# Patient Record
Sex: MALE | Race: WHITE | Employment: OTHER | URBAN - NONMETROPOLITAN AREA
[De-identification: names, ages, dates, MRNs, and addresses within clinical notes are randomized per-mention and may not be internally consistent; named-entity substitution may affect disease eponyms.]

---

## 2018-02-08 ENCOUNTER — HOSPITAL ENCOUNTER (OUTPATIENT)
Age: 73
Discharge: HOME | End: 2018-02-08
Payer: MEDICARE

## 2018-02-08 LAB
ABSOLUTE EOS #: 0.5 K/UL (ref 0–0.4)
ABSOLUTE IMMATURE GRANULOCYTE: ABNORMAL K/UL (ref 0–0.3)
ABSOLUTE LYMPH #: 2 K/UL (ref 1–4.8)
ABSOLUTE MONO #: 0.8 K/UL (ref 0–1)
ALBUMIN SERPL-MCNC: 4 G/DL (ref 3.5–5.2)
ALBUMIN/GLOBULIN RATIO: 1.3 (ref 1–2.5)
ALP BLD-CCNC: 45 U/L (ref 40–129)
ALT SERPL-CCNC: 26 U/L (ref 5–41)
ANION GAP SERPL CALCULATED.3IONS-SCNC: 11 MMOL/L (ref 9–17)
AST SERPL-CCNC: 23 U/L
BASOPHILS # BLD: 0 % (ref 0–2)
BASOPHILS ABSOLUTE: 0 K/UL (ref 0–0.2)
BILIRUB SERPL-MCNC: 0.55 MG/DL (ref 0.3–1.2)
BUN BLDV-MCNC: 9 MG/DL (ref 8–23)
BUN/CREAT BLD: 13 (ref 9–20)
CALCIUM SERPL-MCNC: 9.2 MG/DL (ref 8.6–10.4)
CHLORIDE BLD-SCNC: 99 MMOL/L (ref 98–107)
CO2: 27 MMOL/L (ref 20–31)
CREAT SERPL-MCNC: 0.71 MG/DL (ref 0.7–1.2)
DIFFERENTIAL TYPE: ABNORMAL
EOSINOPHILS RELATIVE PERCENT: 6 % (ref 0–8)
GFR AFRICAN AMERICAN: >60 ML/MIN
GFR NON-AFRICAN AMERICAN: >60 ML/MIN
GFR SERPL CREATININE-BSD FRML MDRD: ABNORMAL ML/MIN/{1.73_M2}
GFR SERPL CREATININE-BSD FRML MDRD: ABNORMAL ML/MIN/{1.73_M2}
GLUCOSE BLD-MCNC: 255 MG/DL (ref 70–99)
HCT VFR BLD CALC: 39.3 % (ref 41–53)
HEMOGLOBIN: 13.2 G/DL (ref 13.5–17)
IMMATURE GRANULOCYTES: ABNORMAL %
LYMPHOCYTES # BLD: 23 % (ref 24–44)
MCH RBC QN AUTO: 28.6 PG (ref 26–34)
MCHC RBC AUTO-ENTMCNC: 33.5 G/DL (ref 31–37)
MCV RBC AUTO: 85.4 FL (ref 80–100)
MONOCYTES # BLD: 9 % (ref 0–12)
NRBC AUTOMATED: ABNORMAL PER 100 WBC
PDW BLD-RTO: 13.9 % (ref 12.1–15.2)
PLATELET # BLD: 250 K/UL (ref 140–450)
PLATELET ESTIMATE: ABNORMAL
PMV BLD AUTO: 7.2 FL (ref 6–12)
POTASSIUM SERPL-SCNC: 4.3 MMOL/L (ref 3.7–5.3)
RBC # BLD: 4.6 M/UL (ref 4.5–5.9)
RBC # BLD: ABNORMAL 10*6/UL
SEG NEUTROPHILS: 62 % (ref 36–66)
SEGMENTED NEUTROPHILS ABSOLUTE COUNT: 5.2 K/UL (ref 1.8–7.7)
SODIUM BLD-SCNC: 137 MMOL/L (ref 135–144)
TOTAL PROTEIN: 7.2 G/DL (ref 6.4–8.3)
WBC # BLD: 8.6 K/UL (ref 3.5–11)
WBC # BLD: ABNORMAL 10*3/UL

## 2018-02-08 PROCEDURE — 87505 NFCT AGENT DETECTION GI: CPT

## 2018-02-08 PROCEDURE — 83630 LACTOFERRIN FECAL (QUAL): CPT

## 2018-02-08 PROCEDURE — 82272 OCCULT BLD FECES 1-3 TESTS: CPT

## 2018-02-08 PROCEDURE — 36415 COLL VENOUS BLD VENIPUNCTURE: CPT

## 2018-02-08 PROCEDURE — 80053 COMPREHEN METABOLIC PANEL: CPT

## 2018-02-08 PROCEDURE — 85025 COMPLETE CBC W/AUTO DIFF WBC: CPT

## 2018-02-09 LAB
CAMPYLOBACTER PCR: NORMAL
DATE, STOOL #1: NORMAL
DATE, STOOL #2: NORMAL
DATE, STOOL #3: NORMAL
HEMOCCULT SP1 STL QL: NEGATIVE
HEMOCCULT SP2 STL QL: NORMAL
HEMOCCULT SP3 STL QL: NORMAL
LACTOFERRIN, QUAL: ABNORMAL
SALMONELLA PCR: NORMAL
SHIGATOXIN GENE PCR: NORMAL
SHIGELLA SP PCR: NORMAL
SPECIMEN: NORMAL
TIME, STOOL #1: 1700
TIME, STOOL #2: NORMAL
TIME, STOOL #3: NORMAL

## 2018-09-05 ENCOUNTER — OFFICE VISIT (OUTPATIENT)
Dept: SURGERY | Age: 73
End: 2018-09-05

## 2018-09-05 VITALS
DIASTOLIC BLOOD PRESSURE: 75 MMHG | SYSTOLIC BLOOD PRESSURE: 120 MMHG | BODY MASS INDEX: 34.13 KG/M2 | RESPIRATION RATE: 24 BRPM | HEIGHT: 70 IN | HEART RATE: 82 BPM | WEIGHT: 238.4 LBS | TEMPERATURE: 98.2 F

## 2018-09-05 DIAGNOSIS — Z12.11 SCREENING FOR MALIGNANT NEOPLASM OF COLON: Primary | ICD-10-CM

## 2018-09-05 PROCEDURE — 1036F TOBACCO NON-USER: CPT | Performed by: SURGERY

## 2018-09-05 PROCEDURE — 99999 PR OFFICE/OUTPT VISIT,PROCEDURE ONLY: CPT | Performed by: SURGERY

## 2018-09-05 PROCEDURE — 1101F PT FALLS ASSESS-DOCD LE1/YR: CPT | Performed by: SURGERY

## 2018-09-05 PROCEDURE — 3017F COLORECTAL CA SCREEN DOC REV: CPT | Performed by: SURGERY

## 2018-09-05 PROCEDURE — G8427 DOCREV CUR MEDS BY ELIG CLIN: HCPCS | Performed by: SURGERY

## 2018-09-05 PROCEDURE — 4040F PNEUMOC VAC/ADMIN/RCVD: CPT | Performed by: SURGERY

## 2018-09-05 PROCEDURE — G8419 CALC BMI OUT NRM PARAM NOF/U: HCPCS | Performed by: SURGERY

## 2018-09-05 PROCEDURE — 1123F ACP DISCUSS/DSCN MKR DOCD: CPT | Performed by: SURGERY

## 2018-09-05 RX ORDER — LISINOPRIL 2.5 MG/1
2.5 TABLET ORAL DAILY
COMMUNITY

## 2018-09-05 RX ORDER — ZINC GLUCONATE 50 MG
50 TABLET ORAL DAILY
COMMUNITY

## 2018-09-05 RX ORDER — ATORVASTATIN CALCIUM 10 MG/1
20 TABLET, FILM COATED ORAL NIGHTLY
Refills: 3 | COMMUNITY
Start: 2018-08-27

## 2018-09-05 RX ORDER — FLUTICASONE PROPIONATE 50 MCG
1 SPRAY, SUSPENSION (ML) NASAL DAILY
COMMUNITY

## 2018-09-05 RX ORDER — TAMSULOSIN HYDROCHLORIDE 0.4 MG/1
0.4 CAPSULE ORAL NIGHTLY
COMMUNITY

## 2018-09-10 ENCOUNTER — HOSPITAL ENCOUNTER (OUTPATIENT)
Dept: VASCULAR LAB | Age: 73
Discharge: HOME | End: 2018-09-12
Payer: MEDICARE

## 2018-09-10 DIAGNOSIS — I65.29 STENOSIS OF CAROTID ARTERY, UNSPECIFIED LATERALITY: ICD-10-CM

## 2018-09-10 PROCEDURE — 93880 EXTRACRANIAL BILAT STUDY: CPT

## 2018-10-04 ENCOUNTER — ANESTHESIA EVENT (OUTPATIENT)
Dept: OPERATING ROOM | Age: 73
End: 2018-10-04
Payer: MEDICARE

## 2018-10-05 ENCOUNTER — ANESTHESIA (OUTPATIENT)
Dept: OPERATING ROOM | Age: 73
End: 2018-10-05
Payer: MEDICARE

## 2018-10-05 ENCOUNTER — HOSPITAL ENCOUNTER (OUTPATIENT)
Age: 73
Setting detail: OUTPATIENT SURGERY
Discharge: HOME | End: 2018-10-05
Attending: SURGERY | Admitting: SURGERY
Payer: MEDICARE

## 2018-10-05 VITALS
WEIGHT: 218 LBS | BODY MASS INDEX: 31.21 KG/M2 | HEIGHT: 70 IN | OXYGEN SATURATION: 98 % | TEMPERATURE: 97.2 F | DIASTOLIC BLOOD PRESSURE: 80 MMHG | HEART RATE: 74 BPM | RESPIRATION RATE: 20 BRPM | SYSTOLIC BLOOD PRESSURE: 118 MMHG

## 2018-10-05 VITALS
RESPIRATION RATE: 17 BRPM | SYSTOLIC BLOOD PRESSURE: 113 MMHG | DIASTOLIC BLOOD PRESSURE: 88 MMHG | OXYGEN SATURATION: 98 %

## 2018-10-05 PROBLEM — K64.1 GRADE II INTERNAL HEMORRHOIDS: Chronic | Status: ACTIVE | Noted: 2018-10-05

## 2018-10-05 LAB — GLUCOSE BLD-MCNC: 118 MG/DL (ref 74–100)

## 2018-10-05 PROCEDURE — 3700000000 HC ANESTHESIA ATTENDED CARE: Performed by: SURGERY

## 2018-10-05 PROCEDURE — 2580000003 HC RX 258: Performed by: NURSE ANESTHETIST, CERTIFIED REGISTERED

## 2018-10-05 PROCEDURE — 3609027000 HC COLONOSCOPY: Performed by: SURGERY

## 2018-10-05 PROCEDURE — G0121 COLON CA SCRN NOT HI RSK IND: HCPCS | Performed by: SURGERY

## 2018-10-05 PROCEDURE — 3700000001 HC ADD 15 MINUTES (ANESTHESIA): Performed by: SURGERY

## 2018-10-05 PROCEDURE — 82947 ASSAY GLUCOSE BLOOD QUANT: CPT

## 2018-10-05 PROCEDURE — 7100000010 HC PHASE II RECOVERY - FIRST 15 MIN: Performed by: SURGERY

## 2018-10-05 PROCEDURE — 7100000011 HC PHASE II RECOVERY - ADDTL 15 MIN: Performed by: SURGERY

## 2018-10-05 PROCEDURE — 6360000002 HC RX W HCPCS: Performed by: NURSE ANESTHETIST, CERTIFIED REGISTERED

## 2018-10-05 PROCEDURE — 2500000003 HC RX 250 WO HCPCS: Performed by: NURSE ANESTHETIST, CERTIFIED REGISTERED

## 2018-10-05 PROCEDURE — 2709999900 HC NON-CHARGEABLE SUPPLY: Performed by: SURGERY

## 2018-10-05 RX ORDER — LIDOCAINE HYDROCHLORIDE 20 MG/ML
INJECTION, SOLUTION INFILTRATION; PERINEURAL PRN
Status: DISCONTINUED | OUTPATIENT
Start: 2018-10-05 | End: 2018-10-05 | Stop reason: SDUPTHER

## 2018-10-05 RX ORDER — SODIUM CHLORIDE, SODIUM LACTATE, POTASSIUM CHLORIDE, CALCIUM CHLORIDE 600; 310; 30; 20 MG/100ML; MG/100ML; MG/100ML; MG/100ML
INJECTION, SOLUTION INTRAVENOUS CONTINUOUS
Status: DISCONTINUED | OUTPATIENT
Start: 2018-10-05 | End: 2018-10-05 | Stop reason: HOSPADM

## 2018-10-05 RX ORDER — PROPOFOL 10 MG/ML
INJECTION, EMULSION INTRAVENOUS CONTINUOUS PRN
Status: DISCONTINUED | OUTPATIENT
Start: 2018-10-05 | End: 2018-10-05 | Stop reason: SDUPTHER

## 2018-10-05 RX ORDER — PROPOFOL 10 MG/ML
INJECTION, EMULSION INTRAVENOUS PRN
Status: DISCONTINUED | OUTPATIENT
Start: 2018-10-05 | End: 2018-10-05 | Stop reason: SDUPTHER

## 2018-10-05 RX ADMIN — PROPOFOL 70 MG: 10 INJECTION, EMULSION INTRAVENOUS at 10:40

## 2018-10-05 RX ADMIN — SODIUM CHLORIDE, POTASSIUM CHLORIDE, SODIUM LACTATE AND CALCIUM CHLORIDE: 600; 310; 30; 20 INJECTION, SOLUTION INTRAVENOUS at 10:07

## 2018-10-05 RX ADMIN — LIDOCAINE HYDROCHLORIDE 100 MG: 20 INJECTION, SOLUTION INFILTRATION; PERINEURAL at 10:40

## 2018-10-05 RX ADMIN — PROPOFOL 150 MCG/KG/MIN: 10 INJECTION, EMULSION INTRAVENOUS at 10:40

## 2018-10-05 ASSESSMENT — PAIN - FUNCTIONAL ASSESSMENT: PAIN_FUNCTIONAL_ASSESSMENT: 0-10

## 2018-10-05 NOTE — OP NOTE
Op Note    Sulema Kamara  YOB: 1945  [id number]      PCP: Dr. Mo    Pre-operative Diagnosis: screening colonoscopy    Post-operative Diagnosis: grade 2 internal hemorrhoids. Normal colon. Procedure: colonoscopy to cecum    Anesthesia: MAC    Surgeon: Dr. Brice Collins    Estimated Blood Loss: none    Complications: None    Specimens: none     Procedure details:    I do meet with the patient in preoperative holding area. Please see H&P for indications for the above named procedure. Patient was brought to the endoscopy suite and placed under monitored anesthesia. Patient was positioned in left lateral position. Time out called and procedure confirmed. The lubricated variable stiffness pediatric colonoscope was carefully passed under direct vision into the rectum, advanced through the sigmoid colon, transverse colon, ascending colon and the cecum. The ileocecal valve was well visualized. Additional findings:    Findings:   Cecum: normal cecal pouch. IC valve and appendiceal orifice well confirmed  Ascending: normal  Transverse: normal  Descending: normal   Sigmoid: normal  Rectum: normal  Rectal exam: mild grade 1-2 internal hemorrhoids, no masses, no fissure  Bowel prep quality: excellent    At the distal 1/3 of the rectum, the scope was retroflexed showing grade 1-2 internal hemorrhoids. The patient tolerated the procedure well. The abdomen was soft after the procedure. I spoke with patient and wife afterwards. Next colonoscopy recommended 10 years. Active 72year old who appears younger and overall good health.     Electronically signed by David Thompson DO on 10/5/2018 at 10:58 AM

## 2018-10-05 NOTE — BRIEF OP NOTE
Brief Postoperative Note    Sulema Kamara  YOB: 1945  [id number]      PCP: Dr. Mo    Pre-operative Diagnosis: screening colonoscopy    Post-operative Diagnosis: grade 2 internal hemorrhoids. Normal colon.     Procedure: colonoscopy to cecum    Anesthesia: MAC    Surgeon: Dr. Brice Collins    Estimated Blood Loss: none    Complications: None    Specimens: none         Electronically signed by David Thompson DO on 10/5/2018 at 10:58 AM

## 2021-01-01 ENCOUNTER — ANESTHESIA (OUTPATIENT)
Dept: ICU | Age: 76
DRG: 871 | End: 2021-01-01
Payer: MEDICARE

## 2021-01-01 ENCOUNTER — APPOINTMENT (OUTPATIENT)
Dept: GENERAL RADIOLOGY | Age: 76
DRG: 870 | End: 2021-01-01
Attending: INTERNAL MEDICINE
Payer: MEDICARE

## 2021-01-01 ENCOUNTER — APPOINTMENT (OUTPATIENT)
Dept: GENERAL RADIOLOGY | Age: 76
DRG: 871 | End: 2021-01-01
Payer: MEDICARE

## 2021-01-01 ENCOUNTER — HOSPITAL ENCOUNTER (INPATIENT)
Age: 76
LOS: 16 days | DRG: 870 | End: 2021-02-20
Attending: INTERNAL MEDICINE | Admitting: INTERNAL MEDICINE
Payer: MEDICARE

## 2021-01-01 ENCOUNTER — HOSPITAL ENCOUNTER (OUTPATIENT)
Dept: LAB | Age: 76
Setting detail: SPECIMEN
Discharge: HOME | End: 2021-01-14
Payer: MEDICARE

## 2021-01-01 ENCOUNTER — APPOINTMENT (OUTPATIENT)
Dept: VASCULAR LAB | Age: 76
DRG: 871 | End: 2021-01-01
Payer: MEDICARE

## 2021-01-01 ENCOUNTER — TELEPHONE (OUTPATIENT)
Dept: PRIMARY CARE CLINIC | Age: 76
End: 2021-01-01

## 2021-01-01 ENCOUNTER — APPOINTMENT (OUTPATIENT)
Dept: ULTRASOUND IMAGING | Age: 76
DRG: 870 | End: 2021-01-01
Attending: INTERNAL MEDICINE
Payer: MEDICARE

## 2021-01-01 ENCOUNTER — HOSPITAL ENCOUNTER (INPATIENT)
Age: 76
LOS: 15 days | Discharge: ANOTHER HOSPITAL | DRG: 871 | End: 2021-02-03
Attending: EMERGENCY MEDICINE | Admitting: INTERNAL MEDICINE
Payer: MEDICARE

## 2021-01-01 ENCOUNTER — ANESTHESIA EVENT (OUTPATIENT)
Dept: ICU | Age: 76
DRG: 871 | End: 2021-01-01
Payer: MEDICARE

## 2021-01-01 ENCOUNTER — HOSPITAL ENCOUNTER (OUTPATIENT)
Age: 76
Setting detail: SPECIMEN
Discharge: HOME | End: 2021-01-14
Payer: MEDICARE

## 2021-01-01 VITALS
SYSTOLIC BLOOD PRESSURE: 119 MMHG | BODY MASS INDEX: 36.77 KG/M2 | OXYGEN SATURATION: 78 % | WEIGHT: 256.84 LBS | HEART RATE: 110 BPM | TEMPERATURE: 101.3 F | DIASTOLIC BLOOD PRESSURE: 69 MMHG | RESPIRATION RATE: 21 BRPM | HEIGHT: 70 IN

## 2021-01-01 VITALS
BODY MASS INDEX: 34.06 KG/M2 | OXYGEN SATURATION: 99 % | HEIGHT: 70 IN | WEIGHT: 237.88 LBS | TEMPERATURE: 97 F | RESPIRATION RATE: 20 BRPM | DIASTOLIC BLOOD PRESSURE: 55 MMHG | HEART RATE: 96 BPM | SYSTOLIC BLOOD PRESSURE: 84 MMHG

## 2021-01-01 DIAGNOSIS — J96.01 ACUTE HYPOXEMIC RESPIRATORY FAILURE (HCC): Primary | ICD-10-CM

## 2021-01-01 DIAGNOSIS — U07.1 PNEUMONIA DUE TO SEVERE ACUTE RESPIRATORY SYNDROME CORONAVIRUS 2 (SARS-COV-2): ICD-10-CM

## 2021-01-01 DIAGNOSIS — J12.82 PNEUMONIA DUE TO SEVERE ACUTE RESPIRATORY SYNDROME CORONAVIRUS 2 (SARS-COV-2): ICD-10-CM

## 2021-01-01 LAB
-: ABNORMAL
ABSOLUTE EOS #: 0 K/UL (ref 0–0.4)
ABSOLUTE EOS #: 0 K/UL (ref 0–0.44)
ABSOLUTE EOS #: 0.08 K/UL (ref 0–0.44)
ABSOLUTE EOS #: 0.09 K/UL (ref 0–0.4)
ABSOLUTE EOS #: 0.1 K/UL (ref 0–0.44)
ABSOLUTE EOS #: 0.23 K/UL (ref 0–0.44)
ABSOLUTE EOS #: <0.03 K/UL (ref 0–0.44)
ABSOLUTE IMMATURE GRANULOCYTE: 0 K/UL (ref 0–0.3)
ABSOLUTE IMMATURE GRANULOCYTE: 0.08 K/UL (ref 0–0.3)
ABSOLUTE IMMATURE GRANULOCYTE: 0.12 K/UL (ref 0–0.3)
ABSOLUTE IMMATURE GRANULOCYTE: 0.12 K/UL (ref 0–0.3)
ABSOLUTE IMMATURE GRANULOCYTE: 0.17 K/UL (ref 0–0.3)
ABSOLUTE IMMATURE GRANULOCYTE: 0.18 K/UL (ref 0–0.3)
ABSOLUTE IMMATURE GRANULOCYTE: 0.19 K/UL (ref 0–0.3)
ABSOLUTE IMMATURE GRANULOCYTE: 0.19 K/UL (ref 0–0.3)
ABSOLUTE IMMATURE GRANULOCYTE: 0.23 K/UL (ref 0–0.3)
ABSOLUTE IMMATURE GRANULOCYTE: 0.25 K/UL (ref 0–0.3)
ABSOLUTE IMMATURE GRANULOCYTE: 0.26 K/UL (ref 0–0.3)
ABSOLUTE IMMATURE GRANULOCYTE: 0.28 K/UL (ref 0–0.3)
ABSOLUTE IMMATURE GRANULOCYTE: 0.29 K/UL (ref 0–0.3)
ABSOLUTE IMMATURE GRANULOCYTE: 0.31 K/UL (ref 0–0.3)
ABSOLUTE IMMATURE GRANULOCYTE: 0.33 K/UL (ref 0–0.3)
ABSOLUTE IMMATURE GRANULOCYTE: 0.4 K/UL (ref 0–0.3)
ABSOLUTE IMMATURE GRANULOCYTE: 0.41 K/UL (ref 0–0.3)
ABSOLUTE IMMATURE GRANULOCYTE: 0.47 K/UL (ref 0–0.3)
ABSOLUTE LYMPH #: 0.12 K/UL (ref 1.1–3.7)
ABSOLUTE LYMPH #: 0.13 K/UL (ref 1.1–3.7)
ABSOLUTE LYMPH #: 0.14 K/UL (ref 1–4.8)
ABSOLUTE LYMPH #: 0.16 K/UL (ref 1.1–3.7)
ABSOLUTE LYMPH #: 0.17 K/UL (ref 1.1–3.7)
ABSOLUTE LYMPH #: 0.18 K/UL (ref 1–4.8)
ABSOLUTE LYMPH #: 0.19 K/UL (ref 1.1–3.7)
ABSOLUTE LYMPH #: 0.2 K/UL (ref 1.1–3.7)
ABSOLUTE LYMPH #: 0.24 K/UL (ref 1.1–3.7)
ABSOLUTE LYMPH #: 0.29 K/UL (ref 1.1–3.7)
ABSOLUTE LYMPH #: 0.29 K/UL (ref 1.1–3.7)
ABSOLUTE LYMPH #: 0.3 K/UL (ref 1.1–3.7)
ABSOLUTE LYMPH #: 0.33 K/UL (ref 1.1–3.7)
ABSOLUTE LYMPH #: 0.34 K/UL (ref 1.1–3.7)
ABSOLUTE LYMPH #: 0.37 K/UL (ref 1–4.8)
ABSOLUTE LYMPH #: 0.43 K/UL (ref 1–4.8)
ABSOLUTE LYMPH #: 0.45 K/UL (ref 1.1–3.7)
ABSOLUTE LYMPH #: 0.46 K/UL (ref 1.1–3.7)
ABSOLUTE LYMPH #: 0.46 K/UL (ref 1.1–3.7)
ABSOLUTE LYMPH #: 0.49 K/UL (ref 1.1–3.7)
ABSOLUTE LYMPH #: 0.49 K/UL (ref 1–4.8)
ABSOLUTE LYMPH #: 0.5 K/UL (ref 1.1–3.7)
ABSOLUTE LYMPH #: 0.53 K/UL (ref 1.1–3.7)
ABSOLUTE LYMPH #: 0.56 K/UL (ref 1–4.8)
ABSOLUTE LYMPH #: 0.59 K/UL (ref 1.1–3.7)
ABSOLUTE LYMPH #: 0.6 K/UL (ref 1.1–3.7)
ABSOLUTE LYMPH #: 0.62 K/UL (ref 1.1–3.7)
ABSOLUTE LYMPH #: 0.65 K/UL (ref 1.1–3.7)
ABSOLUTE LYMPH #: 0.7 K/UL (ref 1.1–3.7)
ABSOLUTE LYMPH #: 0.71 K/UL (ref 1.1–3.7)
ABSOLUTE LYMPH #: 0.77 K/UL (ref 1–4.8)
ABSOLUTE LYMPH #: 0.83 K/UL (ref 1.1–3.7)
ABSOLUTE MONO #: 0 K/UL (ref 0.1–0.8)
ABSOLUTE MONO #: 0 K/UL (ref 0.1–1.2)
ABSOLUTE MONO #: 0.13 K/UL (ref 0.1–1.2)
ABSOLUTE MONO #: 0.13 K/UL (ref 0.1–1.2)
ABSOLUTE MONO #: 0.15 K/UL (ref 0.1–1.2)
ABSOLUTE MONO #: 0.18 K/UL (ref 0.1–0.8)
ABSOLUTE MONO #: 0.26 K/UL (ref 0.1–1.2)
ABSOLUTE MONO #: 0.34 K/UL (ref 0.1–1.2)
ABSOLUTE MONO #: 0.4 K/UL (ref 0.1–1.2)
ABSOLUTE MONO #: 0.46 K/UL (ref 0.1–0.8)
ABSOLUTE MONO #: 0.46 K/UL (ref 0.1–1.2)
ABSOLUTE MONO #: 0.46 K/UL (ref 0.1–1.2)
ABSOLUTE MONO #: 0.47 K/UL (ref 0.1–1.2)
ABSOLUTE MONO #: 0.48 K/UL (ref 0.1–1.2)
ABSOLUTE MONO #: 0.48 K/UL (ref 0.1–1.2)
ABSOLUTE MONO #: 0.49 K/UL (ref 0.1–1.2)
ABSOLUTE MONO #: 0.5 K/UL (ref 0.1–1.2)
ABSOLUTE MONO #: 0.51 K/UL (ref 0.1–0.8)
ABSOLUTE MONO #: 0.55 K/UL (ref 0.1–1.2)
ABSOLUTE MONO #: 0.56 K/UL (ref 0.1–0.8)
ABSOLUTE MONO #: 0.57 K/UL (ref 0.1–1.2)
ABSOLUTE MONO #: 0.57 K/UL (ref 0.1–1.2)
ABSOLUTE MONO #: 0.58 K/UL (ref 0.1–1.2)
ABSOLUTE MONO #: 0.59 K/UL (ref 0.1–1.2)
ABSOLUTE MONO #: 0.61 K/UL (ref 0.1–1.2)
ABSOLUTE MONO #: 0.62 K/UL (ref 0.1–1.2)
ABSOLUTE MONO #: 0.65 K/UL (ref 0.1–0.8)
ABSOLUTE MONO #: 0.73 K/UL (ref 0.1–0.8)
ABSOLUTE MONO #: 1.01 K/UL (ref 0.1–1.2)
ACTION: NORMAL
ALBUMIN SERPL-MCNC: 2.4 G/DL (ref 3.5–5.2)
ALBUMIN SERPL-MCNC: 2.5 G/DL (ref 3.5–5.2)
ALBUMIN SERPL-MCNC: 2.6 G/DL (ref 3.5–5.2)
ALBUMIN SERPL-MCNC: 2.7 G/DL (ref 3.5–5.2)
ALBUMIN SERPL-MCNC: 2.8 G/DL (ref 3.5–5.2)
ALBUMIN SERPL-MCNC: 2.8 G/DL (ref 3.5–5.2)
ALBUMIN SERPL-MCNC: 3 G/DL (ref 3.5–5.2)
ALBUMIN SERPL-MCNC: 3.1 G/DL (ref 3.5–5.2)
ALBUMIN SERPL-MCNC: 3.1 G/DL (ref 3.5–5.2)
ALBUMIN SERPL-MCNC: 3.2 G/DL (ref 3.5–5.2)
ALBUMIN SERPL-MCNC: 3.4 G/DL (ref 3.5–5.2)
ALBUMIN SERPL-MCNC: 3.9 G/DL (ref 3.5–5.2)
ALBUMIN/GLOBULIN RATIO: 0.6 (ref 1–2.5)
ALBUMIN/GLOBULIN RATIO: 0.7 (ref 1–2.5)
ALBUMIN/GLOBULIN RATIO: 0.8 (ref 1–2.5)
ALBUMIN/GLOBULIN RATIO: 0.9 (ref 1–2.5)
ALBUMIN/GLOBULIN RATIO: 1.1 (ref 1–2.5)
ALLEN TEST: ABNORMAL
ALP BLD-CCNC: 123 U/L (ref 40–129)
ALP BLD-CCNC: 130 U/L (ref 40–129)
ALP BLD-CCNC: 141 U/L (ref 40–129)
ALP BLD-CCNC: 145 U/L (ref 40–129)
ALP BLD-CCNC: 163 U/L (ref 40–129)
ALP BLD-CCNC: 169 U/L (ref 40–129)
ALP BLD-CCNC: 170 U/L (ref 40–129)
ALP BLD-CCNC: 173 U/L (ref 40–129)
ALP BLD-CCNC: 180 U/L (ref 40–129)
ALP BLD-CCNC: 195 U/L (ref 40–129)
ALP BLD-CCNC: 213 U/L (ref 40–129)
ALP BLD-CCNC: 219 U/L (ref 40–129)
ALP BLD-CCNC: 219 U/L (ref 40–129)
ALP BLD-CCNC: 62 U/L (ref 40–129)
ALP BLD-CCNC: 69 U/L (ref 40–129)
ALP BLD-CCNC: 69 U/L (ref 40–129)
ALP BLD-CCNC: 93 U/L (ref 40–129)
ALP BLD-CCNC: 96 U/L (ref 40–129)
ALT SERPL-CCNC: 12 U/L (ref 5–41)
ALT SERPL-CCNC: 13 U/L (ref 5–41)
ALT SERPL-CCNC: 14 U/L (ref 5–41)
ALT SERPL-CCNC: 15 U/L (ref 5–41)
ALT SERPL-CCNC: 18 U/L (ref 5–41)
ALT SERPL-CCNC: 21 U/L (ref 5–41)
ALT SERPL-CCNC: 22 U/L (ref 5–41)
ALT SERPL-CCNC: 24 U/L (ref 5–41)
ALT SERPL-CCNC: 26 U/L (ref 5–41)
AMORPHOUS: ABNORMAL
ANION GAP SERPL CALCULATED.3IONS-SCNC: 10 MMOL/L (ref 9–17)
ANION GAP SERPL CALCULATED.3IONS-SCNC: 10 MMOL/L (ref 9–17)
ANION GAP SERPL CALCULATED.3IONS-SCNC: 11 MMOL/L (ref 9–17)
ANION GAP SERPL CALCULATED.3IONS-SCNC: 12 MMOL/L (ref 9–17)
ANION GAP SERPL CALCULATED.3IONS-SCNC: 12 MMOL/L (ref 9–17)
ANION GAP SERPL CALCULATED.3IONS-SCNC: 16 MMOL/L (ref 9–17)
ANION GAP SERPL CALCULATED.3IONS-SCNC: 2 MMOL/L (ref 9–17)
ANION GAP SERPL CALCULATED.3IONS-SCNC: 3 MMOL/L (ref 9–17)
ANION GAP SERPL CALCULATED.3IONS-SCNC: 4 MMOL/L (ref 9–17)
ANION GAP SERPL CALCULATED.3IONS-SCNC: 4 MMOL/L (ref 9–17)
ANION GAP SERPL CALCULATED.3IONS-SCNC: 5 MMOL/L (ref 9–17)
ANION GAP SERPL CALCULATED.3IONS-SCNC: 6 MMOL/L (ref 9–17)
ANION GAP SERPL CALCULATED.3IONS-SCNC: 7 MMOL/L (ref 9–17)
ANION GAP SERPL CALCULATED.3IONS-SCNC: 8 MMOL/L (ref 9–17)
ANION GAP SERPL CALCULATED.3IONS-SCNC: 9 MMOL/L (ref 9–17)
ANION GAP: 6 MMOL/L (ref 7–16)
AST SERPL-CCNC: 19 U/L
AST SERPL-CCNC: 24 U/L
AST SERPL-CCNC: 29 U/L
AST SERPL-CCNC: 30 U/L
AST SERPL-CCNC: 32 U/L
AST SERPL-CCNC: 32 U/L
AST SERPL-CCNC: 33 U/L
AST SERPL-CCNC: 34 U/L
AST SERPL-CCNC: 35 U/L
AST SERPL-CCNC: 36 U/L
AST SERPL-CCNC: 37 U/L
AST SERPL-CCNC: 37 U/L
AST SERPL-CCNC: 38 U/L
AST SERPL-CCNC: 40 U/L
AST SERPL-CCNC: 41 U/L
BACTERIA: ABNORMAL
BASOPHILS # BLD: 0 % (ref 0–2)
BASOPHILS # BLD: 1 % (ref 0–2)
BASOPHILS ABSOLUTE: 0 K/UL (ref 0–0.2)
BASOPHILS ABSOLUTE: 0.11 K/UL (ref 0–0.2)
BASOPHILS ABSOLUTE: <0.03 K/UL (ref 0–0.2)
BILIRUB SERPL-MCNC: 0.42 MG/DL (ref 0.3–1.2)
BILIRUB SERPL-MCNC: 0.54 MG/DL (ref 0.3–1.2)
BILIRUB SERPL-MCNC: 0.59 MG/DL (ref 0.3–1.2)
BILIRUB SERPL-MCNC: 0.6 MG/DL (ref 0.3–1.2)
BILIRUB SERPL-MCNC: 0.61 MG/DL (ref 0.3–1.2)
BILIRUB SERPL-MCNC: 0.62 MG/DL (ref 0.3–1.2)
BILIRUB SERPL-MCNC: 0.66 MG/DL (ref 0.3–1.2)
BILIRUB SERPL-MCNC: 0.68 MG/DL (ref 0.3–1.2)
BILIRUB SERPL-MCNC: 0.71 MG/DL (ref 0.3–1.2)
BILIRUB SERPL-MCNC: 0.75 MG/DL (ref 0.3–1.2)
BILIRUB SERPL-MCNC: 0.78 MG/DL (ref 0.3–1.2)
BILIRUB SERPL-MCNC: 0.79 MG/DL (ref 0.3–1.2)
BILIRUB SERPL-MCNC: 0.83 MG/DL (ref 0.3–1.2)
BILIRUB SERPL-MCNC: 0.86 MG/DL (ref 0.3–1.2)
BILIRUB SERPL-MCNC: 0.94 MG/DL (ref 0.3–1.2)
BILIRUB SERPL-MCNC: 0.97 MG/DL (ref 0.3–1.2)
BILIRUB SERPL-MCNC: 1.03 MG/DL (ref 0.3–1.2)
BILIRUB SERPL-MCNC: 1.04 MG/DL (ref 0.3–1.2)
BILIRUBIN DIRECT: 0.21 MG/DL
BILIRUBIN URINE: NEGATIVE
BILIRUBIN, INDIRECT: 0.41 MG/DL (ref 0–1)
BNP INTERPRETATION: ABNORMAL
BUN BLDV-MCNC: 18 MG/DL (ref 8–23)
BUN BLDV-MCNC: 20 MG/DL (ref 8–23)
BUN BLDV-MCNC: 20 MG/DL (ref 8–23)
BUN BLDV-MCNC: 21 MG/DL (ref 8–23)
BUN BLDV-MCNC: 22 MG/DL (ref 8–23)
BUN BLDV-MCNC: 23 MG/DL (ref 8–23)
BUN BLDV-MCNC: 23 MG/DL (ref 8–23)
BUN BLDV-MCNC: 24 MG/DL (ref 8–23)
BUN BLDV-MCNC: 24 MG/DL (ref 8–23)
BUN BLDV-MCNC: 25 MG/DL (ref 8–23)
BUN BLDV-MCNC: 26 MG/DL (ref 8–23)
BUN BLDV-MCNC: 28 MG/DL (ref 8–23)
BUN BLDV-MCNC: 29 MG/DL (ref 8–23)
BUN BLDV-MCNC: 31 MG/DL (ref 8–23)
BUN BLDV-MCNC: 31 MG/DL (ref 8–23)
BUN BLDV-MCNC: 32 MG/DL (ref 8–23)
BUN BLDV-MCNC: 32 MG/DL (ref 8–23)
BUN BLDV-MCNC: 35 MG/DL (ref 8–23)
BUN BLDV-MCNC: 36 MG/DL (ref 8–23)
BUN BLDV-MCNC: 36 MG/DL (ref 8–23)
BUN BLDV-MCNC: 37 MG/DL (ref 8–23)
BUN BLDV-MCNC: 38 MG/DL (ref 8–23)
BUN BLDV-MCNC: 39 MG/DL (ref 8–23)
BUN BLDV-MCNC: 40 MG/DL (ref 8–23)
BUN BLDV-MCNC: 42 MG/DL (ref 8–23)
BUN BLDV-MCNC: 43 MG/DL (ref 8–23)
BUN BLDV-MCNC: 45 MG/DL (ref 8–23)
BUN BLDV-MCNC: 47 MG/DL (ref 8–23)
BUN/CREAT BLD: 21 (ref 9–20)
BUN/CREAT BLD: 26 (ref 9–20)
BUN/CREAT BLD: 29 (ref 9–20)
BUN/CREAT BLD: 30 (ref 9–20)
BUN/CREAT BLD: 31 (ref 9–20)
BUN/CREAT BLD: 32 (ref 9–20)
BUN/CREAT BLD: 32 (ref 9–20)
BUN/CREAT BLD: 33 (ref 9–20)
BUN/CREAT BLD: 33 (ref 9–20)
BUN/CREAT BLD: 36 (ref 9–20)
BUN/CREAT BLD: 36 (ref 9–20)
BUN/CREAT BLD: 37 (ref 9–20)
BUN/CREAT BLD: 37 (ref 9–20)
BUN/CREAT BLD: 39 (ref 9–20)
BUN/CREAT BLD: 42 (ref 9–20)
BUN/CREAT BLD: 49 (ref 9–20)
BUN/CREAT BLD: ABNORMAL (ref 9–20)
C-REACTIVE PROTEIN: 98.4 MG/L (ref 0–5)
CALCIUM SERPL-MCNC: 8.2 MG/DL (ref 8.6–10.4)
CALCIUM SERPL-MCNC: 8.2 MG/DL (ref 8.6–10.4)
CALCIUM SERPL-MCNC: 8.3 MG/DL (ref 8.6–10.4)
CALCIUM SERPL-MCNC: 8.4 MG/DL (ref 8.6–10.4)
CALCIUM SERPL-MCNC: 8.5 MG/DL (ref 8.6–10.4)
CALCIUM SERPL-MCNC: 8.5 MG/DL (ref 8.6–10.4)
CALCIUM SERPL-MCNC: 8.6 MG/DL (ref 8.6–10.4)
CALCIUM SERPL-MCNC: 8.7 MG/DL (ref 8.6–10.4)
CALCIUM SERPL-MCNC: 8.8 MG/DL (ref 8.6–10.4)
CALCIUM SERPL-MCNC: 8.9 MG/DL (ref 8.6–10.4)
CALCIUM SERPL-MCNC: 9 MG/DL (ref 8.6–10.4)
CALCIUM SERPL-MCNC: 9.1 MG/DL (ref 8.6–10.4)
CALCIUM SERPL-MCNC: 9.2 MG/DL (ref 8.6–10.4)
CALCIUM SERPL-MCNC: 9.3 MG/DL (ref 8.6–10.4)
CALCIUM SERPL-MCNC: 9.4 MG/DL (ref 8.6–10.4)
CALCIUM SERPL-MCNC: 9.4 MG/DL (ref 8.6–10.4)
CALCIUM SERPL-MCNC: 9.5 MG/DL (ref 8.6–10.4)
CALCIUM SERPL-MCNC: 9.5 MG/DL (ref 8.6–10.4)
CARBOXYHEMOGLOBIN: ABNORMAL % (ref 0–5)
CASTS UA: ABNORMAL /LPF (ref 0–8)
CHLORIDE BLD-SCNC: 100 MMOL/L (ref 98–107)
CHLORIDE BLD-SCNC: 100 MMOL/L (ref 98–107)
CHLORIDE BLD-SCNC: 101 MMOL/L (ref 98–107)
CHLORIDE BLD-SCNC: 101 MMOL/L (ref 98–107)
CHLORIDE BLD-SCNC: 103 MMOL/L (ref 98–107)
CHLORIDE BLD-SCNC: 103 MMOL/L (ref 98–107)
CHLORIDE BLD-SCNC: 106 MMOL/L (ref 98–107)
CHLORIDE BLD-SCNC: 106 MMOL/L (ref 98–107)
CHLORIDE BLD-SCNC: 107 MMOL/L (ref 98–107)
CHLORIDE BLD-SCNC: 90 MMOL/L (ref 98–107)
CHLORIDE BLD-SCNC: 91 MMOL/L (ref 98–107)
CHLORIDE BLD-SCNC: 92 MMOL/L (ref 98–107)
CHLORIDE BLD-SCNC: 93 MMOL/L (ref 98–107)
CHLORIDE BLD-SCNC: 94 MMOL/L (ref 98–107)
CHLORIDE BLD-SCNC: 95 MMOL/L (ref 98–107)
CHLORIDE BLD-SCNC: 96 MMOL/L (ref 98–107)
CHLORIDE BLD-SCNC: 97 MMOL/L (ref 98–107)
CHLORIDE BLD-SCNC: 99 MMOL/L (ref 98–107)
CO2: 21 MMOL/L (ref 20–31)
CO2: 21 MMOL/L (ref 20–31)
CO2: 22 MMOL/L (ref 20–31)
CO2: 22 MMOL/L (ref 20–31)
CO2: 24 MMOL/L (ref 20–31)
CO2: 24 MMOL/L (ref 20–31)
CO2: 25 MMOL/L (ref 20–31)
CO2: 25 MMOL/L (ref 20–31)
CO2: 26 MMOL/L (ref 20–31)
CO2: 27 MMOL/L (ref 20–31)
CO2: 28 MMOL/L (ref 20–31)
CO2: 29 MMOL/L (ref 20–31)
CO2: 31 MMOL/L (ref 20–31)
CO2: 33 MMOL/L (ref 20–31)
CO2: 34 MMOL/L (ref 20–31)
CO2: 35 MMOL/L (ref 20–31)
CO2: 36 MMOL/L (ref 20–31)
CO2: 36 MMOL/L (ref 20–31)
CO2: 37 MMOL/L (ref 20–31)
CO2: 38 MMOL/L (ref 20–31)
CO2: 39 MMOL/L (ref 20–31)
CO2: 39 MMOL/L (ref 20–31)
COLOR: YELLOW
CORTISOL COLLECTION INFO: ABNORMAL
CORTISOL: 18.6 UG/DL (ref 2.7–18.4)
CREAT SERPL-MCNC: 0.52 MG/DL (ref 0.7–1.2)
CREAT SERPL-MCNC: 0.52 MG/DL (ref 0.7–1.2)
CREAT SERPL-MCNC: 0.55 MG/DL (ref 0.7–1.2)
CREAT SERPL-MCNC: 0.56 MG/DL (ref 0.7–1.2)
CREAT SERPL-MCNC: 0.57 MG/DL (ref 0.7–1.2)
CREAT SERPL-MCNC: 0.58 MG/DL (ref 0.7–1.2)
CREAT SERPL-MCNC: 0.59 MG/DL (ref 0.7–1.2)
CREAT SERPL-MCNC: 0.59 MG/DL (ref 0.7–1.2)
CREAT SERPL-MCNC: 0.62 MG/DL (ref 0.7–1.2)
CREAT SERPL-MCNC: 0.63 MG/DL (ref 0.7–1.2)
CREAT SERPL-MCNC: 0.64 MG/DL (ref 0.7–1.2)
CREAT SERPL-MCNC: 0.64 MG/DL (ref 0.7–1.2)
CREAT SERPL-MCNC: 0.65 MG/DL (ref 0.7–1.2)
CREAT SERPL-MCNC: 0.66 MG/DL (ref 0.7–1.2)
CREAT SERPL-MCNC: 0.67 MG/DL (ref 0.7–1.2)
CREAT SERPL-MCNC: 0.68 MG/DL (ref 0.7–1.2)
CREAT SERPL-MCNC: 0.69 MG/DL (ref 0.7–1.2)
CREAT SERPL-MCNC: 0.73 MG/DL (ref 0.7–1.2)
CREAT SERPL-MCNC: 0.74 MG/DL (ref 0.7–1.2)
CREAT SERPL-MCNC: 0.78 MG/DL (ref 0.7–1.2)
CREAT SERPL-MCNC: 0.78 MG/DL (ref 0.7–1.2)
CREAT SERPL-MCNC: 0.79 MG/DL (ref 0.7–1.2)
CREAT SERPL-MCNC: 0.81 MG/DL (ref 0.7–1.2)
CREAT SERPL-MCNC: 0.84 MG/DL (ref 0.7–1.2)
CREAT SERPL-MCNC: 0.96 MG/DL (ref 0.7–1.2)
CREAT SERPL-MCNC: 1.16 MG/DL (ref 0.7–1.2)
CREAT SERPL-MCNC: 1.21 MG/DL (ref 0.7–1.2)
CREAT SERPL-MCNC: 1.31 MG/DL (ref 0.7–1.2)
CREATININE URINE: 98.2 MG/DL (ref 39–259)
CRYSTALS, UA: ABNORMAL /HPF
CULTURE: ABNORMAL
CULTURE: NORMAL
D-DIMER QUANTITATIVE: 1.36 MG/L FEU (ref 0–0.59)
D-DIMER QUANTITATIVE: 1.51 MG/L FEU (ref 0–0.59)
D-DIMER QUANTITATIVE: 1.63 MG/L FEU (ref 0–0.59)
D-DIMER QUANTITATIVE: 10.67 MG/L FEU (ref 0–0.59)
D-DIMER QUANTITATIVE: 12.97 MG/L FEU (ref 0–0.59)
D-DIMER QUANTITATIVE: 13.5 MG/L FEU (ref 0–0.59)
D-DIMER QUANTITATIVE: 13.68 MG/L FEU (ref 0–0.59)
D-DIMER QUANTITATIVE: 15.1 MG/L FEU (ref 0–0.59)
D-DIMER QUANTITATIVE: 3.12 MG/L FEU (ref 0–0.59)
D-DIMER QUANTITATIVE: 3.53 MG/L FEU (ref 0–0.59)
D-DIMER QUANTITATIVE: 3.98 MG/L FEU (ref 0–0.59)
D-DIMER QUANTITATIVE: 4.69 MG/L FEU (ref 0–0.59)
D-DIMER QUANTITATIVE: 6 MG/L FEU (ref 0–0.59)
D-DIMER QUANTITATIVE: 7.39 MG/L FEU (ref 0–0.59)
D-DIMER QUANTITATIVE: 9.31 MG/L FEU (ref 0–0.59)
DATE AND TIME: NORMAL
DIFFERENTIAL TYPE: ABNORMAL
DIRECT EXAM: ABNORMAL
DIRECT EXAM: NORMAL
EKG ATRIAL RATE: 71 BPM
EKG ATRIAL RATE: 75 BPM
EKG ATRIAL RATE: 85 BPM
EKG ATRIAL RATE: 88 BPM
EKG P AXIS: -25 DEGREES
EKG P AXIS: 1 DEGREES
EKG P AXIS: 19 DEGREES
EKG P AXIS: 29 DEGREES
EKG P-R INTERVAL: 150 MS
EKG P-R INTERVAL: 166 MS
EKG P-R INTERVAL: 172 MS
EKG P-R INTERVAL: 178 MS
EKG Q-T INTERVAL: 358 MS
EKG Q-T INTERVAL: 372 MS
EKG Q-T INTERVAL: 382 MS
EKG Q-T INTERVAL: 400 MS
EKG QRS DURATION: 78 MS
EKG QRS DURATION: 80 MS
EKG QRS DURATION: 86 MS
EKG QRS DURATION: 88 MS
EKG QTC CALCULATION (BAZETT): 415 MS
EKG QTC CALCULATION (BAZETT): 433 MS
EKG QTC CALCULATION (BAZETT): 442 MS
EKG QTC CALCULATION (BAZETT): 446 MS
EKG R AXIS: 18 DEGREES
EKG R AXIS: 22 DEGREES
EKG R AXIS: 36 DEGREES
EKG R AXIS: 9 DEGREES
EKG T AXIS: -28 DEGREES
EKG T AXIS: 24 DEGREES
EKG T AXIS: 24 DEGREES
EKG T AXIS: 32 DEGREES
EKG VENTRICULAR RATE: 71 BPM
EKG VENTRICULAR RATE: 75 BPM
EKG VENTRICULAR RATE: 85 BPM
EKG VENTRICULAR RATE: 88 BPM
EOSINOPHIL,URINE: NORMAL
EOSINOPHILS RELATIVE PERCENT: 0 % (ref 1–4)
EOSINOPHILS RELATIVE PERCENT: 1 % (ref 1–4)
EOSINOPHILS RELATIVE PERCENT: 2 % (ref 1–4)
EPITHELIAL CELLS UA: ABNORMAL /HPF (ref 0–5)
ESTIMATED AVERAGE GLUCOSE: 163 MG/DL
FERRITIN: 307 UG/L (ref 30–400)
FIBRINOGEN: 313 MG/DL (ref 179–518)
FIBRINOGEN: 328 MG/DL (ref 179–518)
FIBRINOGEN: 347 MG/DL (ref 179–518)
FIBRINOGEN: 350 MG/DL (ref 179–518)
FIBRINOGEN: 354 MG/DL (ref 179–518)
FIBRINOGEN: 362 MG/DL (ref 179–518)
FIBRINOGEN: 365 MG/DL (ref 179–518)
FIBRINOGEN: 372 MG/DL (ref 179–518)
FIBRINOGEN: 385 MG/DL (ref 179–518)
FIBRINOGEN: 390 MG/DL (ref 179–518)
FIBRINOGEN: 406 MG/DL (ref 179–518)
FIBRINOGEN: 433 MG/DL (ref 179–518)
FIBRINOGEN: 465 MG/DL (ref 179–518)
FIBRINOGEN: 489 MG/DL (ref 179–518)
FIBRINOGEN: 540 MG/DL (ref 179–518)
FIO2: 100
FIO2: 65
FIO2: 70
FIO2: 75
FIO2: 80
FIO2: 90
FIO2: 95
FIO2: 95
FIO2: ABNORMAL
GFR AFRICAN AMERICAN: >60 ML/MIN
GFR NON-AFRICAN AMERICAN: 54 ML/MIN
GFR NON-AFRICAN AMERICAN: 59 ML/MIN
GFR NON-AFRICAN AMERICAN: >60 ML/MIN
GFR SERPL CREATININE-BSD FRML MDRD: ABNORMAL ML/MIN/{1.73_M2}
GLOBULIN: ABNORMAL G/DL (ref 1.5–3.8)
GLUCOSE BLD-MCNC: 107 MG/DL (ref 75–110)
GLUCOSE BLD-MCNC: 115 MG/DL (ref 75–110)
GLUCOSE BLD-MCNC: 115 MG/DL (ref 75–110)
GLUCOSE BLD-MCNC: 121 MG/DL (ref 70–99)
GLUCOSE BLD-MCNC: 122 MG/DL (ref 74–100)
GLUCOSE BLD-MCNC: 125 MG/DL (ref 75–110)
GLUCOSE BLD-MCNC: 127 MG/DL (ref 75–110)
GLUCOSE BLD-MCNC: 129 MG/DL (ref 70–99)
GLUCOSE BLD-MCNC: 129 MG/DL (ref 74–100)
GLUCOSE BLD-MCNC: 130 MG/DL (ref 75–110)
GLUCOSE BLD-MCNC: 131 MG/DL (ref 75–110)
GLUCOSE BLD-MCNC: 133 MG/DL (ref 75–110)
GLUCOSE BLD-MCNC: 134 MG/DL (ref 70–99)
GLUCOSE BLD-MCNC: 134 MG/DL (ref 70–99)
GLUCOSE BLD-MCNC: 136 MG/DL (ref 70–99)
GLUCOSE BLD-MCNC: 136 MG/DL (ref 75–110)
GLUCOSE BLD-MCNC: 138 MG/DL (ref 74–100)
GLUCOSE BLD-MCNC: 141 MG/DL (ref 74–100)
GLUCOSE BLD-MCNC: 141 MG/DL (ref 74–100)
GLUCOSE BLD-MCNC: 141 MG/DL (ref 75–110)
GLUCOSE BLD-MCNC: 141 MG/DL (ref 75–110)
GLUCOSE BLD-MCNC: 142 MG/DL (ref 70–99)
GLUCOSE BLD-MCNC: 144 MG/DL (ref 74–100)
GLUCOSE BLD-MCNC: 144 MG/DL (ref 74–100)
GLUCOSE BLD-MCNC: 145 MG/DL (ref 75–110)
GLUCOSE BLD-MCNC: 146 MG/DL (ref 74–100)
GLUCOSE BLD-MCNC: 146 MG/DL (ref 75–110)
GLUCOSE BLD-MCNC: 146 MG/DL (ref 75–110)
GLUCOSE BLD-MCNC: 148 MG/DL (ref 74–100)
GLUCOSE BLD-MCNC: 148 MG/DL (ref 74–100)
GLUCOSE BLD-MCNC: 150 MG/DL (ref 70–99)
GLUCOSE BLD-MCNC: 150 MG/DL (ref 75–110)
GLUCOSE BLD-MCNC: 154 MG/DL (ref 75–110)
GLUCOSE BLD-MCNC: 155 MG/DL (ref 70–99)
GLUCOSE BLD-MCNC: 155 MG/DL (ref 74–100)
GLUCOSE BLD-MCNC: 155 MG/DL (ref 75–110)
GLUCOSE BLD-MCNC: 155 MG/DL (ref 75–110)
GLUCOSE BLD-MCNC: 157 MG/DL (ref 75–110)
GLUCOSE BLD-MCNC: 158 MG/DL (ref 70–99)
GLUCOSE BLD-MCNC: 158 MG/DL (ref 74–100)
GLUCOSE BLD-MCNC: 158 MG/DL (ref 75–110)
GLUCOSE BLD-MCNC: 159 MG/DL (ref 70–99)
GLUCOSE BLD-MCNC: 159 MG/DL (ref 75–110)
GLUCOSE BLD-MCNC: 160 MG/DL (ref 70–99)
GLUCOSE BLD-MCNC: 160 MG/DL (ref 70–99)
GLUCOSE BLD-MCNC: 160 MG/DL (ref 75–110)
GLUCOSE BLD-MCNC: 161 MG/DL (ref 74–100)
GLUCOSE BLD-MCNC: 162 MG/DL (ref 74–100)
GLUCOSE BLD-MCNC: 164 MG/DL (ref 74–100)
GLUCOSE BLD-MCNC: 164 MG/DL (ref 74–100)
GLUCOSE BLD-MCNC: 166 MG/DL (ref 74–100)
GLUCOSE BLD-MCNC: 166 MG/DL (ref 75–110)
GLUCOSE BLD-MCNC: 166 MG/DL (ref 75–110)
GLUCOSE BLD-MCNC: 167 MG/DL (ref 70–99)
GLUCOSE BLD-MCNC: 167 MG/DL (ref 75–110)
GLUCOSE BLD-MCNC: 168 MG/DL (ref 70–99)
GLUCOSE BLD-MCNC: 169 MG/DL (ref 75–110)
GLUCOSE BLD-MCNC: 170 MG/DL (ref 74–100)
GLUCOSE BLD-MCNC: 170 MG/DL (ref 75–110)
GLUCOSE BLD-MCNC: 170 MG/DL (ref 75–110)
GLUCOSE BLD-MCNC: 171 MG/DL (ref 74–100)
GLUCOSE BLD-MCNC: 171 MG/DL (ref 75–110)
GLUCOSE BLD-MCNC: 172 MG/DL (ref 75–110)
GLUCOSE BLD-MCNC: 172 MG/DL (ref 75–110)
GLUCOSE BLD-MCNC: 173 MG/DL (ref 75–110)
GLUCOSE BLD-MCNC: 173 MG/DL (ref 75–110)
GLUCOSE BLD-MCNC: 174 MG/DL (ref 75–110)
GLUCOSE BLD-MCNC: 174 MG/DL (ref 75–110)
GLUCOSE BLD-MCNC: 175 MG/DL (ref 75–110)
GLUCOSE BLD-MCNC: 176 MG/DL (ref 75–110)
GLUCOSE BLD-MCNC: 178 MG/DL (ref 70–99)
GLUCOSE BLD-MCNC: 179 MG/DL (ref 75–110)
GLUCOSE BLD-MCNC: 179 MG/DL (ref 75–110)
GLUCOSE BLD-MCNC: 180 MG/DL (ref 74–100)
GLUCOSE BLD-MCNC: 181 MG/DL (ref 70–99)
GLUCOSE BLD-MCNC: 181 MG/DL (ref 70–99)
GLUCOSE BLD-MCNC: 181 MG/DL (ref 75–110)
GLUCOSE BLD-MCNC: 182 MG/DL (ref 74–100)
GLUCOSE BLD-MCNC: 182 MG/DL (ref 74–100)
GLUCOSE BLD-MCNC: 182 MG/DL (ref 75–110)
GLUCOSE BLD-MCNC: 183 MG/DL (ref 74–100)
GLUCOSE BLD-MCNC: 183 MG/DL (ref 75–110)
GLUCOSE BLD-MCNC: 184 MG/DL (ref 74–100)
GLUCOSE BLD-MCNC: 184 MG/DL (ref 75–110)
GLUCOSE BLD-MCNC: 184 MG/DL (ref 75–110)
GLUCOSE BLD-MCNC: 185 MG/DL (ref 74–100)
GLUCOSE BLD-MCNC: 186 MG/DL (ref 70–99)
GLUCOSE BLD-MCNC: 186 MG/DL (ref 75–110)
GLUCOSE BLD-MCNC: 187 MG/DL (ref 70–99)
GLUCOSE BLD-MCNC: 187 MG/DL (ref 74–100)
GLUCOSE BLD-MCNC: 188 MG/DL (ref 74–100)
GLUCOSE BLD-MCNC: 189 MG/DL (ref 70–99)
GLUCOSE BLD-MCNC: 189 MG/DL (ref 74–100)
GLUCOSE BLD-MCNC: 189 MG/DL (ref 75–110)
GLUCOSE BLD-MCNC: 190 MG/DL (ref 74–100)
GLUCOSE BLD-MCNC: 190 MG/DL (ref 75–110)
GLUCOSE BLD-MCNC: 191 MG/DL (ref 75–110)
GLUCOSE BLD-MCNC: 192 MG/DL (ref 75–110)
GLUCOSE BLD-MCNC: 193 MG/DL (ref 74–100)
GLUCOSE BLD-MCNC: 194 MG/DL (ref 75–110)
GLUCOSE BLD-MCNC: 195 MG/DL (ref 74–100)
GLUCOSE BLD-MCNC: 195 MG/DL (ref 74–100)
GLUCOSE BLD-MCNC: 196 MG/DL (ref 74–100)
GLUCOSE BLD-MCNC: 197 MG/DL (ref 75–110)
GLUCOSE BLD-MCNC: 198 MG/DL (ref 70–99)
GLUCOSE BLD-MCNC: 198 MG/DL (ref 74–100)
GLUCOSE BLD-MCNC: 198 MG/DL (ref 75–110)
GLUCOSE BLD-MCNC: 199 MG/DL (ref 70–99)
GLUCOSE BLD-MCNC: 199 MG/DL (ref 75–110)
GLUCOSE BLD-MCNC: 201 MG/DL (ref 70–99)
GLUCOSE BLD-MCNC: 201 MG/DL (ref 74–100)
GLUCOSE BLD-MCNC: 204 MG/DL (ref 70–99)
GLUCOSE BLD-MCNC: 205 MG/DL (ref 70–99)
GLUCOSE BLD-MCNC: 205 MG/DL (ref 70–99)
GLUCOSE BLD-MCNC: 206 MG/DL (ref 75–110)
GLUCOSE BLD-MCNC: 206 MG/DL (ref 75–110)
GLUCOSE BLD-MCNC: 207 MG/DL (ref 74–100)
GLUCOSE BLD-MCNC: 208 MG/DL (ref 75–110)
GLUCOSE BLD-MCNC: 208 MG/DL (ref 75–110)
GLUCOSE BLD-MCNC: 209 MG/DL (ref 70–99)
GLUCOSE BLD-MCNC: 209 MG/DL (ref 75–110)
GLUCOSE BLD-MCNC: 209 MG/DL (ref 75–110)
GLUCOSE BLD-MCNC: 210 MG/DL (ref 74–100)
GLUCOSE BLD-MCNC: 211 MG/DL (ref 74–100)
GLUCOSE BLD-MCNC: 211 MG/DL (ref 75–110)
GLUCOSE BLD-MCNC: 211 MG/DL (ref 75–110)
GLUCOSE BLD-MCNC: 212 MG/DL (ref 74–100)
GLUCOSE BLD-MCNC: 213 MG/DL (ref 75–110)
GLUCOSE BLD-MCNC: 213 MG/DL (ref 75–110)
GLUCOSE BLD-MCNC: 214 MG/DL (ref 74–100)
GLUCOSE BLD-MCNC: 214 MG/DL (ref 75–110)
GLUCOSE BLD-MCNC: 215 MG/DL (ref 74–100)
GLUCOSE BLD-MCNC: 217 MG/DL (ref 70–99)
GLUCOSE BLD-MCNC: 218 MG/DL (ref 74–100)
GLUCOSE BLD-MCNC: 218 MG/DL (ref 75–110)
GLUCOSE BLD-MCNC: 218 MG/DL (ref 75–110)
GLUCOSE BLD-MCNC: 219 MG/DL (ref 70–99)
GLUCOSE BLD-MCNC: 220 MG/DL (ref 74–100)
GLUCOSE BLD-MCNC: 220 MG/DL (ref 74–100)
GLUCOSE BLD-MCNC: 221 MG/DL (ref 74–100)
GLUCOSE BLD-MCNC: 222 MG/DL (ref 70–99)
GLUCOSE BLD-MCNC: 223 MG/DL (ref 75–110)
GLUCOSE BLD-MCNC: 225 MG/DL (ref 74–100)
GLUCOSE BLD-MCNC: 226 MG/DL (ref 74–100)
GLUCOSE BLD-MCNC: 228 MG/DL (ref 74–100)
GLUCOSE BLD-MCNC: 229 MG/DL (ref 70–99)
GLUCOSE BLD-MCNC: 231 MG/DL (ref 70–99)
GLUCOSE BLD-MCNC: 231 MG/DL (ref 75–110)
GLUCOSE BLD-MCNC: 231 MG/DL (ref 75–110)
GLUCOSE BLD-MCNC: 233 MG/DL (ref 70–99)
GLUCOSE BLD-MCNC: 233 MG/DL (ref 74–100)
GLUCOSE BLD-MCNC: 234 MG/DL (ref 74–100)
GLUCOSE BLD-MCNC: 234 MG/DL (ref 74–100)
GLUCOSE BLD-MCNC: 234 MG/DL (ref 75–110)
GLUCOSE BLD-MCNC: 235 MG/DL (ref 70–99)
GLUCOSE BLD-MCNC: 235 MG/DL (ref 70–99)
GLUCOSE BLD-MCNC: 237 MG/DL (ref 70–99)
GLUCOSE BLD-MCNC: 238 MG/DL (ref 74–100)
GLUCOSE BLD-MCNC: 238 MG/DL (ref 75–110)
GLUCOSE BLD-MCNC: 240 MG/DL (ref 74–100)
GLUCOSE BLD-MCNC: 241 MG/DL (ref 70–99)
GLUCOSE BLD-MCNC: 242 MG/DL (ref 74–100)
GLUCOSE BLD-MCNC: 243 MG/DL (ref 74–100)
GLUCOSE BLD-MCNC: 244 MG/DL (ref 74–100)
GLUCOSE BLD-MCNC: 244 MG/DL (ref 74–100)
GLUCOSE BLD-MCNC: 245 MG/DL (ref 75–110)
GLUCOSE BLD-MCNC: 246 MG/DL (ref 70–99)
GLUCOSE BLD-MCNC: 246 MG/DL (ref 70–99)
GLUCOSE BLD-MCNC: 247 MG/DL (ref 70–99)
GLUCOSE BLD-MCNC: 248 MG/DL (ref 75–110)
GLUCOSE BLD-MCNC: 249 MG/DL (ref 74–100)
GLUCOSE BLD-MCNC: 250 MG/DL (ref 70–99)
GLUCOSE BLD-MCNC: 251 MG/DL (ref 74–100)
GLUCOSE BLD-MCNC: 254 MG/DL (ref 74–100)
GLUCOSE BLD-MCNC: 255 MG/DL (ref 70–99)
GLUCOSE BLD-MCNC: 256 MG/DL (ref 74–100)
GLUCOSE BLD-MCNC: 257 MG/DL (ref 70–99)
GLUCOSE BLD-MCNC: 260 MG/DL (ref 74–100)
GLUCOSE BLD-MCNC: 261 MG/DL (ref 75–110)
GLUCOSE BLD-MCNC: 264 MG/DL (ref 74–100)
GLUCOSE BLD-MCNC: 287 MG/DL (ref 74–100)
GLUCOSE BLD-MCNC: 288 MG/DL (ref 74–100)
GLUCOSE URINE: NEGATIVE
HBA1C MFR BLD: 7.3 % (ref 4–6)
HCO3 VENOUS: 20.3 MMOL/L (ref 24–30)
HCT VFR BLD CALC: 28.5 % (ref 40.7–50.3)
HCT VFR BLD CALC: 28.9 % (ref 40.7–50.3)
HCT VFR BLD CALC: 28.9 % (ref 40.7–50.3)
HCT VFR BLD CALC: 29 % (ref 40.7–50.3)
HCT VFR BLD CALC: 29 % (ref 40.7–50.3)
HCT VFR BLD CALC: 29.1 % (ref 40.7–50.3)
HCT VFR BLD CALC: 29.6 % (ref 40.7–50.3)
HCT VFR BLD CALC: 30.5 % (ref 40.7–50.3)
HCT VFR BLD CALC: 31.7 % (ref 40.7–50.3)
HCT VFR BLD CALC: 32.3 % (ref 40.7–50.3)
HCT VFR BLD CALC: 32.4 % (ref 40.7–50.3)
HCT VFR BLD CALC: 33 % (ref 40.7–50.3)
HCT VFR BLD CALC: 33.4 % (ref 40.7–50.3)
HCT VFR BLD CALC: 33.7 % (ref 40.7–50.3)
HCT VFR BLD CALC: 34.8 % (ref 40.7–50.3)
HCT VFR BLD CALC: 34.9 % (ref 40.7–50.3)
HCT VFR BLD CALC: 35.3 % (ref 40.7–50.3)
HCT VFR BLD CALC: 35.4 % (ref 40.7–50.3)
HCT VFR BLD CALC: 35.5 % (ref 40.7–50.3)
HCT VFR BLD CALC: 36 % (ref 40.7–50.3)
HCT VFR BLD CALC: 36.2 % (ref 40.7–50.3)
HCT VFR BLD CALC: 36.2 % (ref 40.7–50.3)
HCT VFR BLD CALC: 36.3 % (ref 40.7–50.3)
HCT VFR BLD CALC: 36.4 % (ref 40.7–50.3)
HCT VFR BLD CALC: 36.4 % (ref 40.7–50.3)
HCT VFR BLD CALC: 36.6 % (ref 40.7–50.3)
HCT VFR BLD CALC: 37.1 % (ref 40.7–50.3)
HCT VFR BLD CALC: 37.7 % (ref 40.7–50.3)
HCT VFR BLD CALC: 38.7 % (ref 40.7–50.3)
HCT VFR BLD CALC: 38.7 % (ref 40.7–50.3)
HCT VFR BLD CALC: 40 % (ref 40.7–50.3)
HCT VFR BLD CALC: 40.3 % (ref 40.7–50.3)
HEMOGLOBIN: 10.1 G/DL (ref 13–17)
HEMOGLOBIN: 10.3 G/DL (ref 13–17)
HEMOGLOBIN: 10.4 G/DL (ref 13–17)
HEMOGLOBIN: 11 G/DL (ref 13–17)
HEMOGLOBIN: 11.2 G/DL (ref 13–17)
HEMOGLOBIN: 11.3 G/DL (ref 13–17)
HEMOGLOBIN: 11.4 G/DL (ref 13–17)
HEMOGLOBIN: 11.5 G/DL (ref 13–17)
HEMOGLOBIN: 11.6 G/DL (ref 13–17)
HEMOGLOBIN: 11.6 G/DL (ref 13–17)
HEMOGLOBIN: 11.7 G/DL (ref 13–17)
HEMOGLOBIN: 11.7 G/DL (ref 13–17)
HEMOGLOBIN: 11.8 G/DL (ref 13–17)
HEMOGLOBIN: 11.9 G/DL (ref 13–17)
HEMOGLOBIN: 12 G/DL (ref 13–17)
HEMOGLOBIN: 12.1 G/DL (ref 13–17)
HEMOGLOBIN: 12.2 G/DL (ref 13–17)
HEMOGLOBIN: 12.5 G/DL (ref 13–17)
HEMOGLOBIN: 12.5 G/DL (ref 13–17)
HEMOGLOBIN: 13 G/DL (ref 13–17)
HEMOGLOBIN: 13.3 G/DL (ref 13–17)
HEMOGLOBIN: 8.5 G/DL (ref 13–17)
HEMOGLOBIN: 8.7 G/DL (ref 13–17)
HEMOGLOBIN: 8.8 G/DL (ref 13–17)
HEMOGLOBIN: 8.8 G/DL (ref 13–17)
HEMOGLOBIN: 9 G/DL (ref 13–17)
HEMOGLOBIN: 9.1 G/DL (ref 13–17)
HEMOGLOBIN: 9.1 G/DL (ref 13–17)
HEMOGLOBIN: 9.5 G/DL (ref 13–17)
HEMOGLOBIN: 9.8 G/DL (ref 13–17)
HEMOGLOBIN: 9.8 G/DL (ref 13–17)
HEMOGLOBIN: 9.9 G/DL (ref 13–17)
IMMATURE GRANULOCYTES: 0 %
IMMATURE GRANULOCYTES: 1 %
IMMATURE GRANULOCYTES: 2 %
IMMATURE GRANULOCYTES: 3 %
IMMATURE GRANULOCYTES: 4 %
IMMATURE GRANULOCYTES: 5 %
INR BLD: 1.1
INR BLD: 1.2
INR BLD: 1.3
INR BLD: 1.4
INR BLD: 1.4
INTERVENTION: NORMAL
KETONES, URINE: NEGATIVE
LACTATE DEHYDROGENASE: 666 U/L (ref 135–225)
LACTIC ACID, SEPSIS WHOLE BLOOD: ABNORMAL MMOL/L (ref 0.5–1.9)
LACTIC ACID, SEPSIS WHOLE BLOOD: NORMAL MMOL/L (ref 0.5–1.9)
LACTIC ACID, SEPSIS: 1.7 MMOL/L (ref 0.5–1.9)
LACTIC ACID, SEPSIS: 3 MMOL/L (ref 0.5–1.9)
LACTIC ACID, WHOLE BLOOD: 1.5 MMOL/L (ref 0.7–2.1)
LACTIC ACID, WHOLE BLOOD: 1.5 MMOL/L (ref 0.7–2.1)
LEUKOCYTE ESTERASE, URINE: ABNORMAL
LYMPHOCYTES # BLD: 1 % (ref 24–43)
LYMPHOCYTES # BLD: 1 % (ref 24–44)
LYMPHOCYTES # BLD: 1 % (ref 24–44)
LYMPHOCYTES # BLD: 2 % (ref 24–43)
LYMPHOCYTES # BLD: 3 % (ref 24–43)
LYMPHOCYTES # BLD: 4 % (ref 24–43)
LYMPHOCYTES # BLD: 4 % (ref 24–44)
LYMPHOCYTES # BLD: 5 % (ref 24–43)
LYMPHOCYTES # BLD: 5 % (ref 24–44)
LYMPHOCYTES # BLD: 6 % (ref 24–43)
LYMPHOCYTES # BLD: 6 % (ref 24–43)
LYMPHOCYTES # BLD: 6 % (ref 24–44)
LYMPHOCYTES # BLD: 7 % (ref 24–43)
LYMPHOCYTES # BLD: 8 % (ref 24–43)
Lab: ABNORMAL
Lab: NORMAL
MCH RBC QN AUTO: 26.8 PG (ref 25.2–33.5)
MCH RBC QN AUTO: 27 PG (ref 25.2–33.5)
MCH RBC QN AUTO: 27 PG (ref 25.2–33.5)
MCH RBC QN AUTO: 27.1 PG (ref 25.2–33.5)
MCH RBC QN AUTO: 27.2 PG (ref 25.2–33.5)
MCH RBC QN AUTO: 27.3 PG (ref 25.2–33.5)
MCH RBC QN AUTO: 27.4 PG (ref 25.2–33.5)
MCH RBC QN AUTO: 27.5 PG (ref 25.2–33.5)
MCH RBC QN AUTO: 27.7 PG (ref 25.2–33.5)
MCH RBC QN AUTO: 27.7 PG (ref 25.2–33.5)
MCH RBC QN AUTO: 27.8 PG (ref 25.2–33.5)
MCH RBC QN AUTO: 27.8 PG (ref 25.2–33.5)
MCH RBC QN AUTO: 28.1 PG (ref 25.2–33.5)
MCHC RBC AUTO-ENTMCNC: 29.2 G/DL (ref 28.4–34.8)
MCHC RBC AUTO-ENTMCNC: 30.2 G/DL (ref 28.4–34.8)
MCHC RBC AUTO-ENTMCNC: 30.2 G/DL (ref 28.4–34.8)
MCHC RBC AUTO-ENTMCNC: 30.3 G/DL (ref 28.4–34.8)
MCHC RBC AUTO-ENTMCNC: 30.4 G/DL (ref 28.4–34.8)
MCHC RBC AUTO-ENTMCNC: 30.4 G/DL (ref 28.4–34.8)
MCHC RBC AUTO-ENTMCNC: 30.5 G/DL (ref 28.4–34.8)
MCHC RBC AUTO-ENTMCNC: 30.7 G/DL (ref 28.4–34.8)
MCHC RBC AUTO-ENTMCNC: 30.7 G/DL (ref 28.4–34.8)
MCHC RBC AUTO-ENTMCNC: 30.9 G/DL (ref 28.4–34.8)
MCHC RBC AUTO-ENTMCNC: 30.9 G/DL (ref 28.4–34.8)
MCHC RBC AUTO-ENTMCNC: 31 G/DL (ref 28.4–34.8)
MCHC RBC AUTO-ENTMCNC: 31 G/DL (ref 28.4–34.8)
MCHC RBC AUTO-ENTMCNC: 31.1 G/DL (ref 28.4–34.8)
MCHC RBC AUTO-ENTMCNC: 31.2 G/DL (ref 28.4–34.8)
MCHC RBC AUTO-ENTMCNC: 31.4 G/DL (ref 28.4–34.8)
MCHC RBC AUTO-ENTMCNC: 31.8 G/DL (ref 28.4–34.8)
MCHC RBC AUTO-ENTMCNC: 32.2 G/DL (ref 28.4–34.8)
MCHC RBC AUTO-ENTMCNC: 32.2 G/DL (ref 28.4–34.8)
MCHC RBC AUTO-ENTMCNC: 32.3 G/DL (ref 28.4–34.8)
MCHC RBC AUTO-ENTMCNC: 32.4 G/DL (ref 28.4–34.8)
MCHC RBC AUTO-ENTMCNC: 32.4 G/DL (ref 28.4–34.8)
MCHC RBC AUTO-ENTMCNC: 32.5 G/DL (ref 28.4–34.8)
MCHC RBC AUTO-ENTMCNC: 32.5 G/DL (ref 28.4–34.8)
MCHC RBC AUTO-ENTMCNC: 32.7 G/DL (ref 28.4–34.8)
MCHC RBC AUTO-ENTMCNC: 32.7 G/DL (ref 28.4–34.8)
MCHC RBC AUTO-ENTMCNC: 33 G/DL (ref 28.4–34.8)
MCHC RBC AUTO-ENTMCNC: 33.1 G/DL (ref 28.4–34.8)
MCHC RBC AUTO-ENTMCNC: 34.3 G/DL (ref 28.4–34.8)
MCV RBC AUTO: 81.9 FL (ref 82.6–102.9)
MCV RBC AUTO: 83.4 FL (ref 82.6–102.9)
MCV RBC AUTO: 83.7 FL (ref 82.6–102.9)
MCV RBC AUTO: 83.7 FL (ref 82.6–102.9)
MCV RBC AUTO: 83.8 FL (ref 82.6–102.9)
MCV RBC AUTO: 84.1 FL (ref 82.6–102.9)
MCV RBC AUTO: 84.1 FL (ref 82.6–102.9)
MCV RBC AUTO: 84.4 FL (ref 82.6–102.9)
MCV RBC AUTO: 84.5 FL (ref 82.6–102.9)
MCV RBC AUTO: 84.6 FL (ref 82.6–102.9)
MCV RBC AUTO: 84.7 FL (ref 82.6–102.9)
MCV RBC AUTO: 84.9 FL (ref 82.6–102.9)
MCV RBC AUTO: 85.1 FL (ref 82.6–102.9)
MCV RBC AUTO: 86 FL (ref 82.6–102.9)
MCV RBC AUTO: 86.4 FL (ref 82.6–102.9)
MCV RBC AUTO: 87.3 FL (ref 82.6–102.9)
MCV RBC AUTO: 87.6 FL (ref 82.6–102.9)
MCV RBC AUTO: 87.7 FL (ref 82.6–102.9)
MCV RBC AUTO: 87.8 FL (ref 82.6–102.9)
MCV RBC AUTO: 88 FL (ref 82.6–102.9)
MCV RBC AUTO: 88.1 FL (ref 82.6–102.9)
MCV RBC AUTO: 88.5 FL (ref 82.6–102.9)
MCV RBC AUTO: 88.8 FL (ref 82.6–102.9)
MCV RBC AUTO: 88.8 FL (ref 82.6–102.9)
MCV RBC AUTO: 89.4 FL (ref 82.6–102.9)
MCV RBC AUTO: 89.5 FL (ref 82.6–102.9)
MCV RBC AUTO: 90 FL (ref 82.6–102.9)
MCV RBC AUTO: 91.2 FL (ref 82.6–102.9)
MCV RBC AUTO: 91.5 FL (ref 82.6–102.9)
MCV RBC AUTO: 93.3 FL (ref 82.6–102.9)
METHEMOGLOBIN: ABNORMAL % (ref 0–1.9)
MODE: ABNORMAL
MONOCYTES # BLD: 0 % (ref 1–7)
MONOCYTES # BLD: 0 % (ref 3–12)
MONOCYTES # BLD: 1 % (ref 1–7)
MONOCYTES # BLD: 1 % (ref 3–12)
MONOCYTES # BLD: 2 % (ref 3–12)
MONOCYTES # BLD: 3 % (ref 3–12)
MONOCYTES # BLD: 4 % (ref 1–7)
MONOCYTES # BLD: 4 % (ref 3–12)
MONOCYTES # BLD: 5 % (ref 1–7)
MONOCYTES # BLD: 5 % (ref 1–7)
MONOCYTES # BLD: 5 % (ref 3–12)
MONOCYTES # BLD: 6 % (ref 1–7)
MONOCYTES # BLD: 6 % (ref 1–7)
MONOCYTES # BLD: 6 % (ref 3–12)
MONOCYTES # BLD: 7 % (ref 3–12)
MONOCYTES # BLD: 7 % (ref 3–12)
MORPHOLOGY: ABNORMAL
MORPHOLOGY: NORMAL
MUCUS: ABNORMAL
NEGATIVE BASE EXCESS, ART: 2 (ref 0–2)
NEGATIVE BASE EXCESS, ART: 3 (ref 0–2)
NEGATIVE BASE EXCESS, ART: 3 (ref 0–2)
NEGATIVE BASE EXCESS, ART: 5 (ref 0–2)
NEGATIVE BASE EXCESS, ART: 5 (ref 0–2)
NEGATIVE BASE EXCESS, ART: ABNORMAL (ref 0–2)
NEGATIVE BASE EXCESS, VEN: 3.5 MMOL/L (ref 0–2)
NITRITE, URINE: NEGATIVE
NOTIFICATION TIME: ABNORMAL
NOTIFICATION: ABNORMAL
NOTIFY: NORMAL
NRBC AUTOMATED: 0 PER 100 WBC
NRBC AUTOMATED: 0.1 PER 100 WBC
NRBC AUTOMATED: 0.1 PER 100 WBC
NRBC AUTOMATED: 0.2 PER 100 WBC
NRBC AUTOMATED: 0.2 PER 100 WBC
NRBC AUTOMATED: 0.3 PER 100 WBC
NRBC AUTOMATED: 0.4 PER 100 WBC
NRBC AUTOMATED: 0.6 PER 100 WBC
NRBC AUTOMATED: 1.3 PER 100 WBC
NUCLEATED RED BLOOD CELLS: 1 PER 100 WBC
O2 DEVICE/FLOW/%: ABNORMAL
O2 SAT, VEN: 59.9 % (ref 60–85)
OSMOLALITY URINE: 439 MOSM/KG (ref 80–1300)
OTHER OBSERVATIONS UA: ABNORMAL
OXYHEMOGLOBIN: ABNORMAL % (ref 95–98)
PARTIAL THROMBOPLASTIN TIME: 29.2 SEC (ref 23.9–33.8)
PARTIAL THROMBOPLASTIN TIME: 29.9 SEC (ref 23.9–33.8)
PARTIAL THROMBOPLASTIN TIME: 32.1 SEC (ref 23.9–33.8)
PARTIAL THROMBOPLASTIN TIME: 33.8 SEC (ref 23.9–33.8)
PARTIAL THROMBOPLASTIN TIME: 34.5 SEC (ref 23.9–33.8)
PARTIAL THROMBOPLASTIN TIME: 35.8 SEC (ref 23.9–33.8)
PARTIAL THROMBOPLASTIN TIME: 37.1 SEC (ref 23.9–33.8)
PARTIAL THROMBOPLASTIN TIME: 37.3 SEC (ref 23.9–33.8)
PARTIAL THROMBOPLASTIN TIME: 37.7 SEC (ref 23.9–33.8)
PARTIAL THROMBOPLASTIN TIME: 38.4 SEC (ref 23.9–33.8)
PARTIAL THROMBOPLASTIN TIME: 40.5 SEC (ref 23.9–33.8)
PARTIAL THROMBOPLASTIN TIME: 41.1 SEC (ref 23.9–33.8)
PARTIAL THROMBOPLASTIN TIME: 41.1 SEC (ref 23.9–33.8)
PATIENT TEMP: 36.5
PATIENT TEMP: 37
PATIENT TEMP: 37.1
PATIENT TEMP: 38.1
PATIENT TEMP: ABNORMAL
PCO2, VEN, TEMP ADJ: ABNORMAL MMHG (ref 39–55)
PCO2, VEN: 33.2 (ref 39–55)
PDW BLD-RTO: 12.5 % (ref 11.8–14.4)
PDW BLD-RTO: 12.6 % (ref 11.8–14.4)
PDW BLD-RTO: 12.7 % (ref 11.8–14.4)
PDW BLD-RTO: 12.8 % (ref 11.8–14.4)
PDW BLD-RTO: 12.9 % (ref 11.8–14.4)
PDW BLD-RTO: 13.2 % (ref 11.8–14.4)
PDW BLD-RTO: 13.4 % (ref 11.8–14.4)
PDW BLD-RTO: 13.7 % (ref 11.8–14.4)
PDW BLD-RTO: 13.8 % (ref 11.8–14.4)
PDW BLD-RTO: 14 % (ref 11.8–14.4)
PDW BLD-RTO: 14 % (ref 11.8–14.4)
PDW BLD-RTO: 14.1 % (ref 11.8–14.4)
PDW BLD-RTO: 14.1 % (ref 11.8–14.4)
PDW BLD-RTO: 14.6 % (ref 11.8–14.4)
PDW BLD-RTO: 14.6 % (ref 11.8–14.4)
PDW BLD-RTO: 14.7 % (ref 11.8–14.4)
PDW BLD-RTO: 14.8 % (ref 11.8–14.4)
PDW BLD-RTO: 15 % (ref 11.8–14.4)
PDW BLD-RTO: 15.1 % (ref 11.8–14.4)
PDW BLD-RTO: 15.4 % (ref 11.8–14.4)
PDW BLD-RTO: 16.2 % (ref 11.8–14.4)
PEEP/CPAP: ABNORMAL
PH UA: 5 (ref 5–8)
PH VENOUS: 7.4 (ref 7.32–7.42)
PH, VEN, TEMP ADJ: ABNORMAL (ref 7.32–7.42)
PLATELET # BLD: 106 K/UL (ref 138–453)
PLATELET # BLD: 107 K/UL (ref 138–453)
PLATELET # BLD: 117 K/UL (ref 138–453)
PLATELET # BLD: 123 K/UL (ref 138–453)
PLATELET # BLD: 128 K/UL (ref 138–453)
PLATELET # BLD: 132 K/UL (ref 138–453)
PLATELET # BLD: 134 K/UL (ref 138–453)
PLATELET # BLD: 134 K/UL (ref 138–453)
PLATELET # BLD: 145 K/UL (ref 138–453)
PLATELET # BLD: 146 K/UL (ref 138–453)
PLATELET # BLD: 149 K/UL (ref 138–453)
PLATELET # BLD: 149 K/UL (ref 138–453)
PLATELET # BLD: 152 K/UL (ref 138–453)
PLATELET # BLD: 156 K/UL (ref 138–453)
PLATELET # BLD: 168 K/UL (ref 138–453)
PLATELET # BLD: 198 K/UL (ref 138–453)
PLATELET # BLD: 207 K/UL (ref 138–453)
PLATELET # BLD: 209 K/UL (ref 138–453)
PLATELET # BLD: 216 K/UL (ref 138–453)
PLATELET # BLD: 221 K/UL (ref 138–453)
PLATELET # BLD: 226 K/UL (ref 138–453)
PLATELET # BLD: 227 K/UL (ref 138–453)
PLATELET # BLD: 235 K/UL (ref 138–453)
PLATELET # BLD: 247 K/UL (ref 138–453)
PLATELET # BLD: 269 K/UL (ref 138–453)
PLATELET # BLD: 271 K/UL (ref 138–453)
PLATELET # BLD: 280 K/UL (ref 138–453)
PLATELET # BLD: 284 K/UL (ref 138–453)
PLATELET # BLD: 82 K/UL (ref 138–453)
PLATELET # BLD: 90 K/UL (ref 138–453)
PLATELET # BLD: 95 K/UL (ref 138–453)
PLATELET # BLD: ABNORMAL K/UL (ref 138–453)
PLATELET ESTIMATE: ABNORMAL
PLATELET, FLUORESCENCE: 127 K/UL (ref 138–453)
PLATELET, IMMATURE FRACTION: 2.2 % (ref 1.1–10.3)
PMV BLD AUTO: 10 FL (ref 8.1–13.5)
PMV BLD AUTO: 10.1 FL (ref 8.1–13.5)
PMV BLD AUTO: 10.1 FL (ref 8.1–13.5)
PMV BLD AUTO: 8.4 FL (ref 8.1–13.5)
PMV BLD AUTO: 8.5 FL (ref 8.1–13.5)
PMV BLD AUTO: 8.6 FL (ref 8.1–13.5)
PMV BLD AUTO: 8.6 FL (ref 8.1–13.5)
PMV BLD AUTO: 8.7 FL (ref 8.1–13.5)
PMV BLD AUTO: 8.7 FL (ref 8.1–13.5)
PMV BLD AUTO: 8.8 FL (ref 8.1–13.5)
PMV BLD AUTO: 8.9 FL (ref 8.1–13.5)
PMV BLD AUTO: 8.9 FL (ref 8.1–13.5)
PMV BLD AUTO: 9 FL (ref 8.1–13.5)
PMV BLD AUTO: 9.1 FL (ref 8.1–13.5)
PMV BLD AUTO: 9.3 FL (ref 8.1–13.5)
PMV BLD AUTO: 9.4 FL (ref 8.1–13.5)
PMV BLD AUTO: 9.5 FL (ref 8.1–13.5)
PMV BLD AUTO: 9.6 FL (ref 8.1–13.5)
PMV BLD AUTO: 9.7 FL (ref 8.1–13.5)
PMV BLD AUTO: 9.7 FL (ref 8.1–13.5)
PMV BLD AUTO: 9.8 FL (ref 8.1–13.5)
PMV BLD AUTO: 9.9 FL (ref 8.1–13.5)
PMV BLD AUTO: ABNORMAL FL (ref 8.1–13.5)
PO2, VEN, TEMP ADJ: ABNORMAL MMHG (ref 30–50)
PO2, VEN: 30.8 (ref 30–50)
POC CHLORIDE: 102 MMOL/L (ref 98–107)
POC HCO3: 24.6 MMOL/L (ref 21–28)
POC HCO3: 26.1 MMOL/L (ref 21–28)
POC HCO3: 26.2 MMOL/L (ref 21–28)
POC HCO3: 26.8 MMOL/L (ref 21–28)
POC HCO3: 26.9 MMOL/L (ref 21–28)
POC HCO3: 26.9 MMOL/L (ref 21–28)
POC HCO3: 27.7 MMOL/L (ref 21–28)
POC HCO3: 29.2 MMOL/L (ref 21–28)
POC HCO3: 29.3 MMOL/L (ref 21–28)
POC HCO3: 30.3 MMOL/L (ref 21–28)
POC HCO3: 32.6 MMOL/L (ref 21–28)
POC HCO3: 33.3 MMOL/L (ref 21–28)
POC HCO3: 35.4 MMOL/L (ref 21–28)
POC HCO3: 35.6 MMOL/L (ref 21–28)
POC HCO3: 35.8 MMOL/L (ref 21–28)
POC HCO3: 37.1 MMOL/L (ref 21–28)
POC HCO3: 37.2 MMOL/L (ref 21–28)
POC HCO3: 37.8 MMOL/L (ref 21–28)
POC HCO3: 38.3 MMOL/L (ref 21–28)
POC HCO3: 39 MMOL/L (ref 21–28)
POC HCO3: 39.4 MMOL/L (ref 21–28)
POC HCO3: 40.1 MMOL/L (ref 21–28)
POC HCO3: 40.5 MMOL/L (ref 21–28)
POC HCO3: 40.7 MMOL/L (ref 21–28)
POC HCO3: 44.1 MMOL/L (ref 21–28)
POC HCO3: 44.2 MMOL/L (ref 21–28)
POC HCO3: 45.1 MMOL/L (ref 21–28)
POC IONIZED CALCIUM: 1.27 MMOL/L (ref 1.15–1.33)
POC LACTIC ACID: 0.63 MMOL/L (ref 0.56–1.39)
POC LACTIC ACID: 1.01 MMOL/L (ref 0.56–1.39)
POC LACTIC ACID: 1.12 MMOL/L (ref 0.56–1.39)
POC LACTIC ACID: 1.29 MMOL/L (ref 0.56–1.39)
POC LACTIC ACID: 1.43 MMOL/L (ref 0.56–1.39)
POC O2 SATURATION: 100 % (ref 94–98)
POC O2 SATURATION: 70 % (ref 94–98)
POC O2 SATURATION: 71 % (ref 94–98)
POC O2 SATURATION: 76 % (ref 94–98)
POC O2 SATURATION: 82 % (ref 94–98)
POC O2 SATURATION: 83 % (ref 94–98)
POC O2 SATURATION: 84 % (ref 94–98)
POC O2 SATURATION: 85 % (ref 94–98)
POC O2 SATURATION: 87 % (ref 94–98)
POC O2 SATURATION: 89 % (ref 94–98)
POC O2 SATURATION: 90 % (ref 94–98)
POC O2 SATURATION: 90 % (ref 94–98)
POC O2 SATURATION: 91 % (ref 94–98)
POC O2 SATURATION: 92 % (ref 94–98)
POC O2 SATURATION: 92 % (ref 94–98)
POC O2 SATURATION: 93 % (ref 94–98)
POC O2 SATURATION: 94 % (ref 94–98)
POC O2 SATURATION: 95 % (ref 94–98)
POC O2 SATURATION: 96 % (ref 94–98)
POC O2 SATURATION: 96 % (ref 94–98)
POC O2 SATURATION: 97 % (ref 94–98)
POC O2 SATURATION: 97 % (ref 94–98)
POC O2 SATURATION: 99 % (ref 94–98)
POC O2 SATURATION: 99 % (ref 94–98)
POC PCO2 TEMP: 89 MM HG
POC PCO2 TEMP: ABNORMAL MM HG
POC PCO2: 53.3 MM HG (ref 35–48)
POC PCO2: 56.9 MM HG (ref 35–48)
POC PCO2: 57.3 MM HG (ref 35–48)
POC PCO2: 60.9 MM HG (ref 35–48)
POC PCO2: 61.3 MM HG (ref 35–48)
POC PCO2: 61.3 MM HG (ref 35–48)
POC PCO2: 62.1 MM HG (ref 35–48)
POC PCO2: 62.1 MM HG (ref 35–48)
POC PCO2: 63.3 MM HG (ref 35–48)
POC PCO2: 63.5 MM HG (ref 35–48)
POC PCO2: 64 MM HG (ref 35–48)
POC PCO2: 64.2 MM HG (ref 35–48)
POC PCO2: 65.5 MM HG (ref 35–48)
POC PCO2: 66 MM HG (ref 35–48)
POC PCO2: 67.2 MM HG (ref 35–48)
POC PCO2: 67.6 MM HG (ref 35–48)
POC PCO2: 68.2 MM HG (ref 35–48)
POC PCO2: 70.1 MM HG (ref 35–48)
POC PCO2: 83.8 MM HG (ref 35–48)
POC PCO2: 84.4 MM HG (ref 35–48)
POC PCO2: 84.7 MM HG (ref 35–48)
POC PCO2: 87.1 MM HG (ref 35–48)
POC PCO2: 90.1 MM HG (ref 35–48)
POC PCO2: 93 MM HG (ref 35–48)
POC PCO2: 95.6 MM HG (ref 35–48)
POC PCO2: 96.7 MM HG (ref 35–48)
POC PCO2: 98.8 MM HG (ref 35–48)
POC PH TEMP: 7.31
POC PH TEMP: ABNORMAL
POC PH: 7.07 (ref 7.35–7.45)
POC PH: 7.09 (ref 7.35–7.45)
POC PH: 7.18 (ref 7.35–7.45)
POC PH: 7.22 (ref 7.35–7.45)
POC PH: 7.22 (ref 7.35–7.45)
POC PH: 7.23 (ref 7.35–7.45)
POC PH: 7.24 (ref 7.35–7.45)
POC PH: 7.25 (ref 7.35–7.45)
POC PH: 7.27 (ref 7.35–7.45)
POC PH: 7.28 (ref 7.35–7.45)
POC PH: 7.28 (ref 7.35–7.45)
POC PH: 7.29 (ref 7.35–7.45)
POC PH: 7.29 (ref 7.35–7.45)
POC PH: 7.3 (ref 7.35–7.45)
POC PH: 7.33 (ref 7.35–7.45)
POC PH: 7.35 (ref 7.35–7.45)
POC PH: 7.37 (ref 7.35–7.45)
POC PH: 7.39 (ref 7.35–7.45)
POC PH: 7.39 (ref 7.35–7.45)
POC PH: 7.4 (ref 7.35–7.45)
POC PH: 7.43 (ref 7.35–7.45)
POC PO2 TEMP: 58 MM HG
POC PO2 TEMP: ABNORMAL MM HG
POC PO2: 102.9 MM HG (ref 83–108)
POC PO2: 106.1 MM HG (ref 83–108)
POC PO2: 168.2 MM HG (ref 83–108)
POC PO2: 173.6 MM HG (ref 83–108)
POC PO2: 174.2 MM HG (ref 83–108)
POC PO2: 45 MM HG (ref 83–108)
POC PO2: 46.7 MM HG (ref 83–108)
POC PO2: 48 MM HG (ref 83–108)
POC PO2: 53.9 MM HG (ref 83–108)
POC PO2: 53.9 MM HG (ref 83–108)
POC PO2: 57.9 MM HG (ref 83–108)
POC PO2: 59.4 MM HG (ref 83–108)
POC PO2: 61.8 MM HG (ref 83–108)
POC PO2: 62.6 MM HG (ref 83–108)
POC PO2: 65.5 MM HG (ref 83–108)
POC PO2: 68 MM HG (ref 83–108)
POC PO2: 73.1 MM HG (ref 83–108)
POC PO2: 73.1 MM HG (ref 83–108)
POC PO2: 74.6 MM HG (ref 83–108)
POC PO2: 75.5 MM HG (ref 83–108)
POC PO2: 80.3 MM HG (ref 83–108)
POC PO2: 85.6 MM HG (ref 83–108)
POC PO2: 86.8 MM HG (ref 83–108)
POC PO2: 90.2 MM HG (ref 83–108)
POC PO2: 91.6 MM HG (ref 83–108)
POC PO2: 97 MM HG (ref 83–108)
POC PO2: 99.9 MM HG (ref 83–108)
POC POTASSIUM: 5.5 MMOL/L (ref 3.5–4.5)
POC SODIUM: 134 MMOL/L (ref 138–146)
POSITIVE BASE EXCESS, ART: 0 (ref 0–3)
POSITIVE BASE EXCESS, ART: 1 (ref 0–3)
POSITIVE BASE EXCESS, ART: 10 (ref 0–3)
POSITIVE BASE EXCESS, ART: 10 (ref 0–3)
POSITIVE BASE EXCESS, ART: 11 (ref 0–3)
POSITIVE BASE EXCESS, ART: 12 (ref 0–3)
POSITIVE BASE EXCESS, ART: 12 (ref 0–3)
POSITIVE BASE EXCESS, ART: 15 (ref 0–3)
POSITIVE BASE EXCESS, ART: 3 (ref 0–3)
POSITIVE BASE EXCESS, ART: 5 (ref 0–3)
POSITIVE BASE EXCESS, ART: 5 (ref 0–3)
POSITIVE BASE EXCESS, ART: 8 (ref 0–3)
POSITIVE BASE EXCESS, ART: 9 (ref 0–3)
POSITIVE BASE EXCESS, ART: ABNORMAL (ref 0–3)
POSITIVE BASE EXCESS, VEN: ABNORMAL MMOL/L (ref 0–2)
POTASSIUM SERPL-SCNC: 3.9 MMOL/L (ref 3.7–5.3)
POTASSIUM SERPL-SCNC: 4 MMOL/L (ref 3.7–5.3)
POTASSIUM SERPL-SCNC: 4.1 MMOL/L (ref 3.7–5.3)
POTASSIUM SERPL-SCNC: 4.2 MMOL/L (ref 3.7–5.3)
POTASSIUM SERPL-SCNC: 4.3 MMOL/L (ref 3.7–5.3)
POTASSIUM SERPL-SCNC: 4.4 MMOL/L (ref 3.7–5.3)
POTASSIUM SERPL-SCNC: 4.6 MMOL/L (ref 3.7–5.3)
POTASSIUM SERPL-SCNC: 4.7 MMOL/L (ref 3.7–5.3)
POTASSIUM SERPL-SCNC: 4.8 MMOL/L (ref 3.7–5.3)
POTASSIUM SERPL-SCNC: 4.9 MMOL/L (ref 3.7–5.3)
POTASSIUM SERPL-SCNC: 5 MMOL/L (ref 3.7–5.3)
POTASSIUM SERPL-SCNC: 5.1 MMOL/L (ref 3.7–5.3)
POTASSIUM SERPL-SCNC: 5.2 MMOL/L (ref 3.7–5.3)
POTASSIUM SERPL-SCNC: 5.3 MMOL/L (ref 3.7–5.3)
POTASSIUM SERPL-SCNC: 5.3 MMOL/L (ref 3.7–5.3)
POTASSIUM SERPL-SCNC: 5.4 MMOL/L (ref 3.7–5.3)
POTASSIUM SERPL-SCNC: 5.4 MMOL/L (ref 3.7–5.3)
POTASSIUM SERPL-SCNC: 5.5 MMOL/L (ref 3.7–5.3)
POTASSIUM SERPL-SCNC: 5.6 MMOL/L (ref 3.7–5.3)
POTASSIUM SERPL-SCNC: 5.8 MMOL/L (ref 3.7–5.3)
POTASSIUM SERPL-SCNC: 6 MMOL/L (ref 3.7–5.3)
POTASSIUM, UR: 34.8 MMOL/L
PRO-BNP: 480 PG/ML
PROTEIN UA: ABNORMAL
PROTHROMBIN TIME: 13.8 SEC (ref 11.5–14.2)
PROTHROMBIN TIME: 14 SEC (ref 11.5–14.2)
PROTHROMBIN TIME: 14.1 SEC (ref 11.5–14.2)
PROTHROMBIN TIME: 14.1 SEC (ref 11.5–14.2)
PROTHROMBIN TIME: 14.4 SEC (ref 11.5–14.2)
PROTHROMBIN TIME: 14.5 SEC (ref 11.5–14.2)
PROTHROMBIN TIME: 14.7 SEC (ref 11.5–14.2)
PROTHROMBIN TIME: 14.8 SEC (ref 11.5–14.2)
PROTHROMBIN TIME: 15.3 SEC (ref 11.5–14.2)
PROTHROMBIN TIME: 15.5 SEC (ref 11.5–14.2)
PROTHROMBIN TIME: 15.7 SEC (ref 11.5–14.2)
PROTHROMBIN TIME: 16.1 SEC (ref 11.5–14.2)
PROTHROMBIN TIME: 16.2 SEC (ref 11.5–14.2)
PROTHROMBIN TIME: 16.7 SEC (ref 11.5–14.2)
PROTHROMBIN TIME: 17.1 SEC (ref 11.5–14.2)
PSV: ABNORMAL
PT. POSITION: ABNORMAL
RBC # BLD: 3.12 M/UL (ref 4.21–5.77)
RBC # BLD: 3.16 M/UL (ref 4.21–5.77)
RBC # BLD: 3.17 M/UL (ref 4.21–5.77)
RBC # BLD: 3.21 M/UL (ref 4.21–5.77)
RBC # BLD: 3.29 M/UL (ref 4.21–5.77)
RBC # BLD: 3.31 M/UL (ref 4.21–5.77)
RBC # BLD: 3.32 M/UL (ref 4.21–5.77)
RBC # BLD: 3.48 M/UL (ref 4.21–5.77)
RBC # BLD: 3.57 M/UL (ref 4.21–5.77)
RBC # BLD: 3.62 M/UL (ref 4.21–5.77)
RBC # BLD: 3.67 M/UL (ref 4.21–5.77)
RBC # BLD: 3.71 M/UL (ref 4.21–5.77)
RBC # BLD: 3.82 M/UL (ref 4.21–5.77)
RBC # BLD: 3.84 M/UL (ref 4.21–5.77)
RBC # BLD: 4.09 M/UL (ref 4.21–5.77)
RBC # BLD: 4.1 M/UL (ref 4.21–5.77)
RBC # BLD: 4.15 M/UL (ref 4.21–5.77)
RBC # BLD: 4.15 M/UL (ref 4.21–5.77)
RBC # BLD: 4.21 M/UL (ref 4.21–5.77)
RBC # BLD: 4.23 M/UL (ref 4.21–5.77)
RBC # BLD: 4.24 M/UL (ref 4.21–5.77)
RBC # BLD: 4.25 M/UL (ref 4.21–5.77)
RBC # BLD: 4.29 M/UL (ref 4.21–5.77)
RBC # BLD: 4.31 M/UL (ref 4.21–5.77)
RBC # BLD: 4.33 M/UL (ref 4.21–5.77)
RBC # BLD: 4.37 M/UL (ref 4.21–5.77)
RBC # BLD: 4.39 M/UL (ref 4.21–5.77)
RBC # BLD: 4.46 M/UL (ref 4.21–5.77)
RBC # BLD: 4.56 M/UL (ref 4.21–5.77)
RBC # BLD: 4.58 M/UL (ref 4.21–5.77)
RBC # BLD: 4.73 M/UL (ref 4.21–5.77)
RBC # BLD: 4.83 M/UL (ref 4.21–5.77)
RBC # BLD: ABNORMAL 10*6/UL
RBC UA: ABNORMAL /HPF (ref 0–4)
READ BACK: YES
RENAL EPITHELIAL, UA: ABNORMAL /HPF
RESPIRATORY RATE: 20
SAMPLE SITE: ABNORMAL
SARS-COV-2, RAPID: ABNORMAL
SARS-COV-2: ABNORMAL
SARS-COV-2: DETECTED
SEG NEUTROPHILS: 81 % (ref 36–65)
SEG NEUTROPHILS: 82 % (ref 36–65)
SEG NEUTROPHILS: 83 % (ref 36–65)
SEG NEUTROPHILS: 87 % (ref 36–65)
SEG NEUTROPHILS: 87 % (ref 36–66)
SEG NEUTROPHILS: 88 % (ref 36–65)
SEG NEUTROPHILS: 88 % (ref 36–65)
SEG NEUTROPHILS: 88 % (ref 36–66)
SEG NEUTROPHILS: 88 % (ref 36–66)
SEG NEUTROPHILS: 89 % (ref 36–65)
SEG NEUTROPHILS: 89 % (ref 36–65)
SEG NEUTROPHILS: 89 % (ref 36–66)
SEG NEUTROPHILS: 89 % (ref 36–66)
SEG NEUTROPHILS: 90 % (ref 36–65)
SEG NEUTROPHILS: 90 % (ref 36–65)
SEG NEUTROPHILS: 91 % (ref 36–65)
SEG NEUTROPHILS: 91 % (ref 36–65)
SEG NEUTROPHILS: 92 % (ref 36–65)
SEG NEUTROPHILS: 92 % (ref 36–65)
SEG NEUTROPHILS: 93 % (ref 36–65)
SEG NEUTROPHILS: 94 % (ref 36–65)
SEG NEUTROPHILS: 95 % (ref 36–65)
SEG NEUTROPHILS: 97 % (ref 36–65)
SEG NEUTROPHILS: 97 % (ref 36–66)
SEG NEUTROPHILS: 97 % (ref 36–66)
SEG NEUTROPHILS: 98 % (ref 36–65)
SEG NEUTROPHILS: 98 % (ref 36–65)
SEG NEUTROPHILS: 99 % (ref 36–65)
SEGMENTED NEUTROPHILS ABSOLUTE COUNT: 10.86 K/UL (ref 1.8–7.7)
SEGMENTED NEUTROPHILS ABSOLUTE COUNT: 10.94 K/UL (ref 1.5–8.1)
SEGMENTED NEUTROPHILS ABSOLUTE COUNT: 11.19 K/UL (ref 1.5–8.1)
SEGMENTED NEUTROPHILS ABSOLUTE COUNT: 11.26 K/UL (ref 1.8–7.7)
SEGMENTED NEUTROPHILS ABSOLUTE COUNT: 11.38 K/UL (ref 1.5–8.1)
SEGMENTED NEUTROPHILS ABSOLUTE COUNT: 12.09 K/UL (ref 1.5–8.1)
SEGMENTED NEUTROPHILS ABSOLUTE COUNT: 12.64 K/UL (ref 1.5–8.1)
SEGMENTED NEUTROPHILS ABSOLUTE COUNT: 13.14 K/UL (ref 1.5–8.1)
SEGMENTED NEUTROPHILS ABSOLUTE COUNT: 13.24 K/UL (ref 1.5–8.1)
SEGMENTED NEUTROPHILS ABSOLUTE COUNT: 13.58 K/UL (ref 1.8–7.7)
SEGMENTED NEUTROPHILS ABSOLUTE COUNT: 14.35 K/UL (ref 1.5–8.1)
SEGMENTED NEUTROPHILS ABSOLUTE COUNT: 14.55 K/UL (ref 1.5–8.1)
SEGMENTED NEUTROPHILS ABSOLUTE COUNT: 15.74 K/UL (ref 1.5–8.1)
SEGMENTED NEUTROPHILS ABSOLUTE COUNT: 16.72 K/UL (ref 1.5–8.1)
SEGMENTED NEUTROPHILS ABSOLUTE COUNT: 17.66 K/UL (ref 1.8–7.7)
SEGMENTED NEUTROPHILS ABSOLUTE COUNT: 18.62 K/UL (ref 1.5–8.1)
SEGMENTED NEUTROPHILS ABSOLUTE COUNT: 18.99 K/UL (ref 1.5–8.1)
SEGMENTED NEUTROPHILS ABSOLUTE COUNT: 22.34 K/UL (ref 1.5–8.1)
SEGMENTED NEUTROPHILS ABSOLUTE COUNT: 5.71 K/UL (ref 1.5–8.1)
SEGMENTED NEUTROPHILS ABSOLUTE COUNT: 6.39 K/UL (ref 1.5–8.1)
SEGMENTED NEUTROPHILS ABSOLUTE COUNT: 7.29 K/UL (ref 1.5–8.1)
SEGMENTED NEUTROPHILS ABSOLUTE COUNT: 7.32 K/UL (ref 1.5–8.1)
SEGMENTED NEUTROPHILS ABSOLUTE COUNT: 7.72 K/UL (ref 1.5–8.1)
SEGMENTED NEUTROPHILS ABSOLUTE COUNT: 8.07 K/UL (ref 1.5–8.1)
SEGMENTED NEUTROPHILS ABSOLUTE COUNT: 8.1 K/UL (ref 1.8–7.7)
SEGMENTED NEUTROPHILS ABSOLUTE COUNT: 8.37 K/UL (ref 1.5–8.1)
SEGMENTED NEUTROPHILS ABSOLUTE COUNT: 8.45 K/UL (ref 1.5–8.1)
SEGMENTED NEUTROPHILS ABSOLUTE COUNT: 8.64 K/UL (ref 1.5–8.1)
SEGMENTED NEUTROPHILS ABSOLUTE COUNT: 8.9 K/UL (ref 1.5–8.1)
SEGMENTED NEUTROPHILS ABSOLUTE COUNT: 9.61 K/UL (ref 1.8–7.7)
SEGMENTED NEUTROPHILS ABSOLUTE COUNT: 9.65 K/UL (ref 1.8–7.7)
SEGMENTED NEUTROPHILS ABSOLUTE COUNT: 9.93 K/UL (ref 1.5–8.1)
SERUM OSMOLALITY: 299 MOSM/KG (ref 275–295)
SET RATE: ABNORMAL
SODIUM BLD-SCNC: 125 MMOL/L (ref 135–144)
SODIUM BLD-SCNC: 125 MMOL/L (ref 135–144)
SODIUM BLD-SCNC: 128 MMOL/L (ref 135–144)
SODIUM BLD-SCNC: 129 MMOL/L (ref 135–144)
SODIUM BLD-SCNC: 129 MMOL/L (ref 135–144)
SODIUM BLD-SCNC: 130 MMOL/L (ref 135–144)
SODIUM BLD-SCNC: 131 MMOL/L (ref 135–144)
SODIUM BLD-SCNC: 132 MMOL/L (ref 135–144)
SODIUM BLD-SCNC: 133 MMOL/L (ref 135–144)
SODIUM BLD-SCNC: 134 MMOL/L (ref 135–144)
SODIUM BLD-SCNC: 135 MMOL/L (ref 135–144)
SODIUM BLD-SCNC: 136 MMOL/L (ref 135–144)
SODIUM BLD-SCNC: 137 MMOL/L (ref 135–144)
SODIUM BLD-SCNC: 138 MMOL/L (ref 135–144)
SODIUM BLD-SCNC: 138 MMOL/L (ref 135–144)
SODIUM BLD-SCNC: 139 MMOL/L (ref 135–144)
SODIUM BLD-SCNC: 139 MMOL/L (ref 135–144)
SODIUM BLD-SCNC: 140 MMOL/L (ref 135–144)
SODIUM BLD-SCNC: 141 MMOL/L (ref 135–144)
SODIUM,UR: 39 MMOL/L
SODIUM,UR: 56 MMOL/L
SOURCE: ABNORMAL
SPECIFIC GRAVITY UA: 1.02 (ref 1–1.03)
SPECIMEN DESCRIPTION: ABNORMAL
SPECIMEN DESCRIPTION: NORMAL
TCO2 (CALC), ART: 27 MMOL/L (ref 22–29)
TCO2 (CALC), ART: 28 MMOL/L (ref 22–29)
TCO2 (CALC), ART: 28 MMOL/L (ref 22–29)
TCO2 (CALC), ART: 29 MMOL/L (ref 22–29)
TCO2 (CALC), ART: 31 MMOL/L (ref 22–29)
TCO2 (CALC), ART: 31 MMOL/L (ref 22–29)
TCO2 (CALC), ART: 32 MMOL/L (ref 22–29)
TCO2 (CALC), ART: 32 MMOL/L (ref 22–29)
TCO2 (CALC), ART: 35 MMOL/L (ref 22–29)
TCO2 (CALC), ART: 35 MMOL/L (ref 22–29)
TCO2 (CALC), ART: 37 MMOL/L (ref 22–29)
TCO2 (CALC), ART: 39 MMOL/L (ref 22–29)
TCO2 (CALC), ART: 40 MMOL/L (ref 22–29)
TCO2 (CALC), ART: 42 MMOL/L (ref 22–29)
TCO2 (CALC), ART: 42 MMOL/L (ref 22–29)
TCO2 (CALC), ART: 43 MMOL/L (ref 22–29)
TCO2 (CALC), ART: 47 MMOL/L (ref 22–29)
TCO2 (CALC), ART: 47 MMOL/L (ref 22–29)
TCO2 (CALC), ART: 48 MMOL/L (ref 22–29)
TEXT FOR RESPIRATORY: ABNORMAL
THYROXINE, FREE: 1.35 NG/DL (ref 0.93–1.7)
TOTAL HB: ABNORMAL G/DL (ref 12–16)
TOTAL PROTEIN, URINE: 33 MG/DL
TOTAL PROTEIN: 5.9 G/DL (ref 6.4–8.3)
TOTAL PROTEIN: 6.3 G/DL (ref 6.4–8.3)
TOTAL PROTEIN: 6.6 G/DL (ref 6.4–8.3)
TOTAL PROTEIN: 6.6 G/DL (ref 6.4–8.3)
TOTAL PROTEIN: 6.7 G/DL (ref 6.4–8.3)
TOTAL PROTEIN: 6.8 G/DL (ref 6.4–8.3)
TOTAL PROTEIN: 6.9 G/DL (ref 6.4–8.3)
TOTAL PROTEIN: 6.9 G/DL (ref 6.4–8.3)
TOTAL PROTEIN: 7 G/DL (ref 6.4–8.3)
TOTAL PROTEIN: 7 G/DL (ref 6.4–8.3)
TOTAL PROTEIN: 7.1 G/DL (ref 6.4–8.3)
TOTAL PROTEIN: 7.2 G/DL (ref 6.4–8.3)
TOTAL PROTEIN: 7.3 G/DL (ref 6.4–8.3)
TOTAL PROTEIN: 7.5 G/DL (ref 6.4–8.3)
TOTAL RATE: ABNORMAL
TRICHOMONAS: ABNORMAL
TROPONIN INTERP: ABNORMAL
TROPONIN INTERP: NORMAL
TROPONIN T: ABNORMAL NG/ML
TROPONIN T: NORMAL NG/ML
TROPONIN, HIGH SENSITIVITY: 8 NG/L (ref 0–22)
TROPONIN, HIGH SENSITIVITY: 81 NG/L (ref 0–22)
TSH SERPL DL<=0.05 MIU/L-ACNC: 0.03 MIU/L (ref 0.3–5)
TURBIDITY: ABNORMAL
URINE HGB: ABNORMAL
UROBILINOGEN, URINE: NORMAL
VANCOMYCIN TROUGH DATE LAST DOSE: NORMAL
VANCOMYCIN TROUGH DOSE AMOUNT: NORMAL
VANCOMYCIN TROUGH TIME LAST DOSE: NORMAL
VANCOMYCIN TROUGH: 16.5 UG/ML (ref 10–20)
VT: ABNORMAL
WBC # BLD: 10 K/UL (ref 3.5–11.3)
WBC # BLD: 10 K/UL (ref 3.5–11.3)
WBC # BLD: 10.2 K/UL (ref 3.5–11.3)
WBC # BLD: 10.8 K/UL (ref 3.5–11.3)
WBC # BLD: 11.1 K/UL (ref 3.5–11.3)
WBC # BLD: 11.3 K/UL (ref 3.5–11.3)
WBC # BLD: 12.1 K/UL (ref 3.5–11.3)
WBC # BLD: 12.2 K/UL (ref 3.5–11.3)
WBC # BLD: 12.3 K/UL (ref 3.5–11.3)
WBC # BLD: 12.3 K/UL (ref 3.5–11.3)
WBC # BLD: 12.8 K/UL (ref 3.5–11.3)
WBC # BLD: 13 K/UL (ref 3.5–11.3)
WBC # BLD: 13.3 K/UL (ref 3.5–11.3)
WBC # BLD: 13.4 K/UL (ref 3.5–11.3)
WBC # BLD: 14 K/UL (ref 3.5–11.3)
WBC # BLD: 14.4 K/UL (ref 3.5–11.3)
WBC # BLD: 15 K/UL (ref 3.5–11.3)
WBC # BLD: 15.1 K/UL (ref 3.5–11.3)
WBC # BLD: 15.9 K/UL (ref 3.5–11.3)
WBC # BLD: 17.6 K/UL (ref 3.5–11.3)
WBC # BLD: 18.2 K/UL (ref 3.5–11.3)
WBC # BLD: 19.8 K/UL (ref 3.5–11.3)
WBC # BLD: 20.2 K/UL (ref 3.5–11.3)
WBC # BLD: 22.8 K/UL (ref 3.5–11.3)
WBC # BLD: 6.5 K/UL (ref 3.5–11.3)
WBC # BLD: 7.7 K/UL (ref 3.5–11.3)
WBC # BLD: 8.1 K/UL (ref 3.5–11.3)
WBC # BLD: 8.4 K/UL (ref 3.5–11.3)
WBC # BLD: 8.4 K/UL (ref 3.5–11.3)
WBC # BLD: 9.2 K/UL (ref 3.5–11.3)
WBC # BLD: 9.3 K/UL (ref 3.5–11.3)
WBC # BLD: 9.6 K/UL (ref 3.5–11.3)
WBC # BLD: ABNORMAL 10*3/UL
WBC UA: ABNORMAL /HPF (ref 0–5)
YEAST: ABNORMAL

## 2021-01-01 PROCEDURE — 6370000000 HC RX 637 (ALT 250 FOR IP): Performed by: FAMILY MEDICINE

## 2021-01-01 PROCEDURE — 2000000000 HC ICU R&B

## 2021-01-01 PROCEDURE — 94645 CONT INHLJ TX EACH ADDL HOUR: CPT

## 2021-01-01 PROCEDURE — 6370000000 HC RX 637 (ALT 250 FOR IP): Performed by: STUDENT IN AN ORGANIZED HEALTH CARE EDUCATION/TRAINING PROGRAM

## 2021-01-01 PROCEDURE — 94761 N-INVAS EAR/PLS OXIMETRY MLT: CPT

## 2021-01-01 PROCEDURE — 2500000003 HC RX 250 WO HCPCS: Performed by: STUDENT IN AN ORGANIZED HEALTH CARE EDUCATION/TRAINING PROGRAM

## 2021-01-01 PROCEDURE — 99232 SBSQ HOSP IP/OBS MODERATE 35: CPT | Performed by: INTERNAL MEDICINE

## 2021-01-01 PROCEDURE — 6370000000 HC RX 637 (ALT 250 FOR IP): Performed by: INTERNAL MEDICINE

## 2021-01-01 PROCEDURE — 6360000002 HC RX W HCPCS: Performed by: STUDENT IN AN ORGANIZED HEALTH CARE EDUCATION/TRAINING PROGRAM

## 2021-01-01 PROCEDURE — 2580000003 HC RX 258: Performed by: STUDENT IN AN ORGANIZED HEALTH CARE EDUCATION/TRAINING PROGRAM

## 2021-01-01 PROCEDURE — 2700000000 HC OXYGEN THERAPY PER DAY

## 2021-01-01 PROCEDURE — 94660 CPAP INITIATION&MGMT: CPT

## 2021-01-01 PROCEDURE — 94640 AIRWAY INHALATION TREATMENT: CPT

## 2021-01-01 PROCEDURE — 85025 COMPLETE CBC W/AUTO DIFF WBC: CPT

## 2021-01-01 PROCEDURE — 6370000000 HC RX 637 (ALT 250 FOR IP): Performed by: NURSE PRACTITIONER

## 2021-01-01 PROCEDURE — 37799 UNLISTED PX VASCULAR SURGERY: CPT

## 2021-01-01 PROCEDURE — 85730 THROMBOPLASTIN TIME PARTIAL: CPT

## 2021-01-01 PROCEDURE — 85379 FIBRIN DEGRADATION QUANT: CPT

## 2021-01-01 PROCEDURE — 6360000002 HC RX W HCPCS: Performed by: PHYSICIAN ASSISTANT

## 2021-01-01 PROCEDURE — 82803 BLOOD GASES ANY COMBINATION: CPT

## 2021-01-01 PROCEDURE — 99233 SBSQ HOSP IP/OBS HIGH 50: CPT | Performed by: NURSE PRACTITIONER

## 2021-01-01 PROCEDURE — 85384 FIBRINOGEN ACTIVITY: CPT

## 2021-01-01 PROCEDURE — 87186 SC STD MICRODIL/AGAR DIL: CPT

## 2021-01-01 PROCEDURE — 6360000002 HC RX W HCPCS: Performed by: FAMILY MEDICINE

## 2021-01-01 PROCEDURE — 99231 SBSQ HOSP IP/OBS SF/LOW 25: CPT | Performed by: INTERNAL MEDICINE

## 2021-01-01 PROCEDURE — 83605 ASSAY OF LACTIC ACID: CPT

## 2021-01-01 PROCEDURE — 85610 PROTHROMBIN TIME: CPT

## 2021-01-01 PROCEDURE — 94003 VENT MGMT INPAT SUBQ DAY: CPT

## 2021-01-01 PROCEDURE — 80053 COMPREHEN METABOLIC PANEL: CPT

## 2021-01-01 PROCEDURE — 99284 EMERGENCY DEPT VISIT MOD MDM: CPT

## 2021-01-01 PROCEDURE — 99233 SBSQ HOSP IP/OBS HIGH 50: CPT | Performed by: INTERNAL MEDICINE

## 2021-01-01 PROCEDURE — 82947 ASSAY GLUCOSE BLOOD QUANT: CPT

## 2021-01-01 PROCEDURE — 2580000003 HC RX 258: Performed by: EMERGENCY MEDICINE

## 2021-01-01 PROCEDURE — 36415 COLL VENOUS BLD VENIPUNCTURE: CPT

## 2021-01-01 PROCEDURE — 94669 MECHANICAL CHEST WALL OSCILL: CPT

## 2021-01-01 PROCEDURE — 83935 ASSAY OF URINE OSMOLALITY: CPT

## 2021-01-01 PROCEDURE — 87641 MR-STAPH DNA AMP PROBE: CPT

## 2021-01-01 PROCEDURE — 97530 THERAPEUTIC ACTIVITIES: CPT

## 2021-01-01 PROCEDURE — 99497 ADVNCD CARE PLAN 30 MIN: CPT | Performed by: NURSE PRACTITIONER

## 2021-01-01 PROCEDURE — 2500000003 HC RX 250 WO HCPCS: Performed by: NURSE PRACTITIONER

## 2021-01-01 PROCEDURE — 80048 BASIC METABOLIC PNL TOTAL CA: CPT

## 2021-01-01 PROCEDURE — 6360000002 HC RX W HCPCS: Performed by: EMERGENCY MEDICINE

## 2021-01-01 PROCEDURE — 2580000003 HC RX 258: Performed by: FAMILY MEDICINE

## 2021-01-01 PROCEDURE — 99291 CRITICAL CARE FIRST HOUR: CPT | Performed by: INTERNAL MEDICINE

## 2021-01-01 PROCEDURE — 2500000003 HC RX 250 WO HCPCS: Performed by: FAMILY MEDICINE

## 2021-01-01 PROCEDURE — 6360000002 HC RX W HCPCS: Performed by: INTERNAL MEDICINE

## 2021-01-01 PROCEDURE — 83880 ASSAY OF NATRIURETIC PEPTIDE: CPT

## 2021-01-01 PROCEDURE — 84132 ASSAY OF SERUM POTASSIUM: CPT

## 2021-01-01 PROCEDURE — 6360000002 HC RX W HCPCS: Performed by: NURSE PRACTITIONER

## 2021-01-01 PROCEDURE — 83930 ASSAY OF BLOOD OSMOLALITY: CPT

## 2021-01-01 PROCEDURE — 2580000003 HC RX 258: Performed by: INTERNAL MEDICINE

## 2021-01-01 PROCEDURE — 83036 HEMOGLOBIN GLYCOSYLATED A1C: CPT

## 2021-01-01 PROCEDURE — 5A1935Z RESPIRATORY VENTILATION, LESS THAN 24 CONSECUTIVE HOURS: ICD-10-PCS | Performed by: INTERNAL MEDICINE

## 2021-01-01 PROCEDURE — 93005 ELECTROCARDIOGRAM TRACING: CPT | Performed by: EMERGENCY MEDICINE

## 2021-01-01 PROCEDURE — 71045 X-RAY EXAM CHEST 1 VIEW: CPT

## 2021-01-01 PROCEDURE — 87205 SMEAR GRAM STAIN: CPT

## 2021-01-01 PROCEDURE — 97116 GAIT TRAINING THERAPY: CPT

## 2021-01-01 PROCEDURE — 2580000003 HC RX 258: Performed by: NURSE PRACTITIONER

## 2021-01-01 PROCEDURE — C9803 HOPD COVID-19 SPEC COLLECT: HCPCS

## 2021-01-01 PROCEDURE — 0BH18EZ INSERTION OF ENDOTRACHEAL AIRWAY INTO TRACHEA, VIA NATURAL OR ARTIFICIAL OPENING ENDOSCOPIC: ICD-10-PCS | Performed by: INTERNAL MEDICINE

## 2021-01-01 PROCEDURE — 81001 URINALYSIS AUTO W/SCOPE: CPT

## 2021-01-01 PROCEDURE — 82533 TOTAL CORTISOL: CPT

## 2021-01-01 PROCEDURE — 84484 ASSAY OF TROPONIN QUANT: CPT

## 2021-01-01 PROCEDURE — 51702 INSERT TEMP BLADDER CATH: CPT

## 2021-01-01 PROCEDURE — 99232 SBSQ HOSP IP/OBS MODERATE 35: CPT | Performed by: NURSE PRACTITIONER

## 2021-01-01 PROCEDURE — 94002 VENT MGMT INPAT INIT DAY: CPT

## 2021-01-01 PROCEDURE — 84133 ASSAY OF URINE POTASSIUM: CPT

## 2021-01-01 PROCEDURE — 99223 1ST HOSP IP/OBS HIGH 75: CPT | Performed by: INTERNAL MEDICINE

## 2021-01-01 PROCEDURE — 93010 ELECTROCARDIOGRAM REPORT: CPT | Performed by: INTERNAL MEDICINE

## 2021-01-01 PROCEDURE — 94772 CIRCADIAN RESPIR PATTERN REC: CPT

## 2021-01-01 PROCEDURE — 97110 THERAPEUTIC EXERCISES: CPT

## 2021-01-01 PROCEDURE — 2500000003 HC RX 250 WO HCPCS

## 2021-01-01 PROCEDURE — 82728 ASSAY OF FERRITIN: CPT

## 2021-01-01 PROCEDURE — 36556 INSERT NON-TUNNEL CV CATH: CPT

## 2021-01-01 PROCEDURE — 80202 ASSAY OF VANCOMYCIN: CPT

## 2021-01-01 PROCEDURE — 85055 RETICULATED PLATELET ASSAY: CPT

## 2021-01-01 PROCEDURE — 83615 LACTATE (LD) (LDH) ENZYME: CPT

## 2021-01-01 PROCEDURE — 31500 INSERT EMERGENCY AIRWAY: CPT

## 2021-01-01 PROCEDURE — U0005 INFEC AGEN DETEC AMPLI PROBE: HCPCS

## 2021-01-01 PROCEDURE — 97166 OT EVAL MOD COMPLEX 45 MIN: CPT

## 2021-01-01 PROCEDURE — 96375 TX/PRO/DX INJ NEW DRUG ADDON: CPT

## 2021-01-01 PROCEDURE — 6360000002 HC RX W HCPCS

## 2021-01-01 PROCEDURE — 82805 BLOOD GASES W/O2 SATURATION: CPT

## 2021-01-01 PROCEDURE — 93010 ELECTROCARDIOGRAM REPORT: CPT | Performed by: FAMILY MEDICINE

## 2021-01-01 PROCEDURE — 31500 INSERT EMERGENCY AIRWAY: CPT | Performed by: NURSE ANESTHETIST, CERTIFIED REGISTERED

## 2021-01-01 PROCEDURE — 93970 EXTREMITY STUDY: CPT

## 2021-01-01 PROCEDURE — 93005 ELECTROCARDIOGRAM TRACING: CPT | Performed by: STUDENT IN AN ORGANIZED HEALTH CARE EDUCATION/TRAINING PROGRAM

## 2021-01-01 PROCEDURE — 36620 INSERTION CATHETER ARTERY: CPT

## 2021-01-01 PROCEDURE — 96365 THER/PROPH/DIAG IV INF INIT: CPT

## 2021-01-01 PROCEDURE — 84156 ASSAY OF PROTEIN URINE: CPT

## 2021-01-01 PROCEDURE — 84295 ASSAY OF SERUM SODIUM: CPT

## 2021-01-01 PROCEDURE — 86140 C-REACTIVE PROTEIN: CPT

## 2021-01-01 PROCEDURE — 2580000003 HC RX 258

## 2021-01-01 PROCEDURE — 87040 BLOOD CULTURE FOR BACTERIA: CPT

## 2021-01-01 PROCEDURE — 87086 URINE CULTURE/COLONY COUNT: CPT

## 2021-01-01 PROCEDURE — 74018 RADEX ABDOMEN 1 VIEW: CPT

## 2021-01-01 PROCEDURE — 99221 1ST HOSP IP/OBS SF/LOW 40: CPT | Performed by: INTERNAL MEDICINE

## 2021-01-01 PROCEDURE — 5A1955Z RESPIRATORY VENTILATION, GREATER THAN 96 CONSECUTIVE HOURS: ICD-10-PCS | Performed by: INTERNAL MEDICINE

## 2021-01-01 PROCEDURE — 84300 ASSAY OF URINE SODIUM: CPT

## 2021-01-01 PROCEDURE — 87070 CULTURE OTHR SPECIMN AEROBIC: CPT

## 2021-01-01 PROCEDURE — 02HV33Z INSERTION OF INFUSION DEVICE INTO SUPERIOR VENA CAVA, PERCUTANEOUS APPROACH: ICD-10-PCS | Performed by: INTERNAL MEDICINE

## 2021-01-01 PROCEDURE — 97161 PT EVAL LOW COMPLEX 20 MIN: CPT

## 2021-01-01 PROCEDURE — 97535 SELF CARE MNGMENT TRAINING: CPT

## 2021-01-01 PROCEDURE — 99222 1ST HOSP IP/OBS MODERATE 55: CPT | Performed by: NURSE PRACTITIONER

## 2021-01-01 PROCEDURE — 84443 ASSAY THYROID STIM HORMONE: CPT

## 2021-01-01 PROCEDURE — 76770 US EXAM ABDO BACK WALL COMP: CPT

## 2021-01-01 PROCEDURE — 87077 CULTURE AEROBIC IDENTIFY: CPT

## 2021-01-01 PROCEDURE — 82570 ASSAY OF URINE CREATININE: CPT

## 2021-01-01 PROCEDURE — XW033E5 INTRODUCTION OF REMDESIVIR ANTI-INFECTIVE INTO PERIPHERAL VEIN, PERCUTANEOUS APPROACH, NEW TECHNOLOGY GROUP 5: ICD-10-PCS | Performed by: INTERNAL MEDICINE

## 2021-01-01 PROCEDURE — 51701 INSERT BLADDER CATHETER: CPT

## 2021-01-01 PROCEDURE — 84439 ASSAY OF FREE THYROXINE: CPT

## 2021-01-01 PROCEDURE — 1200000000 HC SEMI PRIVATE

## 2021-01-01 PROCEDURE — 6370000000 HC RX 637 (ALT 250 FOR IP)

## 2021-01-01 PROCEDURE — 82435 ASSAY OF BLOOD CHLORIDE: CPT

## 2021-01-01 PROCEDURE — U0003 INFECTIOUS AGENT DETECTION BY NUCLEIC ACID (DNA OR RNA); SEVERE ACUTE RESPIRATORY SYNDROME CORONAVIRUS 2 (SARS-COV-2) (CORONAVIRUS DISEASE [COVID-19]), AMPLIFIED PROBE TECHNIQUE, MAKING USE OF HIGH THROUGHPUT TECHNOLOGIES AS DESCRIBED BY CMS-2020-01-R: HCPCS

## 2021-01-01 PROCEDURE — 94644 CONT INHLJ TX 1ST HOUR: CPT

## 2021-01-01 PROCEDURE — 80076 HEPATIC FUNCTION PANEL: CPT

## 2021-01-01 PROCEDURE — 82330 ASSAY OF CALCIUM: CPT

## 2021-01-01 PROCEDURE — 89220 SPUTUM SPECIMEN COLLECTION: CPT

## 2021-01-01 PROCEDURE — 97162 PT EVAL MOD COMPLEX 30 MIN: CPT

## 2021-01-01 RX ORDER — SODIUM CHLORIDE 0.9 % (FLUSH) 0.9 %
10 SYRINGE (ML) INJECTION PRN
Status: DISCONTINUED | OUTPATIENT
Start: 2021-01-01 | End: 2021-01-01 | Stop reason: HOSPADM

## 2021-01-01 RX ORDER — MORPHINE SULFATE 2 MG/ML
1 INJECTION, SOLUTION INTRAMUSCULAR; INTRAVENOUS EVERY 30 MIN PRN
Status: DISCONTINUED | OUTPATIENT
Start: 2021-01-01 | End: 2021-01-01 | Stop reason: HOSPADM

## 2021-01-01 RX ORDER — ASPIRIN 81 MG/1
81 TABLET ORAL DAILY
Status: DISCONTINUED | OUTPATIENT
Start: 2021-01-01 | End: 2021-01-01 | Stop reason: HOSPADM

## 2021-01-01 RX ORDER — PANTOPRAZOLE SODIUM 40 MG/1
40 TABLET, DELAYED RELEASE ORAL DAILY
COMMUNITY

## 2021-01-01 RX ORDER — LIDOCAINE HYDROCHLORIDE 10 MG/ML
5 INJECTION, SOLUTION INFILTRATION; PERINEURAL ONCE
Status: DISCONTINUED | OUTPATIENT
Start: 2021-01-01 | End: 2021-01-01

## 2021-01-01 RX ORDER — FLUTICASONE PROPIONATE 50 MCG
1 SPRAY, SUSPENSION (ML) NASAL DAILY
Status: DISCONTINUED | OUTPATIENT
Start: 2021-01-01 | End: 2021-01-01 | Stop reason: HOSPADM

## 2021-01-01 RX ORDER — IVERMECTIN 3 MG/1
12 TABLET ORAL ONCE
Status: COMPLETED | OUTPATIENT
Start: 2021-01-01 | End: 2021-01-01

## 2021-01-01 RX ORDER — SODIUM CHLORIDE 0.9 % (FLUSH) 0.9 %
10 SYRINGE (ML) INJECTION EVERY 12 HOURS SCHEDULED
Status: DISCONTINUED | OUTPATIENT
Start: 2021-01-01 | End: 2021-01-01 | Stop reason: HOSPADM

## 2021-01-01 RX ORDER — NICOTINE POLACRILEX 4 MG
15 LOZENGE BUCCAL PRN
Status: DISCONTINUED | OUTPATIENT
Start: 2021-01-01 | End: 2021-01-01 | Stop reason: HOSPADM

## 2021-01-01 RX ORDER — PREDNISONE 20 MG/1
20 TABLET ORAL DAILY
Status: COMPLETED | OUTPATIENT
Start: 2021-01-01 | End: 2021-01-01

## 2021-01-01 RX ORDER — PANTOPRAZOLE SODIUM 40 MG/1
40 TABLET, DELAYED RELEASE ORAL DAILY
Status: DISCONTINUED | OUTPATIENT
Start: 2021-01-01 | End: 2021-01-01

## 2021-01-01 RX ORDER — DEXTROSE MONOHYDRATE 50 MG/ML
INJECTION, SOLUTION INTRAVENOUS
Status: COMPLETED
Start: 2021-01-01 | End: 2021-01-01

## 2021-01-01 RX ORDER — LANOLIN ALCOHOL/MO/W.PET/CERES
400 CREAM (GRAM) TOPICAL DAILY
Status: DISCONTINUED | OUTPATIENT
Start: 2021-01-01 | End: 2021-01-01 | Stop reason: HOSPADM

## 2021-01-01 RX ORDER — DEXTROSE MONOHYDRATE 50 MG/ML
100 INJECTION, SOLUTION INTRAVENOUS PRN
Status: DISCONTINUED | OUTPATIENT
Start: 2021-01-01 | End: 2021-01-01

## 2021-01-01 RX ORDER — ALBUTEROL SULFATE 2.5 MG/3ML
2.5 SOLUTION RESPIRATORY (INHALATION) EVERY 4 HOURS PRN
Status: DISCONTINUED | OUTPATIENT
Start: 2021-01-01 | End: 2021-01-01 | Stop reason: HOSPADM

## 2021-01-01 RX ORDER — MORPHINE SULFATE 2 MG/ML
2 INJECTION, SOLUTION INTRAMUSCULAR; INTRAVENOUS EVERY 30 MIN PRN
Status: DISCONTINUED | OUTPATIENT
Start: 2021-01-01 | End: 2021-01-01 | Stop reason: HOSPADM

## 2021-01-01 RX ORDER — 0.9 % SODIUM CHLORIDE 0.9 %
500 INTRAVENOUS SOLUTION INTRAVENOUS ONCE
Status: COMPLETED | OUTPATIENT
Start: 2021-01-01 | End: 2021-01-01

## 2021-01-01 RX ORDER — LORAZEPAM 2 MG/ML
1 INJECTION INTRAMUSCULAR
Status: DISCONTINUED | OUTPATIENT
Start: 2021-01-01 | End: 2021-01-01 | Stop reason: HOSPADM

## 2021-01-01 RX ORDER — MAGNESIUM OXIDE 400 MG/1
400 TABLET ORAL DAILY
COMMUNITY

## 2021-01-01 RX ORDER — DEXTROSE MONOHYDRATE 50 MG/ML
100 INJECTION, SOLUTION INTRAVENOUS PRN
Status: DISCONTINUED | OUTPATIENT
Start: 2021-01-01 | End: 2021-01-01 | Stop reason: HOSPADM

## 2021-01-01 RX ORDER — DEXTROSE MONOHYDRATE 25 G/50ML
12.5 INJECTION, SOLUTION INTRAVENOUS PRN
Status: DISCONTINUED | OUTPATIENT
Start: 2021-01-01 | End: 2021-01-01

## 2021-01-01 RX ORDER — PREDNISONE 1 MG/1
5 TABLET ORAL DAILY
Status: DISCONTINUED | OUTPATIENT
Start: 2021-02-23 | End: 2021-01-01 | Stop reason: HOSPADM

## 2021-01-01 RX ORDER — DEXTROSE MONOHYDRATE 25 G/50ML
25 INJECTION, SOLUTION INTRAVENOUS ONCE
Status: COMPLETED | OUTPATIENT
Start: 2021-01-01 | End: 2021-01-01

## 2021-01-01 RX ORDER — GLYCOPYRROLATE 0.2 MG/ML
0.2 INJECTION INTRAMUSCULAR; INTRAVENOUS EVERY 4 HOURS PRN
Status: DISCONTINUED | OUTPATIENT
Start: 2021-01-01 | End: 2021-01-01 | Stop reason: HOSPADM

## 2021-01-01 RX ORDER — FUROSEMIDE 10 MG/ML
20 INJECTION INTRAMUSCULAR; INTRAVENOUS ONCE
Status: COMPLETED | OUTPATIENT
Start: 2021-01-01 | End: 2021-01-01

## 2021-01-01 RX ORDER — NOREPINEPHRINE BIT/0.9 % NACL 16MG/250ML
INFUSION BOTTLE (ML) INTRAVENOUS
Status: COMPLETED
Start: 2021-01-01 | End: 2021-01-01

## 2021-01-01 RX ORDER — ALBUTEROL SULFATE 90 UG/1
4 AEROSOL, METERED RESPIRATORY (INHALATION) EVERY 4 HOURS PRN
Status: DISCONTINUED | OUTPATIENT
Start: 2021-01-01 | End: 2021-01-01 | Stop reason: HOSPADM

## 2021-01-01 RX ORDER — SODIUM CHLORIDE 9 MG/ML
INJECTION, SOLUTION INTRAVENOUS CONTINUOUS
Status: DISCONTINUED | OUTPATIENT
Start: 2021-01-01 | End: 2021-01-01

## 2021-01-01 RX ORDER — ONDANSETRON 2 MG/ML
4 INJECTION INTRAMUSCULAR; INTRAVENOUS EVERY 6 HOURS PRN
Status: DISCONTINUED | OUTPATIENT
Start: 2021-01-01 | End: 2021-01-01 | Stop reason: HOSPADM

## 2021-01-01 RX ORDER — PREDNISONE 20 MG/1
40 TABLET ORAL DAILY
Status: COMPLETED | OUTPATIENT
Start: 2021-01-01 | End: 2021-01-01

## 2021-01-01 RX ORDER — AMLODIPINE BESYLATE 10 MG/1
10 TABLET ORAL DAILY
Status: DISCONTINUED | OUTPATIENT
Start: 2021-01-01 | End: 2021-01-01 | Stop reason: HOSPADM

## 2021-01-01 RX ORDER — CALCIUM GLUCONATE 20 MG/ML
1000 INJECTION, SOLUTION INTRAVENOUS ONCE
Status: COMPLETED | OUTPATIENT
Start: 2021-01-01 | End: 2021-01-01

## 2021-01-01 RX ORDER — CALCIUM GLUCONATE 94 MG/ML
2000 INJECTION, SOLUTION INTRAVENOUS ONCE
Status: COMPLETED | OUTPATIENT
Start: 2021-01-01 | End: 2021-01-01

## 2021-01-01 RX ORDER — INSULIN GLARGINE 100 [IU]/ML
30 INJECTION, SOLUTION SUBCUTANEOUS NIGHTLY
Status: DISCONTINUED | OUTPATIENT
Start: 2021-01-01 | End: 2021-01-01

## 2021-01-01 RX ORDER — INSULIN GLARGINE 100 [IU]/ML
40 INJECTION, SOLUTION SUBCUTANEOUS NIGHTLY
Status: DISCONTINUED | OUTPATIENT
Start: 2021-01-01 | End: 2021-01-01 | Stop reason: HOSPADM

## 2021-01-01 RX ORDER — MORPHINE SULFATE 4 MG/ML
4 INJECTION, SOLUTION INTRAMUSCULAR; INTRAVENOUS EVERY 30 MIN PRN
Status: DISCONTINUED | OUTPATIENT
Start: 2021-01-01 | End: 2021-01-01 | Stop reason: HOSPADM

## 2021-01-01 RX ORDER — PROPOFOL 10 MG/ML
5-50 INJECTION, EMULSION INTRAVENOUS CONTINUOUS
Status: DISCONTINUED | OUTPATIENT
Start: 2021-01-01 | End: 2021-01-01 | Stop reason: HOSPADM

## 2021-01-01 RX ORDER — ACETAMINOPHEN 325 MG/1
650 TABLET ORAL EVERY 6 HOURS PRN
Status: DISCONTINUED | OUTPATIENT
Start: 2021-01-01 | End: 2021-01-01 | Stop reason: HOSPADM

## 2021-01-01 RX ORDER — FUROSEMIDE 10 MG/ML
40 INJECTION INTRAMUSCULAR; INTRAVENOUS ONCE
Status: COMPLETED | OUTPATIENT
Start: 2021-01-01 | End: 2021-01-01

## 2021-01-01 RX ORDER — PROMETHAZINE HYDROCHLORIDE 12.5 MG/1
12.5 TABLET ORAL EVERY 6 HOURS PRN
Status: DISCONTINUED | OUTPATIENT
Start: 2021-01-01 | End: 2021-01-01 | Stop reason: HOSPADM

## 2021-01-01 RX ORDER — IPRATROPIUM BROMIDE AND ALBUTEROL SULFATE 2.5; .5 MG/3ML; MG/3ML
1 SOLUTION RESPIRATORY (INHALATION)
Status: DISCONTINUED | OUTPATIENT
Start: 2021-01-01 | End: 2021-01-01

## 2021-01-01 RX ORDER — KETAMINE HCL 50MG/ML(1)
SYRINGE (ML) INTRAVENOUS
Status: COMPLETED | OUTPATIENT
Start: 2021-01-01 | End: 2021-01-01

## 2021-01-01 RX ORDER — CISATRACURIUM BESYLATE 10 MG/ML
0.15 INJECTION, SOLUTION INTRAVENOUS ONCE
Status: DISCONTINUED | OUTPATIENT
Start: 2021-01-01 | End: 2021-01-01 | Stop reason: HOSPADM

## 2021-01-01 RX ORDER — LABETALOL HYDROCHLORIDE 5 MG/ML
10 INJECTION, SOLUTION INTRAVENOUS EVERY 6 HOURS PRN
Status: DISCONTINUED | OUTPATIENT
Start: 2021-01-01 | End: 2021-01-01 | Stop reason: HOSPADM

## 2021-01-01 RX ORDER — INSULIN GLARGINE 100 [IU]/ML
10 INJECTION, SOLUTION SUBCUTANEOUS NIGHTLY
Status: DISCONTINUED | OUTPATIENT
Start: 2021-01-01 | End: 2021-01-01

## 2021-01-01 RX ORDER — ACETAMINOPHEN 650 MG
TABLET, EXTENDED RELEASE ORAL
Status: DISPENSED
Start: 2021-01-01 | End: 2021-01-01

## 2021-01-01 RX ORDER — MORPHINE SULFATE 2 MG/ML
2 INJECTION, SOLUTION INTRAMUSCULAR; INTRAVENOUS EVERY 4 HOURS PRN
Status: DISCONTINUED | OUTPATIENT
Start: 2021-01-01 | End: 2021-01-01 | Stop reason: HOSPADM

## 2021-01-01 RX ORDER — NOREPINEPHRINE BIT/0.9 % NACL 16MG/250ML
2-100 INFUSION BOTTLE (ML) INTRAVENOUS CONTINUOUS
Status: DISCONTINUED | OUTPATIENT
Start: 2021-01-01 | End: 2021-01-01

## 2021-01-01 RX ORDER — ROCURONIUM BROMIDE 10 MG/ML
INJECTION, SOLUTION INTRAVENOUS
Status: COMPLETED | OUTPATIENT
Start: 2021-01-01 | End: 2021-01-01

## 2021-01-01 RX ORDER — NOREPINEPHRINE BIT/0.9 % NACL 16MG/250ML
2-30 INFUSION BOTTLE (ML) INTRAVENOUS CONTINUOUS
Status: DISCONTINUED | OUTPATIENT
Start: 2021-01-01 | End: 2021-01-01

## 2021-01-01 RX ORDER — METFORMIN HYDROCHLORIDE 500 MG/1
1000 TABLET, EXTENDED RELEASE ORAL
Status: DISCONTINUED | OUTPATIENT
Start: 2021-01-01 | End: 2021-01-01 | Stop reason: HOSPADM

## 2021-01-01 RX ORDER — INSULIN GLARGINE 100 [IU]/ML
12 INJECTION, SOLUTION SUBCUTANEOUS NIGHTLY
Status: DISCONTINUED | OUTPATIENT
Start: 2021-01-01 | End: 2021-01-01 | Stop reason: HOSPADM

## 2021-01-01 RX ORDER — POLYVINYL ALCOHOL 14 MG/ML
1 SOLUTION/ DROPS OPHTHALMIC PRN
Status: DISCONTINUED | OUTPATIENT
Start: 2021-01-01 | End: 2021-01-01 | Stop reason: HOSPADM

## 2021-01-01 RX ORDER — CHLORHEXIDINE GLUCONATE 0.12 MG/ML
15 RINSE ORAL 2 TIMES DAILY
Status: DISCONTINUED | OUTPATIENT
Start: 2021-01-01 | End: 2021-01-01 | Stop reason: HOSPADM

## 2021-01-01 RX ORDER — LORAZEPAM 2 MG/ML
0.5 INJECTION INTRAMUSCULAR
Status: DISCONTINUED | OUTPATIENT
Start: 2021-01-01 | End: 2021-01-01 | Stop reason: HOSPADM

## 2021-01-01 RX ORDER — ALBUTEROL SULFATE 90 UG/1
2 AEROSOL, METERED RESPIRATORY (INHALATION) EVERY 6 HOURS PRN
COMMUNITY

## 2021-01-01 RX ORDER — IPRATROPIUM BROMIDE AND ALBUTEROL SULFATE 2.5; .5 MG/3ML; MG/3ML
1 SOLUTION RESPIRATORY (INHALATION) 4 TIMES DAILY
Status: DISCONTINUED | OUTPATIENT
Start: 2021-01-01 | End: 2021-01-01 | Stop reason: HOSPADM

## 2021-01-01 RX ORDER — FUROSEMIDE 10 MG/ML
INJECTION INTRAMUSCULAR; INTRAVENOUS
Status: COMPLETED
Start: 2021-01-01 | End: 2021-01-01

## 2021-01-01 RX ORDER — VECURONIUM BROMIDE 1 MG/ML
INJECTION, POWDER, LYOPHILIZED, FOR SOLUTION INTRAVENOUS
Status: COMPLETED
Start: 2021-01-01 | End: 2021-01-01

## 2021-01-01 RX ORDER — LACTULOSE 10 G/15ML
20 SOLUTION ORAL 3 TIMES DAILY
Status: DISCONTINUED | OUTPATIENT
Start: 2021-01-01 | End: 2021-01-01

## 2021-01-01 RX ORDER — DEXAMETHASONE SODIUM PHOSPHATE 10 MG/ML
6 INJECTION INTRAMUSCULAR; INTRAVENOUS EVERY 24 HOURS
Status: COMPLETED | OUTPATIENT
Start: 2021-01-01 | End: 2021-01-01

## 2021-01-01 RX ORDER — SODIUM CHLORIDE 0.9 % (FLUSH) 0.9 %
10 SYRINGE (ML) INJECTION PRN
Status: DISCONTINUED | OUTPATIENT
Start: 2021-01-01 | End: 2021-01-01

## 2021-01-01 RX ORDER — 0.9 % SODIUM CHLORIDE 0.9 %
30 INTRAVENOUS SOLUTION INTRAVENOUS PRN
Status: DISCONTINUED | OUTPATIENT
Start: 2021-01-01 | End: 2021-01-01 | Stop reason: HOSPADM

## 2021-01-01 RX ORDER — DEXMEDETOMIDINE HYDROCHLORIDE 4 UG/ML
.2-1.4 INJECTION, SOLUTION INTRAVENOUS CONTINUOUS
Status: DISCONTINUED | OUTPATIENT
Start: 2021-01-01 | End: 2021-01-01

## 2021-01-01 RX ORDER — CALCIUM GLUCONATE 94 MG/ML
1000 INJECTION, SOLUTION INTRAVENOUS ONCE
Status: DISCONTINUED | OUTPATIENT
Start: 2021-01-01 | End: 2021-01-01

## 2021-01-01 RX ORDER — NICOTINE POLACRILEX 4 MG
15 LOZENGE BUCCAL PRN
Status: DISCONTINUED | OUTPATIENT
Start: 2021-01-01 | End: 2021-01-01

## 2021-01-01 RX ORDER — POLYETHYLENE GLYCOL 3350 17 G/17G
17 POWDER, FOR SOLUTION ORAL DAILY PRN
Status: DISCONTINUED | OUTPATIENT
Start: 2021-01-01 | End: 2021-01-01 | Stop reason: HOSPADM

## 2021-01-01 RX ORDER — DEXTROSE MONOHYDRATE 25 G/50ML
12.5 INJECTION, SOLUTION INTRAVENOUS PRN
Status: DISCONTINUED | OUTPATIENT
Start: 2021-01-01 | End: 2021-01-01 | Stop reason: HOSPADM

## 2021-01-01 RX ORDER — SODIUM CHLORIDE 9 MG/ML
INJECTION, SOLUTION INTRAVENOUS CONTINUOUS
Status: DISCONTINUED | OUTPATIENT
Start: 2021-01-01 | End: 2021-01-01 | Stop reason: HOSPADM

## 2021-01-01 RX ORDER — PSEUDOEPHEDRINE HYDROCHLORIDE 30 MG/1
60 TABLET ORAL ONCE
Status: COMPLETED | OUTPATIENT
Start: 2021-01-01 | End: 2021-01-01

## 2021-01-01 RX ORDER — METHYLPREDNISOLONE SODIUM SUCCINATE 40 MG/ML
40 INJECTION, POWDER, LYOPHILIZED, FOR SOLUTION INTRAMUSCULAR; INTRAVENOUS EVERY 12 HOURS
Status: DISCONTINUED | OUTPATIENT
Start: 2021-01-01 | End: 2021-01-01 | Stop reason: HOSPADM

## 2021-01-01 RX ORDER — MIDAZOLAM HYDROCHLORIDE 5 MG/ML
INJECTION INTRAMUSCULAR; INTRAVENOUS
Status: COMPLETED | OUTPATIENT
Start: 2021-01-01 | End: 2021-01-01

## 2021-01-01 RX ORDER — SUCCINYLCHOLINE CHLORIDE 20 MG/ML
INJECTION INTRAMUSCULAR; INTRAVENOUS
Status: COMPLETED | OUTPATIENT
Start: 2021-01-01 | End: 2021-01-01

## 2021-01-01 RX ORDER — VITAMIN B COMPLEX
5000 TABLET ORAL DAILY
Status: DISCONTINUED | OUTPATIENT
Start: 2021-01-01 | End: 2021-01-01 | Stop reason: HOSPADM

## 2021-01-01 RX ORDER — LISINOPRIL 2.5 MG/1
2.5 TABLET ORAL DAILY
Status: DISCONTINUED | OUTPATIENT
Start: 2021-01-01 | End: 2021-01-01 | Stop reason: HOSPADM

## 2021-01-01 RX ORDER — ACETAMINOPHEN 650 MG/1
650 SUPPOSITORY RECTAL EVERY 6 HOURS PRN
Status: DISCONTINUED | OUTPATIENT
Start: 2021-01-01 | End: 2021-01-01 | Stop reason: HOSPADM

## 2021-01-01 RX ORDER — PROPOFOL 10 MG/ML
INJECTION, EMULSION INTRAVENOUS
Status: COMPLETED
Start: 2021-01-01 | End: 2021-01-01

## 2021-01-01 RX ORDER — ACETAMINOPHEN 500 MG
1000 TABLET ORAL EVERY 6 HOURS PRN
COMMUNITY

## 2021-01-01 RX ORDER — VECURONIUM BROMIDE 1 MG/ML
10 INJECTION, POWDER, LYOPHILIZED, FOR SOLUTION INTRAVENOUS ONCE
Status: COMPLETED | OUTPATIENT
Start: 2021-01-01 | End: 2021-01-01

## 2021-01-01 RX ORDER — REMDESIVIR 100 MG/1
INJECTION, POWDER, LYOPHILIZED, FOR SOLUTION INTRAVENOUS
Status: COMPLETED
Start: 2021-01-01 | End: 2021-01-01

## 2021-01-01 RX ORDER — PROPOFOL 10 MG/ML
10 INJECTION, EMULSION INTRAVENOUS ONCE
Status: COMPLETED | OUTPATIENT
Start: 2021-01-01 | End: 2021-01-01

## 2021-01-01 RX ORDER — PROPOFOL 10 MG/ML
5-50 INJECTION, EMULSION INTRAVENOUS
Status: DISCONTINUED | OUTPATIENT
Start: 2021-01-01 | End: 2021-01-01

## 2021-01-01 RX ORDER — PREDNISONE 10 MG/1
10 TABLET ORAL DAILY
Status: DISCONTINUED | OUTPATIENT
Start: 2021-01-01 | End: 2021-01-01 | Stop reason: HOSPADM

## 2021-01-01 RX ORDER — VANCOMYCIN HYDROCHLORIDE 1 G/200ML
1000 INJECTION, SOLUTION INTRAVENOUS ONCE
Status: DISCONTINUED | OUTPATIENT
Start: 2021-01-01 | End: 2021-01-01

## 2021-01-01 RX ORDER — IPRATROPIUM BROMIDE AND ALBUTEROL SULFATE 2.5; .5 MG/3ML; MG/3ML
SOLUTION RESPIRATORY (INHALATION)
Status: COMPLETED
Start: 2021-01-01 | End: 2021-01-01

## 2021-01-01 RX ORDER — CETIRIZINE HYDROCHLORIDE 10 MG/1
10 TABLET ORAL DAILY
COMMUNITY

## 2021-01-01 RX ORDER — VANCOMYCIN HYDROCHLORIDE 1 G/200ML
1000 INJECTION, SOLUTION INTRAVENOUS EVERY 12 HOURS
Status: DISCONTINUED | OUTPATIENT
Start: 2021-01-01 | End: 2021-01-01

## 2021-01-01 RX ORDER — PROPOFOL 10 MG/ML
INJECTION, EMULSION INTRAVENOUS CONTINUOUS PRN
Status: DISCONTINUED | OUTPATIENT
Start: 2021-01-01 | End: 2021-01-01 | Stop reason: HOSPADM

## 2021-01-01 RX ORDER — LEVOFLOXACIN 5 MG/ML
500 INJECTION, SOLUTION INTRAVENOUS EVERY 24 HOURS
Status: DISCONTINUED | OUTPATIENT
Start: 2021-01-01 | End: 2021-01-01

## 2021-01-01 RX ORDER — TAMSULOSIN HYDROCHLORIDE 0.4 MG/1
0.4 CAPSULE ORAL NIGHTLY
Status: DISCONTINUED | OUTPATIENT
Start: 2021-01-01 | End: 2021-01-01 | Stop reason: HOSPADM

## 2021-01-01 RX ORDER — HEPARIN SODIUM 5000 [USP'U]/ML
5000 INJECTION, SOLUTION INTRAVENOUS; SUBCUTANEOUS EVERY 8 HOURS SCHEDULED
Status: DISCONTINUED | OUTPATIENT
Start: 2021-01-01 | End: 2021-01-01 | Stop reason: HOSPADM

## 2021-01-01 RX ORDER — ATORVASTATIN CALCIUM 20 MG/1
20 TABLET, FILM COATED ORAL NIGHTLY
Status: DISCONTINUED | OUTPATIENT
Start: 2021-01-01 | End: 2021-01-01 | Stop reason: HOSPADM

## 2021-01-01 RX ORDER — CETIRIZINE HYDROCHLORIDE 10 MG/1
10 TABLET ORAL DAILY
Status: DISCONTINUED | OUTPATIENT
Start: 2021-01-01 | End: 2021-01-01 | Stop reason: HOSPADM

## 2021-01-01 RX ORDER — FENTANYL CITRATE 50 UG/ML
25 INJECTION, SOLUTION INTRAMUSCULAR; INTRAVENOUS
Status: DISCONTINUED | OUTPATIENT
Start: 2021-01-01 | End: 2021-01-01 | Stop reason: HOSPADM

## 2021-01-01 RX ORDER — METHYLPREDNISOLONE SODIUM SUCCINATE 40 MG/ML
40 INJECTION, POWDER, LYOPHILIZED, FOR SOLUTION INTRAMUSCULAR; INTRAVENOUS EVERY 12 HOURS
Status: COMPLETED | OUTPATIENT
Start: 2021-01-01 | End: 2021-01-01

## 2021-01-01 RX ORDER — ZINC SULFATE 50(220)MG
50 CAPSULE ORAL DAILY
Status: DISCONTINUED | OUTPATIENT
Start: 2021-01-01 | End: 2021-01-01 | Stop reason: HOSPADM

## 2021-01-01 RX ORDER — PHENYLEPHRINE HYDROCHLORIDE 10 MG/ML
INJECTION INTRAVENOUS
Status: DISCONTINUED
Start: 2021-01-01 | End: 2021-01-01 | Stop reason: HOSPADM

## 2021-01-01 RX ADMIN — INSULIN LISPRO 4 UNITS: 100 INJECTION, SOLUTION INTRAVENOUS; SUBCUTANEOUS at 03:54

## 2021-01-01 RX ADMIN — ENOXAPARIN SODIUM 60 MG: 60 INJECTION SUBCUTANEOUS at 09:12

## 2021-01-01 RX ADMIN — Medication 5000 UNITS: at 08:28

## 2021-01-01 RX ADMIN — Medication 200 MCG/HR: at 07:22

## 2021-01-01 RX ADMIN — CISATRACURIUM BESYLATE 3 MCG/KG/MIN: 10 INJECTION INTRAVENOUS at 03:52

## 2021-01-01 RX ADMIN — SODIUM CHLORIDE, PRESERVATIVE FREE 10 ML: 5 INJECTION INTRAVENOUS at 09:17

## 2021-01-01 RX ADMIN — PREDNISONE 40 MG: 20 TABLET ORAL at 08:35

## 2021-01-01 RX ADMIN — SODIUM CHLORIDE, PRESERVATIVE FREE 10 ML: 5 INJECTION INTRAVENOUS at 20:58

## 2021-01-01 RX ADMIN — Medication 5000 UNITS: at 08:17

## 2021-01-01 RX ADMIN — ASPIRIN 81 MG: 81 TABLET, COATED ORAL at 08:51

## 2021-01-01 RX ADMIN — EPOPROSTENOL 50 NG/KG/MIN: 1.5 INJECTION, POWDER, LYOPHILIZED, FOR SOLUTION INTRAVENOUS at 08:48

## 2021-01-01 RX ADMIN — INSULIN LISPRO 1 UNITS: 100 INJECTION, SOLUTION INTRAVENOUS; SUBCUTANEOUS at 12:31

## 2021-01-01 RX ADMIN — TAMSULOSIN HYDROCHLORIDE 0.4 MG: 0.4 CAPSULE ORAL at 21:10

## 2021-01-01 RX ADMIN — IVERMECTIN 12 MG: 3 TABLET ORAL at 12:05

## 2021-01-01 RX ADMIN — VECURONIUM BROMIDE 10 MG: 1 INJECTION, POWDER, LYOPHILIZED, FOR SOLUTION INTRAVENOUS at 12:02

## 2021-01-01 RX ADMIN — IPRATROPIUM BROMIDE AND ALBUTEROL SULFATE 1 AMPULE: .5; 3 SOLUTION RESPIRATORY (INHALATION) at 16:20

## 2021-01-01 RX ADMIN — HEPARIN SODIUM 5000 UNITS: 5000 INJECTION INTRAVENOUS; SUBCUTANEOUS at 00:51

## 2021-01-01 RX ADMIN — HEPARIN SODIUM 5000 UNITS: 5000 INJECTION INTRAVENOUS; SUBCUTANEOUS at 14:15

## 2021-01-01 RX ADMIN — PROPOFOL 60 MG: 10 INJECTION, EMULSION INTRAVENOUS at 16:13

## 2021-01-01 RX ADMIN — IPRATROPIUM BROMIDE AND ALBUTEROL SULFATE 1 AMPULE: .5; 3 SOLUTION RESPIRATORY (INHALATION) at 20:05

## 2021-01-01 RX ADMIN — Medication 100 MCG/HR: at 23:09

## 2021-01-01 RX ADMIN — ASPIRIN 81 MG: 81 TABLET, COATED ORAL at 08:02

## 2021-01-01 RX ADMIN — POLYETHYLENE GLYCOL 3350 17 G: 17 POWDER, FOR SOLUTION ORAL at 16:31

## 2021-01-01 RX ADMIN — ALBUTEROL SULFATE 2.5 MG: 2.5 SOLUTION RESPIRATORY (INHALATION) at 00:59

## 2021-01-01 RX ADMIN — SODIUM CHLORIDE, PRESERVATIVE FREE 10 ML: 5 INJECTION INTRAVENOUS at 08:11

## 2021-01-01 RX ADMIN — METFORMIN HYDROCHLORIDE 1000 MG: 500 TABLET, EXTENDED RELEASE ORAL at 08:24

## 2021-01-01 RX ADMIN — LACTULOSE 20 G: 10 SOLUTION ORAL at 19:28

## 2021-01-01 RX ADMIN — Medication 200 MCG/HR: at 00:44

## 2021-01-01 RX ADMIN — CETIRIZINE HYDROCHLORIDE 10 MG: 10 TABLET, FILM COATED ORAL at 09:02

## 2021-01-01 RX ADMIN — SODIUM CHLORIDE, PRESERVATIVE FREE 10 ML: 5 INJECTION INTRAVENOUS at 08:18

## 2021-01-01 RX ADMIN — Medication 1500 MG: at 03:30

## 2021-01-01 RX ADMIN — ZINC SULFATE 220 MG (50 MG) CAPSULE 50 MG: CAPSULE at 08:23

## 2021-01-01 RX ADMIN — METHYLPREDNISOLONE SODIUM SUCCINATE 40 MG: 40 INJECTION, POWDER, FOR SOLUTION INTRAMUSCULAR; INTRAVENOUS at 12:55

## 2021-01-01 RX ADMIN — IPRATROPIUM BROMIDE AND ALBUTEROL SULFATE 1 AMPULE: .5; 3 SOLUTION RESPIRATORY (INHALATION) at 05:44

## 2021-01-01 RX ADMIN — PIPERACILLIN AND TAZOBACTAM 3375 MG: 3; .375 INJECTION, POWDER, FOR SOLUTION INTRAVENOUS at 06:21

## 2021-01-01 RX ADMIN — Medication 200 MCG/HR: at 11:09

## 2021-01-01 RX ADMIN — INSULIN GLARGINE 40 UNITS: 100 INJECTION, SOLUTION SUBCUTANEOUS at 20:11

## 2021-01-01 RX ADMIN — Medication 75 MCG/HR: at 23:08

## 2021-01-01 RX ADMIN — Medication 75 MCG/HR: at 11:31

## 2021-01-01 RX ADMIN — METHYLPREDNISOLONE SODIUM SUCCINATE 40 MG: 40 INJECTION, POWDER, FOR SOLUTION INTRAMUSCULAR; INTRAVENOUS at 00:28

## 2021-01-01 RX ADMIN — SODIUM CHLORIDE, PRESERVATIVE FREE 10 ML: 5 INJECTION INTRAVENOUS at 20:12

## 2021-01-01 RX ADMIN — IPRATROPIUM BROMIDE AND ALBUTEROL SULFATE 1 AMPULE: .5; 3 SOLUTION RESPIRATORY (INHALATION) at 11:02

## 2021-01-01 RX ADMIN — FAMOTIDINE 20 MG: 10 INJECTION INTRAVENOUS at 08:00

## 2021-01-01 RX ADMIN — HEPARIN SODIUM 5000 UNITS: 5000 INJECTION INTRAVENOUS; SUBCUTANEOUS at 15:56

## 2021-01-01 RX ADMIN — INSULIN LISPRO 2 UNITS: 100 INJECTION, SOLUTION INTRAVENOUS; SUBCUTANEOUS at 08:11

## 2021-01-01 RX ADMIN — INSULIN LISPRO 2 UNITS: 100 INJECTION, SOLUTION INTRAVENOUS; SUBCUTANEOUS at 06:20

## 2021-01-01 RX ADMIN — IPRATROPIUM BROMIDE AND ALBUTEROL SULFATE 1 AMPULE: .5; 3 SOLUTION RESPIRATORY (INHALATION) at 20:51

## 2021-01-01 RX ADMIN — HEPARIN SODIUM 5000 UNITS: 5000 INJECTION INTRAVENOUS; SUBCUTANEOUS at 20:27

## 2021-01-01 RX ADMIN — LACTULOSE 20 G: 10 SOLUTION ORAL at 14:25

## 2021-01-01 RX ADMIN — INSULIN GLARGINE 40 UNITS: 100 INJECTION, SOLUTION SUBCUTANEOUS at 20:25

## 2021-01-01 RX ADMIN — INSULIN LISPRO 1 UNITS: 100 INJECTION, SOLUTION INTRAVENOUS; SUBCUTANEOUS at 20:35

## 2021-01-01 RX ADMIN — METHYLPREDNISOLONE SODIUM SUCCINATE 40 MG: 40 INJECTION, POWDER, FOR SOLUTION INTRAMUSCULAR; INTRAVENOUS at 22:16

## 2021-01-01 RX ADMIN — INSULIN LISPRO 3 UNITS: 100 INJECTION, SOLUTION INTRAVENOUS; SUBCUTANEOUS at 17:16

## 2021-01-01 RX ADMIN — VASOPRESSIN 0.04 UNITS/MIN: 20 INJECTION INTRAVENOUS at 12:43

## 2021-01-01 RX ADMIN — INSULIN LISPRO 3 UNITS: 100 INJECTION, SOLUTION INTRAVENOUS; SUBCUTANEOUS at 16:49

## 2021-01-01 RX ADMIN — Medication 75 MCG/HR: at 11:53

## 2021-01-01 RX ADMIN — VASOPRESSIN 0.04 UNITS/MIN: 20 INJECTION INTRAVENOUS at 17:05

## 2021-01-01 RX ADMIN — ATORVASTATIN CALCIUM 20 MG: 20 TABLET, FILM COATED ORAL at 20:18

## 2021-01-01 RX ADMIN — INSULIN GLARGINE 40 UNITS: 100 INJECTION, SOLUTION SUBCUTANEOUS at 20:54

## 2021-01-01 RX ADMIN — IPRATROPIUM BROMIDE AND ALBUTEROL SULFATE 1 AMPULE: .5; 3 SOLUTION RESPIRATORY (INHALATION) at 15:23

## 2021-01-01 RX ADMIN — CISATRACURIUM BESYLATE 2 MCG/KG/MIN: 10 INJECTION INTRAVENOUS at 05:21

## 2021-01-01 RX ADMIN — REMDESIVIR 100 MG: 100 INJECTION, POWDER, LYOPHILIZED, FOR SOLUTION INTRAVENOUS at 02:47

## 2021-01-01 RX ADMIN — LISINOPRIL 2.5 MG: 2.5 TABLET ORAL at 09:12

## 2021-01-01 RX ADMIN — TAMSULOSIN HYDROCHLORIDE 0.4 MG: 0.4 CAPSULE ORAL at 20:39

## 2021-01-01 RX ADMIN — FAMOTIDINE 20 MG: 10 INJECTION INTRAVENOUS at 20:33

## 2021-01-01 RX ADMIN — FLUTICASONE PROPIONATE 1 SPRAY: 50 SPRAY, METERED NASAL at 08:03

## 2021-01-01 RX ADMIN — ASPIRIN 81 MG: 81 TABLET, COATED ORAL at 08:23

## 2021-01-01 RX ADMIN — CISATRACURIUM BESYLATE 2 MCG/KG/MIN: 10 INJECTION INTRAVENOUS at 07:31

## 2021-01-01 RX ADMIN — INSULIN LISPRO 3 UNITS: 100 INJECTION, SOLUTION INTRAVENOUS; SUBCUTANEOUS at 11:58

## 2021-01-01 RX ADMIN — METFORMIN HYDROCHLORIDE 1000 MG: 500 TABLET, EXTENDED RELEASE ORAL at 08:32

## 2021-01-01 RX ADMIN — Medication 10 MG/HR: at 21:07

## 2021-01-01 RX ADMIN — INSULIN LISPRO 1 UNITS: 100 INJECTION, SOLUTION INTRAVENOUS; SUBCUTANEOUS at 13:00

## 2021-01-01 RX ADMIN — METHYLPREDNISOLONE SODIUM SUCCINATE 40 MG: 40 INJECTION, POWDER, FOR SOLUTION INTRAMUSCULAR; INTRAVENOUS at 12:11

## 2021-01-01 RX ADMIN — AMLODIPINE BESYLATE 10 MG: 10 TABLET ORAL at 08:31

## 2021-01-01 RX ADMIN — INSULIN LISPRO 6 UNITS: 100 INJECTION, SOLUTION INTRAVENOUS; SUBCUTANEOUS at 08:03

## 2021-01-01 RX ADMIN — INSULIN GLARGINE 12 UNITS: 100 INJECTION, SOLUTION SUBCUTANEOUS at 21:19

## 2021-01-01 RX ADMIN — SODIUM CHLORIDE: 9 INJECTION, SOLUTION INTRAVENOUS at 19:49

## 2021-01-01 RX ADMIN — CETIRIZINE HYDROCHLORIDE 10 MG: 10 TABLET, FILM COATED ORAL at 08:25

## 2021-01-01 RX ADMIN — HEPARIN SODIUM 5000 UNITS: 5000 INJECTION INTRAVENOUS; SUBCUTANEOUS at 05:21

## 2021-01-01 RX ADMIN — METFORMIN HYDROCHLORIDE 1000 MG: 500 TABLET, EXTENDED RELEASE ORAL at 08:15

## 2021-01-01 RX ADMIN — ENOXAPARIN SODIUM 60 MG: 60 INJECTION SUBCUTANEOUS at 08:12

## 2021-01-01 RX ADMIN — SODIUM CHLORIDE, PRESERVATIVE FREE 10 ML: 5 INJECTION INTRAVENOUS at 21:35

## 2021-01-01 RX ADMIN — TAMSULOSIN HYDROCHLORIDE 0.4 MG: 0.4 CAPSULE ORAL at 20:57

## 2021-01-01 RX ADMIN — HEPARIN SODIUM 5000 UNITS: 5000 INJECTION INTRAVENOUS; SUBCUTANEOUS at 05:32

## 2021-01-01 RX ADMIN — Medication 150 MCG/HR: at 16:16

## 2021-01-01 RX ADMIN — INSULIN LISPRO 4 UNITS: 100 INJECTION, SOLUTION INTRAVENOUS; SUBCUTANEOUS at 17:02

## 2021-01-01 RX ADMIN — SODIUM CHLORIDE, PRESERVATIVE FREE 10 ML: 5 INJECTION INTRAVENOUS at 20:16

## 2021-01-01 RX ADMIN — CETIRIZINE HYDROCHLORIDE 10 MG: 10 TABLET, FILM COATED ORAL at 08:23

## 2021-01-01 RX ADMIN — LEVOFLOXACIN 500 MG: 5 INJECTION, SOLUTION INTRAVENOUS at 13:30

## 2021-01-01 RX ADMIN — HEPARIN SODIUM 5000 UNITS: 5000 INJECTION INTRAVENOUS; SUBCUTANEOUS at 14:17

## 2021-01-01 RX ADMIN — IPRATROPIUM BROMIDE AND ALBUTEROL SULFATE 1 AMPULE: .5; 3 SOLUTION RESPIRATORY (INHALATION) at 04:45

## 2021-01-01 RX ADMIN — Medication 75 MCG/HR: at 01:01

## 2021-01-01 RX ADMIN — DEXTROSE MONOHYDRATE 25 G: 25 INJECTION, SOLUTION INTRAVENOUS at 12:03

## 2021-01-01 RX ADMIN — HEPARIN SODIUM 5000 UNITS: 5000 INJECTION INTRAVENOUS; SUBCUTANEOUS at 15:38

## 2021-01-01 RX ADMIN — DEXAMETHASONE SODIUM PHOSPHATE 6 MG: 10 INJECTION INTRAMUSCULAR; INTRAVENOUS at 02:17

## 2021-01-01 RX ADMIN — METHYLPREDNISOLONE SODIUM SUCCINATE 40 MG: 40 INJECTION, POWDER, FOR SOLUTION INTRAMUSCULAR; INTRAVENOUS at 22:44

## 2021-01-01 RX ADMIN — DEXTROSE MONOHYDRATE 25 G: 25 INJECTION, SOLUTION INTRAVENOUS at 06:10

## 2021-01-01 RX ADMIN — Medication 125 MCG/HR: at 19:17

## 2021-01-01 RX ADMIN — ASPIRIN 81 MG: 81 TABLET, COATED ORAL at 08:57

## 2021-01-01 RX ADMIN — INSULIN LISPRO 6 UNITS: 100 INJECTION, SOLUTION INTRAVENOUS; SUBCUTANEOUS at 16:48

## 2021-01-01 RX ADMIN — INSULIN LISPRO 1 UNITS: 100 INJECTION, SOLUTION INTRAVENOUS; SUBCUTANEOUS at 00:08

## 2021-01-01 RX ADMIN — METHYLPREDNISOLONE SODIUM SUCCINATE 40 MG: 40 INJECTION, POWDER, FOR SOLUTION INTRAMUSCULAR; INTRAVENOUS at 23:24

## 2021-01-01 RX ADMIN — HEPARIN SODIUM 5000 UNITS: 5000 INJECTION INTRAVENOUS; SUBCUTANEOUS at 06:01

## 2021-01-01 RX ADMIN — METHYLPREDNISOLONE SODIUM SUCCINATE 40 MG: 40 INJECTION, POWDER, FOR SOLUTION INTRAMUSCULAR; INTRAVENOUS at 00:03

## 2021-01-01 RX ADMIN — INSULIN LISPRO 3 UNITS: 100 INJECTION, SOLUTION INTRAVENOUS; SUBCUTANEOUS at 08:37

## 2021-01-01 RX ADMIN — SODIUM CHLORIDE, PRESERVATIVE FREE 10 ML: 5 INJECTION INTRAVENOUS at 20:17

## 2021-01-01 RX ADMIN — HEPARIN SODIUM 5000 UNITS: 5000 INJECTION INTRAVENOUS; SUBCUTANEOUS at 20:47

## 2021-01-01 RX ADMIN — IPRATROPIUM BROMIDE AND ALBUTEROL SULFATE 1 AMPULE: .5; 3 SOLUTION RESPIRATORY (INHALATION) at 11:00

## 2021-01-01 RX ADMIN — SODIUM CHLORIDE, PRESERVATIVE FREE 10 ML: 5 INJECTION INTRAVENOUS at 08:51

## 2021-01-01 RX ADMIN — INSULIN LISPRO 1 UNITS: 100 INJECTION, SOLUTION INTRAVENOUS; SUBCUTANEOUS at 04:35

## 2021-01-01 RX ADMIN — FAMOTIDINE 20 MG: 10 INJECTION INTRAVENOUS at 08:10

## 2021-01-01 RX ADMIN — SODIUM CHLORIDE, PRESERVATIVE FREE 10 ML: 5 INJECTION INTRAVENOUS at 08:00

## 2021-01-01 RX ADMIN — INSULIN GLARGINE 40 UNITS: 100 INJECTION, SOLUTION SUBCUTANEOUS at 21:30

## 2021-01-01 RX ADMIN — ZINC SULFATE 220 MG (50 MG) CAPSULE 50 MG: CAPSULE at 08:28

## 2021-01-01 RX ADMIN — FLUTICASONE PROPIONATE 1 SPRAY: 50 SPRAY, METERED NASAL at 09:04

## 2021-01-01 RX ADMIN — METHYLPREDNISOLONE SODIUM SUCCINATE 40 MG: 40 INJECTION, POWDER, FOR SOLUTION INTRAMUSCULAR; INTRAVENOUS at 23:52

## 2021-01-01 RX ADMIN — SODIUM CHLORIDE, PRESERVATIVE FREE 10 ML: 5 INJECTION INTRAVENOUS at 08:27

## 2021-01-01 RX ADMIN — FENTANYL CITRATE 25 MCG: 50 INJECTION, SOLUTION INTRAMUSCULAR; INTRAVENOUS at 16:53

## 2021-01-01 RX ADMIN — HEPARIN SODIUM 5000 UNITS: 5000 INJECTION INTRAVENOUS; SUBCUTANEOUS at 05:57

## 2021-01-01 RX ADMIN — ASPIRIN 81 MG: 81 TABLET, COATED ORAL at 08:09

## 2021-01-01 RX ADMIN — HEPARIN SODIUM 5000 UNITS: 5000 INJECTION INTRAVENOUS; SUBCUTANEOUS at 22:01

## 2021-01-01 RX ADMIN — AMLODIPINE BESYLATE 10 MG: 10 TABLET ORAL at 07:43

## 2021-01-01 RX ADMIN — FAMOTIDINE 20 MG: 10 INJECTION INTRAVENOUS at 22:10

## 2021-01-01 RX ADMIN — Medication 150 MCG/HR: at 23:57

## 2021-01-01 RX ADMIN — ZINC SULFATE 220 MG (50 MG) CAPSULE 50 MG: CAPSULE at 08:17

## 2021-01-01 RX ADMIN — ASPIRIN 81 MG: 81 TABLET, COATED ORAL at 08:16

## 2021-01-01 RX ADMIN — HEPARIN SODIUM 5000 UNITS: 5000 INJECTION INTRAVENOUS; SUBCUTANEOUS at 14:05

## 2021-01-01 RX ADMIN — ACETAMINOPHEN 650 MG: 325 TABLET ORAL at 08:24

## 2021-01-01 RX ADMIN — INSULIN LISPRO 3 UNITS: 100 INJECTION, SOLUTION INTRAVENOUS; SUBCUTANEOUS at 00:25

## 2021-01-01 RX ADMIN — INSULIN LISPRO 2 UNITS: 100 INJECTION, SOLUTION INTRAVENOUS; SUBCUTANEOUS at 03:43

## 2021-01-01 RX ADMIN — DEXTROSE MONOHYDRATE 200 MG: 50 INJECTION, SOLUTION INTRAVENOUS at 17:27

## 2021-01-01 RX ADMIN — DEXTROSE MONOHYDRATE 100 MG: 50 INJECTION, SOLUTION INTRAVENOUS at 15:56

## 2021-01-01 RX ADMIN — PANTOPRAZOLE SODIUM 40 MG: 40 TABLET, DELAYED RELEASE ORAL at 09:12

## 2021-01-01 RX ADMIN — IPRATROPIUM BROMIDE AND ALBUTEROL SULFATE 1 AMPULE: .5; 3 SOLUTION RESPIRATORY (INHALATION) at 21:52

## 2021-01-01 RX ADMIN — INSULIN LISPRO 3 UNITS: 100 INJECTION, SOLUTION INTRAVENOUS; SUBCUTANEOUS at 17:00

## 2021-01-01 RX ADMIN — Medication 200 MCG/HR: at 16:22

## 2021-01-01 RX ADMIN — EPOPROSTENOL 50 NG/KG/MIN: 1.5 INJECTION, POWDER, LYOPHILIZED, FOR SOLUTION INTRAVENOUS at 11:08

## 2021-01-01 RX ADMIN — IPRATROPIUM BROMIDE AND ALBUTEROL SULFATE 1 AMPULE: .5; 3 SOLUTION RESPIRATORY (INHALATION) at 04:53

## 2021-01-01 RX ADMIN — ZINC SULFATE 220 MG (50 MG) CAPSULE 50 MG: CAPSULE at 08:15

## 2021-01-01 RX ADMIN — INSULIN LISPRO 3 UNITS: 100 INJECTION, SOLUTION INTRAVENOUS; SUBCUTANEOUS at 18:16

## 2021-01-01 RX ADMIN — PREDNISONE 40 MG: 20 TABLET ORAL at 15:45

## 2021-01-01 RX ADMIN — ATORVASTATIN CALCIUM 20 MG: 20 TABLET, FILM COATED ORAL at 20:39

## 2021-01-01 RX ADMIN — INSULIN GLARGINE 10 UNITS: 100 INJECTION, SOLUTION SUBCUTANEOUS at 21:24

## 2021-01-01 RX ADMIN — ACETAMINOPHEN 650 MG: 325 TABLET ORAL at 17:35

## 2021-01-01 RX ADMIN — TAMSULOSIN HYDROCHLORIDE 0.4 MG: 0.4 CAPSULE ORAL at 20:46

## 2021-01-01 RX ADMIN — ENOXAPARIN SODIUM 60 MG: 60 INJECTION SUBCUTANEOUS at 08:25

## 2021-01-01 RX ADMIN — PANTOPRAZOLE SODIUM 40 MG: 40 TABLET, DELAYED RELEASE ORAL at 08:17

## 2021-01-01 RX ADMIN — IVERMECTIN 12 MG: 3 TABLET ORAL at 17:09

## 2021-01-01 RX ADMIN — INSULIN LISPRO 2 UNITS: 100 INJECTION, SOLUTION INTRAVENOUS; SUBCUTANEOUS at 19:46

## 2021-01-01 RX ADMIN — INSULIN LISPRO 1 UNITS: 100 INJECTION, SOLUTION INTRAVENOUS; SUBCUTANEOUS at 08:38

## 2021-01-01 RX ADMIN — ASPIRIN 81 MG: 81 TABLET, COATED ORAL at 09:02

## 2021-01-01 RX ADMIN — Medication 5000 UNITS: at 14:02

## 2021-01-01 RX ADMIN — DEXAMETHASONE SODIUM PHOSPHATE 6 MG: 10 INJECTION INTRAMUSCULAR; INTRAVENOUS at 01:11

## 2021-01-01 RX ADMIN — FLUTICASONE PROPIONATE 1 SPRAY: 50 SPRAY, METERED NASAL at 08:17

## 2021-01-01 RX ADMIN — ASPIRIN 81 MG: 81 TABLET, COATED ORAL at 08:48

## 2021-01-01 RX ADMIN — IPRATROPIUM BROMIDE AND ALBUTEROL SULFATE 1 AMPULE: .5; 3 SOLUTION RESPIRATORY (INHALATION) at 16:51

## 2021-01-01 RX ADMIN — SODIUM CHLORIDE, PRESERVATIVE FREE 10 ML: 5 INJECTION INTRAVENOUS at 20:13

## 2021-01-01 RX ADMIN — HEPARIN SODIUM 5000 UNITS: 5000 INJECTION INTRAVENOUS; SUBCUTANEOUS at 14:18

## 2021-01-01 RX ADMIN — FAMOTIDINE 20 MG: 10 INJECTION INTRAVENOUS at 21:20

## 2021-01-01 RX ADMIN — SODIUM CHLORIDE: 9 INJECTION, SOLUTION INTRAVENOUS at 02:05

## 2021-01-01 RX ADMIN — INSULIN LISPRO 6 UNITS: 100 INJECTION, SOLUTION INTRAVENOUS; SUBCUTANEOUS at 12:40

## 2021-01-01 RX ADMIN — INSULIN LISPRO 4 UNITS: 100 INJECTION, SOLUTION INTRAVENOUS; SUBCUTANEOUS at 08:24

## 2021-01-01 RX ADMIN — INSULIN LISPRO 3 UNITS: 100 INJECTION, SOLUTION INTRAVENOUS; SUBCUTANEOUS at 12:56

## 2021-01-01 RX ADMIN — FUROSEMIDE 40 MG: 10 INJECTION, SOLUTION INTRAMUSCULAR; INTRAVENOUS at 10:05

## 2021-01-01 RX ADMIN — LACTULOSE 20 G: 10 SOLUTION ORAL at 20:20

## 2021-01-01 RX ADMIN — Medication 400 MG: at 08:51

## 2021-01-01 RX ADMIN — ZINC SULFATE 220 MG (50 MG) CAPSULE 50 MG: CAPSULE at 08:58

## 2021-01-01 RX ADMIN — METFORMIN HYDROCHLORIDE 1000 MG: 500 TABLET, EXTENDED RELEASE ORAL at 08:51

## 2021-01-01 RX ADMIN — CETIRIZINE HYDROCHLORIDE 10 MG: 10 TABLET, FILM COATED ORAL at 14:03

## 2021-01-01 RX ADMIN — AZITHROMYCIN MONOHYDRATE 500 MG: 500 INJECTION, POWDER, LYOPHILIZED, FOR SOLUTION INTRAVENOUS at 22:26

## 2021-01-01 RX ADMIN — HEPARIN SODIUM 5000 UNITS: 5000 INJECTION INTRAVENOUS; SUBCUTANEOUS at 23:58

## 2021-01-01 RX ADMIN — WATER: 1 INJECTION INTRAMUSCULAR; INTRAVENOUS; SUBCUTANEOUS at 12:01

## 2021-01-01 RX ADMIN — EPOPROSTENOL 50 NG/KG/MIN: 1.5 INJECTION, POWDER, LYOPHILIZED, FOR SOLUTION INTRAVENOUS at 15:15

## 2021-01-01 RX ADMIN — HEPARIN SODIUM 5000 UNITS: 5000 INJECTION INTRAVENOUS; SUBCUTANEOUS at 19:33

## 2021-01-01 RX ADMIN — METHYLPREDNISOLONE SODIUM SUCCINATE 40 MG: 40 INJECTION, POWDER, FOR SOLUTION INTRAMUSCULAR; INTRAVENOUS at 23:48

## 2021-01-01 RX ADMIN — CETIRIZINE HYDROCHLORIDE 10 MG: 10 TABLET, FILM COATED ORAL at 08:16

## 2021-01-01 RX ADMIN — Medication 8 MG/HR: at 16:59

## 2021-01-01 RX ADMIN — ENOXAPARIN SODIUM 60 MG: 60 INJECTION SUBCUTANEOUS at 00:27

## 2021-01-01 RX ADMIN — DEXAMETHASONE SODIUM PHOSPHATE 6 MG: 10 INJECTION INTRAMUSCULAR; INTRAVENOUS at 02:07

## 2021-01-01 RX ADMIN — SODIUM ZIRCONIUM CYCLOSILICATE 10 G: 10 POWDER, FOR SUSPENSION ORAL at 14:15

## 2021-01-01 RX ADMIN — FAMOTIDINE 20 MG: 10 INJECTION INTRAVENOUS at 10:56

## 2021-01-01 RX ADMIN — Medication 9 MG/HR: at 06:10

## 2021-01-01 RX ADMIN — Medication 9 MG/HR: at 18:10

## 2021-01-01 RX ADMIN — INSULIN LISPRO 3 UNITS: 100 INJECTION, SOLUTION INTRAVENOUS; SUBCUTANEOUS at 23:57

## 2021-01-01 RX ADMIN — FAMOTIDINE 20 MG: 10 INJECTION, SOLUTION INTRAVENOUS at 11:21

## 2021-01-01 RX ADMIN — DEXAMETHASONE SODIUM PHOSPHATE 6 MG: 10 INJECTION INTRAMUSCULAR; INTRAVENOUS at 01:58

## 2021-01-01 RX ADMIN — DEXTROSE MONOHYDRATE 100 MG: 50 INJECTION, SOLUTION INTRAVENOUS at 20:20

## 2021-01-01 RX ADMIN — INSULIN LISPRO 1 UNITS: 100 INJECTION, SOLUTION INTRAVENOUS; SUBCUTANEOUS at 17:30

## 2021-01-01 RX ADMIN — FLUTICASONE PROPIONATE 1 SPRAY: 50 SPRAY, METERED NASAL at 08:11

## 2021-01-01 RX ADMIN — Medication 200 MCG/HR: at 05:00

## 2021-01-01 RX ADMIN — FLUTICASONE PROPIONATE 1 SPRAY: 50 SPRAY, METERED NASAL at 17:07

## 2021-01-01 RX ADMIN — SODIUM CHLORIDE, PRESERVATIVE FREE 10 ML: 5 INJECTION INTRAVENOUS at 08:36

## 2021-01-01 RX ADMIN — Medication 1500 MG: at 04:00

## 2021-01-01 RX ADMIN — IPRATROPIUM BROMIDE AND ALBUTEROL SULFATE 1 AMPULE: .5; 3 SOLUTION RESPIRATORY (INHALATION) at 20:18

## 2021-01-01 RX ADMIN — Medication 9 MG/HR: at 16:51

## 2021-01-01 RX ADMIN — HEPARIN SODIUM 5000 UNITS: 5000 INJECTION INTRAVENOUS; SUBCUTANEOUS at 14:44

## 2021-01-01 RX ADMIN — INSULIN LISPRO 3 UNITS: 100 INJECTION, SOLUTION INTRAVENOUS; SUBCUTANEOUS at 15:04

## 2021-01-01 RX ADMIN — Medication 10 MG/HR: at 01:31

## 2021-01-01 RX ADMIN — SODIUM CHLORIDE 500 ML: 9 INJECTION, SOLUTION INTRAVENOUS at 10:01

## 2021-01-01 RX ADMIN — ATORVASTATIN CALCIUM 20 MG: 20 TABLET, FILM COATED ORAL at 00:27

## 2021-01-01 RX ADMIN — INSULIN GLARGINE 12 UNITS: 100 INJECTION, SOLUTION SUBCUTANEOUS at 19:31

## 2021-01-01 RX ADMIN — CISATRACURIUM BESYLATE 3 MCG/KG/MIN: 10 INJECTION INTRAVENOUS at 04:12

## 2021-01-01 RX ADMIN — TAMSULOSIN HYDROCHLORIDE 0.4 MG: 0.4 CAPSULE ORAL at 20:59

## 2021-01-01 RX ADMIN — Medication 75 MCG/HR: at 06:18

## 2021-01-01 RX ADMIN — SODIUM CHLORIDE: 9 INJECTION, SOLUTION INTRAVENOUS at 21:52

## 2021-01-01 RX ADMIN — CISATRACURIUM BESYLATE 3 MCG/KG/MIN: 10 INJECTION INTRAVENOUS at 15:17

## 2021-01-01 RX ADMIN — INSULIN LISPRO 2 UNITS: 100 INJECTION, SOLUTION INTRAVENOUS; SUBCUTANEOUS at 14:32

## 2021-01-01 RX ADMIN — INSULIN GLARGINE 30 UNITS: 100 INJECTION, SOLUTION SUBCUTANEOUS at 20:16

## 2021-01-01 RX ADMIN — PROPOFOL 25 MCG/KG/MIN: 10 INJECTION, EMULSION INTRAVENOUS at 20:33

## 2021-01-01 RX ADMIN — ATORVASTATIN CALCIUM 20 MG: 20 TABLET, FILM COATED ORAL at 20:16

## 2021-01-01 RX ADMIN — SODIUM CHLORIDE, PRESERVATIVE FREE 10 ML: 5 INJECTION INTRAVENOUS at 08:03

## 2021-01-01 RX ADMIN — PIPERACILLIN AND TAZOBACTAM 3375 MG: 3; .375 INJECTION, POWDER, FOR SOLUTION INTRAVENOUS at 14:35

## 2021-01-01 RX ADMIN — SODIUM CHLORIDE 2 MCG/KG/MIN: 9 INJECTION, SOLUTION INTRAVENOUS at 17:31

## 2021-01-01 RX ADMIN — DEXAMETHASONE SODIUM PHOSPHATE 6 MG: 10 INJECTION INTRAMUSCULAR; INTRAVENOUS at 04:07

## 2021-01-01 RX ADMIN — PIPERACILLIN AND TAZOBACTAM 3375 MG: 3; .375 INJECTION, POWDER, FOR SOLUTION INTRAVENOUS at 09:07

## 2021-01-01 RX ADMIN — Medication 8 MG/HR: at 06:37

## 2021-01-01 RX ADMIN — DEXTROSE MONOHYDRATE 100 MG: 50 INJECTION, SOLUTION INTRAVENOUS at 17:52

## 2021-01-01 RX ADMIN — ACETAMINOPHEN 650 MG: 325 TABLET ORAL at 16:10

## 2021-01-01 RX ADMIN — PIPERACILLIN AND TAZOBACTAM 3375 MG: 3; .375 INJECTION, POWDER, FOR SOLUTION INTRAVENOUS at 08:11

## 2021-01-01 RX ADMIN — METHYLPREDNISOLONE SODIUM SUCCINATE 40 MG: 40 INJECTION, POWDER, FOR SOLUTION INTRAMUSCULAR; INTRAVENOUS at 11:41

## 2021-01-01 RX ADMIN — SODIUM CHLORIDE, PRESERVATIVE FREE 10 ML: 5 INJECTION INTRAVENOUS at 09:08

## 2021-01-01 RX ADMIN — METHYLPREDNISOLONE SODIUM SUCCINATE 40 MG: 40 INJECTION, POWDER, FOR SOLUTION INTRAMUSCULAR; INTRAVENOUS at 11:23

## 2021-01-01 RX ADMIN — INSULIN LISPRO 3 UNITS: 100 INJECTION, SOLUTION INTRAVENOUS; SUBCUTANEOUS at 21:45

## 2021-01-01 RX ADMIN — IPRATROPIUM BROMIDE AND ALBUTEROL SULFATE 1 AMPULE: .5; 3 SOLUTION RESPIRATORY (INHALATION) at 04:58

## 2021-01-01 RX ADMIN — MIDAZOLAM 5 MG: 5 INJECTION INTRAMUSCULAR; INTRAVENOUS at 16:12

## 2021-01-01 RX ADMIN — Medication 10 MG/HR: at 23:26

## 2021-01-01 RX ADMIN — Medication 75 MCG/HR: at 08:04

## 2021-01-01 RX ADMIN — ENOXAPARIN SODIUM 60 MG: 60 INJECTION SUBCUTANEOUS at 20:07

## 2021-01-01 RX ADMIN — INSULIN LISPRO 3 UNITS: 100 INJECTION, SOLUTION INTRAVENOUS; SUBCUTANEOUS at 12:08

## 2021-01-01 RX ADMIN — INSULIN LISPRO 1 UNITS: 100 INJECTION, SOLUTION INTRAVENOUS; SUBCUTANEOUS at 00:30

## 2021-01-01 RX ADMIN — SODIUM CHLORIDE: 9 INJECTION, SOLUTION INTRAVENOUS at 17:10

## 2021-01-01 RX ADMIN — SODIUM CHLORIDE, PRESERVATIVE FREE 10 ML: 5 INJECTION INTRAVENOUS at 08:25

## 2021-01-01 RX ADMIN — CISATRACURIUM BESYLATE 3.5 MCG/KG/MIN: 10 INJECTION INTRAVENOUS at 13:20

## 2021-01-01 RX ADMIN — ZINC SULFATE 220 MG (50 MG) CAPSULE 50 MG: CAPSULE at 14:02

## 2021-01-01 RX ADMIN — ATORVASTATIN CALCIUM 20 MG: 20 TABLET, FILM COATED ORAL at 20:17

## 2021-01-01 RX ADMIN — LISINOPRIL 2.5 MG: 2.5 TABLET ORAL at 14:02

## 2021-01-01 RX ADMIN — Medication 200 MCG/HR: at 21:23

## 2021-01-01 RX ADMIN — GUAIFENESIN AND DEXTROMETHORPHAN HYDROBROMIDE 1 TABLET: 600; 30 TABLET, EXTENDED RELEASE ORAL at 14:02

## 2021-01-01 RX ADMIN — ZINC SULFATE 220 MG (50 MG) CAPSULE 50 MG: CAPSULE at 08:25

## 2021-01-01 RX ADMIN — Medication 1500 MG: at 15:41

## 2021-01-01 RX ADMIN — INSULIN GLARGINE 12 UNITS: 100 INJECTION, SOLUTION SUBCUTANEOUS at 20:27

## 2021-01-01 RX ADMIN — Medication 10 MG/HR: at 14:40

## 2021-01-01 RX ADMIN — FUROSEMIDE 20 MG: 10 INJECTION, SOLUTION INTRAMUSCULAR; INTRAVENOUS at 18:00

## 2021-01-01 RX ADMIN — WATER 20 ML: 1 INJECTION INTRAMUSCULAR; INTRAVENOUS; SUBCUTANEOUS at 02:42

## 2021-01-01 RX ADMIN — HEPARIN SODIUM 5000 UNITS: 5000 INJECTION INTRAVENOUS; SUBCUTANEOUS at 06:36

## 2021-01-01 RX ADMIN — Medication 10 MCG/MIN: at 03:50

## 2021-01-01 RX ADMIN — INSULIN GLARGINE 40 UNITS: 100 INJECTION, SOLUTION SUBCUTANEOUS at 21:01

## 2021-01-01 RX ADMIN — SODIUM ZIRCONIUM CYCLOSILICATE 10 G: 10 POWDER, FOR SUSPENSION ORAL at 08:10

## 2021-01-01 RX ADMIN — INSULIN LISPRO 4 UNITS: 100 INJECTION, SOLUTION INTRAVENOUS; SUBCUTANEOUS at 08:10

## 2021-01-01 RX ADMIN — INSULIN LISPRO 1 UNITS: 100 INJECTION, SOLUTION INTRAVENOUS; SUBCUTANEOUS at 03:20

## 2021-01-01 RX ADMIN — ENOXAPARIN SODIUM 60 MG: 60 INJECTION SUBCUTANEOUS at 20:18

## 2021-01-01 RX ADMIN — INSULIN LISPRO 3 UNITS: 100 INJECTION, SOLUTION INTRAVENOUS; SUBCUTANEOUS at 05:41

## 2021-01-01 RX ADMIN — Medication 400 MG: at 08:15

## 2021-01-01 RX ADMIN — INSULIN LISPRO 3 UNITS: 100 INJECTION, SOLUTION INTRAVENOUS; SUBCUTANEOUS at 11:31

## 2021-01-01 RX ADMIN — PHENYLEPHRINE HYDROCHLORIDE 30 MCG/MIN: 10 INJECTION INTRAVENOUS at 05:57

## 2021-01-01 RX ADMIN — Medication 1500 MG: at 15:10

## 2021-01-01 RX ADMIN — Medication 200 MCG/HR: at 19:46

## 2021-01-01 RX ADMIN — Medication 1500 MG: at 15:44

## 2021-01-01 RX ADMIN — TAMSULOSIN HYDROCHLORIDE 0.4 MG: 0.4 CAPSULE ORAL at 20:18

## 2021-01-01 RX ADMIN — SODIUM ZIRCONIUM CYCLOSILICATE 10 G: 10 POWDER, FOR SUSPENSION ORAL at 20:14

## 2021-01-01 RX ADMIN — REMDESIVIR 100 MG: 100 INJECTION, POWDER, LYOPHILIZED, FOR SOLUTION INTRAVENOUS at 02:42

## 2021-01-01 RX ADMIN — IPRATROPIUM BROMIDE AND ALBUTEROL SULFATE 1 AMPULE: .5; 3 SOLUTION RESPIRATORY (INHALATION) at 21:40

## 2021-01-01 RX ADMIN — INSULIN GLARGINE 40 UNITS: 100 INJECTION, SOLUTION SUBCUTANEOUS at 21:14

## 2021-01-01 RX ADMIN — SODIUM CHLORIDE, PRESERVATIVE FREE 10 ML: 5 INJECTION INTRAVENOUS at 19:28

## 2021-01-01 RX ADMIN — IPRATROPIUM BROMIDE AND ALBUTEROL SULFATE 1 AMPULE: .5; 3 SOLUTION RESPIRATORY (INHALATION) at 20:46

## 2021-01-01 RX ADMIN — GUAIFENESIN AND DEXTROMETHORPHAN HYDROBROMIDE 1 TABLET: 600; 30 TABLET, EXTENDED RELEASE ORAL at 21:32

## 2021-01-01 RX ADMIN — IPRATROPIUM BROMIDE AND ALBUTEROL SULFATE 1 AMPULE: .5; 3 SOLUTION RESPIRATORY (INHALATION) at 05:29

## 2021-01-01 RX ADMIN — LISINOPRIL 2.5 MG: 2.5 TABLET ORAL at 08:27

## 2021-01-01 RX ADMIN — INSULIN LISPRO 2 UNITS: 100 INJECTION, SOLUTION INTRAVENOUS; SUBCUTANEOUS at 17:08

## 2021-01-01 RX ADMIN — Medication 75 MCG/HR: at 21:06

## 2021-01-01 RX ADMIN — METFORMIN HYDROCHLORIDE 1000 MG: 500 TABLET, EXTENDED RELEASE ORAL at 08:48

## 2021-01-01 RX ADMIN — LISINOPRIL 2.5 MG: 2.5 TABLET ORAL at 08:57

## 2021-01-01 RX ADMIN — ASPIRIN 81 MG: 81 TABLET, COATED ORAL at 09:12

## 2021-01-01 RX ADMIN — CALCIUM GLUCONATE 1000 MG: 20 INJECTION, SOLUTION INTRAVENOUS at 12:04

## 2021-01-01 RX ADMIN — INSULIN LISPRO 3 UNITS: 100 INJECTION, SOLUTION INTRAVENOUS; SUBCUTANEOUS at 11:15

## 2021-01-01 RX ADMIN — EPOPROSTENOL 50 NG/KG/MIN: 1.5 INJECTION, POWDER, LYOPHILIZED, FOR SOLUTION INTRAVENOUS at 01:48

## 2021-01-01 RX ADMIN — SODIUM CHLORIDE, PRESERVATIVE FREE 10 ML: 5 INJECTION INTRAVENOUS at 09:51

## 2021-01-01 RX ADMIN — HEPARIN SODIUM 5000 UNITS: 5000 INJECTION INTRAVENOUS; SUBCUTANEOUS at 15:45

## 2021-01-01 RX ADMIN — INSULIN LISPRO 1 UNITS: 100 INJECTION, SOLUTION INTRAVENOUS; SUBCUTANEOUS at 08:30

## 2021-01-01 RX ADMIN — ENOXAPARIN SODIUM 60 MG: 60 INJECTION SUBCUTANEOUS at 20:15

## 2021-01-01 RX ADMIN — SODIUM CHLORIDE, PRESERVATIVE FREE 10 ML: 5 INJECTION INTRAVENOUS at 08:31

## 2021-01-01 RX ADMIN — EPOPROSTENOL 50 NG/KG/MIN: 1.5 INJECTION, POWDER, LYOPHILIZED, FOR SOLUTION INTRAVENOUS at 23:00

## 2021-01-01 RX ADMIN — SODIUM CHLORIDE, PRESERVATIVE FREE 10 ML: 5 INJECTION INTRAVENOUS at 21:37

## 2021-01-01 RX ADMIN — Medication 400 MG: at 09:02

## 2021-01-01 RX ADMIN — LEVOFLOXACIN 500 MG: 5 INJECTION, SOLUTION INTRAVENOUS at 11:44

## 2021-01-01 RX ADMIN — SODIUM CHLORIDE, PRESERVATIVE FREE 10 ML: 5 INJECTION INTRAVENOUS at 08:49

## 2021-01-01 RX ADMIN — IPRATROPIUM BROMIDE AND ALBUTEROL SULFATE 1 AMPULE: .5; 3 SOLUTION RESPIRATORY (INHALATION) at 21:05

## 2021-01-01 RX ADMIN — Medication 400 MG: at 09:13

## 2021-01-01 RX ADMIN — INSULIN LISPRO 2 UNITS: 100 INJECTION, SOLUTION INTRAVENOUS; SUBCUTANEOUS at 18:44

## 2021-01-01 RX ADMIN — INSULIN LISPRO 2 UNITS: 100 INJECTION, SOLUTION INTRAVENOUS; SUBCUTANEOUS at 15:37

## 2021-01-01 RX ADMIN — CETIRIZINE HYDROCHLORIDE 10 MG: 10 TABLET, FILM COATED ORAL at 08:57

## 2021-01-01 RX ADMIN — PANTOPRAZOLE SODIUM 40 MG: 40 TABLET, DELAYED RELEASE ORAL at 08:02

## 2021-01-01 RX ADMIN — INSULIN LISPRO 3 UNITS: 100 INJECTION, SOLUTION INTRAVENOUS; SUBCUTANEOUS at 00:44

## 2021-01-01 RX ADMIN — LISINOPRIL 2.5 MG: 2.5 TABLET ORAL at 08:02

## 2021-01-01 RX ADMIN — LACTULOSE 20 G: 10 SOLUTION ORAL at 08:35

## 2021-01-01 RX ADMIN — LACTULOSE 20 G: 10 SOLUTION ORAL at 16:31

## 2021-01-01 RX ADMIN — DEXAMETHASONE SODIUM PHOSPHATE 6 MG: 10 INJECTION INTRAMUSCULAR; INTRAVENOUS at 05:44

## 2021-01-01 RX ADMIN — SODIUM CHLORIDE, PRESERVATIVE FREE 10 ML: 5 INJECTION INTRAVENOUS at 11:52

## 2021-01-01 RX ADMIN — SODIUM CHLORIDE, PRESERVATIVE FREE 10 ML: 5 INJECTION INTRAVENOUS at 08:47

## 2021-01-01 RX ADMIN — WATER 40 ML: 1 INJECTION INTRAMUSCULAR; INTRAVENOUS; SUBCUTANEOUS at 03:23

## 2021-01-01 RX ADMIN — INSULIN GLARGINE 40 UNITS: 100 INJECTION, SOLUTION SUBCUTANEOUS at 20:41

## 2021-01-01 RX ADMIN — Medication 5 MG/HR: at 21:59

## 2021-01-01 RX ADMIN — FLUTICASONE PROPIONATE 1 SPRAY: 50 SPRAY, METERED NASAL at 08:50

## 2021-01-01 RX ADMIN — HEPARIN SODIUM 5000 UNITS: 5000 INJECTION INTRAVENOUS; SUBCUTANEOUS at 08:33

## 2021-01-01 RX ADMIN — LACTULOSE 20 G: 10 SOLUTION ORAL at 15:45

## 2021-01-01 RX ADMIN — IPRATROPIUM BROMIDE AND ALBUTEROL SULFATE 1 AMPULE: .5; 3 SOLUTION RESPIRATORY (INHALATION) at 11:58

## 2021-01-01 RX ADMIN — Medication 75 MCG/HR: at 03:43

## 2021-01-01 RX ADMIN — Medication 6 MG/HR: at 20:46

## 2021-01-01 RX ADMIN — LACTULOSE 20 G: 10 SOLUTION ORAL at 13:45

## 2021-01-01 RX ADMIN — HEPARIN SODIUM 5000 UNITS: 5000 INJECTION INTRAVENOUS; SUBCUTANEOUS at 21:00

## 2021-01-01 RX ADMIN — VASOPRESSIN 0.02 UNITS/MIN: 20 INJECTION INTRAVENOUS at 05:24

## 2021-01-01 RX ADMIN — LACTULOSE 20 G: 10 SOLUTION ORAL at 09:00

## 2021-01-01 RX ADMIN — Medication 8 MCG/MIN: at 00:02

## 2021-01-01 RX ADMIN — SODIUM CHLORIDE, PRESERVATIVE FREE 10 ML: 5 INJECTION INTRAVENOUS at 21:00

## 2021-01-01 RX ADMIN — LACTULOSE 20 G: 10 SOLUTION ORAL at 19:31

## 2021-01-01 RX ADMIN — Medication 10 MG/HR: at 14:50

## 2021-01-01 RX ADMIN — TAMSULOSIN HYDROCHLORIDE 0.4 MG: 0.4 CAPSULE ORAL at 20:54

## 2021-01-01 RX ADMIN — Medication 5000 UNITS: at 08:23

## 2021-01-01 RX ADMIN — Medication 10 MG/HR: at 14:59

## 2021-01-01 RX ADMIN — IPRATROPIUM BROMIDE AND ALBUTEROL SULFATE 1 AMPULE: .5; 3 SOLUTION RESPIRATORY (INHALATION) at 21:42

## 2021-01-01 RX ADMIN — INSULIN LISPRO 3 UNITS: 100 INJECTION, SOLUTION INTRAVENOUS; SUBCUTANEOUS at 12:12

## 2021-01-01 RX ADMIN — Medication 400 MG: at 08:10

## 2021-01-01 RX ADMIN — Medication 5000 UNITS: at 08:57

## 2021-01-01 RX ADMIN — FAMOTIDINE 20 MG: 10 INJECTION INTRAVENOUS at 08:38

## 2021-01-01 RX ADMIN — DEXAMETHASONE SODIUM PHOSPHATE 6 MG: 10 INJECTION INTRAMUSCULAR; INTRAVENOUS at 04:39

## 2021-01-01 RX ADMIN — HEPARIN SODIUM 5000 UNITS: 5000 INJECTION INTRAVENOUS; SUBCUTANEOUS at 21:45

## 2021-01-01 RX ADMIN — INSULIN LISPRO 3 UNITS: 100 INJECTION, SOLUTION INTRAVENOUS; SUBCUTANEOUS at 04:33

## 2021-01-01 RX ADMIN — SODIUM CHLORIDE, PRESERVATIVE FREE 10 ML: 5 INJECTION INTRAVENOUS at 09:26

## 2021-01-01 RX ADMIN — FAMOTIDINE 20 MG: 10 INJECTION INTRAVENOUS at 09:19

## 2021-01-01 RX ADMIN — HEPARIN SODIUM 5000 UNITS: 5000 INJECTION INTRAVENOUS; SUBCUTANEOUS at 21:19

## 2021-01-01 RX ADMIN — PANTOPRAZOLE SODIUM 40 MG: 40 TABLET, DELAYED RELEASE ORAL at 08:48

## 2021-01-01 RX ADMIN — AMLODIPINE BESYLATE 10 MG: 10 TABLET ORAL at 08:00

## 2021-01-01 RX ADMIN — INSULIN LISPRO 2 UNITS: 100 INJECTION, SOLUTION INTRAVENOUS; SUBCUTANEOUS at 09:00

## 2021-01-01 RX ADMIN — Medication 5000 UNITS: at 09:02

## 2021-01-01 RX ADMIN — INSULIN LISPRO 1 UNITS: 100 INJECTION, SOLUTION INTRAVENOUS; SUBCUTANEOUS at 16:51

## 2021-01-01 RX ADMIN — CETIRIZINE HYDROCHLORIDE 10 MG: 10 TABLET, FILM COATED ORAL at 08:50

## 2021-01-01 RX ADMIN — ATORVASTATIN CALCIUM 20 MG: 20 TABLET, FILM COATED ORAL at 20:02

## 2021-01-01 RX ADMIN — ENOXAPARIN SODIUM 60 MG: 60 INJECTION SUBCUTANEOUS at 20:30

## 2021-01-01 RX ADMIN — INSULIN HUMAN 10 UNITS: 100 INJECTION, SOLUTION PARENTERAL at 06:56

## 2021-01-01 RX ADMIN — Medication 5000 UNITS: at 08:32

## 2021-01-01 RX ADMIN — DEXTROSE MONOHYDRATE 100 MG: 50 INJECTION, SOLUTION INTRAVENOUS at 16:58

## 2021-01-01 RX ADMIN — ATORVASTATIN CALCIUM 20 MG: 20 TABLET, FILM COATED ORAL at 21:00

## 2021-01-01 RX ADMIN — CISATRACURIUM BESYLATE 2 MCG/KG/MIN: 10 INJECTION INTRAVENOUS at 16:03

## 2021-01-01 RX ADMIN — Medication 8 MG/HR: at 18:17

## 2021-01-01 RX ADMIN — ATORVASTATIN CALCIUM 20 MG: 20 TABLET, FILM COATED ORAL at 21:10

## 2021-01-01 RX ADMIN — HEPARIN SODIUM 5000 UNITS: 5000 INJECTION INTRAVENOUS; SUBCUTANEOUS at 22:03

## 2021-01-01 RX ADMIN — POLYVINYL ALCOHOL 1 DROP: 14 SOLUTION/ DROPS OPHTHALMIC at 20:03

## 2021-01-01 RX ADMIN — SODIUM CHLORIDE, PRESERVATIVE FREE 10 ML: 5 INJECTION INTRAVENOUS at 20:38

## 2021-01-01 RX ADMIN — INSULIN HUMAN 13 UNITS: 100 INJECTION, SOLUTION PARENTERAL at 12:04

## 2021-01-01 RX ADMIN — LACTULOSE 20 G: 10 SOLUTION ORAL at 20:12

## 2021-01-01 RX ADMIN — DEXTROSE MONOHYDRATE 100 MG: 50 INJECTION, SOLUTION INTRAVENOUS at 16:48

## 2021-01-01 RX ADMIN — Medication 1500 MG: at 03:00

## 2021-01-01 RX ADMIN — Medication 5000 UNITS: at 08:25

## 2021-01-01 RX ADMIN — IPRATROPIUM BROMIDE AND ALBUTEROL SULFATE 1 AMPULE: .5; 3 SOLUTION RESPIRATORY (INHALATION) at 16:26

## 2021-01-01 RX ADMIN — ASPIRIN 81 MG: 81 TABLET, COATED ORAL at 08:28

## 2021-01-01 RX ADMIN — Medication 75 MCG/HR: at 14:26

## 2021-01-01 RX ADMIN — INSULIN GLARGINE 10 UNITS: 100 INJECTION, SOLUTION SUBCUTANEOUS at 20:03

## 2021-01-01 RX ADMIN — SODIUM BICARBONATE 10 MEQ: 84 INJECTION, SOLUTION INTRAVENOUS at 06:57

## 2021-01-01 RX ADMIN — INSULIN LISPRO 3 UNITS: 100 INJECTION, SOLUTION INTRAVENOUS; SUBCUTANEOUS at 11:50

## 2021-01-01 RX ADMIN — PANTOPRAZOLE SODIUM 40 MG: 40 TABLET, DELAYED RELEASE ORAL at 09:02

## 2021-01-01 RX ADMIN — METHYLPREDNISOLONE SODIUM SUCCINATE 40 MG: 40 INJECTION, POWDER, FOR SOLUTION INTRAMUSCULAR; INTRAVENOUS at 23:53

## 2021-01-01 RX ADMIN — AMLODIPINE BESYLATE 10 MG: 10 TABLET ORAL at 09:18

## 2021-01-01 RX ADMIN — IPRATROPIUM BROMIDE AND ALBUTEROL SULFATE 1 AMPULE: .5; 3 SOLUTION RESPIRATORY (INHALATION) at 11:10

## 2021-01-01 RX ADMIN — Medication 11 MCG/MIN: at 17:07

## 2021-01-01 RX ADMIN — ENOXAPARIN SODIUM 60 MG: 60 INJECTION SUBCUTANEOUS at 08:09

## 2021-01-01 RX ADMIN — INSULIN LISPRO 6 UNITS: 100 INJECTION, SOLUTION INTRAVENOUS; SUBCUTANEOUS at 08:18

## 2021-01-01 RX ADMIN — LISINOPRIL 2.5 MG: 2.5 TABLET ORAL at 08:23

## 2021-01-01 RX ADMIN — IPRATROPIUM BROMIDE AND ALBUTEROL SULFATE 1 AMPULE: .5; 3 SOLUTION RESPIRATORY (INHALATION) at 10:56

## 2021-01-01 RX ADMIN — FAMOTIDINE 20 MG: 10 INJECTION INTRAVENOUS at 08:31

## 2021-01-01 RX ADMIN — Medication 150 MCG/HR: at 22:59

## 2021-01-01 RX ADMIN — IPRATROPIUM BROMIDE AND ALBUTEROL SULFATE 1 AMPULE: .5; 3 SOLUTION RESPIRATORY (INHALATION) at 16:47

## 2021-01-01 RX ADMIN — Medication 8 MG/HR: at 18:11

## 2021-01-01 RX ADMIN — ENOXAPARIN SODIUM 60 MG: 60 INJECTION SUBCUTANEOUS at 21:10

## 2021-01-01 RX ADMIN — ZINC SULFATE 220 MG (50 MG) CAPSULE 50 MG: CAPSULE at 08:48

## 2021-01-01 RX ADMIN — HEPARIN SODIUM 5000 UNITS: 5000 INJECTION INTRAVENOUS; SUBCUTANEOUS at 16:31

## 2021-01-01 RX ADMIN — FAMOTIDINE 20 MG: 10 INJECTION INTRAVENOUS at 20:19

## 2021-01-01 RX ADMIN — PROPOFOL 10 MCG/KG/MIN: 10 INJECTION, EMULSION INTRAVENOUS at 02:00

## 2021-01-01 RX ADMIN — Medication 5 MG/HR: at 06:06

## 2021-01-01 RX ADMIN — CISATRACURIUM BESYLATE 3.5 MCG/KG/MIN: 10 INJECTION INTRAVENOUS at 08:23

## 2021-01-01 RX ADMIN — ENOXAPARIN SODIUM 60 MG: 60 INJECTION SUBCUTANEOUS at 08:14

## 2021-01-01 RX ADMIN — IPRATROPIUM BROMIDE AND ALBUTEROL SULFATE 1 AMPULE: .5; 3 SOLUTION RESPIRATORY (INHALATION) at 05:04

## 2021-01-01 RX ADMIN — SODIUM CHLORIDE, PRESERVATIVE FREE 10 ML: 5 INJECTION INTRAVENOUS at 02:43

## 2021-01-01 RX ADMIN — INSULIN LISPRO 3 UNITS: 100 INJECTION, SOLUTION INTRAVENOUS; SUBCUTANEOUS at 17:42

## 2021-01-01 RX ADMIN — IPRATROPIUM BROMIDE AND ALBUTEROL SULFATE 1 AMPULE: .5; 3 SOLUTION RESPIRATORY (INHALATION) at 10:55

## 2021-01-01 RX ADMIN — FUROSEMIDE 40 MG: 10 INJECTION, SOLUTION INTRAMUSCULAR; INTRAVENOUS at 11:50

## 2021-01-01 RX ADMIN — HEPARIN SODIUM 5000 UNITS: 5000 INJECTION INTRAVENOUS; SUBCUTANEOUS at 05:14

## 2021-01-01 RX ADMIN — HEPARIN SODIUM 5000 UNITS: 5000 INJECTION INTRAVENOUS; SUBCUTANEOUS at 21:58

## 2021-01-01 RX ADMIN — ENOXAPARIN SODIUM 60 MG: 60 INJECTION SUBCUTANEOUS at 20:46

## 2021-01-01 RX ADMIN — SODIUM CHLORIDE: 9 INJECTION, SOLUTION INTRAVENOUS at 10:00

## 2021-01-01 RX ADMIN — METHYLPREDNISOLONE SODIUM SUCCINATE 40 MG: 40 INJECTION, POWDER, FOR SOLUTION INTRAMUSCULAR; INTRAVENOUS at 11:32

## 2021-01-01 RX ADMIN — INSULIN GLARGINE 12 UNITS: 100 INJECTION, SOLUTION SUBCUTANEOUS at 19:51

## 2021-01-01 RX ADMIN — TAMSULOSIN HYDROCHLORIDE 0.4 MG: 0.4 CAPSULE ORAL at 20:02

## 2021-01-01 RX ADMIN — CISATRACURIUM BESYLATE 2 MCG/KG/MIN: 10 INJECTION INTRAVENOUS at 17:09

## 2021-01-01 RX ADMIN — PIPERACILLIN AND TAZOBACTAM 3375 MG: 3; .375 INJECTION, POWDER, FOR SOLUTION INTRAVENOUS at 23:56

## 2021-01-01 RX ADMIN — INSULIN LISPRO 2 UNITS: 100 INJECTION, SOLUTION INTRAVENOUS; SUBCUTANEOUS at 12:36

## 2021-01-01 RX ADMIN — FLUTICASONE PROPIONATE 1 SPRAY: 50 SPRAY, METERED NASAL at 08:54

## 2021-01-01 RX ADMIN — FAMOTIDINE 20 MG: 10 INJECTION INTRAVENOUS at 08:24

## 2021-01-01 RX ADMIN — FENTANYL CITRATE 25 MCG/HR: 50 INJECTION, SOLUTION INTRAMUSCULAR; INTRAVENOUS at 16:40

## 2021-01-01 RX ADMIN — FAMOTIDINE 20 MG: 10 INJECTION, SOLUTION INTRAVENOUS at 20:19

## 2021-01-01 RX ADMIN — INSULIN LISPRO 1 UNITS: 100 INJECTION, SOLUTION INTRAVENOUS; SUBCUTANEOUS at 08:36

## 2021-01-01 RX ADMIN — ENOXAPARIN SODIUM 60 MG: 60 INJECTION SUBCUTANEOUS at 20:27

## 2021-01-01 RX ADMIN — REMDESIVIR 100 MG: 100 INJECTION, POWDER, LYOPHILIZED, FOR SOLUTION INTRAVENOUS at 02:38

## 2021-01-01 RX ADMIN — INSULIN LISPRO 3 UNITS: 100 INJECTION, SOLUTION INTRAVENOUS; SUBCUTANEOUS at 05:35

## 2021-01-01 RX ADMIN — SODIUM CHLORIDE, PRESERVATIVE FREE 10 ML: 5 INJECTION INTRAVENOUS at 10:58

## 2021-01-01 RX ADMIN — TAMSULOSIN HYDROCHLORIDE 0.4 MG: 0.4 CAPSULE ORAL at 20:17

## 2021-01-01 RX ADMIN — ALBUTEROL SULFATE 2.5 MG: 2.5 SOLUTION RESPIRATORY (INHALATION) at 23:05

## 2021-01-01 RX ADMIN — METHYLPREDNISOLONE SODIUM SUCCINATE 40 MG: 40 INJECTION, POWDER, FOR SOLUTION INTRAMUSCULAR; INTRAVENOUS at 23:35

## 2021-01-01 RX ADMIN — PANTOPRAZOLE SODIUM 40 MG: 40 TABLET, DELAYED RELEASE ORAL at 08:10

## 2021-01-01 RX ADMIN — Medication 400 MG: at 08:18

## 2021-01-01 RX ADMIN — CETIRIZINE HYDROCHLORIDE 10 MG: 10 TABLET, FILM COATED ORAL at 08:48

## 2021-01-01 RX ADMIN — Medication 200 MCG/HR: at 21:09

## 2021-01-01 RX ADMIN — Medication 8 MG/HR: at 04:12

## 2021-01-01 RX ADMIN — ENOXAPARIN SODIUM 60 MG: 60 INJECTION SUBCUTANEOUS at 08:58

## 2021-01-01 RX ADMIN — Medication 400 MG: at 08:17

## 2021-01-01 RX ADMIN — TAMSULOSIN HYDROCHLORIDE 0.4 MG: 0.4 CAPSULE ORAL at 21:32

## 2021-01-01 RX ADMIN — EPOPROSTENOL 50 NG/KG/MIN: 1.5 INJECTION, POWDER, LYOPHILIZED, FOR SOLUTION INTRAVENOUS at 18:16

## 2021-01-01 RX ADMIN — DEXAMETHASONE SODIUM PHOSPHATE 6 MG: 10 INJECTION INTRAMUSCULAR; INTRAVENOUS at 01:59

## 2021-01-01 RX ADMIN — SODIUM CHLORIDE, PRESERVATIVE FREE 10 ML: 5 INJECTION INTRAVENOUS at 08:38

## 2021-01-01 RX ADMIN — WATER 20 ML: 1 INJECTION INTRAMUSCULAR; INTRAVENOUS; SUBCUTANEOUS at 02:47

## 2021-01-01 RX ADMIN — CALCIUM GLUCONATE 1000 MG: 20 INJECTION, SOLUTION INTRAVENOUS at 06:01

## 2021-01-01 RX ADMIN — DEXTROSE MONOHYDRATE: 50 INJECTION, SOLUTION INTRAVENOUS at 02:51

## 2021-01-01 RX ADMIN — INSULIN GLARGINE 12 UNITS: 100 INJECTION, SOLUTION SUBCUTANEOUS at 20:12

## 2021-01-01 RX ADMIN — SODIUM ZIRCONIUM CYCLOSILICATE 10 G: 10 POWDER, FOR SUSPENSION ORAL at 12:59

## 2021-01-01 RX ADMIN — INSULIN LISPRO 3 UNITS: 100 INJECTION, SOLUTION INTRAVENOUS; SUBCUTANEOUS at 17:01

## 2021-01-01 RX ADMIN — LISINOPRIL 2.5 MG: 2.5 TABLET ORAL at 08:15

## 2021-01-01 RX ADMIN — FAMOTIDINE 20 MG: 10 INJECTION INTRAVENOUS at 08:35

## 2021-01-01 RX ADMIN — INSULIN LISPRO 2 UNITS: 100 INJECTION, SOLUTION INTRAVENOUS; SUBCUTANEOUS at 14:01

## 2021-01-01 RX ADMIN — METHYLPREDNISOLONE SODIUM SUCCINATE 40 MG: 40 INJECTION, POWDER, FOR SOLUTION INTRAMUSCULAR; INTRAVENOUS at 11:27

## 2021-01-01 RX ADMIN — PIPERACILLIN AND TAZOBACTAM 3375 MG: 3; .375 INJECTION, POWDER, FOR SOLUTION INTRAVENOUS at 22:06

## 2021-01-01 RX ADMIN — SODIUM CHLORIDE: 9 INJECTION, SOLUTION INTRAVENOUS at 11:14

## 2021-01-01 RX ADMIN — Medication 75 MCG/HR: at 14:46

## 2021-01-01 RX ADMIN — PANTOPRAZOLE SODIUM 40 MG: 40 TABLET, DELAYED RELEASE ORAL at 08:28

## 2021-01-01 RX ADMIN — ASPIRIN 81 MG: 81 TABLET, COATED ORAL at 14:02

## 2021-01-01 RX ADMIN — SODIUM CHLORIDE, PRESERVATIVE FREE 10 ML: 5 INJECTION INTRAVENOUS at 20:56

## 2021-01-01 RX ADMIN — INSULIN GLARGINE 10 UNITS: 100 INJECTION, SOLUTION SUBCUTANEOUS at 20:33

## 2021-01-01 RX ADMIN — LISINOPRIL 2.5 MG: 2.5 TABLET ORAL at 08:48

## 2021-01-01 RX ADMIN — Medication 75 MCG/HR: at 23:53

## 2021-01-01 RX ADMIN — VASOPRESSIN 0.04 UNITS/MIN: 20 INJECTION INTRAVENOUS at 22:01

## 2021-01-01 RX ADMIN — LACTULOSE 20 G: 10 SOLUTION ORAL at 14:19

## 2021-01-01 RX ADMIN — ASPIRIN 81 MG: 81 TABLET, COATED ORAL at 08:15

## 2021-01-01 RX ADMIN — TAMSULOSIN HYDROCHLORIDE 0.4 MG: 0.4 CAPSULE ORAL at 00:27

## 2021-01-01 RX ADMIN — IPRATROPIUM BROMIDE AND ALBUTEROL SULFATE 1 AMPULE: .5; 3 SOLUTION RESPIRATORY (INHALATION) at 16:35

## 2021-01-01 RX ADMIN — METHYLPREDNISOLONE SODIUM SUCCINATE 40 MG: 40 INJECTION, POWDER, FOR SOLUTION INTRAMUSCULAR; INTRAVENOUS at 11:33

## 2021-01-01 RX ADMIN — CETIRIZINE HYDROCHLORIDE 10 MG: 10 TABLET, FILM COATED ORAL at 09:12

## 2021-01-01 RX ADMIN — PIPERACILLIN AND TAZOBACTAM 3375 MG: 3; .375 INJECTION, POWDER, FOR SOLUTION INTRAVENOUS at 14:59

## 2021-01-01 RX ADMIN — EPOPROSTENOL 50 NG/KG/MIN: 1.5 INJECTION, POWDER, LYOPHILIZED, FOR SOLUTION INTRAVENOUS at 12:39

## 2021-01-01 RX ADMIN — HEPARIN SODIUM 5000 UNITS: 5000 INJECTION INTRAVENOUS; SUBCUTANEOUS at 20:33

## 2021-01-01 RX ADMIN — CETIRIZINE HYDROCHLORIDE 10 MG: 10 TABLET, FILM COATED ORAL at 08:32

## 2021-01-01 RX ADMIN — WATER 1 G: 1 INJECTION INTRAMUSCULAR; INTRAVENOUS; SUBCUTANEOUS at 22:26

## 2021-01-01 RX ADMIN — INSULIN LISPRO 3 UNITS: 100 INJECTION, SOLUTION INTRAVENOUS; SUBCUTANEOUS at 11:46

## 2021-01-01 RX ADMIN — FAMOTIDINE 20 MG: 10 INJECTION INTRAVENOUS at 20:11

## 2021-01-01 RX ADMIN — DEXTROSE MONOHYDRATE 25 G: 25 INJECTION, SOLUTION INTRAVENOUS at 06:56

## 2021-01-01 RX ADMIN — CISATRACURIUM BESYLATE 3 MCG/KG/MIN: 10 INJECTION INTRAVENOUS at 03:00

## 2021-01-01 RX ADMIN — SODIUM ZIRCONIUM CYCLOSILICATE 5 G: 5 POWDER, FOR SUSPENSION ORAL at 20:19

## 2021-01-01 RX ADMIN — INSULIN LISPRO 3 UNITS: 100 INJECTION, SOLUTION INTRAVENOUS; SUBCUTANEOUS at 17:07

## 2021-01-01 RX ADMIN — CETIRIZINE HYDROCHLORIDE 10 MG: 10 TABLET, FILM COATED ORAL at 08:15

## 2021-01-01 RX ADMIN — METFORMIN HYDROCHLORIDE 1000 MG: 500 TABLET, EXTENDED RELEASE ORAL at 08:28

## 2021-01-01 RX ADMIN — ENOXAPARIN SODIUM 60 MG: 60 INJECTION SUBCUTANEOUS at 08:28

## 2021-01-01 RX ADMIN — Medication 125 MCG/HR: at 02:53

## 2021-01-01 RX ADMIN — SODIUM CHLORIDE, PRESERVATIVE FREE 10 ML: 5 INJECTION INTRAVENOUS at 09:00

## 2021-01-01 RX ADMIN — METHYLPREDNISOLONE SODIUM SUCCINATE 40 MG: 40 INJECTION, POWDER, FOR SOLUTION INTRAMUSCULAR; INTRAVENOUS at 23:31

## 2021-01-01 RX ADMIN — INSULIN GLARGINE 12 UNITS: 100 INJECTION, SOLUTION SUBCUTANEOUS at 21:44

## 2021-01-01 RX ADMIN — Medication 200 MCG/HR: at 01:51

## 2021-01-01 RX ADMIN — PANTOPRAZOLE SODIUM 40 MG: 40 TABLET, DELAYED RELEASE ORAL at 14:03

## 2021-01-01 RX ADMIN — IPRATROPIUM BROMIDE AND ALBUTEROL SULFATE 1 AMPULE: .5; 3 SOLUTION RESPIRATORY (INHALATION) at 01:31

## 2021-01-01 RX ADMIN — LEVOFLOXACIN 500 MG: 5 INJECTION, SOLUTION INTRAVENOUS at 12:55

## 2021-01-01 RX ADMIN — Medication 1500 MG: at 15:55

## 2021-01-01 RX ADMIN — SODIUM CHLORIDE, PRESERVATIVE FREE 10 ML: 5 INJECTION INTRAVENOUS at 21:20

## 2021-01-01 RX ADMIN — SODIUM CHLORIDE, PRESERVATIVE FREE 10 ML: 5 INJECTION INTRAVENOUS at 16:30

## 2021-01-01 RX ADMIN — IPRATROPIUM BROMIDE AND ALBUTEROL SULFATE 1 AMPULE: .5; 3 SOLUTION RESPIRATORY (INHALATION) at 19:12

## 2021-01-01 RX ADMIN — ENOXAPARIN SODIUM 60 MG: 60 INJECTION SUBCUTANEOUS at 21:45

## 2021-01-01 RX ADMIN — PANTOPRAZOLE SODIUM 40 MG: 40 TABLET, DELAYED RELEASE ORAL at 08:23

## 2021-01-01 RX ADMIN — INSULIN GLARGINE 40 UNITS: 100 INJECTION, SOLUTION SUBCUTANEOUS at 20:19

## 2021-01-01 RX ADMIN — Medication 8 MG/HR: at 23:53

## 2021-01-01 RX ADMIN — INSULIN LISPRO 1 UNITS: 100 INJECTION, SOLUTION INTRAVENOUS; SUBCUTANEOUS at 08:33

## 2021-01-01 RX ADMIN — ROCURONIUM BROMIDE 50 MG: 10 INJECTION INTRAVENOUS at 16:16

## 2021-01-01 RX ADMIN — Medication 5000 UNITS: at 08:15

## 2021-01-01 RX ADMIN — FAMOTIDINE 20 MG: 10 INJECTION INTRAVENOUS at 20:14

## 2021-01-01 RX ADMIN — Medication 9 MG/HR: at 04:40

## 2021-01-01 RX ADMIN — ALBUTEROL SULFATE 2.5 MG: 2.5 SOLUTION RESPIRATORY (INHALATION) at 01:47

## 2021-01-01 RX ADMIN — Medication 9 MG/HR: at 05:00

## 2021-01-01 RX ADMIN — SODIUM CHLORIDE, PRESERVATIVE FREE 10 ML: 5 INJECTION INTRAVENOUS at 23:35

## 2021-01-01 RX ADMIN — Medication 400 MG: at 08:33

## 2021-01-01 RX ADMIN — SODIUM CHLORIDE, PRESERVATIVE FREE 10 ML: 5 INJECTION INTRAVENOUS at 21:10

## 2021-01-01 RX ADMIN — CETIRIZINE HYDROCHLORIDE 10 MG: 10 TABLET, FILM COATED ORAL at 08:10

## 2021-01-01 RX ADMIN — Medication 9 MG/HR: at 10:57

## 2021-01-01 RX ADMIN — PANTOPRAZOLE SODIUM 40 MG: 40 TABLET, DELAYED RELEASE ORAL at 08:33

## 2021-01-01 RX ADMIN — Medication 100 MCG/HR: at 02:58

## 2021-01-01 RX ADMIN — INSULIN LISPRO 1 UNITS: 100 INJECTION, SOLUTION INTRAVENOUS; SUBCUTANEOUS at 20:47

## 2021-01-01 RX ADMIN — METFORMIN HYDROCHLORIDE 1000 MG: 500 TABLET, EXTENDED RELEASE ORAL at 14:02

## 2021-01-01 RX ADMIN — LACTULOSE 20 G: 10 SOLUTION ORAL at 20:36

## 2021-01-01 RX ADMIN — Medication 200 MCG/HR: at 11:11

## 2021-01-01 RX ADMIN — INSULIN LISPRO 3 UNITS: 100 INJECTION, SOLUTION INTRAVENOUS; SUBCUTANEOUS at 09:01

## 2021-01-01 RX ADMIN — HEPARIN SODIUM 5000 UNITS: 5000 INJECTION INTRAVENOUS; SUBCUTANEOUS at 05:13

## 2021-01-01 RX ADMIN — GUAIFENESIN AND DEXTROMETHORPHAN HYDROBROMIDE 1 TABLET: 600; 30 TABLET, EXTENDED RELEASE ORAL at 03:02

## 2021-01-01 RX ADMIN — INSULIN LISPRO 2 UNITS: 100 INJECTION, SOLUTION INTRAVENOUS; SUBCUTANEOUS at 11:15

## 2021-01-01 RX ADMIN — IPRATROPIUM BROMIDE AND ALBUTEROL SULFATE 1 AMPULE: .5; 3 SOLUTION RESPIRATORY (INHALATION) at 15:26

## 2021-01-01 RX ADMIN — HEPARIN SODIUM 5000 UNITS: 5000 INJECTION INTRAVENOUS; SUBCUTANEOUS at 14:45

## 2021-01-01 RX ADMIN — INSULIN LISPRO 3 UNITS: 100 INJECTION, SOLUTION INTRAVENOUS; SUBCUTANEOUS at 08:13

## 2021-01-01 RX ADMIN — Medication 75 MCG/HR: at 19:35

## 2021-01-01 RX ADMIN — PREDNISONE 20 MG: 20 TABLET ORAL at 08:31

## 2021-01-01 RX ADMIN — SODIUM CHLORIDE, PRESERVATIVE FREE 10 ML: 5 INJECTION INTRAVENOUS at 09:18

## 2021-01-01 RX ADMIN — ATORVASTATIN CALCIUM 20 MG: 20 TABLET, FILM COATED ORAL at 20:59

## 2021-01-01 RX ADMIN — FLUTICASONE PROPIONATE 1 SPRAY: 50 SPRAY, METERED NASAL at 08:24

## 2021-01-01 RX ADMIN — INSULIN GLARGINE 40 UNITS: 100 INJECTION, SOLUTION SUBCUTANEOUS at 20:38

## 2021-01-01 RX ADMIN — METHYLPREDNISOLONE SODIUM SUCCINATE 40 MG: 40 INJECTION, POWDER, FOR SOLUTION INTRAMUSCULAR; INTRAVENOUS at 22:17

## 2021-01-01 RX ADMIN — FAMOTIDINE 20 MG: 10 INJECTION INTRAVENOUS at 19:45

## 2021-01-01 RX ADMIN — SODIUM CHLORIDE, PRESERVATIVE FREE 10 ML: 5 INJECTION INTRAVENOUS at 19:31

## 2021-01-01 RX ADMIN — Medication 200 MCG/HR: at 14:34

## 2021-01-01 RX ADMIN — IPRATROPIUM BROMIDE AND ALBUTEROL SULFATE 1 AMPULE: .5; 3 SOLUTION RESPIRATORY (INHALATION) at 05:00

## 2021-01-01 RX ADMIN — SODIUM CHLORIDE, PRESERVATIVE FREE 10 ML: 5 INJECTION INTRAVENOUS at 23:31

## 2021-01-01 RX ADMIN — HEPARIN SODIUM 5000 UNITS: 5000 INJECTION INTRAVENOUS; SUBCUTANEOUS at 14:58

## 2021-01-01 RX ADMIN — Medication 8 MG/HR: at 11:17

## 2021-01-01 RX ADMIN — CISATRACURIUM BESYLATE 3 MCG/KG/MIN: 10 INJECTION INTRAVENOUS at 14:10

## 2021-01-01 RX ADMIN — IPRATROPIUM BROMIDE AND ALBUTEROL SULFATE 1 AMPULE: .5; 3 SOLUTION RESPIRATORY (INHALATION) at 09:38

## 2021-01-01 RX ADMIN — SODIUM ZIRCONIUM CYCLOSILICATE 5 G: 5 POWDER, FOR SUSPENSION ORAL at 20:03

## 2021-01-01 RX ADMIN — INSULIN LISPRO 3 UNITS: 100 INJECTION, SOLUTION INTRAVENOUS; SUBCUTANEOUS at 08:03

## 2021-01-01 RX ADMIN — INSULIN GLARGINE 30 UNITS: 100 INJECTION, SOLUTION SUBCUTANEOUS at 20:15

## 2021-01-01 RX ADMIN — SODIUM CHLORIDE: 9 INJECTION, SOLUTION INTRAVENOUS at 04:13

## 2021-01-01 RX ADMIN — CEFTAROLINE FOSAMIL 600 MG: 600 POWDER, FOR SOLUTION INTRAVENOUS at 03:45

## 2021-01-01 RX ADMIN — ASPIRIN 81 MG: 81 TABLET, COATED ORAL at 08:32

## 2021-01-01 RX ADMIN — INSULIN LISPRO 6 UNITS: 100 INJECTION, SOLUTION INTRAVENOUS; SUBCUTANEOUS at 12:13

## 2021-01-01 RX ADMIN — INSULIN LISPRO 1 UNITS: 100 INJECTION, SOLUTION INTRAVENOUS; SUBCUTANEOUS at 20:19

## 2021-01-01 RX ADMIN — SODIUM CHLORIDE, PRESERVATIVE FREE 10 ML: 5 INJECTION INTRAVENOUS at 08:35

## 2021-01-01 RX ADMIN — Medication 1500 MG: at 14:49

## 2021-01-01 RX ADMIN — DEXAMETHASONE SODIUM PHOSPHATE 6 MG: 10 INJECTION INTRAMUSCULAR; INTRAVENOUS at 03:20

## 2021-01-01 RX ADMIN — HEPARIN SODIUM 5000 UNITS: 5000 INJECTION INTRAVENOUS; SUBCUTANEOUS at 05:30

## 2021-01-01 RX ADMIN — FAMOTIDINE 20 MG: 10 INJECTION INTRAVENOUS at 20:16

## 2021-01-01 RX ADMIN — SODIUM CHLORIDE: 9 INJECTION, SOLUTION INTRAVENOUS at 14:16

## 2021-01-01 RX ADMIN — HEPARIN SODIUM 5000 UNITS: 5000 INJECTION INTRAVENOUS; SUBCUTANEOUS at 04:53

## 2021-01-01 RX ADMIN — EPOPROSTENOL 50 NG/KG/MIN: 1.5 INJECTION, POWDER, LYOPHILIZED, FOR SOLUTION INTRAVENOUS at 19:00

## 2021-01-01 RX ADMIN — REMDESIVIR 100 MG: 100 INJECTION, POWDER, LYOPHILIZED, FOR SOLUTION INTRAVENOUS at 02:52

## 2021-01-01 RX ADMIN — SODIUM CHLORIDE, PRESERVATIVE FREE 10 ML: 5 INJECTION INTRAVENOUS at 08:23

## 2021-01-01 RX ADMIN — ENOXAPARIN SODIUM 60 MG: 60 INJECTION SUBCUTANEOUS at 20:39

## 2021-01-01 RX ADMIN — MORPHINE SULFATE 2 MG: 2 INJECTION, SOLUTION INTRAMUSCULAR; INTRAVENOUS at 10:33

## 2021-01-01 RX ADMIN — FAMOTIDINE 20 MG: 10 INJECTION INTRAVENOUS at 08:37

## 2021-01-01 RX ADMIN — Medication 150 MCG/HR: at 05:43

## 2021-01-01 RX ADMIN — INSULIN LISPRO 2 UNITS: 100 INJECTION, SOLUTION INTRAVENOUS; SUBCUTANEOUS at 16:31

## 2021-01-01 RX ADMIN — Medication 400 MG: at 14:02

## 2021-01-01 RX ADMIN — IPRATROPIUM BROMIDE AND ALBUTEROL SULFATE 1 AMPULE: .5; 3 SOLUTION RESPIRATORY (INHALATION) at 22:11

## 2021-01-01 RX ADMIN — Medication 9 MG/HR: at 17:52

## 2021-01-01 RX ADMIN — SODIUM ZIRCONIUM CYCLOSILICATE 5 G: 5 POWDER, FOR SUSPENSION ORAL at 17:09

## 2021-01-01 RX ADMIN — INSULIN LISPRO 1 UNITS: 100 INJECTION, SOLUTION INTRAVENOUS; SUBCUTANEOUS at 20:02

## 2021-01-01 RX ADMIN — Medication 5000 UNITS: at 08:02

## 2021-01-01 RX ADMIN — ATORVASTATIN CALCIUM 20 MG: 20 TABLET, FILM COATED ORAL at 20:30

## 2021-01-01 RX ADMIN — IPRATROPIUM BROMIDE AND ALBUTEROL SULFATE 1 AMPULE: .5; 3 SOLUTION RESPIRATORY (INHALATION) at 05:20

## 2021-01-01 RX ADMIN — INSULIN LISPRO 1 UNITS: 100 INJECTION, SOLUTION INTRAVENOUS; SUBCUTANEOUS at 12:04

## 2021-01-01 RX ADMIN — INSULIN LISPRO 1 UNITS: 100 INJECTION, SOLUTION INTRAVENOUS; SUBCUTANEOUS at 23:53

## 2021-01-01 RX ADMIN — ZINC SULFATE 220 MG (50 MG) CAPSULE 50 MG: CAPSULE at 08:32

## 2021-01-01 RX ADMIN — Medication 5000 UNITS: at 08:48

## 2021-01-01 RX ADMIN — INSULIN LISPRO 2 UNITS: 100 INJECTION, SOLUTION INTRAVENOUS; SUBCUTANEOUS at 11:53

## 2021-01-01 RX ADMIN — ENOXAPARIN SODIUM 60 MG: 60 INJECTION SUBCUTANEOUS at 20:19

## 2021-01-01 RX ADMIN — LACTULOSE 20 G: 10 SOLUTION ORAL at 15:55

## 2021-01-01 RX ADMIN — ASPIRIN 81 MG: 81 TABLET, COATED ORAL at 08:17

## 2021-01-01 RX ADMIN — FUROSEMIDE 40 MG: 10 INJECTION, SOLUTION INTRAMUSCULAR; INTRAVENOUS at 14:17

## 2021-01-01 RX ADMIN — CISATRACURIUM BESYLATE 4 MCG/KG/MIN: 10 INJECTION INTRAVENOUS at 22:04

## 2021-01-01 RX ADMIN — ZINC SULFATE 220 MG (50 MG) CAPSULE 50 MG: CAPSULE at 08:02

## 2021-01-01 RX ADMIN — IPRATROPIUM BROMIDE AND ALBUTEROL SULFATE 1 AMPULE: .5; 3 SOLUTION RESPIRATORY (INHALATION) at 10:02

## 2021-01-01 RX ADMIN — SODIUM CHLORIDE, PRESERVATIVE FREE 10 ML: 5 INJECTION INTRAVENOUS at 21:02

## 2021-01-01 RX ADMIN — Medication 5000 UNITS: at 09:12

## 2021-01-01 RX ADMIN — IPRATROPIUM BROMIDE AND ALBUTEROL SULFATE 1 AMPULE: .5; 3 SOLUTION RESPIRATORY (INHALATION) at 15:34

## 2021-01-01 RX ADMIN — LACTULOSE 20 G: 10 SOLUTION ORAL at 20:14

## 2021-01-01 RX ADMIN — TAMSULOSIN HYDROCHLORIDE 0.4 MG: 0.4 CAPSULE ORAL at 20:30

## 2021-01-01 RX ADMIN — CISATRACURIUM BESYLATE 4 MCG/KG/MIN: 10 INJECTION INTRAVENOUS at 08:34

## 2021-01-01 RX ADMIN — Medication 75 MCG/HR: at 13:19

## 2021-01-01 RX ADMIN — SODIUM ZIRCONIUM CYCLOSILICATE 5 G: 5 POWDER, FOR SUSPENSION ORAL at 08:23

## 2021-01-01 RX ADMIN — IPRATROPIUM BROMIDE AND ALBUTEROL SULFATE 1 AMPULE: .5; 3 SOLUTION RESPIRATORY (INHALATION) at 16:05

## 2021-01-01 RX ADMIN — INSULIN LISPRO 1 UNITS: 100 INJECTION, SOLUTION INTRAVENOUS; SUBCUTANEOUS at 20:12

## 2021-01-01 RX ADMIN — ATORVASTATIN CALCIUM 20 MG: 20 TABLET, FILM COATED ORAL at 20:46

## 2021-01-01 RX ADMIN — SODIUM CHLORIDE, PRESERVATIVE FREE 10 ML: 5 INJECTION INTRAVENOUS at 00:01

## 2021-01-01 RX ADMIN — CETIRIZINE HYDROCHLORIDE 10 MG: 10 TABLET, FILM COATED ORAL at 08:17

## 2021-01-01 RX ADMIN — LISINOPRIL 2.5 MG: 2.5 TABLET ORAL at 08:18

## 2021-01-01 RX ADMIN — ATORVASTATIN CALCIUM 20 MG: 20 TABLET, FILM COATED ORAL at 20:57

## 2021-01-01 RX ADMIN — LISINOPRIL 2.5 MG: 2.5 TABLET ORAL at 08:25

## 2021-01-01 RX ADMIN — METHYLPREDNISOLONE SODIUM SUCCINATE 40 MG: 40 INJECTION, POWDER, FOR SOLUTION INTRAMUSCULAR; INTRAVENOUS at 00:01

## 2021-01-01 RX ADMIN — METFORMIN HYDROCHLORIDE 1000 MG: 500 TABLET, EXTENDED RELEASE ORAL at 09:02

## 2021-01-01 RX ADMIN — SODIUM CHLORIDE, PRESERVATIVE FREE 10 ML: 5 INJECTION INTRAVENOUS at 20:20

## 2021-01-01 RX ADMIN — ENOXAPARIN SODIUM 60 MG: 60 INJECTION SUBCUTANEOUS at 08:33

## 2021-01-01 RX ADMIN — SODIUM ZIRCONIUM CYCLOSILICATE 10 G: 10 POWDER, FOR SUSPENSION ORAL at 20:13

## 2021-01-01 RX ADMIN — IPRATROPIUM BROMIDE AND ALBUTEROL SULFATE 1 AMPULE: .5; 3 SOLUTION RESPIRATORY (INHALATION) at 11:31

## 2021-01-01 RX ADMIN — ENOXAPARIN SODIUM 60 MG: 60 INJECTION SUBCUTANEOUS at 09:02

## 2021-01-01 RX ADMIN — Medication 400 MG: at 08:48

## 2021-01-01 RX ADMIN — ZINC SULFATE 220 MG (50 MG) CAPSULE 50 MG: CAPSULE at 09:12

## 2021-01-01 RX ADMIN — IPRATROPIUM BROMIDE AND ALBUTEROL SULFATE 1 AMPULE: .5; 3 SOLUTION RESPIRATORY (INHALATION) at 05:06

## 2021-01-01 RX ADMIN — SODIUM CHLORIDE, PRESERVATIVE FREE 10 ML: 5 INJECTION INTRAVENOUS at 20:39

## 2021-01-01 RX ADMIN — IPRATROPIUM BROMIDE AND ALBUTEROL SULFATE 1 AMPULE: .5; 3 SOLUTION RESPIRATORY (INHALATION) at 10:30

## 2021-01-01 RX ADMIN — INSULIN GLARGINE 10 UNITS: 100 INJECTION, SOLUTION SUBCUTANEOUS at 19:46

## 2021-01-01 RX ADMIN — ENOXAPARIN SODIUM 60 MG: 60 INJECTION SUBCUTANEOUS at 08:17

## 2021-01-01 RX ADMIN — HEPARIN SODIUM 5000 UNITS: 5000 INJECTION INTRAVENOUS; SUBCUTANEOUS at 06:22

## 2021-01-01 RX ADMIN — ALBUTEROL SULFATE 2.5 MG: 2.5 SOLUTION RESPIRATORY (INHALATION) at 01:03

## 2021-01-01 RX ADMIN — IPRATROPIUM BROMIDE AND ALBUTEROL SULFATE 1 AMPULE: .5; 3 SOLUTION RESPIRATORY (INHALATION) at 16:43

## 2021-01-01 RX ADMIN — IPRATROPIUM BROMIDE AND ALBUTEROL SULFATE 1 AMPULE: .5; 3 SOLUTION RESPIRATORY (INHALATION) at 21:37

## 2021-01-01 RX ADMIN — POLYVINYL ALCOHOL 1 DROP: 14 SOLUTION/ DROPS OPHTHALMIC at 17:24

## 2021-01-01 RX ADMIN — ATORVASTATIN CALCIUM 20 MG: 20 TABLET, FILM COATED ORAL at 20:54

## 2021-01-01 RX ADMIN — SODIUM CHLORIDE, PRESERVATIVE FREE 10 ML: 5 INJECTION INTRAVENOUS at 12:09

## 2021-01-01 RX ADMIN — INSULIN LISPRO 6 UNITS: 100 INJECTION, SOLUTION INTRAVENOUS; SUBCUTANEOUS at 00:00

## 2021-01-01 RX ADMIN — ENOXAPARIN SODIUM 60 MG: 60 INJECTION SUBCUTANEOUS at 20:17

## 2021-01-01 RX ADMIN — Medication 9 MG/HR: at 02:25

## 2021-01-01 RX ADMIN — ACETAMINOPHEN 650 MG: 325 TABLET ORAL at 23:48

## 2021-01-01 RX ADMIN — INSULIN LISPRO 3 UNITS: 100 INJECTION, SOLUTION INTRAVENOUS; SUBCUTANEOUS at 00:51

## 2021-01-01 RX ADMIN — METHYLPREDNISOLONE SODIUM SUCCINATE 40 MG: 40 INJECTION, POWDER, FOR SOLUTION INTRAMUSCULAR; INTRAVENOUS at 11:58

## 2021-01-01 RX ADMIN — PROPOFOL 10 MCG/KG/MIN: 10 INJECTION, EMULSION INTRAVENOUS at 16:38

## 2021-01-01 RX ADMIN — Medication 100 MCG/HR: at 02:51

## 2021-01-01 RX ADMIN — LISINOPRIL 2.5 MG: 2.5 TABLET ORAL at 08:32

## 2021-01-01 RX ADMIN — ENOXAPARIN SODIUM 60 MG: 60 INJECTION SUBCUTANEOUS at 21:00

## 2021-01-01 RX ADMIN — SODIUM CHLORIDE, PRESERVATIVE FREE 10 ML: 5 INJECTION INTRAVENOUS at 00:05

## 2021-01-01 RX ADMIN — SODIUM CHLORIDE, PRESERVATIVE FREE 10 ML: 5 INJECTION INTRAVENOUS at 08:13

## 2021-01-01 RX ADMIN — DEXTROSE MONOHYDRATE 100 MG: 50 INJECTION, SOLUTION INTRAVENOUS at 20:39

## 2021-01-01 RX ADMIN — TAMSULOSIN HYDROCHLORIDE 0.4 MG: 0.4 CAPSULE ORAL at 20:23

## 2021-01-01 RX ADMIN — Medication 400 MG: at 08:25

## 2021-01-01 RX ADMIN — IPRATROPIUM BROMIDE AND ALBUTEROL SULFATE 1 AMPULE: .5; 3 SOLUTION RESPIRATORY (INHALATION) at 19:46

## 2021-01-01 RX ADMIN — HEPARIN SODIUM 5000 UNITS: 5000 INJECTION INTRAVENOUS; SUBCUTANEOUS at 22:10

## 2021-01-01 RX ADMIN — INSULIN GLARGINE 40 UNITS: 100 INJECTION, SOLUTION SUBCUTANEOUS at 20:37

## 2021-01-01 RX ADMIN — SODIUM CHLORIDE, PRESERVATIVE FREE 10 ML: 5 INJECTION INTRAVENOUS at 09:06

## 2021-01-01 RX ADMIN — INSULIN LISPRO 3 UNITS: 100 INJECTION, SOLUTION INTRAVENOUS; SUBCUTANEOUS at 05:14

## 2021-01-01 RX ADMIN — SODIUM CHLORIDE: 9 INJECTION, SOLUTION INTRAVENOUS at 03:13

## 2021-01-01 RX ADMIN — Medication 10 MG/HR: at 01:36

## 2021-01-01 RX ADMIN — METFORMIN HYDROCHLORIDE 500 MG: 500 TABLET ORAL at 08:23

## 2021-01-01 RX ADMIN — METFORMIN HYDROCHLORIDE 500 MG: 500 TABLET ORAL at 08:02

## 2021-01-01 RX ADMIN — CISATRACURIUM BESYLATE 2 MCG/KG/MIN: 10 INJECTION INTRAVENOUS at 23:50

## 2021-01-01 RX ADMIN — LACTULOSE 20 G: 10 SOLUTION ORAL at 08:00

## 2021-01-01 RX ADMIN — Medication 400 MG: at 08:23

## 2021-01-01 RX ADMIN — METHYLPREDNISOLONE SODIUM SUCCINATE 40 MG: 40 INJECTION, POWDER, FOR SOLUTION INTRAMUSCULAR; INTRAVENOUS at 12:02

## 2021-01-01 RX ADMIN — INSULIN LISPRO 6 UNITS: 100 INJECTION, SOLUTION INTRAVENOUS; SUBCUTANEOUS at 12:09

## 2021-01-01 RX ADMIN — ENOXAPARIN SODIUM 60 MG: 60 INJECTION SUBCUTANEOUS at 08:48

## 2021-01-01 RX ADMIN — LACTULOSE 20 G: 10 SOLUTION ORAL at 19:44

## 2021-01-01 RX ADMIN — FAMOTIDINE 20 MG: 10 INJECTION INTRAVENOUS at 09:26

## 2021-01-01 RX ADMIN — SUCCINYLCHOLINE CHLORIDE 200 MG: 20 INJECTION, SOLUTION INTRAMUSCULAR; INTRAVENOUS at 16:12

## 2021-01-01 RX ADMIN — INSULIN LISPRO 6 UNITS: 100 INJECTION, SOLUTION INTRAVENOUS; SUBCUTANEOUS at 18:24

## 2021-01-01 RX ADMIN — INSULIN LISPRO 3 UNITS: 100 INJECTION, SOLUTION INTRAVENOUS; SUBCUTANEOUS at 12:25

## 2021-01-01 RX ADMIN — SODIUM CHLORIDE, PRESERVATIVE FREE 10 ML: 5 INJECTION INTRAVENOUS at 20:34

## 2021-01-01 RX ADMIN — Medication 8 MG/HR: at 06:05

## 2021-01-01 RX ADMIN — METFORMIN HYDROCHLORIDE 500 MG: 500 TABLET ORAL at 08:16

## 2021-01-01 RX ADMIN — LISINOPRIL 2.5 MG: 2.5 TABLET ORAL at 08:09

## 2021-01-01 RX ADMIN — INSULIN GLARGINE 10 UNITS: 100 INJECTION, SOLUTION SUBCUTANEOUS at 20:35

## 2021-01-01 RX ADMIN — INSULIN HUMAN 10 UNITS: 100 INJECTION, SOLUTION PARENTERAL at 06:10

## 2021-01-01 RX ADMIN — HEPARIN SODIUM 5000 UNITS: 5000 INJECTION INTRAVENOUS; SUBCUTANEOUS at 05:41

## 2021-01-01 RX ADMIN — INSULIN GLARGINE 40 UNITS: 100 INJECTION, SOLUTION SUBCUTANEOUS at 20:58

## 2021-01-01 RX ADMIN — PANTOPRAZOLE SODIUM 40 MG: 40 TABLET, DELAYED RELEASE ORAL at 08:58

## 2021-01-01 RX ADMIN — Medication 150 MCG/HR: at 12:24

## 2021-01-01 RX ADMIN — Medication 10 MG/HR: at 19:47

## 2021-01-01 RX ADMIN — PREDNISONE 20 MG: 20 TABLET ORAL at 08:24

## 2021-01-01 RX ADMIN — HEPARIN SODIUM 5000 UNITS: 5000 INJECTION INTRAVENOUS; SUBCUTANEOUS at 14:30

## 2021-01-01 RX ADMIN — INSULIN LISPRO 2 UNITS: 100 INJECTION, SOLUTION INTRAVENOUS; SUBCUTANEOUS at 18:55

## 2021-01-01 RX ADMIN — PANTOPRAZOLE SODIUM 40 MG: 40 TABLET, DELAYED RELEASE ORAL at 08:16

## 2021-01-01 RX ADMIN — SODIUM CHLORIDE, PRESERVATIVE FREE 10 ML: 5 INJECTION INTRAVENOUS at 09:07

## 2021-01-01 RX ADMIN — Medication 5000 UNITS: at 08:10

## 2021-01-01 RX ADMIN — SODIUM CHLORIDE, PRESERVATIVE FREE 10 ML: 5 INJECTION INTRAVENOUS at 20:15

## 2021-01-01 RX ADMIN — INSULIN LISPRO 3 UNITS: 100 INJECTION, SOLUTION INTRAVENOUS; SUBCUTANEOUS at 12:17

## 2021-01-01 RX ADMIN — IPRATROPIUM BROMIDE AND ALBUTEROL SULFATE 1 AMPULE: .5; 3 SOLUTION RESPIRATORY (INHALATION) at 05:25

## 2021-01-01 RX ADMIN — FUROSEMIDE 40 MG: 10 INJECTION INTRAMUSCULAR; INTRAVENOUS at 11:50

## 2021-01-01 RX ADMIN — ATORVASTATIN CALCIUM 20 MG: 20 TABLET, FILM COATED ORAL at 21:32

## 2021-01-01 RX ADMIN — IPRATROPIUM BROMIDE AND ALBUTEROL SULFATE 1 AMPULE: .5; 3 SOLUTION RESPIRATORY (INHALATION) at 05:08

## 2021-01-01 RX ADMIN — INSULIN LISPRO 3 UNITS: 100 INJECTION, SOLUTION INTRAVENOUS; SUBCUTANEOUS at 17:59

## 2021-01-01 RX ADMIN — INSULIN LISPRO 4 UNITS: 100 INJECTION, SOLUTION INTRAVENOUS; SUBCUTANEOUS at 08:00

## 2021-01-01 RX ADMIN — IPRATROPIUM BROMIDE AND ALBUTEROL SULFATE 1 AMPULE: .5; 3 SOLUTION RESPIRATORY (INHALATION) at 21:45

## 2021-01-01 RX ADMIN — Medication 200 MCG/HR: at 02:15

## 2021-01-01 RX ADMIN — TAMSULOSIN HYDROCHLORIDE 0.4 MG: 0.4 CAPSULE ORAL at 21:17

## 2021-01-01 RX ADMIN — LACTULOSE 20 G: 10 SOLUTION ORAL at 20:03

## 2021-01-01 RX ADMIN — SODIUM CHLORIDE: 9 INJECTION, SOLUTION INTRAVENOUS at 04:00

## 2021-01-01 RX ADMIN — INSULIN LISPRO 3 UNITS: 100 INJECTION, SOLUTION INTRAVENOUS; SUBCUTANEOUS at 18:40

## 2021-01-01 RX ADMIN — ZINC SULFATE 220 MG (50 MG) CAPSULE 50 MG: CAPSULE at 08:09

## 2021-01-01 RX ADMIN — ALBUTEROL SULFATE 2.5 MG: 2.5 SOLUTION RESPIRATORY (INHALATION) at 00:51

## 2021-01-01 RX ADMIN — CALCIUM GLUCONATE 2000 MG: 98 INJECTION, SOLUTION INTRAVENOUS at 06:57

## 2021-01-01 RX ADMIN — IPRATROPIUM BROMIDE AND ALBUTEROL SULFATE 1 AMPULE: .5; 3 SOLUTION RESPIRATORY (INHALATION) at 09:28

## 2021-01-01 RX ADMIN — ACETAMINOPHEN 650 MG: 325 TABLET ORAL at 06:21

## 2021-01-01 RX ADMIN — SODIUM CHLORIDE 500 ML: 9 INJECTION, SOLUTION INTRAVENOUS at 10:27

## 2021-01-01 RX ADMIN — SODIUM CHLORIDE, PRESERVATIVE FREE 10 ML: 5 INJECTION INTRAVENOUS at 20:14

## 2021-01-01 RX ADMIN — Medication 8 MG/HR: at 02:54

## 2021-01-01 RX ADMIN — LACTULOSE 20 G: 10 SOLUTION ORAL at 08:24

## 2021-01-01 RX ADMIN — ENOXAPARIN SODIUM 60 MG: 60 INJECTION SUBCUTANEOUS at 20:57

## 2021-01-01 RX ADMIN — INSULIN LISPRO 6 UNITS: 100 INJECTION, SOLUTION INTRAVENOUS; SUBCUTANEOUS at 17:53

## 2021-01-01 RX ADMIN — HEPARIN SODIUM 5000 UNITS: 5000 INJECTION INTRAVENOUS; SUBCUTANEOUS at 06:23

## 2021-01-01 RX ADMIN — LISINOPRIL 2.5 MG: 2.5 TABLET ORAL at 09:02

## 2021-01-01 RX ADMIN — HEPARIN SODIUM 5000 UNITS: 5000 INJECTION INTRAVENOUS; SUBCUTANEOUS at 06:07

## 2021-01-01 RX ADMIN — CISATRACURIUM BESYLATE 0.5 MCG/KG/MIN: 10 INJECTION INTRAVENOUS at 08:26

## 2021-01-01 RX ADMIN — SODIUM CHLORIDE, PRESERVATIVE FREE 10 ML: 5 INJECTION INTRAVENOUS at 08:40

## 2021-01-01 RX ADMIN — HEPARIN SODIUM 5000 UNITS: 5000 INJECTION INTRAVENOUS; SUBCUTANEOUS at 20:34

## 2021-01-01 RX ADMIN — METHYLPREDNISOLONE SODIUM SUCCINATE 40 MG: 40 INJECTION, POWDER, FOR SOLUTION INTRAMUSCULAR; INTRAVENOUS at 11:52

## 2021-01-01 RX ADMIN — EPOPROSTENOL 50 NG/KG/MIN: 1.5 INJECTION, POWDER, LYOPHILIZED, FOR SOLUTION INTRAVENOUS at 05:50

## 2021-01-01 RX ADMIN — ATORVASTATIN CALCIUM 20 MG: 20 TABLET, FILM COATED ORAL at 20:23

## 2021-01-01 RX ADMIN — LISINOPRIL 2.5 MG: 2.5 TABLET ORAL at 08:51

## 2021-01-01 RX ADMIN — LACTULOSE 20 G: 10 SOLUTION ORAL at 14:59

## 2021-01-01 RX ADMIN — CETIRIZINE HYDROCHLORIDE 10 MG: 10 TABLET, FILM COATED ORAL at 08:28

## 2021-01-01 RX ADMIN — INSULIN LISPRO 6 UNITS: 100 INJECTION, SOLUTION INTRAVENOUS; SUBCUTANEOUS at 17:34

## 2021-01-01 RX ADMIN — METFORMIN HYDROCHLORIDE 500 MG: 500 TABLET ORAL at 08:17

## 2021-01-01 RX ADMIN — Medication 200 MCG/HR: at 05:27

## 2021-01-01 RX ADMIN — METHYLPREDNISOLONE SODIUM SUCCINATE 40 MG: 40 INJECTION, POWDER, FOR SOLUTION INTRAMUSCULAR; INTRAVENOUS at 12:04

## 2021-01-01 RX ADMIN — Medication 100 MCG/HR: at 09:00

## 2021-01-01 RX ADMIN — HEPARIN SODIUM 5000 UNITS: 5000 INJECTION INTRAVENOUS; SUBCUTANEOUS at 21:29

## 2021-01-01 RX ADMIN — ENOXAPARIN SODIUM 60 MG: 60 INJECTION SUBCUTANEOUS at 20:55

## 2021-01-01 RX ADMIN — ENOXAPARIN SODIUM 60 MG: 60 INJECTION SUBCUTANEOUS at 08:02

## 2021-01-01 RX ADMIN — IPRATROPIUM BROMIDE AND ALBUTEROL SULFATE 1 AMPULE: .5; 3 SOLUTION RESPIRATORY (INHALATION) at 05:11

## 2021-01-01 RX ADMIN — SODIUM ZIRCONIUM CYCLOSILICATE 5 G: 5 POWDER, FOR SUSPENSION ORAL at 13:45

## 2021-01-01 RX ADMIN — SODIUM CHLORIDE, PRESERVATIVE FREE 10 ML: 5 INJECTION INTRAVENOUS at 03:23

## 2021-01-01 RX ADMIN — PANTOPRAZOLE SODIUM 40 MG: 40 TABLET, DELAYED RELEASE ORAL at 08:25

## 2021-01-01 RX ADMIN — ALBUTEROL SULFATE 2.5 MG: 2.5 SOLUTION RESPIRATORY (INHALATION) at 00:43

## 2021-01-01 RX ADMIN — EPOPROSTENOL 50 NG/KG/MIN: 1.5 INJECTION, POWDER, LYOPHILIZED, FOR SOLUTION INTRAVENOUS at 16:56

## 2021-01-01 RX ADMIN — ASPIRIN 81 MG: 81 TABLET, COATED ORAL at 08:25

## 2021-01-01 RX ADMIN — INSULIN LISPRO 3 UNITS: 100 INJECTION, SOLUTION INTRAVENOUS; SUBCUTANEOUS at 16:32

## 2021-01-01 RX ADMIN — LISINOPRIL 2.5 MG: 2.5 TABLET ORAL at 08:17

## 2021-01-01 RX ADMIN — HEPARIN SODIUM 5000 UNITS: 5000 INJECTION INTRAVENOUS; SUBCUTANEOUS at 05:35

## 2021-01-01 RX ADMIN — INSULIN GLARGINE 10 UNITS: 100 INJECTION, SOLUTION SUBCUTANEOUS at 20:48

## 2021-01-01 RX ADMIN — LACTULOSE 20 G: 10 SOLUTION ORAL at 08:36

## 2021-01-01 RX ADMIN — Medication 400 MG: at 08:02

## 2021-01-01 RX ADMIN — FAMOTIDINE 20 MG: 10 INJECTION INTRAVENOUS at 20:03

## 2021-01-01 RX ADMIN — INSULIN LISPRO 6 UNITS: 100 INJECTION, SOLUTION INTRAVENOUS; SUBCUTANEOUS at 08:25

## 2021-01-01 RX ADMIN — INSULIN LISPRO 3 UNITS: 100 INJECTION, SOLUTION INTRAVENOUS; SUBCUTANEOUS at 12:24

## 2021-01-01 RX ADMIN — DEXAMETHASONE SODIUM PHOSPHATE 6 MG: 10 INJECTION INTRAMUSCULAR; INTRAVENOUS at 02:25

## 2021-01-01 RX ADMIN — INSULIN LISPRO 2 UNITS: 100 INJECTION, SOLUTION INTRAVENOUS; SUBCUTANEOUS at 09:22

## 2021-01-01 RX ADMIN — PANTOPRAZOLE SODIUM 40 MG: 40 TABLET, DELAYED RELEASE ORAL at 08:51

## 2021-01-01 RX ADMIN — INSULIN LISPRO 4 UNITS: 100 INJECTION, SOLUTION INTRAVENOUS; SUBCUTANEOUS at 12:29

## 2021-01-01 RX ADMIN — PANTOPRAZOLE SODIUM 40 MG: 40 TABLET, DELAYED RELEASE ORAL at 08:15

## 2021-01-01 RX ADMIN — HEPARIN SODIUM 5000 UNITS: 5000 INJECTION INTRAVENOUS; SUBCUTANEOUS at 05:40

## 2021-01-01 RX ADMIN — FAMOTIDINE 20 MG: 10 INJECTION INTRAVENOUS at 20:12

## 2021-01-01 RX ADMIN — INSULIN GLARGINE 12 UNITS: 100 INJECTION, SOLUTION SUBCUTANEOUS at 20:21

## 2021-01-01 RX ADMIN — INSULIN LISPRO 3 UNITS: 100 INJECTION, SOLUTION INTRAVENOUS; SUBCUTANEOUS at 06:22

## 2021-01-01 RX ADMIN — FAMOTIDINE 20 MG: 10 INJECTION INTRAVENOUS at 19:28

## 2021-01-01 RX ADMIN — FAMOTIDINE 20 MG: 10 INJECTION INTRAVENOUS at 09:51

## 2021-01-01 RX ADMIN — METHYLPREDNISOLONE SODIUM SUCCINATE 40 MG: 40 INJECTION, POWDER, FOR SOLUTION INTRAMUSCULAR; INTRAVENOUS at 23:47

## 2021-01-01 RX ADMIN — Medication 400 MG: at 08:58

## 2021-01-01 RX ADMIN — METHYLPREDNISOLONE SODIUM SUCCINATE 40 MG: 40 INJECTION, POWDER, FOR SOLUTION INTRAMUSCULAR; INTRAVENOUS at 23:02

## 2021-01-01 RX ADMIN — INSULIN LISPRO 3 UNITS: 100 INJECTION, SOLUTION INTRAVENOUS; SUBCUTANEOUS at 06:34

## 2021-01-01 RX ADMIN — Medication 5000 UNITS: at 08:50

## 2021-01-01 RX ADMIN — METFORMIN HYDROCHLORIDE 1000 MG: 500 TABLET, EXTENDED RELEASE ORAL at 09:12

## 2021-01-01 RX ADMIN — SODIUM CHLORIDE, PRESERVATIVE FREE 10 ML: 5 INJECTION INTRAVENOUS at 20:28

## 2021-01-01 RX ADMIN — INSULIN LISPRO 9 UNITS: 100 INJECTION, SOLUTION INTRAVENOUS; SUBCUTANEOUS at 11:38

## 2021-01-01 RX ADMIN — EPOPROSTENOL 50 NG/KG/MIN: 1.5 INJECTION, POWDER, LYOPHILIZED, FOR SOLUTION INTRAVENOUS at 12:36

## 2021-01-01 RX ADMIN — SODIUM CHLORIDE, PRESERVATIVE FREE 10 ML: 5 INJECTION INTRAVENOUS at 20:03

## 2021-01-01 RX ADMIN — Medication 75 MCG/HR: at 10:07

## 2021-01-01 RX ADMIN — INSULIN LISPRO 6 UNITS: 100 INJECTION, SOLUTION INTRAVENOUS; SUBCUTANEOUS at 17:16

## 2021-01-01 RX ADMIN — POLYVINYL ALCOHOL 1 DROP: 14 SOLUTION/ DROPS OPHTHALMIC at 20:34

## 2021-01-01 RX ADMIN — IPRATROPIUM BROMIDE AND ALBUTEROL SULFATE 1 AMPULE: .5; 3 SOLUTION RESPIRATORY (INHALATION) at 04:51

## 2021-01-01 RX ADMIN — Medication 50 MCG/HR: at 12:52

## 2021-01-01 RX ADMIN — ZINC SULFATE 220 MG (50 MG) CAPSULE 50 MG: CAPSULE at 08:51

## 2021-01-01 RX ADMIN — Medication 50 MG: at 16:13

## 2021-01-01 RX ADMIN — CISATRACURIUM BESYLATE 4 MCG/KG/MIN: 10 INJECTION INTRAVENOUS at 23:59

## 2021-01-01 RX ADMIN — PROPOFOL 10 MG: 10 INJECTION, EMULSION INTRAVENOUS at 17:17

## 2021-01-01 RX ADMIN — PREDNISONE 20 MG: 20 TABLET ORAL at 07:43

## 2021-01-01 RX ADMIN — SODIUM CHLORIDE, PRESERVATIVE FREE 10 ML: 5 INJECTION INTRAVENOUS at 21:07

## 2021-01-01 RX ADMIN — Medication 10 MG/HR: at 06:13

## 2021-01-01 RX ADMIN — POLYVINYL ALCOHOL 1 DROP: 14 SOLUTION/ DROPS OPHTHALMIC at 22:01

## 2021-01-01 RX ADMIN — INSULIN LISPRO 1 UNITS: 100 INJECTION, SOLUTION INTRAVENOUS; SUBCUTANEOUS at 04:21

## 2021-01-01 RX ADMIN — INSULIN LISPRO 3 UNITS: 100 INJECTION, SOLUTION INTRAVENOUS; SUBCUTANEOUS at 06:21

## 2021-01-01 RX ADMIN — LEVOFLOXACIN 500 MG: 5 INJECTION, SOLUTION INTRAVENOUS at 12:22

## 2021-01-01 RX ADMIN — PREDNISONE 40 MG: 20 TABLET ORAL at 08:00

## 2021-01-01 RX ADMIN — ZINC SULFATE 220 MG (50 MG) CAPSULE 50 MG: CAPSULE at 08:16

## 2021-01-01 RX ADMIN — FLUTICASONE PROPIONATE 1 SPRAY: 50 SPRAY, METERED NASAL at 08:32

## 2021-01-01 RX ADMIN — LACTULOSE 20 G: 10 SOLUTION ORAL at 09:26

## 2021-01-01 RX ADMIN — INSULIN LISPRO 1 UNITS: 100 INJECTION, SOLUTION INTRAVENOUS; SUBCUTANEOUS at 00:00

## 2021-01-01 RX ADMIN — INSULIN LISPRO 3 UNITS: 100 INJECTION, SOLUTION INTRAVENOUS; SUBCUTANEOUS at 17:39

## 2021-01-01 RX ADMIN — INSULIN LISPRO 3 UNITS: 100 INJECTION, SOLUTION INTRAVENOUS; SUBCUTANEOUS at 13:27

## 2021-01-01 RX ADMIN — Medication 5000 UNITS: at 08:16

## 2021-01-01 RX ADMIN — IPRATROPIUM BROMIDE AND ALBUTEROL SULFATE 1 AMPULE: .5; 3 SOLUTION RESPIRATORY (INHALATION) at 10:35

## 2021-01-01 RX ADMIN — Medication 8 MG/HR: at 12:36

## 2021-01-01 RX ADMIN — LACTULOSE 20 G: 10 SOLUTION ORAL at 11:12

## 2021-01-01 RX ADMIN — Medication 150 MCG/HR: at 17:51

## 2021-01-01 RX ADMIN — Medication 200 MCG/HR: at 15:58

## 2021-01-01 RX ADMIN — METHYLPREDNISOLONE SODIUM SUCCINATE 40 MG: 40 INJECTION, POWDER, FOR SOLUTION INTRAMUSCULAR; INTRAVENOUS at 12:12

## 2021-01-01 RX ADMIN — METHYLPREDNISOLONE SODIUM SUCCINATE 40 MG: 40 INJECTION, POWDER, FOR SOLUTION INTRAMUSCULAR; INTRAVENOUS at 23:51

## 2021-01-01 RX ADMIN — ZINC SULFATE 220 MG (50 MG) CAPSULE 50 MG: CAPSULE at 09:02

## 2021-01-01 RX ADMIN — LEVOFLOXACIN 500 MG: 5 INJECTION, SOLUTION INTRAVENOUS at 11:31

## 2021-01-01 RX ADMIN — IPRATROPIUM BROMIDE AND ALBUTEROL SULFATE 1 AMPULE: .5; 3 SOLUTION RESPIRATORY (INHALATION) at 11:43

## 2021-01-01 RX ADMIN — ENOXAPARIN SODIUM 60 MG: 60 INJECTION SUBCUTANEOUS at 08:24

## 2021-01-01 RX ADMIN — Medication 4 MG/HR: at 02:17

## 2021-01-01 RX ADMIN — TAMSULOSIN HYDROCHLORIDE 0.4 MG: 0.4 CAPSULE ORAL at 20:16

## 2021-01-01 RX ADMIN — SODIUM CHLORIDE, PRESERVATIVE FREE 10 ML: 5 INJECTION INTRAVENOUS at 08:32

## 2021-01-01 RX ADMIN — IPRATROPIUM BROMIDE AND ALBUTEROL SULFATE 1 AMPULE: .5; 3 SOLUTION RESPIRATORY (INHALATION) at 10:52

## 2021-01-01 RX ADMIN — CEFTAROLINE FOSAMIL 600 MG: 600 POWDER, FOR SOLUTION INTRAVENOUS at 15:45

## 2021-01-01 RX ADMIN — HEPARIN SODIUM 5000 UNITS: 5000 INJECTION INTRAVENOUS; SUBCUTANEOUS at 05:27

## 2021-01-01 RX ADMIN — METFORMIN HYDROCHLORIDE 1000 MG: 500 TABLET, EXTENDED RELEASE ORAL at 08:57

## 2021-01-01 RX ADMIN — DEXMEDETOMIDINE HYDROCHLORIDE 0.4 MCG/KG/HR: 400 INJECTION INTRAVENOUS at 06:50

## 2021-01-01 RX ADMIN — METFORMIN HYDROCHLORIDE 500 MG: 500 TABLET ORAL at 08:10

## 2021-01-01 RX ADMIN — ENOXAPARIN SODIUM 60 MG: 60 INJECTION SUBCUTANEOUS at 08:16

## 2021-01-01 RX ADMIN — INSULIN LISPRO 6 UNITS: 100 INJECTION, SOLUTION INTRAVENOUS; SUBCUTANEOUS at 08:27

## 2021-01-01 RX ADMIN — INSULIN LISPRO 3 UNITS: 100 INJECTION, SOLUTION INTRAVENOUS; SUBCUTANEOUS at 08:48

## 2021-01-01 RX ADMIN — METHYLPREDNISOLONE SODIUM SUCCINATE 40 MG: 40 INJECTION, POWDER, FOR SOLUTION INTRAMUSCULAR; INTRAVENOUS at 12:09

## 2021-01-01 RX ADMIN — FAMOTIDINE 20 MG: 10 INJECTION INTRAVENOUS at 07:43

## 2021-01-01 RX ADMIN — Medication 150 MCG/HR: at 10:31

## 2021-01-01 RX ADMIN — INSULIN LISPRO 1 UNITS: 100 INJECTION, SOLUTION INTRAVENOUS; SUBCUTANEOUS at 22:04

## 2021-01-01 RX ADMIN — FAMOTIDINE 20 MG: 10 INJECTION INTRAVENOUS at 08:23

## 2021-01-01 RX ADMIN — ENOXAPARIN SODIUM 60 MG: 60 INJECTION SUBCUTANEOUS at 08:46

## 2021-01-01 RX ADMIN — CISATRACURIUM BESYLATE 4 MCG/KG/MIN: 10 INJECTION INTRAVENOUS at 07:35

## 2021-01-01 RX ADMIN — IPRATROPIUM BROMIDE AND ALBUTEROL SULFATE 1 AMPULE: .5; 3 SOLUTION RESPIRATORY (INHALATION) at 16:12

## 2021-01-01 RX ADMIN — LACTULOSE 20 G: 10 SOLUTION ORAL at 14:04

## 2021-01-01 RX ADMIN — HEPARIN SODIUM 5000 UNITS: 5000 INJECTION INTRAVENOUS; SUBCUTANEOUS at 20:35

## 2021-01-01 RX ADMIN — Medication 6 MG/HR: at 07:17

## 2021-01-01 RX ADMIN — SODIUM ZIRCONIUM CYCLOSILICATE 5 G: 5 POWDER, FOR SUSPENSION ORAL at 08:37

## 2021-01-01 RX ADMIN — INSULIN LISPRO 3 UNITS: 100 INJECTION, SOLUTION INTRAVENOUS; SUBCUTANEOUS at 05:27

## 2021-01-01 RX ADMIN — CISATRACURIUM BESYLATE 3.5 MCG/KG/MIN: 10 INJECTION INTRAVENOUS at 17:01

## 2021-01-01 RX ADMIN — INSULIN LISPRO 3 UNITS: 100 INJECTION, SOLUTION INTRAVENOUS; SUBCUTANEOUS at 12:16

## 2021-01-01 RX ADMIN — SODIUM CHLORIDE, PRESERVATIVE FREE 10 ML: 5 INJECTION INTRAVENOUS at 09:13

## 2021-01-01 RX ADMIN — AMLODIPINE BESYLATE 10 MG: 10 TABLET ORAL at 08:24

## 2021-01-01 RX ADMIN — SODIUM CHLORIDE, PRESERVATIVE FREE 10 ML: 5 INJECTION INTRAVENOUS at 10:56

## 2021-01-01 RX ADMIN — IPRATROPIUM BROMIDE AND ALBUTEROL SULFATE 1 AMPULE: .5; 3 SOLUTION RESPIRATORY (INHALATION) at 16:37

## 2021-01-01 RX ADMIN — HEPARIN SODIUM 5000 UNITS: 5000 INJECTION INTRAVENOUS; SUBCUTANEOUS at 14:31

## 2021-01-01 RX ADMIN — Medication 75 MCG/HR: at 01:24

## 2021-01-01 RX ADMIN — Medication 10 MG/HR: at 13:00

## 2021-01-01 RX ADMIN — VANCOMYCIN HYDROCHLORIDE 1750 MG: 10 INJECTION, POWDER, LYOPHILIZED, FOR SOLUTION INTRAVENOUS at 11:44

## 2021-01-01 RX ADMIN — IPRATROPIUM BROMIDE AND ALBUTEROL SULFATE 1 AMPULE: .5; 3 SOLUTION RESPIRATORY (INHALATION) at 04:41

## 2021-01-01 RX ADMIN — PREDNISONE 10 MG: 10 TABLET ORAL at 09:19

## 2021-01-01 RX ADMIN — INSULIN GLARGINE 10 UNITS: 100 INJECTION, SOLUTION SUBCUTANEOUS at 20:20

## 2021-01-01 RX ADMIN — AMLODIPINE BESYLATE 10 MG: 10 TABLET ORAL at 08:35

## 2021-01-01 RX ADMIN — METHYLPREDNISOLONE SODIUM SUCCINATE 40 MG: 40 INJECTION, POWDER, FOR SOLUTION INTRAMUSCULAR; INTRAVENOUS at 11:04

## 2021-01-01 RX ADMIN — HEPARIN SODIUM 5000 UNITS: 5000 INJECTION INTRAVENOUS; SUBCUTANEOUS at 13:45

## 2021-01-01 RX ADMIN — CHLORHEXIDINE GLUCONATE 15 ML: 1.2 RINSE ORAL at 20:26

## 2021-01-01 RX ADMIN — REMDESIVIR 200 MG: 100 INJECTION, POWDER, LYOPHILIZED, FOR SOLUTION INTRAVENOUS at 03:22

## 2021-01-01 RX ADMIN — SODIUM CHLORIDE, PRESERVATIVE FREE 10 ML: 5 INJECTION INTRAVENOUS at 08:16

## 2021-01-01 RX ADMIN — Medication 200 MCG/HR: at 06:15

## 2021-01-01 RX ADMIN — ACETAMINOPHEN 650 MG: 325 TABLET ORAL at 04:13

## 2021-01-01 RX ADMIN — CETIRIZINE HYDROCHLORIDE 10 MG: 10 TABLET, FILM COATED ORAL at 08:02

## 2021-01-01 RX ADMIN — LEVOFLOXACIN 500 MG: 5 INJECTION, SOLUTION INTRAVENOUS at 11:36

## 2021-01-01 RX ADMIN — SODIUM CHLORIDE, PRESERVATIVE FREE 10 ML: 5 INJECTION INTRAVENOUS at 23:52

## 2021-01-01 RX ADMIN — IPRATROPIUM BROMIDE AND ALBUTEROL SULFATE 1 AMPULE: .5; 3 SOLUTION RESPIRATORY (INHALATION) at 09:50

## 2021-01-01 RX ADMIN — EPOPROSTENOL 40 NG/KG/MIN: 1.5 INJECTION, POWDER, LYOPHILIZED, FOR SOLUTION INTRAVENOUS at 09:58

## 2021-01-01 RX ADMIN — CISATRACURIUM BESYLATE 3.5 MCG/KG/MIN: 10 INJECTION INTRAVENOUS at 23:15

## 2021-01-01 RX ADMIN — INSULIN LISPRO 6 UNITS: 100 INJECTION, SOLUTION INTRAVENOUS; SUBCUTANEOUS at 08:22

## 2021-01-01 RX ADMIN — Medication 400 MG: at 08:28

## 2021-01-01 RX ADMIN — IPRATROPIUM BROMIDE AND ALBUTEROL SULFATE 1 AMPULE: .5; 3 SOLUTION RESPIRATORY (INHALATION) at 14:10

## 2021-01-01 RX ADMIN — HEPARIN SODIUM 5000 UNITS: 5000 INJECTION INTRAVENOUS; SUBCUTANEOUS at 14:51

## 2021-01-01 RX ADMIN — INSULIN LISPRO 2 UNITS: 100 INJECTION, SOLUTION INTRAVENOUS; SUBCUTANEOUS at 16:30

## 2021-01-01 RX ADMIN — IPRATROPIUM BROMIDE AND ALBUTEROL SULFATE 1 AMPULE: .5; 3 SOLUTION RESPIRATORY (INHALATION) at 22:10

## 2021-01-01 RX ADMIN — FLUTICASONE PROPIONATE 1 SPRAY: 50 SPRAY, METERED NASAL at 08:57

## 2021-01-01 RX ADMIN — INSULIN LISPRO 4 UNITS: 100 INJECTION, SOLUTION INTRAVENOUS; SUBCUTANEOUS at 11:55

## 2021-01-01 RX ADMIN — PSEUDOEPHEDRINE HCL 60 MG: 30 TABLET, FILM COATED ORAL at 14:02

## 2021-01-01 RX ADMIN — Medication 10 MG/HR: at 10:01

## 2021-01-01 RX ADMIN — ENOXAPARIN SODIUM 60 MG: 60 INJECTION SUBCUTANEOUS at 21:16

## 2021-01-01 RX ADMIN — METHYLPREDNISOLONE SODIUM SUCCINATE 40 MG: 40 INJECTION, POWDER, FOR SOLUTION INTRAMUSCULAR; INTRAVENOUS at 00:05

## 2021-01-01 RX ADMIN — HEPARIN SODIUM 5000 UNITS: 5000 INJECTION INTRAVENOUS; SUBCUTANEOUS at 14:01

## 2021-01-01 RX ADMIN — ALBUTEROL SULFATE 2.5 MG: 2.5 SOLUTION RESPIRATORY (INHALATION) at 01:58

## 2021-01-01 ASSESSMENT — PULMONARY FUNCTION TESTS
PIF_VALUE: 36
PIF_VALUE: 36
PIF_VALUE: 35
PIF_VALUE: 36
PIF_VALUE: 28
PIF_VALUE: 35
PIF_VALUE: 36
PIF_VALUE: 42
PIF_VALUE: 36
PIF_VALUE: 35
PIF_VALUE: 37
PIF_VALUE: 40
PIF_VALUE: 34
PIF_VALUE: 35
PIF_VALUE: 39
PIF_VALUE: 28
PIF_VALUE: 38
PIF_VALUE: 36
PIF_VALUE: 37
PIF_VALUE: 36
PIF_VALUE: 34
PIF_VALUE: 38
PIF_VALUE: 36
PIF_VALUE: 35
PIF_VALUE: 37
PIF_VALUE: 29
PIF_VALUE: 37
PIF_VALUE: 34
PIF_VALUE: 26
PIF_VALUE: 37
PIF_VALUE: 38
PIF_VALUE: 38
PIF_VALUE: 36
PIF_VALUE: 35
PIF_VALUE: 37
PIF_VALUE: 36
PIF_VALUE: 37
PIF_VALUE: 39
PIF_VALUE: 35
PIF_VALUE: 36
PIF_VALUE: 37
PIF_VALUE: 22
PIF_VALUE: 35
PIF_VALUE: 37
PIF_VALUE: 38
PIF_VALUE: 37
PIF_VALUE: 27
PIF_VALUE: 28
PIF_VALUE: 35
PIF_VALUE: 28
PIF_VALUE: 27
PIF_VALUE: 37
PIF_VALUE: 37
PIF_VALUE: 35
PIF_VALUE: 36
PIF_VALUE: 33
PIF_VALUE: 28
PIF_VALUE: 34
PIF_VALUE: 35
PIF_VALUE: 33
PIF_VALUE: 36
PIF_VALUE: 38
PIF_VALUE: 37
PIF_VALUE: 33
PIF_VALUE: 27
PIF_VALUE: 39
PIF_VALUE: 36
PIF_VALUE: 37
PIF_VALUE: 38
PIF_VALUE: 34
PIF_VALUE: 36
PIF_VALUE: 33
PIF_VALUE: 36
PIF_VALUE: 34
PIF_VALUE: 37
PIF_VALUE: 37
PIF_VALUE: 36
PIF_VALUE: 37
PIF_VALUE: 36
PIF_VALUE: 32
PIF_VALUE: 28
PIF_VALUE: 28
PIF_VALUE: 39
PIF_VALUE: 38
PIF_VALUE: 36
PIF_VALUE: 35
PIF_VALUE: 37
PIF_VALUE: 36
PIF_VALUE: 36
PIF_VALUE: 37
PIF_VALUE: 27
PIF_VALUE: 36
PIF_VALUE: 34
PIF_VALUE: 35
PIF_VALUE: 36
PIF_VALUE: 35
PIF_VALUE: 38
PIF_VALUE: 36
PIF_VALUE: 36

## 2021-01-01 ASSESSMENT — ENCOUNTER SYMPTOMS
ABDOMINAL PAIN: 0
SORE THROAT: 0
NAUSEA: 0
EYE REDNESS: 0
CHEST TIGHTNESS: 1
BACK PAIN: 0
SHORTNESS OF BREATH: 1
EYE PAIN: 0
TROUBLE SWALLOWING: 0
VOMITING: 0
ABDOMINAL DISTENTION: 0
COUGH: 1
TACHYPNEA: 1

## 2021-01-01 ASSESSMENT — PAIN SCALES - GENERAL
PAINLEVEL_OUTOF10: 0

## 2021-01-19 PROBLEM — U07.1 COVID-19: Status: ACTIVE | Noted: 2021-01-01

## 2021-01-20 PROBLEM — J96.01 ACUTE RESPIRATORY FAILURE WITH HYPOXIA (HCC): Status: ACTIVE | Noted: 2021-01-01

## 2021-01-20 NOTE — ACP (ADVANCE CARE PLANNING)
[x] ACP discussion completed  [] Existing advance directive reviewed with patient; no changes to patient's previously recorded wishes  [] New Advance Directive completed  [] Portable Do Not Rescitate prepared for Provider review and signature  [] POLST/POST/MOLST/MOST prepared for Provider review and signature      Follow-up plan:    [] Schedule follow-up conversation to continue planning  [x] Referred individual to Provider for additional questions/concerns   [] Advised patient/agent/surrogate to review completed ACP document and update if needed with changes in condition, patient preferences or care setting    [] This note routed to one or more involved healthcare providers

## 2021-01-20 NOTE — PROGRESS NOTES
Patient's O2 remaining 88%. Writer callled and Dr. Gal villasenor'd BiPAP order. Respiratory on the floor now and will set up BiPAP.

## 2021-01-20 NOTE — PLAN OF CARE
Problem: Airway Clearance - Ineffective  Goal: Achieve or maintain patent airway  Outcome: Ongoing     Problem: Gas Exchange - Impaired  Goal: Absence of hypoxia  Outcome: Ongoing  Goal: Promote optimal lung function  Outcome: Ongoing     Problem: Breathing Pattern - Ineffective  Goal: Ability to achieve and maintain a regular respiratory rate  Outcome: Ongoing     Problem:  Body Temperature -  Risk of, Imbalanced  Goal: Ability to maintain a body temperature within defined limits  Outcome: Ongoing  Goal: Will regain or maintain usual level of consciousness  Outcome: Ongoing  Goal: Complications related to the disease process, condition or treatment will be avoided or minimized  Outcome: Ongoing     Problem: Isolation Precautions - Risk of Spread of Infection  Goal: Prevent transmission of infection  Outcome: Ongoing     Problem: Nutrition Deficits  Goal: Optimize nutritional status  Outcome: Ongoing     Problem: Risk for Fluid Volume Deficit  Goal: Maintain normal heart rhythm  Outcome: Ongoing  Goal: Maintain absence of muscle cramping  Outcome: Ongoing  Goal: Maintain normal serum potassium, sodium, calcium, phosphorus, and pH  Outcome: Ongoing     Problem: Loneliness or Risk for Loneliness  Goal: Demonstrate positive use of time alone when socialization is not possible  Outcome: Ongoing     Problem: Fatigue  Goal: Verbalize increase energy and improved vitality  Outcome: Ongoing     Problem: Patient Education: Go to Patient Education Activity  Goal: Patient/Family Education  Outcome: Ongoing     Problem: Falls - Risk of:  Goal: Will remain free from falls  Description: Will remain free from falls  Outcome: Ongoing  Goal: Absence of physical injury  Description: Absence of physical injury  Outcome: Ongoing     Problem: Nutrition  Goal: Optimal nutrition therapy  Description: Nutrition Problem #1: Inadequate oral intake  Intervention: Food and/or Nutrient Delivery: Continue Current Diet Nutritional Goals: PO > 75% with good tolerance      Outcome: Ongoing

## 2021-01-20 NOTE — CONSULTS
Remdesivir Initiation & Daily Monitoring    Physician requesting remdesivir (use limited to ID): Dr Carisa Seals    Demonstrated benefit is to shorten the duration of illness, mortality benefit has not been shown. Seems to be most beneficial early in the disease course. Criteria for use (confirm all are met): yes  Suspected/Confirmed COVID-19 positive and requiring hospitalization  Patient requires supplemental oxygen (this is the population for whom remdesivir demonstrated a shortened duration of illness; benefit less clear for those on high flow oxygen or mechanical ventilation, but may be reasonable to consider if change in respiratory status was recent)  Not recommended to be used in patients with baseline ALT 5x ULN or more    There are no PK data available for severe renal impairment (eGFR < 30 mL/min) or on RRT. Consider risk/benefit for individual patient. Baseline CrCl: Estimated Creatinine Clearance: 88 mL/min (based on SCr of 0.96 mg/dL). Ensure LFTs are ordered at baseline & consider if monitoring during therapy is appropriate (this information is included in CMP or can be ordered separately). Package insert states: Perform hepatic laboratory testing in all patients before starting VEKLURY and while receiving VEKLURY as clinically appropriate. Baseline ALT: 13    Duration of therapy should be limited to 5 days. Anticipated stop date: 1/24    Also assess corticosteroid therapy: If patient has had symptoms for 7 days or more and is on supplemental oxygen, then a course of dexamethasone can also be considered. Dexamethasone indicated? If yes, is dexamethasone ordered?    Consider baseline CRP (steroids may be beneficial if > 20 mg/L and may be harmful if < 10 mg/L):

## 2021-01-20 NOTE — PROGRESS NOTES
Remdesivir Initiation & Daily Monitoring    Physician requesting remdesivir (use limited to ID): Dr Trina Rene    Demonstrated benefit is to shorten the duration of illness, mortality benefit has not been shown. Seems to be most beneficial early in the disease course. Criteria for use (confirm all are met): yes  Suspected/Confirmed COVID-19 positive and requiring hospitalization  Patient requires supplemental oxygen (this is the population for whom remdesivir demonstrated a shortened duration of illness; benefit less clear for those on high flow oxygen or mechanical ventilation, but may be reasonable to consider if change in respiratory status was recent)  Not recommended to be used in patients with baseline ALT 5x ULN or more    There are no PK data available for severe renal impairment (eGFR < 30 mL/min) or on RRT. Consider risk/benefit for individual patient. Baseline CrCl: Estimated Creatinine Clearance: 88 mL/min (based on SCr of 0.96 mg/dL). Ensure LFTs are ordered at baseline & consider if monitoring during therapy is appropriate (this information is included in CMP or can be ordered separately). Package insert states: Perform hepatic laboratory testing in all patients before starting VEKLURY and while receiving VEKLURY as clinically appropriate. Baseline ALT: 13    Duration of therapy should be limited to 5 days. Anticipated stop date: 1/24    Also assess corticosteroid therapy: If patient has had symptoms for 7 days or more and is on supplemental oxygen, then a course of dexamethasone can also be considered. Dexamethasone indicated? If yes, is dexamethasone ordered? Consider baseline CRP (steroids may be beneficial if > 20 mg/L and may be harmful if < 10 mg/L): Thank you,  CORINE Obando, PharmD  1/20/2021 2:43 AM

## 2021-01-20 NOTE — ED NOTES
Called Dr Simón Vallejo as hospitalist via answering service.  Connected call to Dr Parmjit Archibald  01/19/21 [id number]

## 2021-01-20 NOTE — CONSULTS
PALLIATIVE CARE  In to talk with Jacquie Villanueva regarding ACP discussion. He is presently in ICU with COVID-19. Positive test was 1/14/21. Currently wearing BiPap and alternating between this and high flow O2. Sitting upright in bed. Appears comfortable. Denies needs. ACP note completed. States has healthcare power of  and living will. Uncertain, however, of order of decision makers other than the 2 children listed. Will follow and support as needed. Rachell Priest RN, Michael Atkinson and Rain Cartagena Nurse Coordinator  1/20/2021 10:00 AM

## 2021-01-20 NOTE — H&P
ICU Admission [facility] Medicine  History and Physical    Patient:  Anita Andersen  MRN: [id number]    Chief Complaint: Shortness of breath    History Obtained From:  patient, electronic medical record    PCP: Candy Connor MD    History of Present Illness: The patient is a 70 y.o. male who presented to the emergency room with complaints of worsening dyspnea and cough over the last 2 days. He explained that his symptoms began January 12 with fever, malaise, myalgias, and cough. He explained on 14 January that he began with respiratory difficulty and he was tested and was positive. Since that time he explained that his symptoms were okay however yesterday he began to have increased dyspnea and dyspnea on exertion. He denied any anosmia or loss of taste. He denies diarrhea, constipation, melena, hematochezia or hematic emesis. He does admit to low-grade fever. He does have a productive cough but states that the mucus is clear only. Denies any hemoptysis. On arrival his temperature was 99. WBC count was normal however chest x-ray showed pneumonia. Lactic acid was elevated at 3 repeat was 1.7. Due to his desaturations he was admitted and placed in the ICU on high flow and BiPAP. Past Medical History:        Diagnosis Date    Asthma     Hyperlipidemia     Hypertension     Type 2 diabetes mellitus without complication ([facility] [address]        Past Surgical History:        Procedure Laterality Date    HERNIA REPAIR      right inguinal at age 3 years.  [place] COLONOSCOPY FLX DX W/COLLJ SPEC WHEN PFRMD N/A 10/5/2018    COLONOSCOPY performed by Shawn Antonio DO at [facility]       Family History:       Problem Relation Age of Onset    Stroke Mother     Heart Disease Father        Social History:   TOBACCO:   reports that he quit smoking about 11 years ago. He has never used smokeless tobacco.  ETOH:   reports current alcohol use.   ELICIT DRUG USE:    Social History     Substance and Sexual Activity Drug Use Not on file     OCCUPATION: Retired    Allergies:  Patient has no known allergies. Medications Prior to Admission:    Prior to Admission medications    Medication Sig Start Date End Date Taking?  Authorizing Provider   magnesium oxide (MAG-OX) 400 MG tablet Take 400 mg by mouth daily   Yes Historical Provider, MD   pantoprazole (PROTONIX) 40 MG tablet Take 40 mg by mouth daily   Yes Historical Provider, MD   albuterol sulfate HFA (VENTOLIN HFA) 108 (90 Base) MCG/ACT inhaler Inhale 2 puffs into the lungs every 6 hours as needed for Wheezing   Yes Historical Provider, MD   cetirizine (ZYRTEC) 10 MG tablet Take 10 mg by mouth daily   Yes Historical Provider, MD   dextromethorphan-guaiFENesin (MUCINEX DM)  MG per extended release tablet Take 1 tablet by mouth every 12 hours as needed for Cough or Congestion   Yes Historical Provider, MD   Multiple Vitamins-Minerals (PRESERVISION AREDS PO) Take 2 tablets by mouth nightly   Yes Historical Provider, MD   acetaminophen (TYLENOL) 500 MG tablet Take 1,000 mg by mouth every 6 hours as needed for Pain or Fever   Yes Historical Provider, MD   metFORMIN (GLUCOPHAGE) 500 MG tablet Take 500 mg by mouth daily (with breakfast)  8/24/18  Yes Historical Provider, MD   atorvastatin (LIPITOR) 10 MG tablet Take 20 mg by mouth nightly  8/27/18  Yes Historical Provider, MD   lisinopril (PRINIVIL;ZESTRIL) 2.5 MG tablet Take 2.5 mg by mouth daily   Yes Historical Provider, MD   tamsulosin (FLOMAX) 0.4 MG capsule Take 0.4 mg by mouth nightly    Yes Historical Provider, MD   mometasone (ASMANEX 120 METERED DOSES) 220 MCG/INH inhaler Inhale 2 puffs into the lungs daily   Yes Historical Provider, MD   fluticasone (FLONASE) 50 MCG/ACT nasal spray 1 spray by Each Nare route daily   Yes Historical Provider, MD   aspirin 81 MG tablet Take 81 mg by mouth daily   Yes Historical Provider, MD   zinc gluconate 50 MG tablet Take 50 mg by mouth daily   Yes Historical Provider, MD Cholecalciferol (VITAMIN D3) 5000 units TABS Take 5,000 Units by mouth daily   Yes Historical Provider, MD       Review of Systems:  Constitutional:positive  for fevers, and negative for chills. Eyes: negative for visual disturbance   ENT: negative for sore throat, negative nasal congestion, and negative for earache  Respiratory: positive for shortness of breath, positive for cough, and negative for wheezing  Cardiovascular: negative for chest pain, negative for palpitations, and negative for syncope  Gastrointestinal: negative for abdominal pain, negative for nausea,negative for vomiting, negative for diarrhea, negative for constipation, and negative for hematochezia or melena  Genitourinary: negative for dysuria, negative for urinary urgency, negative for urinary frequency, and negative for hematuria  Skin: negative for skin rash, and negative for skin lesions  Neurological: negative for unilateral weakness, numbness or tingling. Physical Exam:    Vitals:  Temp: 96.4 °F (35.8 °C)  BP: 132/88  Resp: 21  Pulse: 63  SpO2: 93 %  24HR INTAKE/OUTPUT:      Intake/Output Summary (Last 24 hours) at 1/20/2021 1556  Last data filed at 1/20/2021 1301  Gross per 24 hour   Intake 470 ml   Output 250 ml   Net 220 ml       Exam:  GEN:  alert and oriented to person, place and time, well-developed and well-nourished, in no acute distress  EYES: No gross abnormalities. , PERRL and EOMI  NECK: normal, supple, no lymphadenopathy,  no carotid bruits  PULM: wheezing present-throughout and decreased breath sounds noted-throughout  COR: regular rate & rhythm, no murmurs, no gallops, S1 normal and S2 normal  ABD:  soft, non-tender, non-distended, normal bowel sounds, no masses or organomegaly  EXT:   no cyanosis, clubbing or edema present    NEURO: follows commands, AMATO, no deficits  SKIN:  no rashes or significant lesions  -----------------------------------------------------------------  Diagnostic Data:   Lab Results Component Value Date    WBC 6.5 01/20/2021    HGB 12.1 (L) 01/20/2021     01/20/2021       Lab Results   Component Value Date    BUN 18 01/20/2021    CREATININE 0.69 (L) 01/20/2021     (L) 01/20/2021    K 4.1 01/20/2021    CALCIUM 8.2 (L) 01/20/2021    CL 96 (L) 01/20/2021    CO2 21 01/20/2021    LABGLOM >60 01/20/2021       No results found for: [place], EPITHUA, LEUKOCYTESUR, SPECGRAV, GLUCOSEU, KETUA, PROTEINU, HGBUR, CASTUA, CRYSTUA, BACTERIA, YEAST    Lab Results   Component Value Date    TROPONINT NOT REPORTED 01/19/2021    PROBNP 480 (H) 01/19/2021       Xr Chest Portable    Result Date: 1/19/2021  EXAMINATION: ONE XRAY VIEW OF THE CHEST 1/19/2021 8:51 pm COMPARISON: None. HISTORY: ORDERING SYSTEM PROVIDED HISTORY: dyspnea TECHNOLOGIST PROVIDED HISTORY: dyspnea 42-year-old male with dyspnea FINDINGS: Portable AP upright view of the chest. Cardiac monitor leads overlie the chest. Trachea midline. No pneumothorax. No free air. Multifocal airspace disease throughout the lungs with a bilateral middle and lower zone predominance. Trace right-sided pleural effusion. Mild cardiomegaly. Atherosclerotic calcification of the aorta. No acute osseous abnormality evident. 1. Multifocal airspace disease throughout both lungs with a mid and lower zone predominance. Trace right-sided pleural effusion. Findings concerning for multifocal pneumonia. Follow-up is recommended to document resolution. 2. Mild cardiomegaly. EKG interpretation:        Assessment:    Principal Problem:    COVID-19  Active Problems:    Acute respiratory failure with hypoxia (HCC)    Type 2 diabetes mellitus without complication (HCC)    Hypertension  Resolved Problems:    * No resolved hospital problems.  *      Patient Active Problem List    Diagnosis Date Noted    Acute respiratory failure with hypoxia ([address] 01/20/2021    Type 2 diabetes mellitus without complication (HCC)     Hypertension  COVID-19 01/19/2021    Grade II internal hemorrhoids 10/05/2018       Plan:     · This patient requires inpatient ICU admission because of COVID-19 virus infection  · Factors affecting the medical complexity of this patient include acute respiratory failure with hypoxia  · Estimated length of stay is 5 days  · Discussed patient's symptoms and data results including labs and imaging studies with the ER MD at time of admission  · COVID-19 virus infection  · IV remdesivir  · Solu-Medrol  · Vitamin C DE and zinc  · Ivermectin  · Acute respiratory failure with hypoxia  · Supplemental oxygen to maintain SPO2 greater than 92%currently using BiPAP with settings 14/8 with 70% FiO2 and may alternate with high flow  · Acapella  · Prone  · Out of bed with meals  · PT OT  · Type 2 diabetes  · POCT before meals and at bedtime  · Continue Glucophage  · Hypoglycemia protocol  · Sliding scale  · Hypertension  · Continue lisinopril  · High risk medications: Insulin, remdesivir        CORE MEASURES  DVT prophylaxis: Lovenox  Decubitus ulcer present on admission: No  CODE STATUS: FULL CODE  Nutrition Status: good   Physical therapy: Yes   Old Charts reviewed: Yes  EKG Reviewed:  Yes  Advance Directive Addressed: Yes    KATAI Cehn, NP-C  1/20/2021, 3:56 PM

## 2021-01-20 NOTE — PROGRESS NOTES
Writer instructed patient on acapella. Writer then assisted patient to lay on left side. Will continue to monitor.

## 2021-01-20 NOTE — PROGRESS NOTES
Comprehensive Nutrition Assessment    Type and Reason for Visit:  Initial(Nutrition Screen)    Nutrition Recommendations/Plan:   1. Continue current diet: 1,800 kcal, 4 CHO/meal diet   2. Diet education offered, pt declined. Offer again before discharge. Nutrition Assessment:  Inadequate oral intake related to impaired respiratory function as evidenced by poor intake prior to admission. Pt is admitted with Covid, has pneumonia in both lungs, reports poor intakes for about 3 days. Pt had eggs, toast, oatmeal, coffee orange juice. Pt states the toast did not sit well with him, as he typically adds jelly, and did not see jelly on plate. Writer let him know he can request. Pt states his normal weight is 260 \"something\", weighed last week, and states he does not feel like he lost a bunch of weight. CBW is 258# indicating 2# (0.8%) wt loss over the week, which is not considered significant over this time frame. Pt reports his appetite is starting to return, completed % of his breakfast, feels somewhat better, and does not need any diet instruction. Last bowel movement yesterday, normal. Labs reviewed. Serum Glucose acutely elevated. Moderate nutrition risk. At risk for malnutrition should pt continue with poor intakes. Malnutrition Assessment:  Malnutrition Status: At risk for malnutrition (Comment)    Context:  Acute Illness     Findings of the 6 clinical characteristics of malnutrition:  Energy Intake:  Mild decrease in energy intake (Comment)(3 days)  Weight Loss:  No significant weight loss     Body Fat Loss:  No significant body fat loss     Muscle Mass Loss:  No significant muscle mass loss    Fluid Accumulation:  No significant fluid accumulation Extremities   Strength:  Not Performed    Estimated Daily Nutrient Needs:  Energy (kcal):  5338-4636(58-28); Weight Used for Energy Requirements:  Admission     Protein (g):  83-98(1.1-1.3);  Weight Used for Protein Requirements:  Ideal Fluid (ml/day):  2000 ml+; Method Used for Fluid Requirements:  1 ml/kcal      Nutrition Related Findings:  jaundice skin, distended abdomen, active bowel sounds      Wounds:  None       Current Nutrition Therapies:    DIET CARB CONTROL; Carb Control: 4 carb choices (60 gms)/meal    Anthropometric Measures:  · Height: 5' 10\" (177.8 cm)  · Current Body Weight: 258 lb (117 kg)   · Admission Body Weight: 260 lb (117.9 kg)(stated)    · Usual Body Weight: 260 lb (117.9 kg)     · Ideal Body Weight: 166 lbs; % Ideal Body Weight 155.4 %   · BMI: 37  · BMI Categories: Obese Class 2 (BMI 35.0 -39.9)       Nutrition Diagnosis:   · Inadequate oral intake related to impaired respiratory function as evidenced by poor intake prior to admission      Nutrition Interventions:   Food and/or Nutrient Delivery:  Continue Current Diet  Nutrition Education/Counseling:  No recommendation at this time   Coordination of Nutrition Care:  Continue to monitor while inpatient    Goals:  PO > 75% with good tolerance       Nutrition Monitoring and Evaluation:   Behavioral-Environmental Outcomes:  None Identified   Food/Nutrient Intake Outcomes:  Food and Nutrient Intake  Physical Signs/Symptoms Outcomes:  Biochemical Data     Discharge Planning:    Continue current diet     Electronically signed by Vikas Lloyd RD, LD on 1/20/21 at 9:05 AM EST    Contact: [phone]

## 2021-01-20 NOTE — PROGRESS NOTES
Transferred to 307 per bed with NR mask on. Monitors applied. Respirations labored with exertion. Lungs with rales in posterior mid and bases. Bi pap applied.

## 2021-01-21 NOTE — PROGRESS NOTES
ICU Progress Note    SUBJECTIVE/INTERVAL HISTORY:    Patient seen in follow up for acute respiratory failure related to COVID-19. He is lying in bed on his left side alert and oriented with BiPAP on with 95% FiO2. Tolerating well he has no complaints. States he is eating well. He does continue to prone and turn and do his Acapella as well. .     OBJECTIVE:    Vitals:   Temp: 97.5 °F (36.4 °C)  Temp range:    Temp  Av.9 °F (36.1 °C)  Min: 96.5 °F (35.8 °C)  Max: 97.5 °F (36.4 °C)    BP: 116/74  BP Range:      Systolic (02OTZ), LZO:789 , Min:82 , WJF:707      Diastolic (67EWR), VJW:87, Min:23, Max:96    Pulse: 65  Pulse Range:    Pulse  Av.8  Min: 51  Max: 77    Resp: 18  Resp Range:   Resp  Av  Min: 18  Max: 30    SpO2: 92 % High flow and BiPAP  SpO2 range:   SpO2  Av.9 %  Min: 86 %  Max: 96 %    24HR INTAKE/OUTPUT:      Intake/Output Summary (Last 24 hours) at 2021 1149  Last data filed at 2021 0827  Gross per 24 hour   Intake 870 ml   Output 925 ml   Net -55 ml       -----------------------------------------------------------------  Review of Systems:  Constitutional:positive  for fevers, and negative for chills.   Eyes: negative for visual disturbance   ENT: negative for sore throat, negative nasal congestion, and negative for earache  Respiratory: positive for shortness of breath, positive for cough, and negative for wheezing  Cardiovascular: negative for chest pain, negative for palpitations, and negative for syncope  Gastrointestinal: negative for abdominal pain, negative for nausea,negative for vomiting, negative for diarrhea, negative for constipation, and negative for hematochezia or melena  Genitourinary: negative for dysuria, negative for urinary urgency, negative for urinary frequency, and negative for hematuria  Skin: negative for skin rash, and negative for skin lesions  Neurological: negative for unilateral weakness, numbness or tingling    Exam: GEN:  alert and oriented to person, place and time, well-developed and well-nourished, in no acute distress  EYES:  No gross abnormalities. , PERRL and EOMI  NECK: normal, supple, no lymphadenopathy,  no carotid bruits  PULM:  Fine rales in mid and lower bases and decreased breath sounds noted-throughout  COR:   regular rate & rhythm, no murmurs, no gallops, S1 normal and S2 normal  ABD:    soft, non-tender, non-distended, normal bowel sounds, no masses or organomegaly  EXT:    no cyanosis, clubbing or edema present    NEURO: follows commands, AMATO, no deficits  SKIN:   no rashes or significant lesions      -----------------------------------------------------------------  Diagnostic Data:  Lab Results   Component Value Date    WBC 8.4 01/21/2021    HGB 11.7 (L) 01/21/2021    HCT 35.4 (L) 01/21/2021     01/21/2021    ALT 14 01/21/2021    AST 36 01/21/2021     (L) 01/21/2021    K 4.6 01/21/2021    CL 96 (L) 01/21/2021    CREATININE 0.81 01/21/2021    BUN 25 (H) 01/21/2021    CO2 24 01/21/2021    INR 1.1 01/21/2021    LABA1C 7.3 (H) 01/20/2021       ASSESSMENT:    Principal Problem:    COVID-19 virus infection  Active Problems:    Acute respiratory failure with hypoxia (HCC)    Type 2 diabetes mellitus without complication ([address]    Hypertension  Resolved Problems:    * No resolved hospital problems. *      Patient Active Problem List    Diagnosis Date Noted    Acute respiratory failure with hypoxia ([address] 01/20/2021    Type 2 [facility] mellitus without complication [facility]     Hypertension     COVID-19 virus infection 01/19/2021    Grade II internal hemorrhoids 10/05/2018       PLAN:  Principal Problem:    COVID-19 virus infection  Active Problems:    Acute respiratory failure with hypoxia (HCC)    Type 2 diabetes mellitus without complication ([address]    Hypertension  Resolved Problems:    * No resolved hospital problems. *    · COVID-19 virus infection  ? Continue IV remdesivir  ?  Continue Solu-Medrol ? Continue Vitamin C D and zinc  ? Ivermectin 2 doses  · Acute respiratory failure with hypoxia  ? Supplemental oxygen to maintain SPO2 greater than 92%currently using BiPAP with settings 14/8 with 70% FiO2 and may alternate with high flow  ? Acapella  ? Prone  ? Out of bed with meals  ? PT OT  · Type 2 diabetes  ? POCT before meals and at bedtime  ? Continue Glucophage  ? Hypoglycemia protocol  ? Sliding scale  · Hypertension  ?  Continue lisinopril  · High risk medications: Insulin, remdesivir    Alfred November, APRGONZALEZ, NP-C  1/21/2021, 11:49 AM

## 2021-01-21 NOTE — PROGRESS NOTES
Discussed discharge plans with the patient. Patient is a 70year old male here with COVID-19 virus infection. He is alert and oriented. Patient is  and lives alone. He uses no medical equipment. Patient does his own cooking and cleaning. He is independent with his ADL's. Patient manages his own medications and drives. He receives no community services. His PCP is Dr. Jhon Posey MD. He has medical insurance that helps with medication costs. Patient served in [employer] and is retired from [employer]. The discharge plan is home with no services at this time. He may need oxygen on discharge. Has advance directives but not on file. LSW to monitor and assist with any needs or concerns as they arise.     MAEGAN Kelley

## 2021-01-21 NOTE — PROGRESS NOTES
Repositioned onto left side. Non -productive cough noted. Encouraged to reposition. Oxygen saturation down to 88-89%.

## 2021-01-21 NOTE — PROGRESS NOTES
Quiet in bed with eyes closed. Aroused easily. Bi pap continued. Lungs diminished throughout. Lying on right side. BP cuff moved to left arm. No complaints voiced.

## 2021-01-22 NOTE — PROGRESS NOTES
Awake in bed. States uncomfortable. Denies need for Tylenol. Repositioned on right side. Strong cough noted after repositioning. Lungs diminished  With rales noted in left mid and base posteriorly. Respirations labored with exertion.

## 2021-01-22 NOTE — PROGRESS NOTES
Physical Therapy    Facility/Department: [facility] AT THE [facility] ICU  Initial Assessment    NAME: Lou Curry  : 1945  MRN: [id number]    Date of Service: 2021    Discharge Recommendations:  Continue to assess pending progress, Patient would benefit from continued therapy after discharge      Assessment   Assessment: Pt is 71 y/o male with general weakness and SOB due to recent illness; will benefit from PT to address deficits. Treatment Diagnosis: general weakness  Prognosis: Good  Decision Making: Medium Complexity  Patient Education: PT eval and POC  REQUIRES PT FOLLOW UP: Yes  Activity Tolerance  Activity Tolerance: Patient Tolerated treatment well       Patient Diagnosis(es): The primary encounter diagnosis was Acute hypoxemic respiratory failure ([facility] A diagnosis of Pneumonia due to severe acute respiratory syndrome coronavirus 2 (SARS-CoV-2) was also pertinent to this visit. has a past medical history of Asthma, Hyperlipidemia, Hypertension, and Type 2 diabetes mellitus without complication ([facility] has a past surgical history that includes hernia repair and pr colonoscopy flx dx w/collj spec when pfrmd (N/A, 10/5/2018).     Restrictions  Restrictions/Precautions  Restrictions/Precautions: Isolation, Fall Risk     Vision/Hearing  Vision Exceptions: Wears glasses for reading  Hearing Exceptions: Hard of hearing/hearing concerns;Bilateral hearing aid       Subjective  General  Chart Reviewed: Yes  Patient assessed for rehabilitation services?: Yes  Response To Previous Treatment: Not applicable  Family / Caregiver Present: No  Follows Commands: Within Functional Limits  Subjective  Subjective: RN placed pt on high/flow vs bipap for eval; pt agrees to mobility  Pain Screening  Patient Currently in Pain: Denies     Orientation  Orientation  Overall Orientation Status: Within Normal Limits     Social/Functional History  Social/Functional History  Lives With: Alone  Type of Home: House Home Layout: Two level, Bed/Bath upstairs(Pt states he could stay on the main level with the half bath if needed.)  Home Access: Stairs to enter with rails  Entrance Stairs - Number of Steps: 4  Entrance Stairs - Rails: Both  Bathroom Shower/Tub: Tub/Shower unit  Bathroom Toilet: Standard  Bathroom Equipment: Grab bars in shower, Grab bars around toilet  Home Equipment: Cane  ADL Assistance: [address]: (reports that he sometimes gets help with IADL tasks.)  Homemaking Responsibilities: Yes  Ambulation Assistance: Independent  Transfer Assistance: Independent  Occupation: Part time employment  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      Cognition   Cognition  Overall Cognitive Status: WNL    Objective  AROM RLE (degrees)  RLE AROM: WFL  AROM LLE (degrees)  LLE AROM : WFL     Strength RLE  Strength RLE: WFL  Strength LLE  Strength LLE: WFL     Bed mobility  Comment: Pt sitting in chair at time of eval     Transfers  Sit to Stand: Contact guard assistance  Stand to sit: Stand by assistance     Ambulation  Ambulation?: Yes  Ambulation 1  Surface: level tile  Device: No Device  Other Apparatus: O2(high/flow)  Distance: 4-5 steps forward and back  Comments: SpO2 dropped to 80%; held remaining activity and informed RN pt needed to be back on BiPap due to low levels at rest (82-83%)     Balance  Sitting - Dynamic: Good  Standing - Static: Good;-  Standing - Dynamic: Fair;Good        Plan   Plan  Times per week: 7x week  Times per day: Twice a day(daily on weekends)  Current Treatment Recommendations: Strengthening, Neuromuscular Re-education, Home Exercise Program, Safety Education & Training, Balance Training, Endurance Training, Patient/Caregiver Education & Training, Functional Mobility Training, Transfer Training, Gait Training, Stair training  Safety Devices Type of devices: All fall risk precautions in place      AM-PAC Score  AM-PAC Inpatient Mobility without Stair Climbing Raw Score : 15 (01/22/21 1536)  AM-PAC Inpatient without Stair Climbing T-Scale Score : 43.03 (01/22/21 1536)  Mobility Inpatient CMS 0-100% Score: 47.43 (01/22/21 1536)  Mobility Inpatient without Stair CMS G-Code Modifier : CK (01/22/21 1536)       Goals  Short term goals  Time Frame for Short term goals: 10 days  Short term goal 1: Pt to ambulate 75ft without AD and no LOB. Short term goal 2: Pt to tolerate 20-30 mins ther ex/act for improved strength and endurance with ADL's. Short term goal 3: Pt to demonstrate good standing balance for decrease fall risk. Short term goal 4: Pt to perform all bed mobility, transfers, and gait independently.   Patient Goals   Patient goals : home following discharge       Therapy Time   Individual Concurrent Group Co-treatment   Time In 1344         Time Out 1358         Minutes 14                 Rosa Tejeda, PT, DPT, CMPT

## 2021-01-22 NOTE — PROGRESS NOTES
Occupational Therapy   Occupational Therapy Initial Assessment  Date: 2021   Patient Name: Lorenzo Jones  MRN: [id number]     : 1945    Date of Service: 2021    Discharge Recommendations:  Continue to assess pending progress       Assessment   Performance deficits / Impairments: Decreased functional mobility ; Decreased coordination;Decreased ADL status; Decreased endurance  Assessment: Pt is a 70year old male admitted for COVID-19. Pt initially on bipap, removed by Nazia Camara and put on high flow O2. O2 dropped to 80-83 while seated and to 78 while standing. Pt presents with decreased endurance and ADL independence secondary to endurance. Pt would benefit from skilled occupational therapy services to address deficits to improve safety and independence with ADL and functional tasks. Treatment Diagnosis: decreased endurance  Prognosis: Good  Decision Making: Medium Complexity  OT Education: OT Role;Plan of Care  Patient Education: pursed lip breathing  Barriers to Learning: none  REQUIRES OT FOLLOW UP: Yes  Safety Devices  Safety Devices in place: Yes  Type of devices: Call light within reach;Nurse notified(left on BSC. Karma Hutson RN present)           Patient Diagnosis(es): The primary encounter diagnosis was Acute hypoxemic respiratory failure ([facility] A diagnosis of Pneumonia due to severe acute respiratory syndrome coronavirus 2 (SARS-CoV-2) was also pertinent to this visit. has a past medical history of Asthma, Hyperlipidemia, Hypertension, and Type 2 diabetes mellitus without complication ([address] has a past surgical history that includes hernia repair and pr colonoscopy flx dx w/collj spec when pfrmd (N/A, 10/5/2018).     Treatment Diagnosis: decreased endurance      Restrictions  Restrictions/Precautions  Restrictions/Precautions: Isolation, Fall Risk    Subjective   General  Chart Reviewed: Yes  Patient assessed for rehabilitation services?: Yes  Family / Caregiver Present: No Toileting: Stand by assistance;Contact guard assistance        Bed mobility  Comment: Pt in chair for evaluation. Bed mobility not assessed. Transfers  Sit to stand: Contact guard assistance  Stand to sit: Contact guard assistance     Cognition  Overall Cognitive Status: WNL             LUE AROM : WFL  Left Hand AROM: WFL  RUE AROM : WFL  Right Hand AROM: WFL  LUE Strength  Gross LUE Strength: WFL  LUE Strength Comment: Grossly 4-/5  RUE Strength  Gross RUE Strength: WFL  RUE Strength Comment: Grossly 4-/5        Plan   Plan  Times per week: 7x/week  Times per day: Daily  Current Treatment Recommendations: Strengthening, Safety Education & Training, Balance Training, Patient/Caregiver Education & Training, Self-Care / ADL, Functional Mobility Training, Endurance Training      AM-PAC Score  AM-[facility] Inpatient Daily Activity Raw Score: 19 (01/22/21 1509)  AM-PAC Inpatient ADL T-Scale Score : 40.22 (01/22/21 1509)  ADL Inpatient CMS 0-100% Score: 42.8 (01/22/21 1509)  ADL Inpatient CMS G-Code Modifier : CK (01/22/21 1509)    Goals  Short term goals  Time Frame for Short term goals: 20 visits  Short term goal 1: Pt will engage in greater than 15 minutes BUE therex/theract without rest breaks to improve UB strength for increased ease and independence with ADL and functional transfers. Short term goal 2: Pt will complete toileting routine Bruno with use of AE PRN to improve safety with ADL tasks. Short term goal 3: Pt will complete TB ADL Bruno with use of AE PRN to ensure safe return home and return to prior level of function. Short term goal 4: Pt will engage in greater than 5 minute dynamic standing task with no LOB or RB to improve endurance and safety with ADL and functional mobility.        Therapy Time   Individual Concurrent Group Co-treatment   Time In 1345         Time Out 1358         Minutes [address]

## 2021-01-22 NOTE — PROGRESS NOTES
Awake in bed. Denies SOB. Bi pap continued. Occasional cough noted. Lungs diminished anteriorly and right posterior with rales noted in left posterior and right posterior base. Stood to void. Tolerated well. Assisted with lying on left side.

## 2021-01-22 NOTE — PROGRESS NOTES
ICU Progress Note    SUBJECTIVE/INTERVAL HISTORY:    Patient seen in follow up for acute respiratory failure related to COVID-19. He is lying in bed on his 8 side alert and oriented with BiPAP on with 95% FiO2. Tolerating well he has no complaints. States he is eating well. He does continue to prone and turn and do his Acapella as well. . Encourage him to get up into the chair today with meals. States he feels pretty good. OBJECTIVE:    Vitals:   Temp: 97.2 °F (36.2 °C)  Temp range:    Temp  Av.2 °F (36.2 °C)  Min: 96.3 °F (35.7 °C)  Max: 98.2 °F (36.8 °C)    BP: (!) 113/53  BP Range:      Systolic (98QLH), ULZ:522 , Min:96 , HYC:413      Diastolic (99PXI), QYV:65, Min:40, Max:77    Pulse: 71  Pulse Range:    Pulse  Av  Min: 56  Max: 90    Resp: 28  Resp Range:   Resp  Av.5  Min: 12  Max: 36    SpO2: (!) 87 % High flow and BiPAP  SpO2 range:   SpO2  Av.5 %  Min: 87 %  Max: 96 %    24HR INTAKE/OUTPUT:      Intake/Output Summary (Last 24 hours) at 2021 0932  Last data filed at 2021 0700  Gross per 24 hour   Intake 800 ml   Output 950 ml   Net -150 ml       -----------------------------------------------------------------  Review of Systems:  Constitutional: Negative  for fevers, and negative for chills.   Eyes: negative for visual disturbance   ENT: negative for sore throat, negative nasal congestion, and negative for earache  Respiratory: positive for shortness of breath, positive for cough, and negative for wheezing  Cardiovascular: negative for chest pain, negative for palpitations, and negative for syncope  Gastrointestinal: negative for abdominal pain, negative for nausea,negative for vomiting, negative for diarrhea, negative for constipation, and negative for hematochezia or melena  Genitourinary: negative for dysuria, negative for urinary urgency, negative for urinary frequency, and negative for hematuria  Skin: negative for skin rash, and negative for skin lesions Neurological: negative for unilateral weakness, numbness or tingling    Exam:  GEN:  alert and oriented to person, place and time, well-developed and well-nourished, in no acute distress  EYES:  No gross abnormalities. , PERRL and EOMI  NECK: normal, supple, no lymphadenopathy,  no carotid bruits  PULM:  Fine rales in  lower bases and decreased breath sounds noted-throughout  COR:   regular rate & rhythm, no murmurs, no gallops, S1 normal and S2 normal  ABD:    soft, non-tender, non-distended, normal bowel sounds, no masses or organomegaly  EXT:    no cyanosis, clubbing or edema present    NEURO: follows commands, AMATO, no deficits  SKIN:   no rashes or significant lesions      -----------------------------------------------------------------  Diagnostic Data:  Lab Results   Component Value Date    WBC 12.3 (H) 01/22/2021    HGB 11.4 (L) 01/22/2021    HCT 34.9 (L) 01/22/2021     01/22/2021    ALT 12 01/22/2021    AST 35 01/22/2021     (L) 01/22/2021    K 4.9 01/22/2021    CL 95 (L) 01/22/2021    CREATININE 0.79 01/22/2021    BUN 23 01/22/2021    CO2 26 01/22/2021    INR 1.1 01/22/2021    LABA1C 7.3 (H) 01/20/2021       ASSESSMENT:    Principal Problem:    COVID-19 virus infection  Active Problems:    Acute respiratory failure with hypoxia (HCC)    Type 2 diabetes mellitus without complication (HCC)    Hypertension  Resolved Problems:    * No resolved hospital problems. *      Patient Active Problem List    Diagnosis Date Noted    Acute respiratory failure with hypoxia ([facility] 01/20/2021    Type 2 diabetes mellitus without complication ([facility]     Hypertension     COVID-19 virus infection 01/19/2021    Grade II internal hemorrhoids 10/05/2018       PLAN:  Principal Problem:    COVID-19 virus infection  Active Problems:    Acute respiratory failure with hypoxia (HCC)    Type 2 diabetes mellitus without complication ([facility]    Hypertension  Resolved Problems:    * No resolved hospital problems.  * · COVID-19 virus infection  ? Continue IV remdesivir  ? Continue Solu-Medrol  ? Continue Vitamin C D and zinc  ? Ivermectin 2 doses  · Acute respiratory failure with hypoxia  ? Supplemental oxygen to maintain SPO2 greater than 92%currently using BiPAP with settings 14/8 with 95 % FiO2 and may alternate with high flow  ? Acapella  ? Prone  ? Out of bed with meals  ? PT OT  · Type 2 diabetes  ? POCT before meals and at bedtime  ? Continue Glucophage  ? Hypoglycemia protocol  ? Increase to high-dose sliding scale  ? Increase Lantus to 40 units at at bedtime  · Hypertension  ?  Continue lisinopril  · High risk medications: Insulin, remdesivir  · Discharge plan: Pending    KATIA Leonard, NP-C  1/22/2021, 9:32 AM

## 2021-01-23 NOTE — PROGRESS NOTES
Occupational Therapy  Facility/Department: [facility] ICU  Daily Treatment Note  NAME: Miguel Garcia  : 1945  MRN: [id number]    Date of Service: 2021    Discharge Recommendations:  Continue to assess pending progress       Assessment      OT Education: OT Role;Plan of Care;Transfer Training;IADL Safety  Patient Education: pursed lip breathing  Barriers to Learning: none  Activity Tolerance  Activity Tolerance: Patient Tolerated treatment well  Activity Tolerance: Spo2 decreased with act  Safety Devices  Type of devices: Call light within reach; Left in bed(Pt left in side lying and provided with edu on benefits of proning/side lying)         Patient Diagnosis(es): The primary encounter diagnosis was Acute hypoxemic respiratory failure ([facility] [address] A diagnosis of Pneumonia due to severe acute respiratory syndrome coronavirus 2 (SARS-CoV-2) was also pertinent to this visit. has a past medical history of Asthma, Hyperlipidemia, Hypertension, and Type 2 diabetes mellitus without complication ([facility] [address] has a past surgical history that includes hernia repair and pr colonoscopy flx dx w/collj spec when pfrmd (N/A, 10/5/2018).     Restrictions  Restrictions/Precautions  Restrictions/Precautions: Isolation, Fall Risk  Subjective   General  Chart Reviewed: Yes  Patient assessed for rehabilitation services?: Yes  Response to previous treatment: Patient with no complaints from previous session  Family / Caregiver Present: No  Referring Practitioner: Ruperto Gallego, APRN-JEREL  Diagnosis: COVID-19  Subjective  Subjective: Pt denies pain and reports diff breathing  General Comment  Comments: Pt supine HOB raised with BiPaP on Spo2 between 83-97% through out tx  Pain Assessment  Pain Assessment: 0-10  Pain Level: 0  Vital Signs  Patient Currently in Pain: Denies   Orientation     Objective    ADL  Additional Comments: Pt declined toileting and ADLs at this time        Balance  Sitting Balance: Modified independent Standing Balance: Stand by assistance  Standing Balance  Time: ~ 5 min  Activity: dynamic standing  Comment: 0 LOB during side stepping, 1-2 steps froward/backward d/t limitations secondary to Bi Pap  Functional Mobility  Functional - Mobility Device: No device  Activity: Other  Assist Level: Stand by assistance  Bed mobility  Rolling to Left: Supervision  Rolling to Right: Supervision  Supine to Sit: Supervision  Sit to Supine: Supervision  Scooting: Supervision  Transfers  Sit to stand: Stand by assistance  Stand to sit: Stand by assistance  Transfer Comments: 0 LOB noted, use of bed rail                                            Type of ROM/Therapeutic Exercise  Type of ROM/Therapeutic Exercise: Free weights  Comment: BUE therex to increase strength/endurance for ease fxl tasks. 1# free weight x 20 reps x 2 sets  xall planes while supine HOB raised. 1 short RB between sets for recovery. Spo2 christiano 80s-low 90s through out. Plan   Plan  Times per week: 7x/week  Times per day: Daily  Current Treatment Recommendations: Strengthening, Safety Education & Training, Balance Training, Patient/Caregiver Education & Training, Self-Care / ADL, Functional Mobility Training, Endurance Training  G-Code     OutComes Score                                                  AM-PAC Score             Goals  Short term goals  Time Frame for Short term goals: 20 visits  Short term goal 1: Pt will engage in greater than 15 minutes BUE therex/theract without rest breaks to improve UB strength for increased ease and independence with ADL and functional transfers. Short term goal 2: Pt will complete toileting routine Bruno with use of AE PRN to improve safety with ADL tasks. Short term goal 3: Pt will complete TB ADL Bruno with use of AE PRN to ensure safe return home and return to prior level of function. Short term goal 4: Pt will engage in greater than 5 minute dynamic standing task with no LOB or RB to improve endurance and safety with ADL and functional mobility.        Therapy Time   Individual Concurrent Group Co-treatment   Time In 1001         Time Out 1024         Minutes 23                 BEULAH Louis

## 2021-01-23 NOTE — PLAN OF CARE
Problem: Airway Clearance - Ineffective  Goal: Achieve or maintain patent airway  Outcome: Ongoing     Problem: Gas Exchange - Impaired  Goal: Absence of hypoxia  Outcome: Ongoing  Goal: Promote optimal lung function  Outcome: Ongoing     Problem: Breathing Pattern - Ineffective  Goal: Ability to achieve and maintain a regular respiratory rate  Outcome: Ongoing     Problem:  Body Temperature -  Risk of, Imbalanced  Goal: Ability to maintain a body temperature within defined limits  Outcome: Ongoing  Goal: Will regain or maintain usual level of consciousness  Outcome: Ongoing  Goal: Complications related to the disease process, condition or treatment will be avoided or minimized  Outcome: Ongoing     Problem: Isolation Precautions - Risk of Spread of Infection  Goal: Prevent transmission of infection  Outcome: Ongoing     Problem: Nutrition Deficits  Goal: Optimize nutritional status  Outcome: Ongoing     Problem: Risk for Fluid Volume Deficit  Goal: Maintain normal heart rhythm  Outcome: Ongoing  Goal: Maintain absence of muscle cramping  Outcome: Ongoing  Goal: Maintain normal serum potassium, sodium, calcium, phosphorus, and pH  Outcome: Ongoing     Problem: Loneliness or Risk for Loneliness  Goal: Demonstrate positive use of time alone when socialization is not possible  Outcome: Ongoing     Problem: Fatigue  Goal: Verbalize increase energy and improved vitality  Outcome: Ongoing     Problem: Patient Education: Go to Patient Education Activity  Goal: Patient/Family Education  Outcome: Ongoing     Problem: Falls - Risk of:  Goal: Will remain free from falls  Description: Will remain free from falls  Outcome: Ongoing  Goal: Absence of physical injury  Description: Absence of physical injury  Outcome: Ongoing     Problem: Nutrition  Goal: Optimal nutrition therapy  Description: Nutrition Problem #1: Inadequate oral intake  Intervention: Food and/or Nutrient Delivery: Continue Current Diet Nutritional Goals: PO > 75% with good tolerance      Outcome: Ongoing     Problem: Musculor/Skeletal Functional Status  Goal: Highest potential functional level  Outcome: Ongoing  Goal: Absence of falls  Outcome: Ongoing

## 2021-01-23 NOTE — PROGRESS NOTES
PT is alert and oriented. Skin color, turgor and temp normal. PT states he feels a lot better. Respirations are unlabored while on Bi-pap. Breath sounds are diminished. Abdomin is soft, no guarding. Now lower extremity edema. SR rates in the 90's. No lower extremity edema.

## 2021-01-23 NOTE — PROGRESS NOTES
Physician Progress Note      PATIENT:               Ivonne Sue  [facility] #:                  [id number]  :                       1945  ADMIT DATE:       2021 8:34 PM  [address] [place] DATE:  RESPONDING  PROVIDER #:        Cielo Armstrong MD          QUERY TEXT:    Pt admitted with COVID-19,  per H&P: chest x-ray showed pneumonia. Pt noted to have temp as low as 96.4, RR max 38,  lactic acid 3.0, CRP 98.4,   BP as low as 82/35    If possible, please document in progress notes and discharge summary if you   are evaluating and/or treating: The medical record reflects the following:  Risk Factors: asthma, DM, HTN,   Per ED provider: became ill on    with fever, malaise, myalgias, and cough, Covid-19  positive test on 21  Clinical Indicators: Per ED provider: in acute distress, tachypnea;  temp as   low as 96.4, RR max 38,  lactic acid 3.0, CRP 98.4;  BP as low as 82/35 on   21  Treatment: IV remdesivir, Solu-Medrol, Ivermectin, BiPAP    Manisha Valdez RN, [address] [place] [address]   [phone]  . Options provided:  -- Sepsis present on admission due to COVID-19 infection  -- Sepsis not present on admission due to COVID-19 infection  -- Sepsis present on admission due to COVID-19 pneumonia  -- Sepsis not present on admission due to COVID-19 pneumonia  -- Covid-19 infection without sepsis  -- Covid-19 pneumonia without sepsis  -- Other - I will add my own diagnosis  -- Disagree - Not applicable / Not valid  -- Disagree - Clinically unable to determine / Unknown  -- Refer to Clinical Documentation Reviewer    PROVIDER RESPONSE TEXT:    This patient has sepsis which was present on admission due to COVID-19   infection.     Query created by: Heaven Davis on 2021 11:47 AM      Electronically signed by:  Cielo Armstrong MD 2021 8:52 AM

## 2021-01-23 NOTE — PROGRESS NOTES
Physical Therapy  Facility/Department: [facility] AT THE [facility]  Daily Treatment Note  NAME: Amanda Carolina  : 1945  MRN: [id number]    Date of Service: 2021    Discharge Recommendations:  Continue to assess pending progress, Patient would benefit from continued therapy after discharge        Assessment   Treatment Diagnosis: general weakness  Prognosis: Good  Decision Making: Medium Complexity  PT Education: Goals;Gait Training;Transfer Training;PT Role  Patient Education: Educated on above with good understanding  REQUIRES PT FOLLOW UP: Yes  Activity Tolerance  Activity Tolerance: Patient Tolerated treatment well  Activity Tolerance: Limited by SPO2     Patient Diagnosis(es): The primary encounter diagnosis was Acute hypoxemic respiratory failure ([facility] [address] A diagnosis of Pneumonia due to severe acute respiratory syndrome coronavirus 2 (SARS-CoV-2) was also pertinent to this visit. has a past medical history of Asthma, Hyperlipidemia, Hypertension, and Type 2 diabetes mellitus without complication ([facility] [address] has a past surgical history that includes hernia repair and pr colonoscopy flx dx w/collj spec when pfrmd (N/A, 10/5/2018). Restrictions  Restrictions/Precautions  Restrictions/Precautions: Isolation, Fall Risk  Subjective   General  Chart Reviewed: Yes  Response To Previous Treatment: Patient with no complaints from previous session.   Family / Caregiver Present: No  Subjective  Subjective: Pt on bipap during treatment          Orientation     Cognition      Objective   Bed mobility  Rolling to Left: Supervision  Rolling to Right: Supervision  Supine to Sit: Supervision  Scooting: Supervision  Transfers  Sit to Stand: Contact guard assistance  Stand to sit: Stand by assistance  Ambulation  Ambulation?: Yes  Ambulation 1  Surface: level tile  Device: No Device  Other Apparatus: O2(bipap)  Assistance: Contact guard assistance  Distance: 5 feet  Comments: SPO2= 83%, but recovered to 91 % within 2 min     Balance Posture: Good  Sitting - Static: Good;+  Sitting - Dynamic: Good  Standing - Static: Good;-  Standing - Dynamic: Fair;Good  Exercises  Hip Flexion: x20  Hip Abduction: 20  Knee Long Arc Quad: x20  Ankle Pumps: x20  Comments: Above ex completed in sitting with SPO2 = 90-91% during ex    Goals  Short term goals  Time Frame for Short term goals: 10 days  Short term goal 1: Pt to ambulate 75ft without AD and no LOB. Short term goal 2: Pt to tolerate 20-30 mins ther ex/act for improved strength and endurance with ADL's. Short term goal 3: Pt to demonstrate good standing balance for decrease fall risk. Short term goal 4: Pt to perform all bed mobility, transfers, and gait independently. Patient Goals   Patient goals : home following discharge    Plan    Plan  Times per week: 7x week  Times per day: Twice a day(daily on weekends)  Current Treatment Recommendations: Strengthening, Neuromuscular Re-education, Home Exercise Program, Safety Education & Training, Balance Training, Endurance Training, Patient/Caregiver Education & Training, Functional Mobility Training, Transfer Training, Gait Training, Stair training  Safety Devices  Type of devices:  All fall risk precautions in place, Nurse notified, Left in chair, Call light within reach(no alarm upon entry)     Therapy Time   Individual Concurrent Group Co-treatment   Time In 1030         Time Out 1102         Minutes [address], PTA

## 2021-01-23 NOTE — PROGRESS NOTES
Progress Note    SUBJECTIVE:  FU related to  / BIPAP good. Hi flow sat in 80\"s / No confusion    OBJECTIVE:    Vitals:   TEMPERATURE:  Current - Temp: 98.1 °F (36.7 °C);  Max - Temp  Av °F (36.7 °C)  Min: 97.4 °F (36.3 °C)  Max: 98.6 °F (37 °C)  RESPIRATIONS RANGE: Resp  Av.7  Min: 16  Max: 33  PULSE RANGE: Pulse  Av  Min: 60  Max: 96  BLOOD PRESSURE RANGE:  Systolic (71HVF), JGM:660 , Min:111 , JACQUELIN:358   ; Diastolic (10YBL), ZDD:31, Min:44, Max:86    PULSE OXIMETRY RANGE: SpO2  Av.8 %  Min: 81 %  Max: 97 %  24HR INTAKE/OUTPUT:      Intake/Output Summary (Last 24 hours) at 2021 0851  Last data filed at 2021 7006  Gross per 24 hour   Intake 2600 ml   Output 875 ml   Net 1725 ml     -----------------------------------------------------------------  Exam:  General: A & O x3  HEENT: Supple neck & negative  Heart: Regular  Lungs: clear to auscultation bilaterally & no retractions  Abdomen: Normal & soft, No tenderness and BS normal  Extremities:  No edema   Neuro: NonFocal     -----------------------------------------------------------------  Diagnostic Data:  Lab Results   Component Value Date    WBC 13.0 (H) 2021    HGB 11.6 (L) 2021     2021       Lab Results   Component Value Date    BUN 22 2021    CREATININE 0.66 (L) 2021     (L) 2021    K 4.7 2021    CALCIUM 8.6 2021    CL 95 (L) 2021    CO2 25 2021    LABGLOM >60 2021       No results found for: WBCUA, RBCUA, EPITHUA, LEUKOCYTESUR, SPECGRAV, GLUCOSEU, KETUA, PROTEINU, HGBUR, CASTUA, CRYSTUA, BACTERIA, YEAST    Lab Results   Component Value Date    TROPONINT NOT REPORTED 2021    PROBNP 480 (H) 2021       Xr Chest Portable    Result Date: 2021 EXAMINATION: ONE XRAY VIEW OF THE CHEST 1/19/2021 8:51 pm COMPARISON: None. HISTORY: ORDERING SYSTEM PROVIDED HISTORY: dyspnea TECHNOLOGIST PROVIDED HISTORY: dyspnea 72-year-old male with dyspnea FINDINGS: Portable AP upright view of the chest. Cardiac monitor leads overlie the chest. Trachea midline. No pneumothorax. No free air. Multifocal airspace disease throughout the lungs with a bilateral middle and lower zone predominance. Trace right-sided pleural effusion. Mild cardiomegaly. Atherosclerotic calcification of the aorta. No acute osseous abnormality evident. 1. Multifocal airspace disease throughout both lungs with a mid and lower zone predominance. Trace right-sided pleural effusion. Findings concerning for multifocal pneumonia. Follow-up is recommended to document resolution. 2. Mild cardiomegaly. ASSESSMENT:    Principal Problem:    COVID-19 virus infection  Active Problems:    Acute respiratory failure with hypoxia (HCC)    Type 2 diabetes mellitus without complication (HCC)    Hypertension  Resolved Problems:    * No resolved hospital problems.  *      Patient Active Problem List    Diagnosis Date Noted    Acute respiratory failure with hypoxia ([facility] [address] 01/20/2021    Type 2 diabetes mellitus without complication ([facility] [address]     Hypertension     COVID-19 virus infection 01/19/2021    Grade II internal hemorrhoids 10/05/2018       PLAN:  · Repeat xray  · covid protocol  · Critical Care Time: Luz Heart M.D.

## 2021-01-24 NOTE — PLAN OF CARE
Problem: Airway Clearance - Ineffective  Goal: Achieve or maintain patent airway  Note: Frequent non productive cough.

## 2021-01-24 NOTE — PROGRESS NOTES
PT is alert and oriented. Skin color, turgor and tem,p are normal. Respirations are unlabored, breath sounds diminished. PT is in a SR rates in the 70's. PT denies any chest pain. Abdomin is soft no guarding. No lower extremity edema. States he feels better.

## 2021-01-24 NOTE — PROGRESS NOTES
Writer to bedside for vitals and morning assessment completed. Pt resting comfortably and quietly on right side. Arouses to name called. Vitals obtained, morning assessment completed, see flow sheet for details. AM FSBS checked. Pt will require insulin. Pt wants to wait to get up to the chair until tray comes. No other needs at this time. Call light in reach. Will continue to monitor.

## 2021-01-24 NOTE — PROGRESS NOTES
Second assessment completed. Slight changes from AM assessment. Therapy working with pt. Will change pt back to heated hi flow cannula when lunch trays arrive. Pt denies needs from writer. Will continue to monitor.

## 2021-01-24 NOTE — PROGRESS NOTES
1600 breathing treatment given. Placed on hi flow at 60 L and 84% for dinner. Pt denies needs at this time.  After meal will return pt to bed and put back on BiPAP per his request.

## 2021-01-24 NOTE — PROGRESS NOTES
Progress Note    SUBJECTIVE:  FU related to  / BIPAP good. Hi flow sat low 90s and upper 80s / No confusion    OBJECTIVE:    Vitals:   TEMPERATURE:  Current - Temp: 97 °F (36.1 °C);  Max - Temp  Av.8 °F (36.6 °C)  Min: 96.8 °F (36 °C)  Max: 98.9 °F (37.2 °C)  RESPIRATIONS RANGE: Resp  Av.5  Min: 21  Max: 35  PULSE RANGE: Pulse  Av.6  Min: 60  Max: 117  BLOOD PRESSURE RANGE:  Systolic (15MZJ), XWJ:841 , Min:115 , IKY:713   ; Diastolic (27AQF), TYI:73, Min:54, Max:79    PULSE OXIMETRY RANGE: SpO2  Av.7 %  Min: 91 %  Max: 100 %  24HR INTAKE/OUTPUT:      Intake/Output Summary (Last 24 hours) at 2021 1008  Last data filed at 2021 2300  Gross per 24 hour   Intake 900 ml   Output 1300 ml   Net -400 ml     -----------------------------------------------------------------  Exam:  General: A & O x3  HEENT: Supple neck & negative  Heart: Regular  Lungs: clear to auscultation bilaterally & no retractions  Abdomen: Normal & soft, No tenderness and BS normal  Extremities:  No edema   Neuro: NonFocal     -----------------------------------------------------------------  Diagnostic Data:  Lab Results   Component Value Date    WBC 13.3 (H) 2021    HGB 11.2 (L) 2021     2021       Lab Results   Component Value Date    BUN 21 2021    CREATININE 0.59 (L) 2021     (L) 2021    K 5.0 2021    CALCIUM 8.7 2021    CL 96 (L) 2021    CO2 28 2021    LABGLOM >60 2021       No results found for: WBCUA, RBCUA, EPITHUA, LEUKOCYTESUR, SPECGRAV, GLUCOSEU, KETUA, PROTEINU, HGBUR, CASTUA, CRYSTUA, BACTERIA, YEAST    Lab Results   Component Value Date    TROPONINT NOT REPORTED 2021    PROBNP 480 (H) 2021       Xr Chest Portable    Result Date: 2021 EXAMINATION: ONE XRAY VIEW OF THE CHEST 1/19/2021 8:51 pm COMPARISON: None. HISTORY: ORDERING SYSTEM PROVIDED HISTORY: dyspnea TECHNOLOGIST PROVIDED HISTORY: dyspnea 61-year-old male with dyspnea FINDINGS: Portable AP upright view of the chest. Cardiac monitor leads overlie the chest. Trachea midline. No pneumothorax. No free air. Multifocal airspace disease throughout the lungs with a bilateral middle and lower zone predominance. Trace right-sided pleural effusion. Mild cardiomegaly. Atherosclerotic calcification of the aorta. No acute osseous abnormality evident. 1. Multifocal airspace disease throughout both lungs with a mid and lower zone predominance. Trace right-sided pleural effusion. Findings concerning for multifocal pneumonia. Follow-up is recommended to document resolution. 2. Mild cardiomegaly. ASSESSMENT:    Principal Problem:    COVID-19 virus infection  Active Problems:    Acute respiratory failure with hypoxia (HCC)    Type 2 diabetes mellitus without complication (HCC)    Hypertension  Resolved Problems:    * No resolved hospital problems.  *      Patient Active Problem List    Diagnosis Date Noted    Acute respiratory failure with hypoxia ([facility] [address] 01/20/2021    Type 2 diabetes mellitus without complication ([facility] [address]     Hypertension     COVID-19 virus infection 01/19/2021    Grade II internal hemorrhoids 10/05/2018       PLAN:  · BIPAP and Hi Flow  · covid protocol  · Xray looks stable with no fluid overload  · Critical Care Time: 20  · Increase Metformin and change to Estephania Vasquez M.D.

## 2021-01-24 NOTE — PROGRESS NOTES
Pt sitting up in chair, resting quietly with eyes shut, respiration even and unlabored while on BiPAP, tachypnea noted. Vitals obtained and assessment completed. No changes from previous assessment. Weaned FiO2 to 80% from 85%. Pt denies needs at this time. Will check blood sugar at approx 1630. Call light in reach. Will continue to monitor.

## 2021-01-24 NOTE — PROGRESS NOTES
Physical Therapy  Facility/Department: [facility] AT THE [facility]  Daily Treatment Note  NAME: Chitra Quiñonez  : 1945  MRN: [id number]    Date of Service: 2021    Discharge Recommendations:  Continue to assess pending progress, Patient would benefit from continued therapy after discharge        Assessment   Treatment Diagnosis: general weakness  Prognosis: Good  Decision Making: Medium Complexity  PT Education: Goals;Gait Training;Transfer Training;PT Role  Patient Education: Educated on above with good understanding  REQUIRES PT FOLLOW UP: Yes  Activity Tolerance  Activity Tolerance: Patient Tolerated treatment well;Patient limited by endurance  Activity Tolerance: Limited by SPO2     Patient Diagnosis(es): The primary encounter diagnosis was Acute hypoxemic respiratory failure ([facility] [address] A diagnosis of Pneumonia due to severe acute respiratory syndrome coronavirus 2 (SARS-CoV-2) was also pertinent to this visit. has a past medical history of Asthma, Hyperlipidemia, Hypertension, and Type 2 diabetes mellitus without complication ([facility] [address] has a past surgical history that includes hernia repair and pr colonoscopy flx dx w/collj spec when pfrmd (N/A, 10/5/2018). Restrictions  Restrictions/Precautions  Restrictions/Precautions: Isolation, Fall Risk  Subjective   General  Chart Reviewed: Yes  Response To Previous Treatment: Patient with no complaints from previous session.   Family / Caregiver Present: No  Subjective  Subjective: Pt on bipap during treatment          Orientation  Orientation  Overall Orientation Status: Within Normal Limits  Cognition      Objective   Bed mobility  Comment: up in chair upon arrival  Transfers  Sit to Stand: Contact guard assistance  Stand to sit: Stand by assistance  Ambulation  Ambulation?: Yes  Ambulation 1  Surface: level tile  Device: No Device(bed rail)  Other Apparatus: O2(hi flow)  Assistance: Contact guard assistance  Gait Deviations: Decreased step length  Distance: 3 feet x 4,

## 2021-01-24 NOTE — PROGRESS NOTES
Occupational Therapy  Facility/Department: [facility] AT THE [facility]  Daily Treatment Note  NAME: Tamir Jeffries  : 1945  MRN: [id number]    Date of Service: 2021    Discharge Recommendations:  Continue to assess pending progress       Assessment      Activity Tolerance  Activity Tolerance: Patient Tolerated treatment well  Safety Devices  Safety Devices in place: Yes  Type of devices: Call light within reach; Left in chair         Patient Diagnosis(es): The primary encounter diagnosis was Acute hypoxemic respiratory failure ([facility] [address] A diagnosis of Pneumonia due to severe acute respiratory syndrome coronavirus 2 (SARS-CoV-2) was also pertinent to this visit. has a past medical history of Asthma, Hyperlipidemia, Hypertension, and Type 2 diabetes mellitus without complication ([facility] [address] has a past surgical history that includes hernia repair and pr colonoscopy flx dx w/collj spec when pfrmd (N/A, 10/5/2018). Restrictions  Restrictions/Precautions  Restrictions/Precautions: Isolation, Fall Risk  Subjective   General  Chart Reviewed: Yes  Patient assessed for rehabilitation services?: Yes  Response to previous treatment: Patient with no complaints from previous session  Family / Caregiver Present: No  Referring Practitioner: DNOTRELL Fang  Diagnosis: COVID-19  Subjective  Subjective: Pt had no complaints of pain this date. General Comment  Comments: Pt sitting up in bedside chair upon arrival. Pt agreed to participate in ADL session this date. Orientation     Objective    ADL  LE Bathing: Stand by assistance  UE Dressing: Minimal assistance(Min A to [facility]/don hospital gown d/t heart monitor and O2 tubing)  LE Dressing: Stand by assistance  Toileting: Stand by assistance(SBA while seated to use urinal.)  Additional Comments: Pt completed sponge bath while seated in bedside chair with SBA/MIN A to complete overall. Pt required RBs as needed secondary to fatigue. Plan   Plan  Times per week: 7x/week  Times per day: Daily  Current Treatment Recommendations: Strengthening, Safety Education & Training, Balance Training, Patient/Caregiver Education & Training, Self-Care / ADL, Functional Mobility Training, Endurance Training  G-Code     OutComes Score                                                  AM-PAC Score             Goals  Short term goals  Time Frame for Short term goals: 20 visits  Short term goal 1: Pt will engage in greater than 15 minutes BUE therex/theract without rest breaks to improve UB strength for increased ease and independence with ADL and functional transfers. Short term goal 2: Pt will complete toileting routine Bruno with use of AE PRN to improve safety with ADL tasks. Short term goal 3: Pt will complete TB ADL Bruno with use of AE PRN to ensure safe return home and return to prior level of function. Short term goal 4: Pt will engage in greater than 5 minute dynamic standing task with no LOB or RB to improve endurance and safety with ADL and functional mobility.        Therapy Time   Individual Concurrent Group Co-treatment   Time In 1416         Time Out 1446         Minutes 30                 AGAPITO Ramirez/CEASAR

## 2021-01-25 NOTE — PROGRESS NOTES
Physical Therapy  Facility/Department: [facility] AT THE [facility]  Daily Treatment Note  NAME: Nichole Santa  : 1945  MRN: [id number]    Date of Service: 2021    Discharge Recommendations:  Continue to assess pending progress, Patient would benefit from continued therapy after discharge        Assessment   Treatment Diagnosis: general weakness  Prognosis: Good  PT Education: Goals;Gait Training;Transfer Training;PT Role;General Safety  Patient Education: Educated on above with good understanding noted. REQUIRES PT FOLLOW UP: Yes  Activity Tolerance  Activity Tolerance: Patient Tolerated treatment well;Patient limited by endurance  Activity Tolerance: Limited by SPO2     Patient Diagnosis(es): The primary encounter diagnosis was Acute hypoxemic respiratory failure ([facility] A diagnosis of Pneumonia due to severe acute respiratory syndrome coronavirus 2 (SARS-CoV-2) was also pertinent to this visit. has a past medical history of Asthma, Hyperlipidemia, Hypertension, and Type 2 diabetes mellitus without complication ([facility] has a past surgical history that includes hernia repair and pr colonoscopy flx dx w/collj spec when pfrmd (N/A, 10/5/2018). Restrictions  Restrictions/Precautions  Restrictions/Precautions: Isolation, Fall Risk  Subjective   General  Chart Reviewed: Yes  Response To Previous Treatment: Patient with no complaints from previous session. Family / Caregiver Present: No  Referring Practitioner: KATIA Figueroa CNP  Subjective  Subjective: Pt on bipap during treatment with no complaints of pain. Orientation  Orientation  Overall Orientation Status: Within Normal Limits  Cognition      Objective   Bed mobility  Supine to Sit: Supervision  Scooting: Supervision  Comment: Minimal cues for technique with good understanding noted. Transfers  Sit to Stand: Contact guard assistance  Stand to sit: Stand by assistance  Comment: Cues for safety with good understanding noted.   Ambulation Ambulation?: Yes  Ambulation 1  Surface: level tile  Device: No Device  Other Apparatus: O2(Pt on Bipap currently.)  Assistance: Contact guard assistance  Distance: 5 feet x 1  Comments: SPO2 decreased to 78% and increased to 86-87% after 12 min seated rest break. Cues for proper breathing technique with good understanding noted but minimal increases in SPO2. Nursing Darryle Soja) alerted to SPO2 levels. Verbal cues for posture and step length during amb with good understanding noted. Stairs/Curb  Stairs?: No     Balance  Posture: Fair  Sitting - Static: Good;+  Sitting - Dynamic: Good  Standing - Static: Good  Standing - Dynamic: Fair;+  Exercises  Hip Flexion: 15x  Hip Abduction: 15x  Knee Long Arc Quad: 15x  Ankle Pumps: 15x  Comments: Above exercises completed in sitting prior to amb and SPO2 88-91%. G-Code     OutComes Score    AM-PAC Score    Goals  Short term goals  Time Frame for Short term goals: 10 days  Short term goal 1: Pt to ambulate 75ft without AD and no LOB. Short term goal 2: Pt to tolerate 20-30 mins ther ex/act for improved strength and endurance with ADL's. Short term goal 3: Pt to demonstrate good standing balance for decrease fall risk. Short term goal 4: Pt to perform all bed mobility, transfers, and gait independently. Patient Goals   Patient goals : home following discharge    Plan    Plan  Times per week: 7x week  Times per day: Twice a day(daily on weekends)  Current Treatment Recommendations: Strengthening, Neuromuscular Re-education, Home Exercise Program, Safety Education & Training, Balance Training, Endurance Training, Patient/Caregiver Education & Training, Functional Mobility Training, Transfer Training, Gait Training, Stair training  Safety Devices  Type of devices:  All fall risk precautions in place, Nurse notified, Left in chair, Call light within reach(No alarm upon entry and none needed per Garo Damon RN.)     Therapy Time   Individual Concurrent Group Co-treatment Time In 1114         Time Out [id number] [place], [place]

## 2021-01-25 NOTE — PROGRESS NOTES
PT is alert and oriented. Skin color,turgor and temp normal. Respirations are unlabored while at rest. Breath sounds are diminished. O2sat on high flow is 100%. PT is in a SR rates in the 70's. Denies any chest pain. Abdomin is soft, no guarding. States he had a good day.

## 2021-01-25 NOTE — PROGRESS NOTES
ICU Progress Note    SUBJECTIVE/INTERVAL HISTORY:    Patient seen in follow up for acute respiratory failure related to COVID-19. He is lying in bed on his right side alert and oriented with BiPAP on with 65 % FiO2. Tolerating well he has no complaints. States he is eating well. He does continue to prone and turn and do his Acapella as well. . Encourage him to get up into the chair today with meals. States he feels pretty good. OBJECTIVE:    Vitals:   Temp: 97.5 °F (36.4 °C)  Temp range:    Temp  Av.3 °F (36.3 °C)  Min: 97 °F (36.1 °C)  Max: 97.5 °F (36.4 °C)    BP: 110/67  BP Range:      Systolic (03BTT), ZNX:614 , Min:101 , ITK:885      Diastolic (61XME), WTF:31, Min:51, Max:90    Pulse: 93  Pulse Range:    Pulse  Av.9  Min: 64  Max: 95    Resp: 26  Resp Range:   Resp  Av.8  Min: 21  Max: 32    SpO2: 94 % High flow and BiPAP  SpO2 range:   SpO2  Av.9 %  Min: 85 %  Max: 100 %    24HR INTAKE/OUTPUT:      Intake/Output Summary (Last 24 hours) at 2021 1143  Last data filed at 2021 0915  Gross per 24 hour   Intake 1210 ml   Output 900 ml   Net 310 ml       -----------------------------------------------------------------  Review of Systems:  Constitutional: Negative  for fevers, and negative for chills.   Eyes: negative for visual disturbance   ENT: negative for sore throat, negative nasal congestion, and negative for earache  Respiratory: positive for shortness of breath, positive for cough, and negative for wheezing  Cardiovascular: negative for chest pain, negative for palpitations, and negative for syncope  Gastrointestinal: negative for abdominal pain, negative for nausea,negative for vomiting, negative for diarrhea, negative for constipation, and negative for hematochezia or melena  Genitourinary: negative for dysuria, negative for urinary urgency, negative for urinary frequency, and negative for hematuria  Skin: negative for skin rash, and negative for skin lesions Neurological: negative for unilateral weakness, numbness or tingling    Exam:  GEN:  alert and oriented to person, place and time, well-developed and well-nourished, in no acute distress  EYES:  No gross abnormalities. , PERRL and EOMI  NECK: normal, supple, no lymphadenopathy,  no carotid bruits  PULM:  Fine rales in  lower bases and decreased breath sounds noted-throughout  COR:   regular rate & rhythm, no murmurs, no gallops, S1 normal and S2 normal  ABD:    soft, non-tender, non-distended, normal bowel sounds, no masses or organomegaly  EXT:    no cyanosis, clubbing or edema present    NEURO: follows commands, AMATO, no deficits  SKIN:   no rashes or significant lesions      -----------------------------------------------------------------  Diagnostic Data:  Lab Results   Component Value Date    WBC 11.3 01/25/2021    HGB 11.6 (L) 01/25/2021    HCT 35.5 (L) 01/25/2021     01/25/2021    ALT 14 01/25/2021    AST 33 01/25/2021     (L) 01/25/2021    K 4.9 01/25/2021    CL 97 (L) 01/25/2021    CREATININE 0.59 (L) 01/25/2021    BUN 21 01/25/2021    CO2 28 01/25/2021    INR 1.3 01/25/2021    LABA1C 7.3 (H) 01/20/2021       ASSESSMENT:    Principal Problem:    COVID-19 virus infection  Active Problems:    Acute respiratory failure with hypoxia (HCC)    Type 2 diabetes mellitus without complication (HCC)    Hypertension  Resolved Problems:    * No resolved hospital problems. *      Patient Active Problem List    Diagnosis Date Noted    Acute respiratory failure with hypoxia ([facility] 01/20/2021    Type 2 diabetes mellitus without complication ([facility]     Hypertension     COVID-19 virus infection 01/19/2021    Grade II internal hemorrhoids 10/05/2018       PLAN:  Principal Problem:    COVID-19 virus infection  Active Problems:    Acute respiratory failure with hypoxia (HCC)    Type 2 diabetes mellitus without complication ([facility]    Hypertension  Resolved Problems:    * No resolved hospital problems.  * · COVID-19 virus infection  ? Completed IV remdesivir  ? Continue Solu-Medrol  ? Continue Vitamin C D and zinc  ? Completed ivermectin 2 doses  · Acute respiratory failure with hypoxia  ? Supplemental oxygen to maintain SPO2 greater than 92%currently using BiPAP with settings 16/10 with 65 % FiO2 and may alternate with high flow  ? Acapella  ? Prone  ? Out of bed with meals  ? PT OT  ? Appreciate Dr. Mague Abrams  · Type 2 diabetes  ? POCT before meals and at bedtime  ? Continue Glucophage  ? Hypoglycemia protocol  ? Continue to high-dose sliding scale  ? Continue Lantus to 40 units at at bedtime  · Hypertension  ?  Continue lisinopril  · High risk medications: Insulin, remdesivir  · Discharge plan: Pending    KATIA Morin, NP-C  1/25/2021, 11:43 AM

## 2021-01-25 NOTE — FLOWSHEET NOTE
Writer attempted to place patient on high flow oxygen @ 60 L and 95% of FIO2 but oxygen saturation dropped to 73%. Patient placed back on Bipap @ 70%.

## 2021-01-25 NOTE — PROGRESS NOTES
[facility]  Inpatient/Observation/Outpatient Rehabilitation    Date: 2021  Patient Name: Kush Parr       [x] Inpatient Acute/Observation       []  Outpatient  : 1945       Pt held this PM per nursing Sylvain Carr).        [place], [place] Date: 2021

## 2021-01-25 NOTE — PLAN OF CARE
Problem: Airway Clearance - Ineffective  Goal: Achieve or maintain patent airway  Outcome: Ongoing     Problem: Gas Exchange - Impaired  Goal: Absence of hypoxia  Outcome: Ongoing  Goal: Promote optimal lung function  Outcome: Ongoing     Problem: Breathing Pattern - Ineffective  Goal: Ability to achieve and maintain a regular respiratory rate  Outcome: Ongoing     Problem:  Body Temperature -  Risk of, Imbalanced  Goal: Ability to maintain a body temperature within defined limits  Outcome: Ongoing  Goal: Will regain or maintain usual level of consciousness  Outcome: Ongoing  Goal: Complications related to the disease process, condition or treatment will be avoided or minimized  Outcome: Ongoing     Problem: Isolation Precautions - Risk of Spread of Infection  Goal: Prevent transmission of infection  Outcome: Ongoing     Problem: Nutrition Deficits  Goal: Optimize nutritional status  Outcome: Ongoing     Problem: Risk for Fluid Volume Deficit  Goal: Maintain normal heart rhythm  Outcome: Ongoing  Goal: Maintain absence of muscle cramping  Outcome: Ongoing  Goal: Maintain normal serum potassium, sodium, calcium, phosphorus, and pH  Outcome: Ongoing     Problem: Loneliness or Risk for Loneliness  Goal: Demonstrate positive use of time alone when socialization is not possible  Outcome: Ongoing     Problem: Fatigue  Goal: Verbalize increase energy and improved vitality  Outcome: Ongoing     Problem: Falls - Risk of:  Goal: Will remain free from falls  Description: Will remain free from falls  Outcome: Ongoing  Goal: Absence of physical injury  Description: Absence of physical injury  Outcome: Ongoing     Problem: Musculor/Skeletal Functional Status  Goal: Highest potential functional level  Outcome: Ongoing  Goal: Absence of falls  Outcome: Ongoing

## 2021-01-26 PROBLEM — E44.1 MILD MALNUTRITION (HCC): Status: ACTIVE | Noted: 2021-01-01

## 2021-01-26 NOTE — PROGRESS NOTES
IV solumedrol given as ordered. Denies SOB. Lungs with rales noted in left anteriorly and posteriorly. Oxygen at 85% per Bi pap.

## 2021-01-26 NOTE — PROGRESS NOTES
[facility]  Inpatient/Observation/Outpatient Rehabilitation    Date: 2021  Patient Name: Ashely Lozoya       [x] Inpatient Acute/Observation       []  Outpatient  : 1945       [] Pt no showed for scheduled appointment    [] Pt refused/declined therapy at this time due to:           [x] Pt cancelled due to:  [] No Reason Given   [] Sick/ill   [x] Other:    JEZ Kimbrough request that pt be on hold this afternoon.       Hannah John PTA Date: 2021

## 2021-01-26 NOTE — PROGRESS NOTES
[facility]  Inpatient/Observation/Outpatient Rehabilitation    Date: 2021  Patient Name: Tamir Jeffries       [x] Inpatient Acute/Observation       []  Outpatient  : 1945       [] Pt no showed for scheduled appointment    [] Pt refused/declined therapy at this time due to:           [x] Pt cancelled due to:  [] No Reason Given   [] Sick/ill   [x] Other:    JEZ Kimbrough request that patient be withheld this AM. Will attempt treatment in PM.      Betito Saba PTA Date: 2021

## 2021-01-26 NOTE — VIRTUAL HEALTH
Tamir Jeffries is a 70 y.o. male admitted for  with complaints of worsening dyspnea and cough over the last 2 days. He explained that his symptoms began January 12 with fever, malaise, myalgias, and cough. He explained on 14 January that he began with respiratory difficulty and he was tested and was positive. Since that time he explained that his symptoms were okay however yesterday he began to have increased dyspnea and dyspnea on exertion. He denied any anosmia or loss of taste. He denies diarrhea, constipation, melena, hematochezia or hematic emesis. He does admit to low-grade fever. He does have a productive cough but states that the mucus is clear only. Denies any hemoptysis. On arrival his temperature was 99. WBC count was normal however chest x-ray showed pneumonia. Lactic acid was elevated at 3 repeat was 1.7. Due to his desaturations he was admitted and placed in the ICU on high flow and BiPAP. Subsequently treated with 5 days of intravenous remdesivir. Remains on methylprednisolone 40 mg every 12 hours. Rectory hypoxemia persists. Alternating high flow oxygen and BiPAP. FiO2 currently 85%. I's/O+ 1.6 L. Transaminases are normal.  Mild elevation of alkaline phosphatase. Glucose elevated on steroids. Arced elevation of D-dimer at 12.97.  Currently on high intensity prophylactic Lovenox. X-ray done 1/23/2021 reviewed. Bilateral patchy densities right greater than left. Not changed. PMHx   Past Medical History      Diagnosis Date    Asthma     Hyperlipidemia     Hypertension     Type 2 diabetes mellitus without complication ([facility]       Past Surgical History        Procedure Laterality Date    HERNIA REPAIR      right inguinal at age 3 years.     SC COLONOSCOPY FLX DX W/COLLJ SPEC WHEN PFRMD N/A 10/5/2018    COLONOSCOPY performed by Salvatore Cid DO at [address]   Current Medications    metFORMIN  1,000 mg Oral Daily with breakfast  insulin lispro  0-18 Units Subcutaneous TID     insulin lispro  0-9 Units Subcutaneous Nightly    insulin glargine  40 Units Subcutaneous Nightly    aspirin  81 mg Oral Daily    atorvastatin  20 mg Oral Nightly    cetirizine  10 mg Oral Daily    Vitamin D  5,000 Units Oral Daily    fluticasone  1 spray Each Nostril Daily    lisinopril  2.5 mg Oral Daily    magnesium oxide  400 mg Oral Daily    pantoprazole  40 mg Oral Daily    tamsulosin  0.4 mg Oral Nightly    zinc sulfate  50 mg Oral Daily    ipratropium-albuterol  1 ampule Inhalation 4x daily    sodium chloride flush  10 mL Intravenous 2 times per day    enoxaparin  0.5 mg/kg Subcutaneous BID    methylPREDNISolone  40 mg Intravenous Q12H     dextromethorphan-guaiFENesin, sodium chloride, albuterol, glucose, dextrose, glucagon (rDNA), dextrose, sodium chloride flush, acetaminophen **OR** acetaminophen  IV Drips/Infusions   dextrose       Home Medications  Medications Prior to Admission: magnesium oxide (MAG-OX) 400 MG tablet, Take 400 mg by mouth daily  pantoprazole (PROTONIX) 40 MG tablet, Take 40 mg by mouth daily  albuterol sulfate HFA (VENTOLIN HFA) 108 (90 Base) MCG/ACT inhaler, Inhale 2 puffs into the lungs every 6 hours as needed for Wheezing  cetirizine (ZYRTEC) 10 MG tablet, Take 10 mg by mouth daily  dextromethorphan-guaiFENesin (MUCINEX DM)  MG per extended release tablet, Take 1 tablet by mouth every 12 hours as needed for Cough or Congestion  Multiple Vitamins-Minerals (PRESERVISION AREDS PO), Take 2 tablets by mouth nightly  acetaminophen (TYLENOL) 500 MG tablet, Take 1,000 mg by mouth every 6 hours as needed for Pain or Fever  metFORMIN (GLUCOPHAGE) 500 MG tablet, Take 500 mg by mouth daily (with breakfast)   atorvastatin (LIPITOR) 10 MG tablet, Take 20 mg by mouth nightly   lisinopril (PRINIVIL;ZESTRIL) 2.5 MG tablet, Take 2.5 mg by mouth daily  tamsulosin (FLOMAX) 0.4 MG capsule, Take 0.4 mg by mouth nightly mometasone (ASMANEX 120 METERED DOSES) 220 MCG/INH inhaler, Inhale 2 puffs into the lungs daily  fluticasone (FLONASE) 50 MCG/ACT nasal spray, 1 spray by Each Nare route daily  aspirin 81 MG tablet, Take 81 mg by mouth daily  zinc gluconate 50 MG tablet, Take 50 mg by mouth daily  Cholecalciferol (VITAMIN D3) 5000 units TABS, Take 5,000 Units by mouth daily    DIET   DIET CARB CONTROL; Carb Control: 4 carb choices (60 gms)/meal    ALLERGIES   Patient has no known allergies. SOCIAL HISTORY   Tobacco History  Social History     Tobacco Use   Smoking Status Former Smoker    Quit date: 2009    Years since quittin.3   Smokeless Tobacco Never Used     Alcohol History  Social History     Substance and Sexual Activity   Alcohol Use Yes    Comment: rare       FAMILY HISTORY         Problem Relation Age of Onset    Stroke Mother     Heart Disease Father        SLEEP HISTORY   n/a    ROS     General Denies any fever or chills  HEENT Denies any diplopia, tinnitus or vertigo  Resp positive for  dry cough, dyspnea and cyanosis  Cardiac Denies any chest pain, palpitations, claudication or edema  GI Denies any melena, hematochezia, hematemesis or pyrosis   Denies any frequency, urgency, hesitancy or incontinence  Heme Denies bruising or bleeding easily  Endocrine Denies any history of diabetes or thyroid disease  Neuro Denies any focal motor or sensory deficits    VITALS    height is 5' 10\" (1.778 m) and weight is 261 lb (118.4 kg). His temporal temperature is 98.4 °F (36.9 °C). His blood pressure is 137/72 and his pulse is 77. His respiration is 28 and oxygen saturation is 94%.        Temperature Range: Temp: 98.4 °F (36.9 °C) Temp  Av.4 °F (36.3 °C)  Min: 96.8 °F (36 °C)  Max: 98.4 °F (36.9 °C)  BP Range:  Systolic (19NMT), TVV:380 , Min:101 , YOGI:294     Diastolic (32DMO), CKL:23, Min:51, Max:88    Pulse Range: Pulse  Av.9  Min: 64  Max: 94  Respiration Range: Resp  Av  Min: 21  Max: 34 Current Pulse Ox[de-identified]  SpO2: 94 %  24HR Pulse Ox Range:  SpO2  Av.5 %  Min: 86 %  Max: 100 %  Oxygen Amount and Delivery: O2 Flow Rate (L/min): 60 L/min    Wt Readings from Last 3 Encounters:   21 261 lb (118.4 kg)   10/05/18 218 lb (98.9 kg)   18 238 lb 6.4 oz (108.1 kg)       I/O (24 Hours)    Intake/Output Summary (Last 24 hours) at 20219  Last data filed at 2021 1830  Gross per 24 hour   Intake 1120 ml   Output 1200 ml   Net -80 ml       EXAM     General Appearance  Awake, alert, oriented conv dyspnea noted  HEENT - Normal, Head is normocephalic, atraumatic. Neurologic - CN II-XII are grossly intact.  There are no focal motor or sensory deficits  Skin - No bruising or bleeding  Extremities - No cyanosis, clubbing or edema    DATA   Old records and notes have been reviewed in [facility]    CBC     Lab Results   Component Value Date    WBC 11.3 2021    RBC 4.24 2021    HGB 11.6 2021    HCT 35.5 2021     2021    MCV 83.7 2021    MCH 27.4 2021    MCHC 32.7 2021    RDW 12.7 2021    LYMPHOPCT 3 2021    MONOPCT 5 2021    BASOPCT 0 2021    MONOSABS 0.57 2021    LYMPHSABS 0.34 2021    EOSABS 0.23 2021    BASOSABS 0.00 2021    DIFFTYPE NOT REPORTED 2021     BMP   Lab Results   Component Value Date     2021    K 4.9 2021    CL 97 2021    CO2 28 2021    BUN 21 2021    CREATININE 0.59 2021    GLUCOSE 160 2021    CALCIUM 8.8 2021     LFTS  Lab Results   Component Value Date    ALKPHOS 169 2021    ALT 14 2021    AST 33 2021    PROT 6.9 2021    BILITOT 0.94 2021    BILIDIR 0.21 2021    IBILI 0.41 2021    LABALBU 2.8 2021     ABG   No results found for: PH, PCO2, PO2, HCO3, O2SAT  Lab Results   Component Value Date    MODE NOT REPORTED 2021   LABRCNT(INR)@  PTT   Lab Results Component Value Date    APTT 33.8 01/25/2021         PFTs  N/A    CULTURES  SARS-CoV-2  COVID-19  Collected: 01/14/21 1438   Result status: Final   Resulting lab: [facility]   Reference range: Not Detected   Value: DETECTEDAbnormal     Comment:        The specimen is POSITIVE for SARS-Cov-2, the novel coronavirus associated with COVID-19.         Yoli SARS-CoV-2 for use on the [employer] Systems is a real-time RT-PCR test intended   for the qualitative detection of nucleic acids from SARS-CoV-2   in clinician-collected nasal, nasopharyngeal, and oropharyngeal swab specimens from   individuals who meet COVID-19 clinical and/or epidemiological criteria. [facility]oli SARS-CoV-2 is for use only under Emergency Use Authorization (EUA) in laboratories   certified under [address] (CLIA), 42 U. S.C. §769U,   that meet requirements to perform high or moderate complexity tests.         An individual without symptoms of COVID-19 and who is not shedding SARS-CoV-2 virus would   expect to have a negative (not detected) result in this assay. Fact sheet for Healthcare Providers: BuildHer.es   Fact sheet for Patients: BuildHer.es                RADIOLOGY     CXR  EXAMINATION:   ONE XRAY VIEW OF THE CHEST       1/23/2021 9:12 am       COMPARISON:   01/19/2021       HISTORY:   ORDERING SYSTEM PROVIDED HISTORY: covid   TECHNOLOGIST PROVIDED HISTORY:   covid       57-year-old male with history of COVID       FINDINGS:   Portable AP upright view of the chest.       Cardiac monitor leads overlie the chest.       Trachea midline.  Stable mild cardiomegaly.  Cardiac and mediastinal contours   remain unchanged.  No pneumothorax.  Patchy bilateral airspace opacities   throughout both lungs, similar to the prior exam, right greater than left.    Mild elevation of the right hemidiaphragm, stable.  Mild right basilar

## 2021-01-26 NOTE — PROGRESS NOTES
ICU Progress Note    SUBJECTIVE/INTERVAL HISTORY:    Patient seen in follow up for acute respiratory failure related to COVID-19. He is lying in bed on his left side alert and oriented with BiPAP on with 100 % FiO2. Tolerating well he has no complaints. States he is eating well. He does continue to prone and turn and do his Acapella as well. . Encourage him to get up into the chair today with meals. He states he would like to be able to go home. He does only tolerate the high flow oxygen long enough to eat his meals and then has to be back on the BiPAP. OBJECTIVE:    Vitals:   Temp: 96.9 °F (36.1 °C)  Temp range:    Temp  Av.4 °F (36.3 °C)  Min: 96.9 °F (36.1 °C)  Max: 98.4 °F (36.9 °C)    BP: (!) 134/58  BP Range:      Systolic (78ULA), ETS:285 , Min:110 , KAQ:800      Diastolic (73IMR), FZI:48, Min:58, Max:88    Pulse: 91  Pulse Range:    Pulse  Av  Min: 68  Max: 99    Resp: 27  Resp Range:   Resp  Av.2  Min: 19  Max: 34    SpO2: 96 % High flow and BiPAP  SpO2 range:   SpO2  Av.6 %  Min: 88 %  Max: 97 %    24HR INTAKE/OUTPUT:      Intake/Output Summary (Last 24 hours) at 2021 1417  Last data filed at 2021 1900  Gross per 24 hour   Intake 710 ml   Output 650 ml   Net 60 ml       -----------------------------------------------------------------  Review of Systems:  Constitutional: Negative  for fevers, and negative for chills.   Eyes: negative for visual disturbance   ENT: negative for sore throat, negative nasal congestion, and negative for earache  Respiratory: positive for shortness of breath, positive for cough, and negative for wheezing  Cardiovascular: negative for chest pain, negative for palpitations, and negative for syncope  Gastrointestinal: negative for abdominal pain, negative for nausea,negative for vomiting, negative for diarrhea, negative for constipation, and negative for hematochezia or melena Genitourinary: negative for dysuria, negative for urinary urgency, negative for urinary frequency, and negative for hematuria  Skin: negative for skin rash, and negative for skin lesions  Neurological: negative for unilateral weakness, numbness or tingling    Exam:  GEN:  alert and oriented to person, place and time, well-developed and well-nourished, in no acute distress  EYES:  No gross abnormalities. , PERRL and EOMI  NECK: normal, supple, no lymphadenopathy,  no carotid bruits  PULM:  Expiratory wheezes and decreased breath sounds noted-throughout  COR:   regular rate & rhythm, no murmurs, no gallops, S1 normal and S2 normal  ABD:    soft, non-tender, non-distended, normal bowel sounds, no masses or organomegaly  EXT:    no cyanosis, clubbing or edema present    NEURO: follows commands, AMATO, no deficits  SKIN:   no rashes or significant lesions      -----------------------------------------------------------------  Diagnostic Data:  Lab Results   Component Value Date    WBC 12.1 (H) 01/26/2021    HGB 11.8 (L) 01/26/2021    HCT 36.4 (L) 01/26/2021     01/26/2021    ALT 15 01/26/2021    AST 38 01/26/2021     (L) 01/26/2021    K 5.2 01/26/2021    CL 96 (L) 01/26/2021    CREATININE 0.66 (L) 01/26/2021    BUN 21 01/26/2021    CO2 28 01/26/2021    INR 1.3 01/26/2021    LABA1C 7.3 (H) 01/20/2021       ASSESSMENT:    Principal Problem:    COVID-19 virus infection  Active Problems:    Acute respiratory failure with hypoxia (HCC)    Type 2 diabetes mellitus without complication (HCC)    Hypertension    Mild malnutrition ([address]  Resolved Problems:    * No resolved hospital problems.  *      Patient Active Problem List    Diagnosis Date Noted    Mild malnutrition ([address] 01/26/2021    Acute respiratory failure with hypoxia ([address] 01/20/2021    Type 2 diabetes mellitus without complication ([facility]     Hypertension     COVID-19 virus infection 01/19/2021    Grade II internal hemorrhoids 10/05/2018       PLAN: Principal Problem:    COVID-19 virus infection  Active Problems:    Acute respiratory failure with hypoxia (HCC)    Type 2 diabetes mellitus without complication (HCC)    Hypertension    Mild malnutrition ([address]  Resolved Problems:    * No resolved hospital problems. *    · COVID-19 virus infection  ? Completed IV remdesivir  ? Continue Solu-Medrol  ? Continue Vitamin C D and zinc  ? Completed ivermectin 2 doses  · Acute respiratory failure with hypoxia  ? Supplemental oxygen to maintain SPO2 greater than 92%currently using BiPAP with settings 16/10 with 100 % FiO2 and may alternate with high flow  ? Acapella  ? Prone  ? Out of bed with meals  ? PT OT  ? Daily labs continue to rise showing continued surge of Covid  ? Appreciate Dr. Tricia Brandon  ? Venous Dopplers negative for DVT  ? CT chest to rule out PEunable to do due to high levels of oxygen demand needed via BiPAP do not have the availability to do this. He is anticoagulated  · Type 2 diabetes  ? POCT before meals and at bedtimebetter controlled  ? Continue Glucophage  ? Hypoglycemia protocol  ? Continue to high-dose sliding scale  ? Continue Lantus to 40 units at at bedtime  · Hypertension  ?  Continue lisinopril  · High risk medications: Insulin  · Discharge plan: Pending    KATIA Quintero, NP-C  1/26/2021, 2:17 PM

## 2021-01-26 NOTE — PROGRESS NOTES
[facility] SPECIALTY [facility]  OCCUPATIONAL THERAPY  No Visit Note    [x] ICU    [] Acute   Patient: Amanda Carolina  Room: C925/X139-06      Amanda Carolina not seen on 1/26/2021 at 2:32 PM due to patient on hold per Tita Camejo RN this date.  .          Signature:AGAPITO Ramirez/CEASAR

## 2021-01-27 NOTE — PROGRESS NOTES
Remains up in chair. Bi pap off. High flow NC at 93% applied at patients request.    Oxygen saturation 88%. 0545  Oxygen saturation 79-80%. With High flow NC on at 93%.

## 2021-01-27 NOTE — PROGRESS NOTES
Mucinex DM  Po given for cough and secretions. Sat up at bedside. Respirations labored with exertion. Lungs with rales noted in right posterior mid and base. Bi pap continued at 100 FIO2.

## 2021-01-27 NOTE — PROGRESS NOTES
Physical Therapy  Facility/Department: [facility] AT THE [facility]  Daily Treatment Note  NAME: Joan Manjarrez  : 1945  MRN: [id number]    Date of Service: 2021    Discharge Recommendations:  Continue to assess pending progress, Patient would benefit from continued therapy after discharge        Assessment   Treatment Diagnosis: general weakness  Prognosis: Good  Decision Making: Medium Complexity  PT Education: General Safety;Gait Training  REQUIRES PT FOLLOW UP: Yes  Activity Tolerance  Activity Tolerance: Patient Tolerated treatment well;Patient limited by endurance  Activity Tolerance: Limited by SPO2     Patient Diagnosis(es): The primary encounter diagnosis was Acute hypoxemic respiratory failure ([facility] A diagnosis of Pneumonia due to severe acute respiratory syndrome coronavirus 2 (SARS-CoV-2) was also pertinent to this visit. has a past medical history of Asthma, Hyperlipidemia, Hypertension, and Type 2 diabetes mellitus without complication ([facility] [address] has a past surgical history that includes hernia repair and pr colonoscopy flx dx w/collj spec when pfrmd (N/A, 10/5/2018). Restrictions  Restrictions/Precautions  Restrictions/Precautions: Isolation, Fall Risk  Subjective   General  Chart Reviewed: Yes  Response To Previous Treatment: Patient with no complaints from previous session. Family / Caregiver Present: No  Referring Practitioner: KATIA Johnson CNP  Subjective  Subjective: Pt denies pain, reports feeling ok today. General Comment  Comments: Pt on Bi-pap during tx. Orientation  Orientation  Overall Orientation Status: Within Functional Limits  Cognition      Objective   Bed mobility  Supine to Sit: Minimal assistance  Comment: Pt pulled up on PTA hand to sit up in bed.   Transfers  Sit to Stand: Contact guard assistance  Stand to sit: Stand by assistance  Ambulation  Ambulation?: Yes  Ambulation 1  Surface: level tile  Device: No Device  Other Apparatus: O2(Bi-pap) Assistance: Contact guard assistance  Distance: 5 ft x1  Comments: SPO2 decreased to 80% after coughing spell after amb. Per nursing, tx stopped d/t low levels. Balance  Posture: Fair  Sitting - Static: Good;+  Sitting - Dynamic: Good  Standing - Static: Good  Standing - Dynamic: Fair;+  Exercises  Quad Sets: x15  Heelslides: x15  Hip Flexion: 15x  Hip Abduction: 15x  Ankle Pumps: x15  Comments: BLE ther ex completed in supine. Pt required frequent RBs d/t fatigue. SPO2 92-93% throughout. Goals  Short term goals  Time Frame for Short term goals: 10 days  Short term goal 1: Pt to ambulate 75ft without AD and no LOB. Short term goal 2: Pt to tolerate 20-30 mins ther ex/act for improved strength and endurance with ADL's. Short term goal 3: Pt to demonstrate good standing balance for decrease fall risk. Short term goal 4: Pt to perform all bed mobility, transfers, and gait independently. Patient Goals   Patient goals : home following discharge    Plan    Plan  Times per week: 7x week  Times per day: Twice a day  Current Treatment Recommendations: Strengthening, Neuromuscular Re-education, Home Exercise Program, Safety Education & Training, Balance Training, Endurance Training, Patient/Caregiver Education & Training, Functional Mobility Training, Transfer Training, Gait Training, Stair training  Safety Devices  Type of devices:  All fall risk precautions in place, Nurse notified, Call light within reach, Left in bed(Seated at EOB, nursing present)     Therapy Time   Individual Concurrent Group Co-treatment   Time In 1139         Time Out 1158         Minutes [address] [place] [address]

## 2021-01-27 NOTE — PLAN OF CARE
Problem: Airway Clearance - Ineffective  Goal: Achieve or maintain patent airway  Outcome: Ongoing     Problem: Gas Exchange - Impaired  Goal: Absence of hypoxia  Outcome: Ongoing  Goal: Promote optimal lung function  Outcome: Ongoing     Problem: Breathing Pattern - Ineffective  Goal: Ability to achieve and maintain a regular respiratory rate  Outcome: Ongoing     Problem:  Body Temperature -  Risk of, Imbalanced  Goal: Complications related to the disease process, condition or treatment will be avoided or minimized  Outcome: Ongoing     Problem: Isolation Precautions - Risk of Spread of Infection  Goal: Prevent transmission of infection  Outcome: Ongoing     Problem: Nutrition Deficits  Goal: Optimize nutritional status  Outcome: Ongoing     Problem: Risk for Fluid Volume Deficit  Goal: Maintain normal heart rhythm  Outcome: Ongoing  Goal: Maintain absence of muscle cramping  Outcome: Ongoing  Goal: Maintain normal serum potassium, sodium, calcium, phosphorus, and pH  Outcome: Ongoing     Problem: Loneliness or Risk for Loneliness  Goal: Demonstrate positive use of time alone when socialization is not possible  Outcome: Ongoing     Problem: Fatigue  Goal: Verbalize increase energy and improved vitality  Outcome: Ongoing     Problem: Patient Education: Go to Patient Education Activity  Goal: Patient/Family Education  Outcome: Ongoing     Problem: Falls - Risk of:  Goal: Will remain free from falls  Description: Will remain free from falls  Outcome: Ongoing  Goal: Absence of physical injury  Description: Absence of physical injury  Outcome: Ongoing     Problem: Nutrition  Goal: Optimal nutrition therapy  Description: Nutrition Problem #1: Inadequate oral intake  Intervention: Food and/or Nutrient Delivery: Continue Current Diet  Nutritional Goals: PO > 75% with good tolerance      Outcome: Ongoing     Problem: Musculor/Skeletal Functional Status  Goal: Highest potential functional level  Outcome: Ongoing Goal: Absence of falls  Outcome: Ongoing

## 2021-01-27 NOTE — FLOWSHEET NOTE
Writer noted oxygen saturation dropping to low 80's and heart rate increasing. Writer entered room with proper PPE. Patient had turned self in bed onto back with [facility] @ 30 degrees. Patient tachypneic. Bipap on @ 16/10 @ 95%. Voiced he had to \"pee\". Writer assisted patient to standing position. Patient voided clear yusra urine. Returned to bed and positioned onto left side. Oxygen saturation increased to 90% after couple minutes. Will continue to monitor.

## 2021-01-27 NOTE — PROGRESS NOTES
[address] [facility]  [place]  OCCUPATIONAL THERAPY  No Visit Note    [x] ICU    [] Acute   Patient: Darrius Tavarez  [facility]      Darrius Tavarez not seen on 1/27/2021 at 1:14 PM due to per Nurse Pedro Hester on hold d/t decrease in status when pt attempted PT earlier this date. Pt is not getting out of bed.          Signature: Dameon PINEDA

## 2021-01-27 NOTE — FLOWSHEET NOTE
Patient up in chair for breakfast.  Placed on Hi-flow @ 60 L, 93% FIO2 of  oxygen to eat. Oxygen saturation 85-89% while eating. Oxygen started to drop to 80% after coughing. Patient placed back on Bipap and placed into bed.

## 2021-01-27 NOTE — PROGRESS NOTES
Skin: negative for skin rash, and negative for skin lesions  Neurological: negative for unilateral weakness, numbness or tingling    Exam:  GEN:  alert and oriented to person, place and time, well-developed and well-nourished, in no acute distress  EYES:  No gross abnormalities. , PERRL and EOMI  NECK: normal, supple, no lymphadenopathy,  no carotid bruits  PULM:  Clear  but decreased breath sounds noted-throughout  COR:   regular rate & rhythm, no murmurs, no gallops, S1 normal and S2 normal  ABD:    soft, non-tender, non-distended, normal bowel sounds, no masses or organomegaly  EXT:    no cyanosis, clubbing or edema present    NEURO: follows commands, AMATO, no deficits  SKIN:   no rashes or significant lesions      -----------------------------------------------------------------  Diagnostic Data:  Lab Results   Component Value Date    WBC 13.4 (H) 01/27/2021    HGB 11.5 (L) 01/27/2021    HCT 36.2 (L) 01/27/2021     01/27/2021    ALT 13 01/27/2021    AST 40 (H) 01/27/2021     (L) 01/27/2021    K 4.8 01/27/2021    CL 94 (L) 01/27/2021    CREATININE 0.66 (L) 01/27/2021    BUN 20 01/27/2021    CO2 28 01/27/2021    INR 1.4 01/27/2021    LABA1C 7.3 (H) 01/20/2021       ASSESSMENT:    Principal Problem:    COVID-19 virus infection  Active Problems:    Acute respiratory failure with hypoxia (HCC)    Type 2 diabetes mellitus without complication (HCC)    Hypertension    Mild malnutrition ([address]  Resolved Problems:    * No resolved hospital problems.  *      Patient Active Problem List    Diagnosis Date Noted    Mild malnutrition ([address] 01/26/2021    Acute respiratory failure with hypoxia ([address] 01/20/2021    Type 2 diabetes mellitus without complication ([address]     Hypertension     COVID-19 virus infection 01/19/2021    Grade II internal hemorrhoids 10/05/2018       PLAN:  Principal Problem:    COVID-19 virus infection  Active Problems:    Acute respiratory failure with hypoxia ([facility] Type 2 diabetes mellitus without complication (HCC)    Hypertension    Mild malnutrition ([facility]  Resolved Problems:    * No resolved hospital problems. *    · COVID-19 virus infection  ? Completed IV remdesivir  ? Continue Solu-Medrol  ? Continue Vitamin C D and zinc  ? Completed ivermectin 2 doses  · Acute respiratory failure with hypoxia  ? Supplemental oxygen to maintain SPO2 greater than 92%currently using BiPAP with settings 16/10 with 100 % FiO2 and may alternate with high flow  ? Acapella  ? Prone  ? Out of bed with meals  ? PT OT  ? Daily labs continue to rise showing continued surge of Covid  ? Appreciate Dr. Elva Fernández  ? Venous Dopplers negative for DVT  ? CT chest to rule out PEunable to do due to high levels of oxygen demand needed via BiPAP do not have the availability to do this. He is anticoagulated  · Type 2 diabetes  ? POCT before meals and at bedtimebetter controlled  ? Continue Glucophage  ? Hypoglycemia protocol  ? Continue to high-dose sliding scale  ? Continue Lantus to 40 units at at bedtime  · Hypertension  ?  Continue lisinopril  · High risk medications: Insulin  · Discharge plan: Pending    KATIA Barajas, NP-C  1/27/2021, 10:35 AM

## 2021-01-27 NOTE — PROGRESS NOTES
Awake.  Continues to C/O SOB but improved. Lungs diminished on right with fine rales in posterior bases with rales noted in left mid and lower base. Bi pap continued at 100% FIO2. Strong occasional cough noted.

## 2021-01-27 NOTE — PROGRESS NOTES
Up to chair at request.  Bi pap continued at 100% FIO2. Oxygen saturation down to 81% with exertion. Respirations labored. Moist strong cough noted. Monitor shows normal sinus at 83.

## 2021-01-27 NOTE — PROGRESS NOTES
Occupational Therapy  [facility]  OCCUPATIONAL THERAPY  No Visit Note    [x] ICU    [] Acute   Patient: Shadia Robb  Room: V439/R189-48      Shadia Robb not seen on 1/27/2021 at 10:40 AM due to nursing requested OT later, Nabil Pandey just got back into bed, you guys should try to come right after meal times for best results\".           Signature: Ever PINEDA

## 2021-01-27 NOTE — PROGRESS NOTES
Located within [facility]  Inpatient/Observation/Outpatient Rehabilitation    Date: 2021  Patient Name: Tamir Jeffries       [x] Inpatient Acute/Observation       []  Outpatient  : 1945       [] Pt no showed for scheduled appointment    [] Pt refused/declined therapy at this time due to:           [x] Pt cancelled due to:  [] No Reason Given   [] Sick/ill   [x] Other: Pt tx held this AM, per nursing, attempt to see pt around meal times for best response. Will attempt evaluation at our earliest opportunity.        Gray Soares Date: 2021

## 2021-01-27 NOTE — FLOWSHEET NOTE
Patient sitting up on side of bed for supper. Bipap removed. Hi-flow nasal cannula applied at 60L 94%. Patient oxygen saturation 91% prior to start eating. After only two bites oxygen saturation dropped to 82%. Non-rebreather set @ flush given to patient to be able to assist with keeping oxygen saturation @ 90%. Oxygen saturation 86-87% after 5 minutes.

## 2021-01-27 NOTE — FLOWSHEET NOTE
Patient attempting to eat with Hi-Flow on @ 60 L @94% of FIO2. Oxygen saturation would drop to 80%. Writer even attempted to place a non-rebreather @ flush on in addition to his hi-flow but oxygen saturation would only come up to 82%. Writer placed Bipap back on @ 16/10 @ 95% and oxygen saturation increased to 93%. Patient laying on right side in bed. Will continue to monitor.

## 2021-01-28 NOTE — PROGRESS NOTES
[facility]  Inpatient/Observation/Outpatient Rehabilitation    Date: 2021  Patient Name: Amanda Carolina       [x] Inpatient Acute/Observation       []  Outpatient  : 1945       [] Pt no showed for scheduled appointment    [x] Pt refused/declined therapy at this time due to:  Shortness of breath and fatigue. RN is aware. [] Pt cancelled due to:  [] No Reason Given   [] Sick/ill   [] Other:    Will attempt evaluation at our earliest opportunity.        James Conley, PT, DPT Date: 2021

## 2021-01-28 NOTE — PLAN OF CARE
Problem: Airway Clearance - Ineffective  Goal: Achieve or maintain patent airway  Outcome: Ongoing     Problem: Gas Exchange - Impaired  Goal: Absence of hypoxia  Outcome: Ongoing  Goal: Promote optimal lung function  Outcome: Ongoing     Problem: Breathing Pattern - Ineffective  Goal: Ability to achieve and maintain a regular respiratory rate  Outcome: Ongoing     Problem:  Body Temperature -  Risk of, Imbalanced  Goal: Complications related to the disease process, condition or treatment will be avoided or minimized  Outcome: Ongoing     Problem: Isolation Precautions - Risk of Spread of Infection  Goal: Prevent transmission of infection  Outcome: Ongoing     Problem: Nutrition Deficits  Goal: Optimize nutritional status  Outcome: Ongoing     Problem: Risk for Fluid Volume Deficit  Goal: Maintain normal heart rhythm  Outcome: Ongoing  Goal: Maintain absence of muscle cramping  Outcome: Ongoing  Goal: Maintain normal serum potassium, sodium, calcium, phosphorus, and pH  Outcome: Ongoing     Problem: Loneliness or Risk for Loneliness  Goal: Demonstrate positive use of time alone when socialization is not possible  Outcome: Ongoing     Problem: Fatigue  Goal: Verbalize increase energy and improved vitality  Outcome: Ongoing     Problem: Patient Education: Go to Patient Education Activity  Goal: Patient/Family Education  Outcome: Ongoing     Problem: Falls - Risk of:  Goal: Will remain free from falls  Description: Will remain free from falls  Outcome: Ongoing  Goal: Absence of physical injury  Description: Absence of physical injury  Outcome: Ongoing     Problem: Nutrition  Goal: Optimal nutrition therapy  Description: Nutrition Problem #1: Inadequate oral intake  Intervention: Food and/or Nutrient Delivery: Continue Current Diet  Nutritional Goals: PO > 75% with good tolerance      Outcome: Ongoing     Problem: Musculor/Skeletal Functional Status  Goal: Highest potential functional level  Outcome: Ongoing Goal: Absence of falls  Outcome: Ongoing     Problem: Skin Integrity:  Goal: Will show no infection signs and symptoms  Description: Will show no infection signs and symptoms  Outcome: Ongoing  Goal: Absence of new skin breakdown  Description: Absence of new skin breakdown  Outcome: Ongoing

## 2021-01-28 NOTE — PLAN OF CARE
Problem: Airway Clearance - Ineffective  Goal: Achieve or maintain patent airway  1/27/2021 1934 by Jessica Smith RN  Note: Frequent non productive cough. Needs Bi-pap to keep O2 sat > 905  1/27/2021 1025 by Osmani Mclean RN  Outcome: Ongoing     Problem: Gas Exchange - Impaired  Goal: Absence of hypoxia  1/27/2021 1934 by Jessica Smith RN  Note: Desats easily with the slightest exertion. Needs Bi-pap to maintain sat > 90%      Problem: Breathing Pattern - Ineffective  Goal: Ability to achieve and maintain a regular respiratory rate  1/27/2021 1934 by Jessica Smith RN  Note: Respiratory rate is 26     Problem:  Body Temperature -  Risk of, Imbalanced  Goal: Complications related to the disease process, condition or treatment will be avoided or minimized  1/27/2021 1934 by Jessica Smith RN  Note: Afebrile temp is 98.1  1/27/2021 1025 by Osmani Mclean RN  Outcome: Ongoing     Problem: Isolation Precautions - Risk of Spread of Infection  Goal: Prevent transmission of infection  1/27/2021 1934 by Jessica Smith RN  Outcome: Ongoing     Problem: Nutrition Deficits  Goal: Optimize nutritional status  1/27/2021 1934 by Jessica Smith RN  Outcome: Ongoing     Problem: Risk for Fluid Volume Deficit  Goal: Maintain normal heart rhythm  1/27/2021 1934 by Jessica Smith RN  Note: SR rates in the 80's     Problem: Risk for Fluid Volume Deficit  Goal: Maintain absence of muscle cramping  1/27/2021 1934 by Jessica Smith RN  Outcome: Ongoing     Problem: Risk for Fluid Volume Deficit  Goal: Maintain normal serum potassium, sodium, calcium, phosphorus, and pH  1/27/2021 1934 by Jessica Smith RN  Outcome: Ongoing     Problem: Loneliness or Risk for Loneliness  Goal: Demonstrate positive use of time alone when socialization is not possible  1/27/2021 1934 by Jessica Smith RN  Outcome: Ongoing     Problem: Fatigue  Goal: Verbalize increase energy and improved vitality 1/27/2021 1934 by Kateryna Maharaj RN  Note: Becomes fatigued with the slightest of exertion.   1/27/2021 1025 by Jerome Snow RN  Outcome: Ongoing     Problem: Patient Education: Go to Patient Education Activity  Goal: Patient/Family Education  1/27/2021 1934 by Kateryna Maharaj RN  Outcome: Ongoing  1/27/2021 1025 by Jerome Snow RN  Outcome: Ongoing     Problem: Falls - Risk of:  Goal: Will remain free from falls  Description: Will remain free from falls  1/27/2021 1934 by Kateryna Maharaj RN  Outcome: Ongoing  1/27/2021 1025 by Jerome Snow RN  Outcome: Ongoing  Goal: Absence of physical injury  Description: Absence of physical injury  1/27/2021 1934 by Kateryna Maharaj RN  Outcome: Ongoing  1/27/2021 1025 by Jerome Snow RN  Outcome: Ongoing     Problem: Nutrition  Goal: Optimal nutrition therapy  Description: Nutrition Problem #1: Inadequate oral intake  Intervention: Food and/or Nutrient Delivery: Continue Current Diet  Nutritional Goals: PO > 75% with good tolerance      1/27/2021 1934 by Kateryna Maharaj RN  Outcome: Ongoing  1/27/2021 1025 by Jerome Snow RN  Outcome: Ongoing     Problem: Musculor/Skeletal Functional Status  Goal: Absence of falls  1/27/2021 1934 by Kateryna Maharaj RN  Outcome: Ongoing  1/27/2021 1025 by Jerome Snwo RN  Outcome: Ongoing

## 2021-01-28 NOTE — FLOWSHEET NOTE
Patient sitting up on side of bed for lunch. Bipap removed. Heated Hi flow nasal cannula applied @ 60 Liters @94%. Patient's oxygen level dropped to 81%. Non-rebreather also applied to patient @ 15 Liters to use in addition. Oxygen saturation only raised to 84-85%. Writer reapplied Bipap for several minutes until oxygen saturation reached 91%. Then removed Bipap and applied the heated high flow and used the non-rebreather for patient to eat lunch.

## 2021-01-28 NOTE — PROGRESS NOTES
[facility]  Inpatient/Observation/Outpatient Rehabilitation    Date: 2021  Patient Name: Tami Gannon       [] Inpatient Acute/Observation       []  Outpatient  : 1945       [] Pt no showed for scheduled appointment    [] Pt refused/declined therapy at this time due to:           [x] Pt cancelled due to:  [] No Reason Given   [] Sick/ill   [x] Other:    Alexandriadhara Susan not seen at 11:54am for physical therapy treatment. Pt unavailable d/t lunch arrival and respiratory therapist going into to give patient treatment at this time. Lisa Carballo RN aware. Will attempt treatment later this afternoon.       Sarah Mccoy, PTA Date: 2021

## 2021-01-28 NOTE — PROGRESS NOTES
Occupational Therapy  Facility/Department: [facility] AT THE [facility]  Daily Treatment Note  NAME: Abby Estrada  : 1945  MRN: [id number]    Date of Service: 2021    Discharge Recommendations:  Continue to assess pending progress       Assessment      OT Education: OT Role;Plan of Care;Home Exercise Program  Patient Education: sidelying/proning  Barriers to Learning: none  Activity Tolerance  Activity Tolerance: Treatment limited secondary to medical complications (free text)  Activity Tolerance: Spo2 decreased with act  Safety Devices  Safety Devices in place: Yes  Type of devices: Call light within reach; Left in chair(pt sidelying in bed)         Patient Diagnosis(es): The primary encounter diagnosis was Acute hypoxemic respiratory failure ([facility] [address] A diagnosis of Pneumonia due to severe acute respiratory syndrome coronavirus 2 (SARS-CoV-2) was also pertinent to this visit. has a past medical history of Asthma, Hyperlipidemia, Hypertension, and Type 2 diabetes mellitus without complication ([facility] [address] has a past surgical history that includes hernia repair and pr colonoscopy flx dx w/collj spec when pfrmd (N/A, 10/5/2018). Restrictions  Restrictions/Precautions  Restrictions/Precautions: Isolation, Fall Risk  Subjective   General  Chart Reviewed: Yes  Patient assessed for rehabilitation services?: Yes  Response to previous treatment: Patient with no complaints from previous session  Family / Caregiver Present: No  Referring Practitioner: DONTRELL Dobson  Diagnosis: COVID-19  Subjective  Subjective: \"Im just not ready for this\" post therapy. Pt sp02 decreased to 78% with supine HOB BUE therex x ~ 3 min x 2  General Comment  Comments: Pt in bed side lying upon ALTMAN arrival. Agreeable to therex.   Pain Assessment  Pain Assessment: 0-10  Pain Level: 0  Vital Signs  Patient Currently in Pain: Denies   Orientation     Objective                Bed mobility  Rolling to Left: Supervision  Rolling to Right: Supervision Scooting: Supervision  Comment: Pt repositioned self with SUP in bed                                               Type of ROM/Therapeutic Exercise  Comment: BUE therex to increase strength/endurance for ease of fxl tasks. Yellow felx bar twist and bends. Pt tolerated ~ 3 min ex x 2 supine with HOB raised. Frequent coughing and repositioing before pt d/c'd ex adn returned to side lying, Sp02 decreased to 78% with act. Plan   Plan  Times per week: 7x/week  Times per day: Daily  Current Treatment Recommendations: Strengthening, Safety Education & Training, Balance Training, Patient/Caregiver Education & Training, Self-Care / ADL, Functional Mobility Training, Endurance Training  G-Code     OutComes Score                                                  AM-PAC Score             Goals  Short term goals  Time Frame for Short term goals: 20 visits  Short term goal 1: Pt will engage in greater than 15 minutes BUE therex/theract without rest breaks to improve UB strength for increased ease and independence with ADL and functional transfers. Short term goal 2: Pt will complete toileting routine Bruno with use of AE PRN to improve safety with ADL tasks. Short term goal 3: Pt will complete TB ADL Bruno with use of AE PRN to ensure safe return home and return to prior level of function. Short term goal 4: Pt will engage in greater than 5 minute dynamic standing task with no LOB or RB to improve endurance and safety with ADL and functional mobility.        Therapy Time   Individual Concurrent Group Co-treatment   Time In 7084         Time Out 1141         Minutes 10                 BEULAH Louis

## 2021-01-28 NOTE — FLOWSHEET NOTE
Patient up to [facility]. Oxygen saturation dropped to 78% with Bipap on. After few minutes patient oxygen saturation @ 87%. Bipap remained on to return to bed. Oxygen level dropped again for transfer to bed. Awaited several minutes till patient oxygen level reached 90%. Heated high flow placed on so patient could finish eating.

## 2021-01-28 NOTE — FLOWSHEET NOTE
Patient able to eat breakfast with heated High flow nasal cannula @ 60 L and 93-94% of FIO2 and using non-rebreather @15 Liters between bites to keep oxygen saturation above 85%.

## 2021-01-28 NOTE — PROGRESS NOTES
ICU Progress Note    SUBJECTIVE/INTERVAL HISTORY:    Patient seen in follow up for acute respiratory failure related to COVID-19. Resting in bed on right side alert and oriented. States his breathing is all right. Denies pain. Afebrile. We did discuss CODE STATUS with him and he does wish to remain a full code at this time. He is tolerating getting up to the chair. OBJECTIVE:    Vitals:   Temp: 97 °F (36.1 °C)  Temp range:    Temp  Av °F (36.7 °C)  Min: 97 °F (36.1 °C)  Max: 98.5 °F (36.9 °C)    BP: 139/77  BP Range:      Systolic (48ZUY), QV , Min:107 , WQN:445      Diastolic (88KNP), MAGO:38, Min:51, Max:103    Pulse: 86  Pulse Range:    Pulse  Av  Min: 69  Max: 105    Resp: 25  Resp Range:   Resp  Av.4  Min: 11  Max: 35    SpO2: (!) 89 % High flow and BiPAP  SpO2 range:   SpO2  Av.2 %  Min: 89 %  Max: 96 %    24HR INTAKE/OUTPUT:      Intake/Output Summary (Last 24 hours) at 2021 1702  Last data filed at 2021 1421  Gross per 24 hour   Intake 920 ml   Output 1450 ml   Net -530 ml       -----------------------------------------------------------------  Review of Systems:  Constitutional: Negative  for fevers, and negative for chills.   Eyes: negative for visual disturbance   ENT: negative for sore throat, negative nasal congestion, and negative for earache  Respiratory: positive for shortness of breath, positive for cough, and negative for wheezing  Cardiovascular: negative for chest pain, negative for palpitations, and negative for syncope  Gastrointestinal: negative for abdominal pain, negative for nausea,negative for vomiting, negative for diarrhea, negative for constipation, and negative for hematochezia or melena  Genitourinary: negative for dysuria, negative for urinary urgency, negative for urinary frequency, and negative for hematuria  Skin: negative for skin rash, and negative for skin lesions  Neurological: negative for unilateral weakness, numbness or tingling Exam:  GEN:  alert and oriented to person, place and time, well-developed and well-nourished, in no acute distress  EYES:  No gross abnormalities. , PERRL and EOMI  NECK: normal, supple, no lymphadenopathy,  no carotid bruits  PULM:  Clear  but decreased breath sounds noted-throughout  COR:   regular rate & rhythm, no murmurs, no gallops, S1 normal and S2 normal  ABD:    soft, non-tender, non-distended, normal bowel sounds, no masses or organomegaly  EXT:    no cyanosis, clubbing or edema present    NEURO: follows commands, AMATO, no deficits  SKIN:   no rashes or significant lesions      -----------------------------------------------------------------  Diagnostic Data:  Lab Results   Component Value Date    WBC 15.0 (H) 01/28/2021    HGB 11.7 (L) 01/28/2021    HCT 36.2 (L) 01/28/2021     01/28/2021    ALT 15 01/28/2021    AST 34 01/28/2021     01/28/2021    K 5.2 01/28/2021    CL 97 (L) 01/28/2021    CREATININE 0.68 (L) 01/28/2021    BUN 22 01/28/2021    CO2 27 01/28/2021    INR 1.4 01/28/2021    LABA1C 7.3 (H) 01/20/2021       ASSESSMENT:    Principal Problem:    COVID-19 virus infection  Active Problems:    Acute respiratory failure with hypoxia (HCC)    Type 2 diabetes mellitus without complication (HCC)    Hypertension    Mild malnutrition ([address]  Resolved Problems:    * No resolved hospital problems. *      Patient Active Problem List    Diagnosis Date Noted    Mild malnutrition ([address] 01/26/2021    Acute respiratory failure with hypoxia ([address] 01/20/2021    Type 2 diabetes mellitus without complication ([address]     Hypertension     COVID-19 virus infection 01/19/2021    Grade II internal hemorrhoids 10/05/2018       PLAN:  Principal Problem:    COVID-19 virus infection  Active Problems:    Acute respiratory failure with hypoxia (HCC)    Type 2 diabetes mellitus without complication ([address]    Hypertension    Mild malnutrition ([facility] [address]  Resolved Problems:    * No resolved hospital problems.  * · COVID-19 virus infection  ? Completed IV remdesivir  ? Continue Solu-Medrol  ? Continue Vitamin C D and zinc  ? Completed ivermectin 2 doses  · Acute respiratory failure with hypoxia  ? Supplemental oxygen to maintain SPO2 greater than 92%currently using BiPAP with settings 16/10 with 95 % FiO2 and may alternate with high flow  ? Acapella  ? Prone  ? Out of bed with meals  ? PT OT  ? Daily labs continue to rise showing continued surge of Covid  ? Appreciate Dr. Chelsie Alba  ? Venous Dopplers negative for DVT  ? CT chest to rule out PEunable to do due to high levels of oxygen demand needed via BiPAP do not have the availability to do this. He is anticoagulated  · Type 2 diabetes  ? POCT before meals and at bedtimebetter controlled  ? Continue Glucophage  ? Hypoglycemia protocol  ? Continue to high-dose sliding scale  ? Continue Lantus to 40 units at at bedtime  · Hypertension  ?  Continue lisinopril  · High risk medications: Insulin  · Discharge plan: Pending    KATIA Conway, NP-C  1/28/2021, 5:02 PM

## 2021-01-28 NOTE — PROGRESS NOTES
PT is alert and oriented. Skin color,turgor and temp are normal. While at rest PT's respirations are unlabored. Respirations become labored with the slightest of exertion. PT needs Bi-pap to keep O2 sats above 90%. PT\"s breath sounds are diminished with some scattered rhonchi. PT is in a SR rates in the 80's. PT denies any chest pain. Abdomin is soft, no guarding. PT reports eating well today. Denies any wants or needs at this time.

## 2021-01-29 NOTE — PROGRESS NOTES
Pt was laying on side when  came into room. PT engaged in conversation and shared that he was having a hard day.   prayed with PT.

## 2021-01-29 NOTE — PROGRESS NOTES
Pt had coughing fit and sp02 dropped to 75% on bipap, pt gasps that he cant breathe, fi02 increased to 100%. Pt able to calm self and take deep breaths and sp02 up to 88-90%, pt repositioned to right side, pt refused to try prone position. Call light in reach. Pt unable to eat lunch, pt still unable to take po pills due to unable to tolerate bipap mask off. FSBS 148, pt refused insulin dose at this time.

## 2021-01-29 NOTE — PROGRESS NOTES
ICU Progress Note    SUBJECTIVE/INTERVAL HISTORY:    Patient seen in follow up for acute respiratory failure related to COVID-19. Resting in bed on right side alert and oriented. Denies pain. Afebrile. He is tolerating getting up to the chair. He is feeling frustrated because he continues to require so much oxygen. Encouragement given. Dr. Nathanael Sharma approves for family to come and visit him at this time. He does state that he is coughing up white to bloody sputum. OBJECTIVE:    Vitals:   Temp: 97.6 °F (36.4 °C)  Temp range:    Temp  Av.4 °F (36.3 °C)  Min: 96.5 °F (35.8 °C)  Max: 98.2 °F (36.8 °C)    BP: 121/67  BP Range:      Systolic (33XGK), RYA:155 , Min:104 , UJP:864      Diastolic (59XSI), KLL:16, Min:46, Max:103    Pulse: 76  Pulse Range:    Pulse  Av.1  Min: 66  Max: 105    Resp: 26  Resp Range:   Resp  Av.7  Min: 20  Max: 32    SpO2: (!) 88 % High flow and BiPAP  SpO2 range:   SpO2  Av.5 %  Min: 88 %  Max: 94 %    24HR INTAKE/OUTPUT:      Intake/Output Summary (Last 24 hours) at 2021 1038  Last data filed at 2021 0857  Gross per 24 hour   Intake 860 ml   Output 1450 ml   Net -590 ml       -----------------------------------------------------------------  Review of Systems:  Constitutional: Negative  for fevers, and negative for chills.   Eyes: negative for visual disturbance   ENT: negative for sore throat, negative nasal congestion, and negative for earache  Respiratory: positive for shortness of breath, positive for cough, and negative for wheezing  Cardiovascular: negative for chest pain, negative for palpitations, and negative for syncope  Gastrointestinal: negative for abdominal pain, negative for nausea,negative for vomiting, negative for diarrhea, negative for constipation, and negative for hematochezia or melena  Genitourinary: negative for dysuria, negative for urinary urgency, negative for urinary frequency, and negative for hematuria Skin: negative for skin rash, and negative for skin lesions  Neurological: negative for unilateral weakness, numbness or tingling    Exam:  GEN:  alert and oriented to person, place and time, well-developed and well-nourished, in no acute distress  EYES:  No gross abnormalities. , PERRL and EOMI  NECK: normal, supple, no lymphadenopathy,  no carotid bruits  PULM:  Clear  but decreased breath sounds noted-throughout  COR:   regular rate & rhythm, no murmurs, no gallops, S1 normal and S2 normal  ABD:    soft, non-tender, non-distended, normal bowel sounds, no masses or organomegaly  EXT:    no cyanosis, clubbing or edema present    NEURO: follows commands, AMATO, no deficits  SKIN:   no rashes or significant lesions      -----------------------------------------------------------------  Diagnostic Data:  Lab Results   Component Value Date    WBC 15.9 (H) 01/29/2021    HGB 12.2 (L) 01/29/2021    HCT 37.7 (L) 01/29/2021     01/29/2021    ALT 13 01/29/2021    AST 37 01/29/2021     (L) 01/29/2021    K 5.1 01/29/2021    CL 92 (L) 01/29/2021    CREATININE 0.56 (L) 01/29/2021    BUN 22 01/29/2021    CO2 22 01/29/2021    INR 1.2 01/29/2021    LABA1C 7.3 (H) 01/20/2021       ASSESSMENT:    Principal Problem:    COVID-19 virus infection  Active Problems:    Acute respiratory failure with hypoxia (HCC)    Type 2 diabetes mellitus without complication (HCC)    Hypertension    Mild malnutrition ([address]  Resolved Problems:    * No resolved hospital problems.  *      Patient Active Problem List    Diagnosis Date Noted    Mild malnutrition ([address] 01/26/2021    Acute respiratory failure with hypoxia ([address] 01/20/2021    Type 2 diabetes mellitus without complication ([address]     Hypertension     COVID-19 virus infection 01/19/2021    Grade II internal hemorrhoids 10/05/2018       PLAN:  Principal Problem:    COVID-19 virus infection  Active Problems:    Acute respiratory failure with hypoxia ([facility] [address]

## 2021-01-29 NOTE — PROGRESS NOTES
Pt attempted to take po medications again, unable to tolerate bipap off. bipap on and pt returned to bed.

## 2021-01-29 NOTE — PROGRESS NOTES
Pt up in chair, alert and oriented. Bipap off and heated high flow applied with 60L and 95% fi02 and non rebreather on at flush. Pt unable to tolerate after a few minutes, sp02 dropped to 75%. Pt requested to have bipap back on. Pt unable to eat breakfast at this time and unable to take any PO pills at this time.

## 2021-01-29 NOTE — PROGRESS NOTES
[facility]  Inpatient/Observation/Outpatient Rehabilitation    Date: 2021  Patient Name: Miguel Garcia       [x] Inpatient Acute/Observation       []  Outpatient  : 1945       [] Pt no showed for scheduled appointment    [x] Pt refused/declined therapy at this time due to: having a bad day and did not sleep well last night.          [] Pt cancelled due to:  [] No Reason Given   [] Sick/ill   [] Other:        Maureen Calle Date: 2021

## 2021-01-29 NOTE — PROGRESS NOTES
Awake.  Frequent productive cough of thin clear blood tinged sputum. Assisted with urinal.  Oxygen saturation down to 86% with movement. Bi pap continued at 95%.

## 2021-01-29 NOTE — PROGRESS NOTES
Updated pt that Dr Juanito Lawrence has okayed for family to visit one at a time, they have to wear a mask, gown, and gloves. Pt states that is nervous about having family visit he states \"I don't want to make them sick\". Writer told nurse that is fine but to let staff know if he changes his mind and would like anyone called to let them know that they can visit.

## 2021-01-29 NOTE — PROGRESS NOTES
Located within [facility]  Inpatient/Observation/Outpatient Rehabilitation    Date: 2021  Patient Name: Theadore Kayser       [x] Inpatient Acute/Observation       []  Outpatient  : 1945       [] Pt no showed for scheduled appointment    [x] Pt refused/declined therapy at this time due to:    \"Having a bad day\"     [] Pt cancelled due to:  [] No Reason Given   [] Sick/ill   [] Other:        Willian Mccartney ,PTA Date: 2021

## 2021-01-29 NOTE — PROGRESS NOTES
Quiet in bed. Helped to use urinal.  Assisted with repositioning on right side. Oxygen saturation down to 80%. FIO2 increased to 100% for 4 minutes to allow him to recover. Becomes SOB with slightest exertion. Lungs with fine rales in anterior bases and rales in mid and bases posteriorly. Productive cough of clear blood tinged sputum.

## 2021-01-29 NOTE — PROGRESS NOTES
[facility] SPECIALTY [facility]  OCCUPATIONAL THERAPY  No Visit Note    [x] ICU    [] Acute   Patient: Isha Leon  Room: F912/P239-41      Isha Leon not seen on 1/29/2021 at 1:40 PM due to patient requesting to hold therapy for the day due to [place] a bad day. \" Rebecca Murillo RN aware. Will re-attempt treatment tomorrow.          Signature: Leobardo Hebert, MOT, OTR/L

## 2021-01-30 NOTE — PROGRESS NOTES
Bertha De La Rosa, patients daughter called and updated about patient condition. She would like Dr. Nathanael Sharma to call and talk to her about plan of care.

## 2021-01-30 NOTE — PROGRESS NOTES
Pt called out for assistance with urinal. Pt urinated 300mL in urinal. Assessment and vital sings as charted. Pt is A & O x 4. Pt denies pain at this time. Cardiac monitor continues to read NSR. BiPAP remains in place at 100%. Continuous pulse ox at 88-91%. Pt turned self to right side and refuses to prone. Pt denies any other needs at this time. Bed alarm on. Call light in reach. Will continue to monitor.

## 2021-01-30 NOTE — PROGRESS NOTES
Pt resting quietly with eyes closed in bed. Respirations even and unlabored. BiPAP remains in use at 100%. Continuous pulse ox remains on with readings 87-90%. No distress noted. Bed alarm on. Call light in reach. Will continue to monitor.

## 2021-01-30 NOTE — PROGRESS NOTES
MAGDIEL Reyes called writer back with update on POC for pt. Dr. Darlene Tse is okay with pt's SpO2 to be maintained on BiPAP at 88% and above unless pt is struggling/labored breathing. Will continue to monitor.

## 2021-01-30 NOTE — PROGRESS NOTES
Patient assessed at this time. Patient pulse oximetry 87-89% on BIPAP at 100%. Patient states his breathing is fine at rest but with any activity he feels like he can't breath. Patient states he was unable to eat at all yesterday due to shortness of breath. Writer sat down with patient and asked if condition worsens would he want to be intubated and he stated yes. Will continue to monitor.

## 2021-01-30 NOTE — PROGRESS NOTES
Patient refusing lunch and laying prone. \"I can only lay on my side. \" Patient turns completely to left side. Pulse oximetry 85-87% on BIPAP.

## 2021-01-30 NOTE — PLAN OF CARE
Problem: Airway Clearance - Ineffective  Goal: Achieve or maintain patent airway  Outcome: Ongoing     Problem: Gas Exchange - Impaired  Goal: Absence of hypoxia  Outcome: Ongoing  Goal: Promote optimal lung function  Outcome: Ongoing     Problem: Breathing Pattern - Ineffective  Goal: Ability to achieve and maintain a regular respiratory rate  Outcome: Ongoing     Problem:  Body Temperature -  Risk of, Imbalanced  Goal: Complications related to the disease process, condition or treatment will be avoided or minimized  Outcome: Ongoing     Problem: Isolation Precautions - Risk of Spread of Infection  Goal: Prevent transmission of infection  Outcome: Ongoing     Problem: Nutrition Deficits  Goal: Optimize nutritional status  Outcome: Ongoing     Problem: Risk for Fluid Volume Deficit  Goal: Maintain normal heart rhythm  Outcome: Ongoing  Goal: Maintain absence of muscle cramping  Outcome: Ongoing  Goal: Maintain normal serum potassium, sodium, calcium, phosphorus, and pH  Outcome: Ongoing     Problem: Loneliness or Risk for Loneliness  Goal: Demonstrate positive use of time alone when socialization is not possible  Outcome: Ongoing     Problem: Fatigue  Goal: Verbalize increase energy and improved vitality  Outcome: Ongoing     Problem: Patient Education: Go to Patient Education Activity  Goal: Patient/Family Education  Outcome: Ongoing     Problem: Falls - Risk of:  Goal: Will remain free from falls  Description: Will remain free from falls  Outcome: Ongoing  Goal: Absence of physical injury  Description: Absence of physical injury  Outcome: Ongoing     Problem: Nutrition  Goal: Optimal nutrition therapy  Description: Nutrition Problem #1: Inadequate oral intake  Intervention: Food and/or Nutrient Delivery: Continue Current Diet  Nutritional Goals: PO > 75% with good tolerance      Outcome: Ongoing     Problem: Musculor/Skeletal Functional Status  Goal: Highest potential functional level  Outcome: Ongoing

## 2021-01-30 NOTE — PROGRESS NOTES
Staff assisted pt up to the [facility] to have a bowel movement. Pt remained on BiPAP the entire time. Pulse ox dropped down to 69% on pivot back to bed after using commode. Pt breathing 46 breaths/min and labored. Writer instructed pt to take slow deep breaths, pt shook head with understanding. Will continue to monitor.

## 2021-01-30 NOTE — PROGRESS NOTES
Pt's SpO2 dropped to 82% while taking 2 scheduled medications and a sip of water. BiPAP immediately put back on per pt request. BiPAP was only off for around 90 sec. Continuous pulse ox remains in place. Will continue to monitor.

## 2021-01-30 NOTE — PROGRESS NOTES
[facility] SPECIALTY [facility]  OCCUPATIONAL THERAPY  No Visit Note    [x] ICU    [] Acute   [facility]: Isha Leon  [facility]      Isha Leon not seen on 1/30/2021 at 2:47 PM due to pt on hold for therapy  all day per nursing.           Signature: MIRIAM Brown

## 2021-01-30 NOTE — PROGRESS NOTES
Pt resting in bed quietly with eyes closed. Pt laying on left side. Bi PAP remains in use at 100% & 16/10. Continuous pulse ox reading 85-87%. Cardiac monitor continues to read NSR. Bed alarm on. Call light in reach. Will continue to monitor.

## 2021-01-30 NOTE — PROGRESS NOTES
Patient laying on left side. Respirations unlabored at rest. Patient continues to be on BIPAP at 100%. Pulse oximetry 87%.

## 2021-01-30 NOTE — PROGRESS NOTES
Patient unable to eat, he states, \"My oxygen is too low. \" Took half of his pills with his oral supplement, he states, \"come back later and I will take the other half. \" Pulse oximetry 84-86% on BIPAP.

## 2021-01-30 NOTE — PROGRESS NOTES
Progress Note    SUBJECTIVE:  FU related to  / dyspnea. OBJECTIVE:    Vitals:   TEMPERATURE:  Current - Temp: 97 °F (36.1 °C); Max - Temp  Av.8 °F (36.6 °C)  Min: 96.8 °F (36 °C)  Max: 98.5 °F (36.9 °C)  RESPIRATIONS RANGE: Resp  Av.9  Min: 23  Max: 46  PULSE RANGE: Pulse  Av.2  Min: 66  Max: 108  BLOOD PRESSURE RANGE:  Systolic (87OSG), RWQ:615 , Min:114 , YDF:779   ; Diastolic (19AZQ), LVU:61, Min:45, Max:106    PULSE OXIMETRY RANGE: SpO2  Av.2 %  Min: 69 %  Max: 92 %  24HR INTAKE/OUTPUT:      Intake/Output Summary (Last 24 hours) at 2021 1106  Last data filed at 2021 1054  Gross per 24 hour   Intake 460 ml   Output 5 ml   Net -1565 ml     -----------------------------------------------------------------  Exam:  General: A & O x3  HEENT: Supple neck & negative  Heart: Regular  Lungs: clear but decreased. No wheezes or rales.   Abdomen: Normal & soft, No tenderness and BS normal  Extremities:  No edema   Neuro: NonFocal     -----------------------------------------------------------------  Diagnostic Data:  Lab Results   Component Value Date    WBC 15.1 (H) 2021    HGB 11.9 (L) 2021     2021       Lab Results   Component Value Date    BUN 23 2021    CREATININE 0.62 (L) 2021     (L) 2021    K 5.0 2021    CALCIUM 9.3 2021    CL 93 (L) 2021    CO2 28 2021    LABGLOM >60 2021       No results found for: Angeli Horseman, EPITHUA, LEUKOCYTESUR, SPECGRAV, GLUCOSEU, KETUA, PROTEINU, HGBUR, CASTUA, CRYSTUA, BACTERIA, YEAST    Lab Results   Component Value Date    TROPONINT NOT REPORTED 2021    PROBNP 480 (H) 2021       Xr Chest Portable    Result Date: 2021 EXAMINATION: ONE XRAY VIEW OF THE CHEST 1/19/2021 8:51 pm COMPARISON: None. HISTORY: ORDERING SYSTEM PROVIDED HISTORY: dyspnea TECHNOLOGIST PROVIDED HISTORY: dyspnea 72-year-old male with dyspnea FINDINGS: Portable AP upright view of the chest. Cardiac monitor leads overlie the chest. Trachea midline. No pneumothorax. No free air. Multifocal airspace disease throughout the lungs with a bilateral middle and lower zone predominance. Trace right-sided pleural effusion. Mild cardiomegaly. Atherosclerotic calcification of the aorta. No acute osseous abnormality evident. 1. Multifocal airspace disease throughout both lungs with a mid and lower zone predominance. Trace right-sided pleural effusion. Findings concerning for multifocal pneumonia. Follow-up is recommended to document resolution. 2. Mild cardiomegaly. ASSESSMENT:    Principal Problem:    COVID-19 virus infection  Active Problems:    Acute respiratory failure with hypoxia (HCC)    Type 2 diabetes mellitus without complication (HCC)    Hypertension    Mild malnutrition ([address]  Resolved Problems:    * No resolved hospital problems. *      Patient Active Problem List    Diagnosis Date Noted    Mild malnutrition ([address] 01/26/2021    Acute respiratory failure with hypoxia ([address] 01/20/2021    Type 2 diabetes mellitus without complication ([address]     Hypertension     COVID-19 virus infection 01/19/2021    Grade II internal hemorrhoids 10/05/2018       PLAN:  · ICU  · Spoke with pulmonary / will intubate if sats drop consistently.   · Critical Care Time: 35  · Reviewed with Bette Johnson M.D.

## 2021-01-30 NOTE — PROGRESS NOTES
Writer updated MAGDIEL Reyes on pt status. Continuous pulse ox continues at 88-90% on BiPAP at 100%. Angelica Estes states she will talk with Dr. Sina Camarena regarding POC at this point. Will continue to monitor.

## 2021-01-30 NOTE — PROGRESS NOTES
[facility]  Inpatient/Observation/Outpatient Rehabilitation    Date: 2021  Patient Name: Capo Frost       [x] Inpatient Acute/Observation       []  Outpatient  : 1945       [] Pt no showed for scheduled appointment    [] Pt refused/declined therapy at this time due to:           [x] Pt cancelled due to:  [] No Reason Given   [] Sick/ill   [] Other: Hold therapy this date per nursing. Will attempt evaluation at our earliest opportunity.        Viki Pan, PT, DPT Date: 2021

## 2021-01-30 NOTE — PROGRESS NOTES
Patient took 3 sips of [address] [place] [address] before needing to go back on BIPAP.  SPO2 dropped to 80%

## 2021-01-31 NOTE — PROGRESS NOTES
Pt resting in bed with eyes closed. Respirations unlabored. BiPAP remains in use at 100%. Continuous pulse ox reads 87-91%. Cardiac monitor continues to show NSR/ST. Bed alarm on. Call light in reach. Will continue to monitor.

## 2021-01-31 NOTE — PROGRESS NOTES
[facility] SPECIALTY [facility]  OCCUPATIONAL THERAPY  No Visit Note    [x] ICU    [] Acute   Patient: Johanan Silva  [facility]      Joahnna Silva not seen on 1/31/2021 at 1:38 PM due to pt on hold per nursing this date.            Signature: AGAPITO Amin/CEASAR

## 2021-01-31 NOTE — PROGRESS NOTES
EXAMINATION: ONE XRAY VIEW OF THE CHEST 1/19/2021 8:51 pm COMPARISON: None. HISTORY: ORDERING SYSTEM PROVIDED HISTORY: dyspnea TECHNOLOGIST PROVIDED HISTORY: dyspnea 72-year-old male with dyspnea FINDINGS: Portable AP upright view of the chest. Cardiac monitor leads overlie the chest. Trachea midline. No pneumothorax. No free air. Multifocal airspace disease throughout the lungs with a bilateral middle and lower zone predominance. Trace right-sided pleural effusion. Mild cardiomegaly. Atherosclerotic calcification of the aorta. No acute osseous abnormality evident. 1. Multifocal airspace disease throughout both lungs with a mid and lower zone predominance. Trace right-sided pleural effusion. Findings concerning for multifocal pneumonia. Follow-up is recommended to document resolution. 2. Mild cardiomegaly. ASSESSMENT:    Principal Problem:    COVID-19 virus infection  Active Problems:    Acute respiratory failure with hypoxia (HCC)    Type 2 diabetes mellitus without complication (HCC)    Hypertension    Mild malnutrition ([address]  Resolved Problems:    * No resolved hospital problems. *      Patient Active Problem List    Diagnosis Date Noted    Mild malnutrition ([address] 01/26/2021    Acute respiratory failure with hypoxia ([address] 01/20/2021    Type 2 diabetes mellitus without complication ([address]     Hypertension     COVID-19 virus infection 01/19/2021    Grade II internal hemorrhoids 10/05/2018       PLAN:  · ICU  · If WBC climbs will change antibiotics  ·  will intubate if sats drop consistently.   · Critical Care Time: 15  · Reviewed with sang Quiñonez M.D.

## 2021-01-31 NOTE — PROGRESS NOTES
Patient attempted to sit at side of the bed to take a drink. Patient takes one drink and states he needs to lay down because he can't breathe. Patient states, \"I can't wait to get the hell out of here! I am so sick of this place. \" Bert Duron sat down and gave emotional support.

## 2021-01-31 NOTE — PROGRESS NOTES
Assessment and vital sings as charted. Pt has unlabored breathing while resting in bed but with the slightest movement or side to side rolling the pt becomes labored and his SpO2 drops into the 70's. Pt takes several minutes to recover and SpO2 comes back up into the 80's. Pt rolled from his left to right side with very minimal help, only with tubings/wires. Pt is A & O x 4. Pt denies pain at this time. Pt is able to take his 2 evening medications with a sip of water. SpO2 dropped to 71% within seconds. Pt able to recover into the 80's after a couple minutes. Encouragement given by Brigette Paul. No insulin/lantus given due to pt not eating any meals today. Bed alarm on. Call light in reach. Will continue to monitor.

## 2021-01-31 NOTE — PROGRESS NOTES
Patient requesting water. BIPAP removed and pulse oximetry dropped to 81%. Patient has strong productive cough. BIPAP applied and patient assessed at this time. Denies any pain. Patient is lying on right side.

## 2021-01-31 NOTE — PROGRESS NOTES
[facility]  Inpatient/Observation/Outpatient Rehabilitation    Date: 2021  Patient Name: Joan Manjarrez       [x] Inpatient Acute/Observation       []  Outpatient  : 1945       Pt held per nursing Odreen Hiss) request.       Irwin Rivas, CJ Date: 2021

## 2021-02-01 NOTE — PLAN OF CARE
Problem: Airway Clearance - Ineffective  Goal: Achieve or maintain patent airway  Outcome: Ongoing     Problem: Gas Exchange - Impaired  Goal: Absence of hypoxia  Outcome: Ongoing     Problem: Gas Exchange - Impaired  Goal: Promote optimal lung function  Outcome: Ongoing     Problem: Breathing Pattern - Ineffective  Goal: Ability to achieve and maintain a regular respiratory rate  Outcome: Ongoing     Problem:  Body Temperature -  Risk of, Imbalanced  Goal: Complications related to the disease process, condition or treatment will be avoided or minimized  Outcome: Ongoing     Problem: Isolation Precautions - Risk of Spread of Infection  Goal: Prevent transmission of infection  Outcome: Ongoing     Problem: Nutrition Deficits  Goal: Optimize nutritional status  Outcome: Ongoing     Problem: Risk for Fluid Volume Deficit  Goal: Maintain normal heart rhythm  Outcome: Ongoing     Problem: Risk for Fluid Volume Deficit  Goal: Maintain absence of muscle cramping  Outcome: Ongoing     Problem: Risk for Fluid Volume Deficit  Goal: Maintain normal serum potassium, sodium, calcium, phosphorus, and pH  Outcome: Ongoing     Problem: Loneliness or Risk for Loneliness  Goal: Demonstrate positive use of time alone when socialization is not possible  Outcome: Ongoing     Problem: Fatigue  Goal: Verbalize increase energy and improved vitality  Outcome: Ongoing     Problem: Patient Education: Go to Patient Education Activity  Goal: Patient/Family Education  Outcome: Ongoing     Problem: Falls - Risk of:  Goal: Will remain free from falls  Description: Will remain free from falls  Outcome: Ongoing     Problem: Falls - Risk of:  Goal: Absence of physical injury  Description: Absence of physical injury  Outcome: Ongoing     Problem: Nutrition  Goal: Optimal nutrition therapy  Description: Nutrition Problem #1: Inadequate oral intake  Intervention: Food and/or Nutrient Delivery: Continue Current Diet Nutritional Goals: PO > 75% with good tolerance      Outcome: Ongoing     Problem: Musculor/Skeletal Functional Status  Goal: Highest potential functional level  Outcome: Ongoing     Problem: Musculor/Skeletal Functional Status  Goal: Absence of falls  Outcome: Ongoing     Problem: Skin Integrity:  Goal: Will show no infection signs and symptoms  Description: Will show no infection signs and symptoms  Outcome: Ongoing     Problem: Skin Integrity:  Goal: Absence of new skin breakdown  Description: Absence of new skin breakdown  Outcome: Ongoing

## 2021-02-01 NOTE — PROGRESS NOTES
[facility] SPECIALTY [facility]  OCCUPATIONAL THERAPY  No Visit Note    [x] ICU    [] Acute   [facility]: Juan White  [id number]      Juan White not seen on 2/1/2021 at 11:38 AM due to patient refusal. Notified Selina Storm RN of refusal.          Signature: Karolyn Lipscomb OTR/CEASAR

## 2021-02-01 NOTE — PROGRESS NOTES
Patient assessment and vitals completed, see charting on all. Patient continues to lay on right side. He was given a drink of water and back on BiPAP.  Saturation has been 85-92% on 100%  fio2

## 2021-02-01 NOTE — PROGRESS NOTES
Pt able to take the rest of po medications with sips of water, unable to eat any breakfast or drink any of the supplement, states he will try to drink the supplement later.

## 2021-02-01 NOTE — PROGRESS NOTES
Pt able to take a few sips of water and able to drink some tomato soup, and took a few bites of yogurt. Pt needed bipap mask back on between bites, tolerated fairly well. sp02 dropped to 80% with bites of food or drinks.

## 2021-02-01 NOTE — PROGRESS NOTES
ICU Progress Note    SUBJECTIVE/INTERVAL HISTORY:    Patient seen in follow up for acute respiratory failure related to COVID-19. Resting in bed on right side alert and oriented. Appears fatigued. Denies pain. Afebrile. He is tolerating getting up to the chair. Nursing reports that he refused for his family to come and see him over the weekend. Dr. Brice Pfeiffer approves for family to come and visit him at this time. He states that he is not coughing up as much anymore. He is currently still on BiPAP at 18/12 with 100% FiO2. He is in some mild distress where he is using his abdominal muscles. OBJECTIVE:    Vitals:   Temp: 97 °F (36.1 °C)  Temp range:    Temp  Av.1 °F (36.2 °C)  Min: 96.9 °F (36.1 °C)  Max: 97.7 °F (36.5 °C)    BP: (!) 140/89  BP Range:      Systolic (65LPX), NNI:894 , Min:56 , AEJ:442      Diastolic (00RIE), KZF:69, Min:14, Max:94    Pulse: 101  Pulse Range:    Pulse  Av  Min: 75  Max: 112    Resp: 25  Resp Range:   Resp  Av.4  Min: 20  Max: 34    SpO2: (!) 85 % High flow and BiPAP  SpO2 range:   SpO2  Av.6 %  Min: 83 %  Max: 94 %    24HR INTAKE/OUTPUT:      Intake/Output Summary (Last 24 hours) at 2021 1328  Last data filed at 2021 1206  Gross per 24 hour   Intake 3020 ml   Output 875 ml   Net 2145 ml       -----------------------------------------------------------------  Review of Systems:  Constitutional: Negative  for fevers, and negative for chills.   Eyes: negative for visual disturbance   ENT: negative for sore throat, negative nasal congestion, and negative for earache  Respiratory: positive for shortness of breath, positive for cough, and negative for wheezing  Cardiovascular: negative for chest pain, negative for palpitations, and negative for syncope  Gastrointestinal: negative for abdominal pain, negative for nausea,negative for vomiting, negative for diarrhea, negative for constipation, and negative for hematochezia or melena Genitourinary: negative for dysuria, negative for urinary urgency, negative for urinary frequency, and negative for hematuria  Skin: negative for skin rash, and negative for skin lesions  Neurological: negative for unilateral weakness, numbness or tingling    Exam:  GEN:  alert and oriented to person, place and time, well-developed and well-nourished, in mild respiratory distress  EYES:  No gross abnormalities. , PERRL and EOMI  NECK: normal, supple, no lymphadenopathy,  no carotid bruits  PULM:  Clear  but decreased breath sounds noted-throughout, tachypneic and use of accessory muscles  COR:   regular rate & rhythm, no murmurs, no gallops, S1 normal and S2 normal  ABD:    soft, non-tender, non-distended, normal bowel sounds, no masses or organomegaly  EXT:    no cyanosis, clubbing or edema present    NEURO: follows commands, AMATO, no deficits  SKIN:   no rashes or significant lesions      -----------------------------------------------------------------  Diagnostic Data:  Lab Results   Component Value Date    WBC 17.6 (H) 02/01/2021    HGB 12.5 (L) 02/01/2021    HCT 38.7 (L) 02/01/2021     02/01/2021    ALT 18 02/01/2021    AST 29 02/01/2021     (L) 02/01/2021    K 5.0 02/01/2021    CL 95 (L) 02/01/2021    CREATININE 0.63 (L) 02/01/2021    BUN 31 (H) 02/01/2021    CO2 27 02/01/2021    INR 1.1 02/01/2021    LABA1C 7.3 (H) 01/20/2021       ASSESSMENT:    Principal Problem:    COVID-19 virus infection  Active Problems:    Acute respiratory failure with hypoxia (HCC)    Type 2 diabetes mellitus without complication (HCC)    Hypertension    Mild malnutrition ([address]  Resolved Problems:    * No resolved hospital problems.  *      Patient Active Problem List    Diagnosis Date Noted    Mild malnutrition ([address] 01/26/2021    Acute respiratory failure with hypoxia ([address] 01/20/2021    Type 2 diabetes mellitus without complication (HCC)     Hypertension     COVID-19 virus infection 01/19/2021  Grade II internal hemorrhoids 10/05/2018       PLAN:  Principal Problem:    COVID-19 virus infection  Active Problems:    Acute respiratory failure with hypoxia (HCC)    Type 2 diabetes mellitus without complication (HCC)    Hypertension    Mild malnutrition ([address]  Resolved Problems:    * No resolved hospital problems. *    · COVID-19 virus infection  ? Completed IV remdesivir  ? Continue Solu-Medrol  ? Continue Vitamin C D and zinc  ? Completed ivermectin 2 doses  · Acute respiratory failure with hypoxia  ? Supplemental oxygen to maintain SPO2 greater than 92%currently using BiPAP with settings 18/12 with 100 % FiO2 and may alternate with high flow  ? Acapella  ? Prone  ? Out of bed with meals  ? PT OT  ? Daily labs continue to decrease   ? Appreciate Dr. Newton Waller  ? Venous Dopplers negative for DVT  ? CT chest to rule out PEunable to do due to high levels of oxygen demand needed via BiPAP do not have the availability to do this. He is anticoagulated  · Type 2 diabetes  ? POCT before meals and at bedtimebetter controlled  ? Continue Glucophage  ? Hypoglycemia protocol  ? Continue to high-dose sliding scale  ? Continue Lantus to 40 units at at bedtime  · Hypertension  ?  Continue lisinopril  · High risk medications: Insulin  · Discharge plan: Pending    KATIA Johnson, NP-C  2/1/2021, 1:28 PM

## 2021-02-01 NOTE — PROGRESS NOTES
[facility] SPECIALTY [facility]  OCCUPATIONAL THERAPY  No Visit Note    [x] ICU    [] Acute   Patient: Sierra Menon  [facility]: [facility]S399-96      Sierra Menon not seen on 2/1/2021 at 2:31 PM due to with  Yoko Spangler RN for care- reporting he will not be able to tolerate therapy at this time, hold tx this time. Will attempt tomorrow if appropriate.           Signature: Onur London OTR/CEASAR

## 2021-02-01 NOTE — PROGRESS NOTES
Pt sitting up to edge of bed, called out and states the he felt short of breath, sp02 dropped to 83% on bipap at 100% fi02. After a few minutes pt was able to lay down on right side, refused prone position.

## 2021-02-01 NOTE — PROGRESS NOTES
Patient called out and specifically asked nurse to tell Iain Aparicio RT that the mask doesn't feel right. Nurse did call RT, he is with other patients and asks writer to adjust. Writer back to patient room and informed patient that Mima Chase is with someone else at this time and writer would help adjust mask, patient refuses to let writer assist, writer informs patient that she will tell RT Iain Aparicio but she is unsure how long he will be.

## 2021-02-01 NOTE — PROGRESS NOTES
[facility]  Inpatient/Observation/Outpatient Rehabilitation    Date: 2021  Patient Name: Kush Parr       [x] Inpatient Acute/Observation       []  Outpatient  : 1945       Attempted to see pt but he refused d/t his SOB.  Nursing Bayron Mathews) alerted to refusal.      Marjorie Barreto, PTA Date: 2021

## 2021-02-01 NOTE — PROGRESS NOTES
BP reading low on monitor, writer entered room and pt awakens easily and denies any dizziness or lightheadedness. New blood pressure cuff applied, pt resting on right side. Saloni Aguiar NP, see orders.

## 2021-02-01 NOTE — PROGRESS NOTES
Patient wanted Bipap removed to cough some mucous up, patient does drop as low as 69% with mask removal, once replaced he comes back up to mid 80s.  Writer to continue to monitor patient

## 2021-02-01 NOTE — PROGRESS NOTES
Patient vitals and assessment has been completed, see charting on all. Patient is with diminished lungs, productive cough, SOB at rest and with exertion. Patient denies any pain or dizziness. Patient was offered a drink. He does ask for Respiratory when nurse in room, writer did help adjust patient mask. Therapy called and they are to come and see patient. He denies any further needs at this time.

## 2021-02-01 NOTE — PROGRESS NOTES
Pt sat up to edge of bed, able move bipap mask off to take half of po medications with drink of orange juice. Bipap mask back on and pt states he is unable to eat any more at this time. Pt states he will take the rest of his pills in a few minutes.

## 2021-02-01 NOTE — PROGRESS NOTES
Patient laying on right side at this time, he will not prone but is turning from side to side. Patient used urinal with assistance of writer, and given a drink of water, BiPAP back on and in place.

## 2021-02-01 NOTE — PROGRESS NOTES
Patient reassessed at this time, see charting on all.    Offered to perform oral care this AM and patient refuses

## 2021-02-02 NOTE — PROGRESS NOTES
[facility]  OCCUPATIONAL THERAPY  No Visit Note    [] ICU    [x] Acute   Patient: Penelope Mccormick  Room: Y424/D279-13      Penelope Mccormick not seen on 2/2/2021 at 12:35 PM due to pt on hold this AM per nursing d/t pt is unable to tolerate tx. Will consult with NSG later this date and re-attempt if appropriate.            Signature: AGAPITO Das/CEASAR

## 2021-02-02 NOTE — PROGRESS NOTES
[facility] SPECIAL[facility]  OCCUPATIONAL THERAPY  No Visit Note    [x] ICU    [] Acute   Patient: Isha Leon  Room: [place] [address]      Isha Leon not seen on 2/2/2021 at 3:25 PM due to [facility] reporting pt not appropriate for PM tx d/t condition upon ALTMAN's second attempt.          Signature: AGAPITO Escobar/CEASAR

## 2021-02-02 NOTE — FLOWSHEET NOTE
Bi pap continued on. took morning medication in applesauce with drinks a little at a time with breaks in between. Anxious, puts BiPAP mask on instantly after medication takes. Able to drink juices for breakfast and take half of oatmeal with frequent breaks in between. Bi pap continued. Call light within reach.

## 2021-02-02 NOTE — PROGRESS NOTES
[facility]  Inpatient/Observation/Outpatient Rehabilitation    Date: 2021  Patient Name: Destinee Tariq       [x] Inpatient Acute/Observation       []  Outpatient  : 1945       [] Pt no showed for scheduled appointment    [] Pt refused/declined therapy at this time due to:           [x] Pt cancelled due to:  [] No Reason Given   [] Sick/ill   [x] Other:    Patient alert and up in recliner upon entry, sp02 at 89% with Bi-navi. Pt declines ex at this time d/t increased SOB with activity. Jazmine Lopez RN alerted.       Nikki Mohan, CJ Date: 2021

## 2021-02-02 NOTE — PROGRESS NOTES
ICU Progress Note    SUBJECTIVE/INTERVAL HISTORY:    Patient seen in follow up for acute respiratory failure related to COVID-19. Resting in bed on right side alert and oriented. Appears fatigued. Denies pain. Afebrile. He is tolerating getting up to the chair. He is currently still on BiPAP at 18/12 with 100% FiO2. He is in some mild distress where he is using his abdominal muscles. He is struggling to maintain his oxygen saturation levels. OBJECTIVE:    Vitals:   Temp: 97.8 °F (36.6 °C)  Temp range:    Temp  Av.8 °F (36.6 °C)  Min: 97.7 °F (36.5 °C)  Max: 97.9 °F (36.6 °C)    BP: 108/70  BP Range:      Systolic (17QNV), YKA:042 , Min:101 , TUO:042      Diastolic (30YYI), TNF:96, Min:34, Max:106    Pulse: 108  Pulse Range:    Pulse  Av.4  Min: 79  Max: 128    Resp: 29  Resp Range:   Resp  Av.1  Min: 18  Max: 35    SpO2: (!) 86 % High flow and BiPAP  SpO2 range:   SpO2  Av.4 %  Min: 82 %  Max: 92 %    24HR INTAKE/OUTPUT:      Intake/Output Summary (Last 24 hours) at 2021 1243  Last data filed at 2021 1141  Gross per 24 hour   Intake 3000 ml   Output 1875 ml   Net 1125 ml       -----------------------------------------------------------------  Review of Systems:  Constitutional: Negative  for fevers, and negative for chills.   Eyes: negative for visual disturbance   ENT: negative for sore throat, negative nasal congestion, and negative for earache  Respiratory: positive for shortness of breath, positive for cough, and negative for wheezing  Cardiovascular: negative for chest pain, negative for palpitations, and negative for syncope  Gastrointestinal: negative for abdominal pain, negative for nausea,negative for vomiting, negative for diarrhea, negative for constipation, and negative for hematochezia or melena  Genitourinary: negative for dysuria, negative for urinary urgency, negative for urinary frequency, and negative for hematuria Skin: negative for skin rash, and negative for skin lesions  Neurological: negative for unilateral weakness, numbness or tingling    Exam:  GEN:  alert and oriented to person, place and time, well-developed and well-nourished, in mild respiratory distress  EYES:  No gross abnormalities. , PERRL and EOMI  NECK: normal, supple, no lymphadenopathy,  no carotid bruits  PULM:  Clear  but decreased breath sounds noted-throughout, rhonchi heard with cough tachypneic and use of accessory muscles  COR:   regular rate & rhythm, no murmurs, no gallops, S1 normal and S2 normal  ABD:    soft, non-tender, non-distended, normal bowel sounds, no masses or organomegaly  EXT:    no cyanosis, clubbing or edema present    NEURO: follows commands, AMATO, no deficits  SKIN:   no rashes or significant lesions      -----------------------------------------------------------------  Diagnostic Data:  Lab Results   Component Value Date    WBC 20.2 (H) 02/02/2021    HGB 12.0 (L) 02/02/2021    HCT 37.1 (L) 02/02/2021     02/02/2021    ALT 21 02/02/2021    AST 32 02/02/2021     (L) 02/02/2021    K 4.8 02/02/2021    CL 95 (L) 02/02/2021    CREATININE 0.65 (L) 02/02/2021    BUN 24 (H) 02/02/2021    CO2 27 02/02/2021    INR 1.2 02/02/2021    LABA1C 7.3 (H) 01/20/2021       ASSESSMENT:    Principal Problem:    COVID-19 virus infection  Active Problems:    Acute respiratory failure with hypoxia (HCC)    Type 2 diabetes mellitus without complication (HCC)    Hypertension    Mild malnutrition ([address]  Resolved Problems:    * No resolved hospital problems.  *      Patient Active Problem List    Diagnosis Date Noted    Mild malnutrition ([address] 01/26/2021    Acute respiratory failure with hypoxia ([address] 01/20/2021    Type 2 diabetes mellitus without complication ([facility]     Hypertension     COVID-19 virus infection 01/19/2021    Grade II internal hemorrhoids 10/05/2018       PLAN:  Principal Problem:    COVID-19 virus infection  Active Problems: Acute respiratory failure with hypoxia (HCC)    Type 2 diabetes mellitus without complication (HCC)    Hypertension    Mild malnutrition ([facility]  Resolved Problems:    * No resolved hospital problems. *    · COVID-19 virus infection  ? Completed IV remdesivir  ? Continue Solu-Medrol  ? Continue Vitamin C D and zinc  ? Completed ivermectin 2 doses  · Acute respiratory failure with hypoxia  ? Supplemental oxygen to maintain SPO2 greater than 92%currently using BiPAP with settings 18/12 with 100 % FiO2 and may alternate with high flow  ? Acapella  ? Prone  ? Out of bed with meals  ? PT OT  ? Daily labs continue to decrease   ? Appreciate Dr. Diony Cardona  ? Venous Dopplers negative for DVT  ? CT chest to rule out PEunable to do due to high levels of oxygen demand needed via BiPAP do not have the availability to do this. He is anticoagulated  · Type 2 diabetes  ? POCT before meals and at bedtimebetter controlled  ? Continue Glucophage  ? Hypoglycemia protocol  ? Continue to high-dose sliding scale  ? Continue Lantus to 40 units at at bedtime  · Hypertension  ?  Continue lisinopril  · High risk medications: Insulin  · Discharge plan: Pending    KATIA Todd, NP-C  2/2/2021, 12:43 PM

## 2021-02-02 NOTE — PROGRESS NOTES
Pt resting in bed quietly with eyes closed. Assessment and vital signs as charted. Pt denies pain at this time. Pt is A & O x 4. Bi PAP remains in use at 100%. Pt laying completely on his left side, refuses to prone at this time. Pt denies any other needs at this time. Call light in reach. Will continue to monitor.

## 2021-02-02 NOTE — FLOWSHEET NOTE
On bedpan and had a BM. After having a BM states he need to use a commode. On BSC with assist. Anxious. PO2 SATs in low 80\" with movement. Had an additional BM on the commode and urinated. To chair at bedside. States he cannot schofield exercises with therapy today. becomes too SOB . Feet up while in chair .  Continue to monitor

## 2021-02-02 NOTE — FLOWSHEET NOTE
Up in chair at bedside. Vitals checked and assessment completed. Denies pain at present time. Continues on BiPAP at 100%. Returned to bed. Very weak, unsteady. Positioned to right side. Head of bed, up. Pastoral care in to see patient.

## 2021-02-02 NOTE — PROGRESS NOTES
Comprehensive Nutrition Assessment    Type and Reason for Visit:  Reassess    Nutrition Recommendations/Plan:  Encourage oral intakes    Nutrition Assessment:  Continued mild malnutrition r/t inadequate nutrient intakes, AEB significant weight losses. Continues with hyperglycemia with steroid provision. Oral fluid intakes appear good per I/O data, and on addtional ivf. Malnutrition Assessment:  Malnutrition Status:  Mild malnutrition    Context:  Acute Illness     Findings of the 6 clinical characteristics of malnutrition:  Energy Intake:  Mild decrease in energy intake (Comment)(3 days)  Weight Loss:  1 - 5% over 1 month     Body Fat Loss:  No significant body fat loss     Muscle Mass Loss:  No significant muscle mass loss    Fluid Accumulation:  1 - Mild Extremities   Strength:  Not Performed    Estimated Daily Nutrient Needs:  Energy (kcal):  9042-7268(84-57); Weight Used for Energy Requirements:  Admission     Protein (g):  83-98(1.1-1.3); Weight Used for Protein Requirements:  Ideal        Fluid (ml/day):  2000 ml+; Method Used for Fluid Requirements:  1 ml/kcal      Nutrition Related Findings:  trace RUE edema      Wounds:  None       Current Nutrition Therapies:    DIET CARB CONTROL; Carb Control: 4 carb choices (60 gms)/meal  Dietary Nutrition Supplements: Diabetic Oral Supplement    Anthropometric Measures:  · Height: 5' 10\" (177.8 cm)  · Current Body Weight: 238 lb 8.6 oz (108.2 kg)   · Admission Body Weight: 260 lb (117.9 kg)(stated)    · Usual Body Weight: 260 lb (117.9 kg)     · Ideal Body Weight: 166 lbs; % Ideal Body Weight 155.4 %   · BMI: 34.2  · Adjusted Body Weight:  ; No Adjustment    · BMI Categories: Obese Class 1 (BMI 30.0-34. 9)       Nutrition Diagnosis:   · Inadequate oral intake related to impaired respiratory function as evidenced by poor intake prior to admission    Lab Results   Component Value Date     (L) 02/02/2021    K 4.8 02/02/2021    CL 95 (L) 02/02/2021 CO2 27 02/02/2021    BUN 24 (H) 02/02/2021    CREATININE 0.65 (L) 02/02/2021    GLUCOSE 219 (H) 02/02/2021    CALCIUM 9.0 02/02/2021    PROT 6.6 02/02/2021    LABALBU 2.6 (L) 02/02/2021    BILITOT 0.68 02/02/2021    ALKPHOS 163 (H) 02/02/2021    AST 32 02/02/2021    ALT 21 02/02/2021    LABGLOM >60 02/02/2021    GFRAA >60 02/02/2021    GLOB NOT REPORTED 01/20/2021     Recent Labs     01/30/21  1720 01/31/21  1732 01/31/21 2011 02/01/21  0823 02/01/21  1139 02/01/21  1712 02/01/21 2027 02/02/21  0641   POCGLU 207* 244* 198* 234* 188* 190* 183* 212*     Nutrition Interventions:   Food and/or Nutrient Delivery:  Continue Current Diet, Start Oral Nutrition Supplement  Nutrition Education/Counseling:  No recommendation at this time   Coordination of Nutrition Care:  Continue to monitor while inpatient    Goals:  PO >75% meals and supplements       Nutrition Monitoring and Evaluation:   Behavioral-Environmental Outcomes:  None Identified   Food/Nutrient Intake Outcomes:  Supplement Intake, Food and Nutrient Intake  Physical Signs/Symptoms Outcomes:  Biochemical Data, Weight, Fluid Status or Edema     Discharge Planning:    Continue current diet     Electronically signed by Shannon Caceres RD, AYESHA on 2/2/21 at 10:04 AM EST    Contact: [phone]

## 2021-02-02 NOTE — FLOWSHEET NOTE
Resting on side awake. BiPAP mask off briefly for patient to cough up sputum. Assessment completed. Denies pain at present time.  this morning. Bi pap continues at 100% . No needs at present. Call light within reach.

## 2021-02-02 NOTE — PROGRESS NOTES
[facility]  Inpatient/Observation/Outpatient Rehabilitation    Date: 2021  Patient Name: Sammy Carver       [x] Inpatient Acute/Observation       []  Outpatient  : 1945       Per nursing Pt just got up to chair and stated he did okay but not up for exercises right now.   Will try again this PM.    Flor Yan, PTA   Date: 2021

## 2021-02-02 NOTE — PROGRESS NOTES
Pt refused to switch positions at this time. Writer encouraged/educated pt on proning position. Pt refused to prone as well. Pt currently laying completely on his right side. Will continue to monitor.

## 2021-02-02 NOTE — PLAN OF CARE
2/1/2021 1955 by Liang Singh RN  Outcome: Ongoing  Goal: Absence of physical injury  Outcome: Ongoing     Problem: Nutrition  Goal: Optimal nutrition therapy  Outcome: Ongoing     Problem: Musculor/Skeletal Functional Status  Goal: Highest potential functional level  Outcome: Ongoing  Goal: Absence of falls  Outcome: Ongoing     Problem: Skin Integrity:  Goal: Will show no infection signs and symptoms  Outcome: Ongoing  Goal: Absence of new skin breakdown  Outcome: Ongoing

## 2021-02-02 NOTE — PROGRESS NOTES
Pt is laying on his right side in bed watching TV. Assessment and vital signs as charted. Pt denies pain at this time. Pt is A & O x 4. Pt used bedpan and urinal at this time. Cardiac monitor continues to show NSR/ST. Bi PAP remains in use at 100%. Pt denies any other needs. Call light in reach. Will continue to monitor.

## 2021-02-02 NOTE — PLAN OF CARE
Problem: Airway Clearance - Ineffective  Goal: Achieve or maintain patent airway  2/1/2021 1955 by Dalia Reese RN  Outcome: Ongoing  Note: Patent airway maintained. Will continue to monitor. Problem: Gas Exchange - Impaired  Goal: Absence of hypoxia  2/1/2021 1955 by Dalia Reese RN  Outcome: Ongoing  Note: Continuous pulse ox remains in place. Will continue to monitor. Problem: Body Temperature -  Risk of, Imbalanced  Goal: Complications related to the disease process, condition or treatment will be avoided or minimized  2/1/2021 1955 by Dalia Reese RN  Outcome: Ongoing  Note: No temperature noted at this time. Will continue to monitor. Problem: Risk for Fluid Volume Deficit  Goal: Maintain normal heart rhythm  2/1/2021 1955 by Dalia Reese RN  Outcome: Ongoing  Note: Cardiac monitor continues to read NSR/ST. Will continue to monitor. Problem: Fatigue  Goal: Verbalize increase energy and improved vitality  2/1/2021 1955 by Dalia Reese RN  Outcome: Ongoing  Note: Pt very tired. Pt continues to rest in bed laying from side to side. Will continue to monitor. Problem: Falls - Risk of:  Goal: Will remain free from falls  Description: Will remain free from falls  2/1/2021 1955 by Dalia Reese RN  Outcome: Ongoing  Note: Pt is at high risk for falls. Non skid socks on. Bed in low position and wheels locked. Fall sign posted and bracelet on. Siderails up x2. Pt is A & O x 4, uses call light properly. Call light within reach. Will continue to assess.

## 2021-02-03 NOTE — PROGRESS NOTES
Morphine admin for respiratory distress. Patient resting in bed, denies further needs at this time. Call light in reach.

## 2021-02-03 NOTE — PROGRESS NOTES
[facility]  Inpatient/Observation/Outpatient Rehabilitation    Date: 2/3/2021  Patient Name: Abby Estrada       [x] Inpatient Acute/Observation       []  Outpatient  : 1945       [] Pt no showed for scheduled appointment    [x] Pt refused/declined therapy at this time due to:     Breathing issues      [] Pt cancelled due to:  [] No Reason Given   [] Sick/ill   [] Other:        Alden Winchester Date: 2/3/2021

## 2021-02-03 NOTE — PROGRESS NOTES
Patient uses call light. Bipap removed briefly for patient to spit out phlegm and also to take a quick drink. BiPap replaced immediately. SaO2 decreases down to 75% and recovered back up to 88% after 7 minutes.   Continue to monitor

## 2021-02-03 NOTE — PLAN OF CARE
Problem: Airway Clearance - Ineffective  Goal: Achieve or maintain patent airway  Outcome: Ongoing  Note: Patient noted to have dyspnea with any activity and SaO2 decreases down to 70's with any movement or removal of BiPap mask for brief time. Lungs noted to be diminished throughout with expiratory wheezing noted in mid and lower lobes. Continue to monitor     Problem: Gas Exchange - Impaired  Goal: Absence of hypoxia  Outcome: Ongoing  Note: Patient noted to have dyspnea with any activity and SaO2 decreases down to 70's with any movement or removal of BiPap mask for brief time. Lungs noted to be diminished throughout with expiratory wheezing noted in mid and lower lobes. Continue to monitor     Problem: Gas Exchange - Impaired  Goal: Promote optimal lung function  Outcome: Ongoing     Problem: Breathing Pattern - Ineffective  Goal: Ability to achieve and maintain a regular respiratory rate  Outcome: Ongoing  Note: Patient noted to have dyspnea with any activity and SaO2 decreases down to 70's with any movement or removal of BiPap mask for brief time. Lungs noted to be diminished throughout with expiratory wheezing noted in mid and lower lobes. Continue to monitor     Problem: Body Temperature -  Risk of, Imbalanced  Goal: Complications related to the disease process, condition or treatment will be avoided or minimized  Outcome: Ongoing  Note: Patient remains afebrile. Continue to monitor     Problem: Isolation Precautions - Risk of Spread of Infection  Goal: Prevent transmission of infection  Outcome: Ongoing  Note: Droplet plus isolation continued. Continue to monitor     Problem: Nutrition Deficits  Goal: Optimize nutritional status  Outcome: Ongoing     Problem: Risk for Fluid Volume Deficit  Goal: Maintain normal heart rhythm  Outcome: Ongoing  Note: Monitor shows RSR-RST. I/O monitored.   Continue to monitor     Problem: Risk for Fluid Volume Deficit  Goal: Maintain absence of muscle cramping Outcome: Ongoing     Problem: Risk for Fluid Volume Deficit  Goal: Maintain normal serum potassium, sodium, calcium, phosphorus, and pH  Outcome: Ongoing  Note: Continue to monitor lab work as ordered. Problem: Loneliness or Risk for Loneliness  Goal: Demonstrate positive use of time alone when socialization is not possible  Outcome: Ongoing  Note: Stay with patient, offered reassurance. Continue to be present to patient     Problem: Fatigue  Goal: Verbalize increase energy and improved vitality  Outcome: Ongoing  Note: Have patient to conserve energy when able and space out activities as needed or group some activities together. Continue to monitor     Problem: Patient Education: Go to Patient Education Activity  Goal: Patient/Family Education  Outcome: Ongoing     Problem: Falls - Risk of:  Goal: Will remain free from falls  Description: Will remain free from falls  Outcome: Ongoing  Note: Patient is alert and oriented and has demonstrated the use of using the call light for assistance before getting up. Education has been provided to defer the use of the bed alarm and/or restraint free alarm as the patient has shown competency in calling for assistance and verbalizing the risk.         Problem: Nutrition  Goal: Optimal nutrition therapy  Description: Nutrition Problem #1: Inadequate oral intake  Intervention: Food and/or Nutrient Delivery: Continue Current Diet  Nutritional Goals: PO > 75% with good tolerance      2/2/2021 2158 by Levar Lewis RN  Outcome: Ongoing     Problem: Musculor/Skeletal Functional Status  Goal: Highest potential functional level  Outcome: Ongoing     Problem: Musculor/Skeletal Functional Status  Goal: Absence of falls  Outcome: Ongoing     Problem: Skin Integrity:  Goal: Will show no infection signs and symptoms  Description: Will show no infection signs and symptoms  Outcome: Ongoing Note: Skin assessment performed at regular intervals. No redness or open areas noted. Patient reminded to turn and relieve areas known to breakdown. Continue to monitor.       Problem: Skin Integrity:  Goal: Absence of new skin breakdown  Description: Absence of new skin breakdown  Outcome: Ongoing

## 2021-02-03 NOTE — PROGRESS NOTES
PALLIATIVE CARE  Follow up visit with Mary Dunham. He remains in ICU and still requires BIPAP to support his breathing. He had a visit with Dr. Germaine Aguilar and is to be intubated today. In to talk with patient. Inquired if would like to talk with his family prior to intubation. He tells me that they already know. He is agreeable to intubation. Sitting with patient and holding pt hand for support. Nursing in and out of his room preparing for intubation and transfer. Nurse states that they are awaiting family to come in to see him prior to intubation. Laying flat in bed. Respirations at 28. Pulse ox 88%. He tells me that he is comfortable when I ask if he needs anything for comfort. Does request prayer and I pray with him. Tells me that [facility] the  has been visiting and that he has enjoyed his visits. Spiritual support given to Mary Dunham. He tells me that he would like to be anointed by his  from [address] [place] [address].  notified and will contact .  Yuval Cronin to stay with pt until family arrives. He tells me that he is okay. Reassured that nurse will be in shortly. [place] given. Will follow and support. Rachell Yoo Chi, RN, [facility] and [address] [place] [address] Nurse Coordinator  2/3/2021 11:49 AM

## 2021-02-03 NOTE — ADDENDUM NOTE
Addendum  created 02/03/21 9172 by KATIA Tariq CRNA    Clinical Note Signed, Intraprocedure Blocks edited, LDA updated via procedure documentation

## 2021-02-03 NOTE — PROGRESS NOTES
Patient doses at times for short intervals. Assisted with patient taking drinks and patient using Kleenexes to get phlegm out of mouth several times throughout night with immediately reapplying BiPap mask. Patient states at times \"I need to calm down. \"  Reassurance given.

## 2021-02-03 NOTE — FLOWSHEET NOTE
Patient puts call light on. Needs assistance removing Bipap so he can spit out mucous. Patient oxygen saturation drops to 74%. Bipap reapplied. After approx. 5 minutes, oxygen saturation raises to 83%.

## 2021-02-03 NOTE — VIRTUAL HEALTH
Consults  Patient Location:  [address]  ICU    Provider Location (City/State): [place], [address] [place] [address],[place]. [address]   Physician Progress Note    Patient - Ashely Lozoya  Date of Admission -  1/19/2021  8:34 PM  Date of Evaluation -  2/3/2021  Room and Bed Number -  B672/T867-13   Hospital Day - 15      SUBJECTIVE:   HPI:  Ashely Lozoya is a 70 y.o. male admitted for  with complaints of worsening dyspnea and cough over the last 2 days. [facility] explained that his symptoms began January 12 with fever, malaise, myalgias, and cough.  He explained on 14 January that he began with respiratory difficulty and he was tested and was positive.  Since that time he explained that his symptoms were okay however yesterday he began to have increased dyspnea and dyspnea on exertion.  He denied any anosmia or loss of taste.  He denies diarrhea, constipation, melena, hematochezia or hematic emesis.  He does admit to low-grade fever.  He does have a productive cough but states that the mucus is clear only.  Denies any hemoptysis.  On arrival his temperature was 99.  WBC count was normal however chest x-ray showed pneumonia.  Lactic acid was elevated at 3 repeat was 1.7.  Due to his desaturations he was admitted and placed in the ICU on high flow and BiPAP. Subsequently treated with 5 days of intravenous remdesivir. Remains on methylprednisolone 40 mg every 12 hours. Rectory hypoxemia persists. Alternating high flow oxygen and BiPAP. FiO2 currently 85%. I's/O+ 1.6 L. Transaminases are normal.  Mild elevation of alkaline phosphatase. Glucose elevated on steroids. Arced elevation of D-dimer at 12.97.  Currently on high intensity prophylactic Lovenox. X-ray done 1/23/2021 reviewed. Bilateral patchy densities right greater than left.   Not changed  OVERNIGHT EVENTS: No improvement in oxygenation over last 2 weeks despite treatment with remdesivir. Remains on Solu-Medrol 40 mg every 12 hours. Currently on Levaquin 500 mg twice daily for gram-positive diplococci in tracheal aspirate. Chest x-ray done today shows bilateral patchy interstitial infiltrates. No clear consolidation. No cough or secretions. Moderate to severe distress with respiratory rate in the 30s. Awake alert and conversant. I/O+ 1 L since admission. Hyponatremic to 126 this morning. Complains of thirst.  Has been on high intensity VTE prophylaxis since admission.   AWAKE & FOLLOWING COMMANDS:  [] No   [x] Yes    SECRETIONS Amount:  [x] Small [] Moderate  [] Large  [] None  Color:     [x] White [] Colored  [] Bloody    SEDATION:  RAAS Score:  [] Propofol gtt  [] Versed gtt  [] Ativan gtt   [x] No Sedation    PARALYZED:  [x] No    [] Yes    VASOPRESSORS:  [x] No    [] Yes  [] Levophed [] Dopamine [] Vasopressin  [] Dobutamine [] Phenylephrine [] Epinephrine      OBJECTIVE:     VITAL SIGNS:  /81   Pulse 103   Temp 97.5 °F (36.4 °C) (Temporal)   Resp (!) 32   Ht 5' 10\" (1.778 m)   Wt 237 lb 14 oz (107.9 kg)   SpO2 (!) 86%   BMI 34.13 kg/m²   Tmax over 24 hours:  Temp (24hrs), Av.6 °F (36.4 °C), Min:97.1 °F (36.2 °C), Max:97.9 °F (36.6 °C)      Patient Vitals for the past 8 hrs:   BP Temp Temp src Pulse Resp SpO2 Weight   21 1000 131/81   103 (!) 32 (!) 86 %    21 0900 130/77   112 (!) 36 (!) 81 %    21 0800 (!) 169/103   105 (!) 36 (!) 82 %    21 0750    101 25 (!) 74 %    21 0700 129/74 97.5 °F (36.4 °C) Temporal 98 22 (!) 86 %    21 0646    107      21 0600 137/62   103 (!) 31 (!) 87 %    21 0544      (!) 88 %    21 0500 117/71   84 29 (!) 89 %    21 0400 129/62   91 24 (!) 89 %    21 0300 109/63 97.1 °F (36.2 °C) Temporal 100 27 (!) 88 % 237 lb 14 oz (107.9 kg) Intake/Output Summary (Last 24 hours) at 2/3/2021 1037  Last data filed at 2/3/2021 1000  Gross per 24 hour   Intake 1260 ml   Output 1875 ml   Net -615 ml     Date 02/03/21 0000 - 02/03/21 2359   Shift 4573-4337 7629-6200 1164-7639 24 Hour Total   INTAKE   P.O.(mL/kg/hr) 200(0.2) 30  230   Shift Total(mL/kg) 200(1.9) 30(0.3)  230(2.1)   OUTPUT   Urine(mL/kg/hr) 725(0.8) 100  825   Shift Total(mL/kg) 725(6.7) 100(0.9)  825(7.6)   Weight (kg) 107.9 107.9 107.9 107.9     Wt Readings from Last 3 Encounters:   02/03/21 237 lb 14 oz (107.9 kg)   10/05/18 218 lb (98.9 kg)   09/05/18 238 lb 6.4 oz (108.1 kg)     Body mass index is 34.13 kg/m². PHYSICAL EXAM:  CONSTITUTIONAL:  awake, alert, cooperative and severe distress  No peripheral edema. Using accessory muscles. Current BiPAP settings 18/12 FiO2 100%. Oxygen saturation 81%.     MEDICATIONS:  Scheduled Meds:   methylPREDNISolone  40 mg Intravenous Q12H    metFORMIN  1,000 mg Oral Daily with breakfast    insulin lispro  0-18 Units Subcutaneous TID WC    insulin lispro  0-9 Units Subcutaneous Nightly    insulin glargine  40 Units Subcutaneous Nightly    aspirin  81 mg Oral Daily    atorvastatin  20 mg Oral Nightly    cetirizine  10 mg Oral Daily    Vitamin D  5,000 Units Oral Daily    fluticasone  1 spray Each Nostril Daily    lisinopril  2.5 mg Oral Daily    magnesium oxide  400 mg Oral Daily    pantoprazole  40 mg Oral Daily    tamsulosin  0.4 mg Oral Nightly    zinc sulfate  50 mg Oral Daily    ipratropium-albuterol  1 ampule Inhalation 4x daily    sodium chloride flush  10 mL Intravenous 2 times per day    enoxaparin  0.5 mg/kg Subcutaneous BID     Continuous Infusions:   sodium chloride      dextrose       PRN Meds:       morphine, 2 mg, Q4H PRN      dextromethorphan-guaiFENesin, 1 tablet, Q12H PRN      sodium chloride, 30 mL, PRN      albuterol, 2.5 mg, Q4H PRN      glucose, 15 g, PRN      dextrose, 12.5 g, PRN   glucagon (rDNA), 1 mg, PRN      dextrose, 100 mL/hr, PRN      sodium chloride flush, 10 mL, PRN      acetaminophen, 650 mg, Q6H PRN    Or      acetaminophen, 650 mg, Q6H PRN        SUPPORT DEVICES: [] Ventilator [x] BIPAP  [] Nasal Cannula [] Room Air    VENT SETTINGS (Comprehensive) (if applicable):    Vent Information  Skin Assessment: Clean, dry, & intact  Equipment Changed: Mask  FiO2 : 100 %  SpO2: (!) 86 %  SpO2/FiO2 ratio: 86  I Time/ I Time %: 1.5 s  Humidification Source: Heated wire  Mask Type: Full face mask  Mask Size: Large  Additional Respiratory  Assessments  Pulse: 103  Resp: (!) 32  SpO2: (!) 86 %  Humidification Source: Heated wire  Oral Care: Teeth brushed, Mouth swabbed, Mouth moisturizer    ABGs:     Lab Results   Component Value Date    FIO2 100 01/19/2021         DATA:  Complete Blood Count:   Recent Labs     02/01/21  0530 02/02/21  0520 02/03/21  0530   WBC 17.6* 20.2* 19.8*   RBC 4.58 4.39 4.56   HGB 12.5* 12.0* 12.5*   HCT 38.7* 37.1* 38.7*   MCV 84.5 84.5 84.9   MCH 27.3 27.3 27.4   MCHC 32.3 32.3 32.3   RDW 12.7 12.7 12.9    146 156   MPV 9.6 9.4 9.8        Last 3 Blood Glucose:   Recent Labs     02/01/21  0530 02/02/21  0520 02/03/21  0530   GLUCOSE 247* 219* 198*        PT/INR:    Lab Results   Component Value Date    PROTIME 14.0 02/03/2021    INR 1.1 02/03/2021     PTT:    Lab Results   Component Value Date    APTT 34.5 02/03/2021       Comprehensive Metabolic Profile:   Recent Labs     02/01/21  0530 02/02/21  0520 02/03/21  0530   * 131* 125*   K 5.0 4.8 4.9   CL 95* 95* 91*   CO2 27 27 26   BUN 31* 24* 21   CREATININE 0.63* 0.65* 0.64*   GLUCOSE 247* 219* 198*   CALCIUM 9.4 9.0 9.0   PROT 6.7 6.6 6.7   LABALBU 2.5* 2.6* 2.5*   BILITOT 0.71 0.68 0.66   ALKPHOS 170* 163* 141*   AST 29 32 33   ALT 18 21 26      Magnesium: No results found for: MG  Phosphorus: No results found for: PHOS  Ionized Calcium: No results found for: CAION Urinalysis: No results found for: NITRU, COLORU, PHUR, LABCAST, WBCUA, RBCUA, MUCUS, TRICHOMONAS, YEAST, BACTERIA, CLARITYU, SPECGRAV, LEUKOCYTESUR, UROBILINOGEN, BILIRUBINUR, BLOODU, GLUCOSEU, KETUA, AMORPHOUS    HgBA1c:    Lab Results   Component Value Date    LABA1C 7.3 01/20/2021     TSH:  No results found for: TSH    Lactic Acid: No results found for: LACTA   Troponin: No results for input(s): TROPONINI in the last 72 hours.     Microbiology:  Urine Culture:  No components found for: CURINE  Blood Culture:  No components found for: CBLOOD, CFUNGUSBL  Sputum Culture:  No components found for: CSPUTUM      Other Labs:  CBC with Differential:    Lab Results   Component Value Date    WBC 19.8 02/03/2021    RBC 4.56 02/03/2021    HGB 12.5 02/03/2021    HCT 38.7 02/03/2021     02/03/2021    MCV 84.9 02/03/2021    MCH 27.4 02/03/2021    MCHC 32.3 02/03/2021    RDW 12.9 02/03/2021    LYMPHOPCT 3 02/03/2021    MONOPCT 3 02/03/2021    BASOPCT 0 02/03/2021    MONOSABS 0.59 02/03/2021    LYMPHSABS 0.59 02/03/2021    EOSABS 0.00 02/03/2021    BASOSABS 0.00 02/03/2021    DIFFTYPE NOT REPORTED 02/03/2021     BMP:    Lab Results   Component Value Date     02/03/2021    K 4.9 02/03/2021    CL 91 02/03/2021    CO2 26 02/03/2021    BUN 21 02/03/2021    LABALBU 2.5 02/03/2021    CREATININE 0.64 02/03/2021    CALCIUM 9.0 02/03/2021    GFRAA >60 02/03/2021    LABGLOM >60 02/03/2021    GLUCOSE 198 02/03/2021     Hepatic Function Panel:    Lab Results   Component Value Date    ALKPHOS 141 02/03/2021    ALT 26 02/03/2021    AST 33 02/03/2021    PROT 6.7 02/03/2021    BILITOT 0.66 02/03/2021    BILIDIR 0.21 01/20/2021    IBILI 0.41 01/20/2021    LABALBU 2.5 02/03/2021     Albumin:    Lab Results   Component Value Date    LABALBU 2.5 02/03/2021     Calcium:    Lab Results   Component Value Date    CALCIUM 9.0 02/03/2021     Ionized Calcium:  No results found for: IONCA  Magnesium:  No results found for: MG Phosphorus:  No results found for: PHOS  LDH:    Lab Results   Component Value Date     01/20/2021     PT/INR:    Lab Results   Component Value Date    PROTIME 14.0 02/03/2021    INR 1.1 02/03/2021     PTT:    Lab Results   Component Value Date    APTT 34.5 02/03/2021   [APTT}  Last 3 Troponin:  No results found for: TROPONINI  U/A:  No results found for: NITRITE, COLORU, PROTEINU, PHUR, LABCAST, WBCUA, RBCUA, MUCUS, TRICHOMONAS, YEAST, BACTERIA, CLARITYU, SPECGRAV, LEUKOCYTESUR, UROBILINOGEN, BILIRUBINUR, BLOODU, GLUCOSEU, AMORPHOUS  ABG:  No results found for: PH, PCO2, PO2, HCO3, BE, THGB, TCO2, O2SAT  FERRITIN:    Lab Results   Component Value Date    FERRITIN 307 01/20/2021     Fibrinogen Level:  No components found for: FIB      Radiology/Imaging:     EXAMINATION:   ONE XRAY VIEW OF THE CHEST       2/3/2021 7:55 am       COMPARISON:   AP chest from 01/30/2021       HISTORY:   ORDERING SYSTEM PROVIDED HISTORY: covid and sob   TECHNOLOGIST PROVIDED HISTORY:   covid and sob       Additional history of asthma, hypertension and type 2 diabetes.       FINDINGS:   Overlying ECG monitor leads and respiratory apparatus.       Cardiomediastinal shadow stable; diffuse interstitial and patchy ground-glass   opacities again seen, unchanged.  No new pulmonary or significant pleural   finding.  No pneumothorax.  Bones stable.           Impression   Unchanged findings with diffuse ground-glass and reticular opacities,   compatible with a history.          ASSESSMENT:     Patient Active Problem List    Diagnosis Date Noted    Mild malnutrition ([address] 01/26/2021    Acute respiratory failure with hypoxia ([address] 01/20/2021    Type 2 diabetes mellitus without complication ([address]     Hypertension     COVID-19 virus infection 01/19/2021    Grade II internal hemorrhoids 10/05/2018         PLAN:     WEAN PER PROTOCOL:  [x] No   [] Yes  [] N/A    DISCONTINUE ANY LABS:   [x] No   [] Yes    ICU PROPHYLAXIS: Stress ulcer:  [] PPI Agent  [] [id number] [] Sucralfate  [x] Other:  VTE:   [x] Enoxaparin  [] Unfract. Heparin Subcut  [] EPC Cuffs    NUTRITION:  [] NPO [] Tube Feeding (Specify: ) [] TPN  [x] PO    HOME MEDICATIONS RECONCILED: [x] No  [] Yes    INSULIN DRIP:   [x] No   [] Yes    CONSULTATION NEEDED:  [] No   [] Yes    FAMILY UPDATED:    [x] No   [] Yes    TRANSFER OUT OF ICU:   [x] No   [] Yes    ADDITIONAL PLAN:  1. Discussed with patient remotely. Advised of current condition, continued worsening, and limited therapeutic options. Patient facing intubation and mechanical ventilation because of refractory hypoxemia although improvement in oxygenation with intubation likely to be marginal.  Would allow for neuromuscular blockade and heavy sedation to decrease oxygen consumption and work of breathing. .  Moreover, could attempt prone positioning, inhaled nitric oxide, etc. in an effort to improve oxygenation. I advised him that his age and condition precludes ECMO or lung transplant. I also offered the option of best supportive care with BiPAP/high flow oxygen without intubation, CPR, or other life supportive measures. Patient states that he wishes to proceed with intubation and transfer to [facility]. I recommended that he speak with his wife regarding his wishes should his condition not improve on the ventilator. I further advised him that mortality for Covid on a ventilator in this setting approximately 50%. 2. Switch to isotonic solutions for hyponatremia. 3. Once intubated, would favor neuromuscular blockade and sedation with combination fentanyl and propofol. 4. Seen with and discussed with Latosha Talbot CNP. Keven Guillen DO  2/3/2021 10:37 AM  Dept. Of Pulmonary and Critical Care Medicine  Critical Care Time 60min      This virtual visit was conducted via interactive/real-time audio/video.

## 2021-02-03 NOTE — PROGRESS NOTES
ICU Progress Note    SUBJECTIVE/INTERVAL HISTORY:    Patient seen in follow up for acute respiratory failure related to COVID-19. Sitting in bed alert and oriented on his left side. He remains on BiPAP 18/12 with 100% FiO2. His SPO2 is decreasing to the low 80s. He denies any pain at this time. We did discuss possible intubation and that I would speak with Dr. Navneet Garcia and make a decision. He still continues to want to be a full code. He is afebrile. He is tachypneic. OBJECTIVE:    Vitals:   Temp: 96.7 °F (35.9 °C)  Temp range:    Temp  Av.2 °F (36.2 °C)  Min: 96.5 °F (35.8 °C)  Max: 97.7 °F (36.5 °C)    BP: 139/84  BP Range:      Systolic (78BBL), XDQ:672 , Min:32 , ANJ:623      Diastolic (75JHQ), XTJ:00, Min:20, Max:103    Pulse: 130  Pulse Range:    Pulse  Av.8  Min: 84  Max: 136    Resp: 16  Resp Range:   Resp  Av.8  Min: 12  Max: 37    SpO2: 91 % High flow and BiPAP  SpO2 range:   SpO2  Av.1 %  Min: 70 %  Max: 91 %    24HR INTAKE/OUTPUT:      Intake/Output Summary (Last 24 hours) at 2/3/2021 8450  Last data filed at 2/3/2021 1000  Gross per 24 hour   Intake 990 ml   Output 1525 ml   Net -535 ml       -----------------------------------------------------------------  Review of Systems:  Constitutional: Negative  for fevers, and negative for chills.   Eyes: negative for visual disturbance   ENT: negative for sore throat, negative nasal congestion, and negative for earache  Respiratory: positive for shortness of breath, positive for cough, and negative for wheezing  Cardiovascular: negative for chest pain, negative for palpitations, and negative for syncope  Gastrointestinal: negative for abdominal pain, negative for nausea,negative for vomiting, negative for diarrhea, negative for constipation, and negative for hematochezia or melena  Genitourinary: negative for dysuria, negative for urinary urgency, negative for urinary frequency, and negative for hematuria Skin: negative for skin rash, and negative for skin lesions  Neurological: negative for unilateral weakness, numbness or tingling    Exam:  GEN:  alert and oriented to person, place and time, well-developed and well-nourished, in mild respiratory distress  EYES:  No gross abnormalities. , PERRL and EOMI  NECK: normal, supple, no lymphadenopathy,  no carotid bruits  PULM:  Clear  but decreased breath sounds noted-throughout, rhonchi heard with cough tachypneic and use of accessory muscles  COR:   regular rate & rhythm, no murmurs, no gallops, S1 normal and S2 normal  ABD:    soft, non-tender, non-distended, normal bowel sounds, no masses or organomegaly  EXT:    no cyanosis, clubbing or edema present    NEURO: follows commands, AMATO, no deficits  SKIN:   no rashes or significant lesions      -----------------------------------------------------------------  Diagnostic Data:  Lab Results   Component Value Date    WBC 19.8 (H) 02/03/2021    HGB 12.5 (L) 02/03/2021    HCT 38.7 (L) 02/03/2021     02/03/2021    ALT 26 02/03/2021    AST 33 02/03/2021     (L) 02/03/2021    K 4.9 02/03/2021    CL 91 (L) 02/03/2021    CREATININE 0.64 (L) 02/03/2021    BUN 21 02/03/2021    CO2 26 02/03/2021    INR 1.1 02/03/2021    LABA1C 7.3 (H) 01/20/2021       ASSESSMENT:    Principal Problem:    COVID-19 virus infection  Active Problems:    Acute respiratory failure with hypoxia (HCC)    Type 2 diabetes mellitus without complication (HCC)    Hypertension    Mild malnutrition ([address]  Resolved Problems:    * No resolved hospital problems.  *      Patient Active Problem List    Diagnosis Date Noted    Mild malnutrition ([address] 01/26/2021    Acute respiratory failure with hypoxia ([address] 01/20/2021    Type 2 diabetes mellitus without complication ([address]     Hypertension     COVID-19 virus infection 01/19/2021    Grade II internal hemorrhoids 10/05/2018       PLAN:  Principal Problem:    COVID-19 virus infection  Active Problems: Acute respiratory failure with hypoxia (HCC)    Type 2 diabetes mellitus without complication (HCC)    Hypertension    Mild malnutrition ([facility]  Resolved Problems:    * No resolved hospital problems. *    · COVID-19 virus infection  ? Completed IV remdesivir  ? Continue Solu-Medrol  ? Continue Vitamin C D and zinc  ? Completed ivermectin 2 doses  · Acute respiratory failure with hypoxia  ? Supplemental oxygen to maintain SPO2 greater than 92%currently using BiPAP with settings 18/12 with 100 % FiO2 and may alternate with high flow  ? Acapella  ? Prone  ? Out of bed with meals  ? PT OT  ? Daily labs continue to decrease   ? Appreciate Dr. Miguel Hurtado will talk with discuss plan of care and possible intubation and transfer  ? Stop Levaquin  ? Venous Dopplers negative for DVT  ? CT chest to rule out PEunable to do due to high levels of oxygen demand needed via BiPAP do not have the availability to do this. He is anticoagulated  · Type 2 diabetes  ? POCT before meals and at bedtimebetter controlled  ? Continue Glucophage  ? Hypoglycemia protocol  ? Continue to high-dose sliding scale  ? Continue Lantus to 40 units at at bedtime  · Hypertension  ?  Continue lisinopril  · High risk medications: Insulin  · Discharge plan: Pending    KATIA Lambert, NP-C  2/3/2021, 5:33 PM

## 2021-02-03 NOTE — PLAN OF CARE
Problem: Airway Clearance - Ineffective  Goal: Achieve or maintain patent airway  2/3/2021 0914 by Ioana Pappas RN  Outcome: Ongoing  Note: Patient able to maintain own airway. Patient has strong productive cough. 2/2/2021 2158 by Robina Flores RN  Outcome: Ongoing  Note: Patient noted to have dyspnea with any activity and SaO2 decreases down to 70's with any movement or removal of BiPap mask for brief time. Lungs noted to be diminished throughout with expiratory wheezing noted in mid and lower lobes. Continue to monitor     Problem: Gas Exchange - Impaired  Goal: Absence of hypoxia  2/3/2021 0914 by Ioana Pappas RN  Outcome: Ongoing  Note: Patient remains on Bipap @ 18/12 @ 100% of FIO2 to keep saturation >85%. Patient desaturates quickly when remove Bipap. 2/2/2021 2158 by Robina Flores RN  Outcome: Ongoing  Note: Patient noted to have dyspnea with any activity and SaO2 decreases down to 70's with any movement or removal of BiPap mask for brief time. Lungs noted to be diminished throughout with expiratory wheezing noted in mid and lower lobes. Continue to monitor  Goal: Promote optimal lung function  2/3/2021 0914 by Ioana Pappas RN  Outcome: Ongoing  2/2/2021 2158 by Robina Flores RN  Outcome: Ongoing     Problem: Breathing Pattern - Ineffective  Goal: Ability to achieve and maintain a regular respiratory rate  2/3/2021 0914 by Ioana Pappas RN  Outcome: Ongoing  Note: Patient continues with tachypnea. 2/2/2021 2158 by Robina Flores RN  Outcome: Ongoing  Note: Patient noted to have dyspnea with any activity and SaO2 decreases down to 70's with any movement or removal of BiPap mask for brief time. Lungs noted to be diminished throughout with expiratory wheezing noted in mid and lower lobes. Continue to monitor     Problem:  Body Temperature -  Risk of, Imbalanced  Goal: Complications related to the disease process, condition or treatment will be avoided or minimized 2/3/2021 0914 by Diana Busch RN  Outcome: Ongoing  2/2/2021 2158 by Hannah Addison RN  Outcome: Ongoing  Note: Patient remains afebrile. Continue to monitor     Problem: Isolation Precautions - Risk of Spread of Infection  Goal: Prevent transmission of infection  2/3/2021 0914 by Diana Busch RN  Outcome: Ongoing  Note: Patient remains in droplet plus isolation. Proper PPE worn in room. 2/2/2021 2158 by Hannah Addison RN  Outcome: Ongoing  Note: Droplet plus isolation continued. Continue to monitor     Problem: Nutrition Deficits  Goal: Optimize nutritional status  2/3/2021 0914 by Diana Busch RN  Outcome: Ongoing  Note: Patient encouraged to eat meals and drink 100% supplements with each meal.    2/2/2021 2158 by Hannah Addison RN  Outcome: Ongoing     Problem: Risk for Fluid Volume Deficit  Goal: Maintain normal heart rhythm  2/3/2021 0914 by Diana Busch RN  Outcome: Ongoing  Note: Patient continues to remain sinus rhythm to sinus tachycardia. 2/2/2021 2158 by Hannah Addison RN  Outcome: Ongoing  Note: Monitor shows RSR-RST. I/O monitored. Continue to monitor  Goal: Maintain absence of muscle cramping  2/3/2021 0914 by Diana Busch RN  Outcome: Met This Shift  Note: Patient denies muscle cramping. 2/2/2021 2158 by Hannah Addison RN  Outcome: Ongoing  Goal: Maintain normal serum potassium, sodium, calcium, phosphorus, and pH  2/3/2021 0914 by Diana Busch RN  Outcome: Ongoing  Note: Patient labs continue to be drawn daily. Abnormals reported to MD.  2/2/2021 2158 by Hannah Addison RN  Outcome: Ongoing  Note: Continue to monitor lab work as ordered.      Problem: Loneliness or Risk for Loneliness  Goal: Demonstrate positive use of time alone when socialization is not possible  2/3/2021 0914 by Diana Busch RN  Outcome: Ongoing  2/2/2021 2158 by Hannah Addison RN  Outcome: Ongoing Note: Stay with patient, offered reassurance. Continue to be present to patient     Problem: Fatigue  Goal: Verbalize increase energy and improved vitality  2/3/2021 0914 by Cayetano Morel RN  Outcome: Ongoing  Note: Patient easily fatigues. Nursing and therapy allow rest periods. 2/2/2021 2158 by Laurel Nix RN  Outcome: Ongoing  Note: Have patient to conserve energy when able and space out activities as needed or group some activities together. Continue to monitor     Problem: Patient Education: Go to Patient Education Activity  Goal: Patient/Family Education  2/3/2021 0914 by Cayetano Morel RN  Outcome: Ongoing  Note: Patient and family kept up to date on plan of care. 2/2/2021 2158 by Laurel Nix RN  Outcome: Ongoing     Problem: Falls - Risk of:  Goal: Will remain free from falls  Description: Will remain free from falls  2/3/2021 0914 by Cayetano Morel RN  Outcome: Ongoing  Note: Patient at moderate risk for falls. Fall precautions in place. Patient utilizes call light within reach. 2/2/2021 2158 by Laurel Nix RN  Outcome: Ongoing  Note: Patient is alert and oriented and has demonstrated the use of using the call light for assistance before getting up. Education has been provided to defer the use of the bed alarm and/or restraint free alarm as the patient has shown competency in calling for assistance and verbalizing the risk. Goal: Absence of physical injury  Description: Absence of physical injury  2/3/2021 0914 by Cayetano Morel RN  Outcome: Met This Shift  Note: Patient remains free from physical injury.   2/2/2021 2158 by Laurel Nix RN  Outcome: Ongoing     Problem: Nutrition  Goal: Optimal nutrition therapy  Description: Nutrition Problem #1: Inadequate oral intake  Intervention: Food and/or Nutrient Delivery: Continue Current Diet  Nutritional Goals: PO > 75% with good tolerance      2/3/2021 0914 by Cayetano Morel RN  Outcome: Ongoing 2/2/2021 2158 by Laurel Nix RN  Outcome: Ongoing     Problem: Musculor/Skeletal Functional Status  Goal: Highest potential functional level  2/3/2021 0914 by Cayetano Morel RN  Outcome: Ongoing  2/2/2021 2158 by Laurel Nix RN  Outcome: Ongoing  Goal: Absence of falls  2/3/2021 0914 by Cayetano Morel RN  Outcome: Ongoing  2/2/2021 2158 by Laurel Nix RN  Outcome: Ongoing     Problem: Skin Integrity:  Goal: Will show no infection signs and symptoms  Description: Will show no infection signs and symptoms  2/3/2021 0914 by Cayetano Morel RN  Outcome: Ongoing  Note: Patient turns and repositions self in bed. No signs of breakdown noted. 2/2/2021 2158 by Laurel Nix RN  Outcome: Ongoing  Note: Skin assessment performed at regular intervals. No redness or open areas noted. Patient reminded to turn and relieve areas known to breakdown. Continue to monitor. Goal: Absence of new skin breakdown  Description: Absence of new skin breakdown  2/3/2021 0914 by Cayetano Morel RN  Outcome: Met This Shift  Note: Patient remains free from skin breakdown.   2/2/2021 2158 by Laurel Nix RN  Outcome: Ongoing

## 2021-02-03 NOTE — PROGRESS NOTES
[facility]  OCCUPATIONAL THERAPY  No Visit Note    [x] ICU    [] Acute   Patient: Capo Frost  Room: R033/H788-90      Capo Frost not seen on 2/3/2021 at 3:47 PM due to  Pt withheld, per nursing. Pt to be transported to [facility] for further care.         Signature: Steffen ALTMAN/CEASAR

## 2021-02-03 NOTE — PROGRESS NOTES
[facility]  Inpatient/Observation/Outpatient Rehabilitation    Date: 2/3/2021  Patient Name: Joan Manjarrez       [x] Inpatient Acute/Observation       []  Outpatient  : 1945       [] Pt no showed for scheduled appointment    [] Pt refused/declined therapy at this time due to:           [x] Pt cancelled due to:  [] No Reason Given   [] Sick/ill   [x] Other:    Kateryna Class not seen for physical therapy at 3:00PM. Pt withheld, per nursing. Pt to be transported to [facility] for further care.       Candi Byrne, PTA Date: 2/3/2021

## 2021-02-03 NOTE — ANESTHESIA PROCEDURE NOTES
Airway  Date/Time: 2/3/2021 4:12 PM  Urgency: urgent    Airway not difficult    General Information and Staff    Patient location during procedure: patient floor  Resident/CRNA: KATIA Tariq CRNA  Other anesthesia staff: KATIA Amador CRNA    Consent for Airway (if performed for an anesthetic, see related documentation for consents)  Patient identity confirmed: per hospital policy  Consent: No emergent situation. Verbal consent obtained (by Azeem Bejarano CNP). Written consent not obtained. Risks and benefits: risks, benefits and alternatives were discussed Azeem Bejarano discussed intubation with patient and family and obtained verbal consent)  Consent given by: patient      Code status verified:yes  Indications and Patient Condition  Indications for airway management: hypoxemia, respiratory distress and respiratory failure  Spontaneous ventilation: present  Sedation level: deep (deep sedation provided for intubation, no sedation prior to intubation)  Preoxygenated: yes  Patient position: sniffing (semi fowlers)  MILS not maintained throughout  Mask difficulty assessment: not attempted (patient was on bipap assisted ventilations with 100% FiO2 immediately prior to sedation and intubation)    Final Airway Details  Final airway type: endotracheal airway      Successful airway: ETT  Cuffed: yes   Successful intubation technique: video laryngoscopy  Intubation device: Glidescope rigid stylet in OETT.   Endotracheal tube insertion site: oral  Blade type: Glidescope 4 MAC   Blade size: #4  ETT size (mm): 8.0  Cormack-Lehane Classification: grade I - full view of glottis  Placement verified by: chest auscultation and capnometry   Inital cuff pressure (cm H2O): 6  Measured from: lips  ETT to lips (cm): 24  Number of attempts at approach: 1  Ventilation between attempts: bag mask (initial 4 breaths to verify ETCO2, then placed immediately to vent)  Number of other approaches attempted: 0

## 2021-02-03 NOTE — PROGRESS NOTES
Patient intubated via Anesthesia RSI with positive colorimeter change, fogging in the tube, bilateral breath sounds, ETCO2, and CXR to confirm placement. O2 sat dropped to 68% once placed on vent VC/AC 16, Vt 580, FIO2 100%, PEEP 16. Oxygen saturation eventually climbed to 88%. Pplat is 30, will monitor this closely. Patient is comfortable appearing at this time.

## 2021-02-04 NOTE — PROGRESS NOTES
Pharmacy Note  Vancomycin Consult    Ashely Lozoya is a 70 y.o. male started on Vancomycin for sepsis; consult received from Dr. Alessia Terrazas to manage therapy. Also receiving the following antibiotics: Zosyn. Patient Active Problem List   Diagnosis    Grade II internal hemorrhoids    COVID-19 virus infection    Acute respiratory failure with hypoxia (HCC)    Type 2 diabetes mellitus without complication (HCC)    Hypertension    Mild malnutrition (HCC)       Allergies:  Patient has no known allergies. Temp max: 98    Recent Labs     02/03/21  0530 02/04/21  0325   BUN 21 32*       Recent Labs     02/03/21  0530 02/04/21  0325   CREATININE 0.64* 1.16       Recent Labs     02/02/21  0520 02/03/21  0530   WBC 20.2* 19.8*         Intake/Output Summary (Last 24 hours) at 2/4/2021 0640  Last data filed at 2/4/2021 0548  Gross per 24 hour   Intake 1353 ml   Output 150 ml   Net 1203 ml       Culture Date      Source                       Results      Ht Readings from Last 1 Encounters:   02/04/21 5' 10\" (1.778 m)        Wt Readings from Last 1 Encounters:   02/04/21 237 lb 14 oz (107.9 kg)         Body mass index is 34.13 kg/m². Estimated Creatinine Clearance: 70 mL/min (based on SCr of 1.16 mg/dL). Goal Trough Level: 15-19 mcg/mL    Assessment/Plan:  Will initiate Vancomycin  1750 mg IV every 18 hours. Timing of trough level will be determined based on culture results, renal function, and clinical response. Thank you for the consult. Will continue to follow. Sayra Tierney Pharm. D.    2/4/2021  6:41 AM

## 2021-02-04 NOTE — FLOWSHEET NOTE
Spoke with Thurmon Angelucci (daughter) and Terry Fermin (son) via phone. Update given and all questions answered. Support given.

## 2021-02-04 NOTE — FLOWSHEET NOTE
Patient transported via mobile life. Patient arrived to unit at [facility]- intubated, sedated and paralyzed (with nimbex)  Upon arrival patients vent settings were at 100% FIO2 and a PEEP of 16. Pts vital were stable at this time. Nimbex was stopped at 0100  At 0200 the patient was able to awaken and follow all commands on fent gtt. At [phone] the patient became hypotensive. Critical care was notified of patients status at this time and  pressor support was added as noted in the [facility]. A central line was placed in the Right IJ at 0415. At 0700 writer was given patients medications as noted in the [facility] when the patient became alert and agitated. The patient became hypertensive and O2 sats dropped. At this time versed gtt was started and pressor support was weaned as noted in the [facility]. Handoff was given to day shift at 0730.        Electronically signed by Zeyad Guerrero RN on 2/4/2021 at 7:59 AM

## 2021-02-04 NOTE — CONSULTS
[facility] -   Infectious diseases evaluation  admission date 2/4/2021    reason for consultation:   bandemia    Impression :   Current:  · bandemia from steroids  · Covid pneumonia  · Covid related sepsis with ARDS   · septic shock  · Respiratory failure and intubation  · Elevated inflammatory markers    Other:  ·   Discussion / summary of stay / plan of care   ·   Recommendations   · Post 5 days of remdesivir at the other hospital  · Post Levaquin course in the other hospital  · Continue steroids and anticoagulation  · ARDS protocol  · Patient on pressors  · Probably too late to give him plasma  · Follow on CRP ferritin fibrinogen  · Patient has been started on Zosyn and Vanco due to low-grade fever 2/4, sputum culture has shown normal aris  · Stop vancomycin for now  · Shorten course of antibiotics according to progress    Might be able to remove isolation after 2/4 if no fever    Infection Control Recommendations   · [place] Precautions  · Contact Isolation   · Airborne isolation  · Droplet Isolation    Antimicrobial Stewardship Recommendations   · Targeted therapy    Coordination ofOutpatient Care:   · Estimated Length of IV antimicrobials:  · Patient will need Midline / picc Catheter Insertion:   · Patient will need SNF:  · Patient will need outpatient wound care:     History of Present Illness:   Initial history:  Lucian Arredondo is a 70y.o.-year-old male presented because of shortness of breath and cough that has been progressive. Started January 12 with a fever aches cough shortness of breath, did not lose the taste of food or the smell, no diarrhea no blood in the sputum. Fever was associated. Presented to the ER because of the worsening of shortness of breath. Chest x-ray showed pneumonia he tested positive for Covid. Lactic acid was 3 and the patient was desaturating so he was admitted on high flow to the ICU. To another hospital, patient was not improving despite steroids and high oxygen supplementation. Patient was intubated 2/3/2021 and transferred to [facility]. Paralyzed high oxygen requirement, 2 pressors on board.     WBC 2019  Creatinine is normal  Liver enzymes are normal    Interval changes  2/4/2021   Patient Vitals for the past 8 hrs:   Temp Temp src Pulse Resp SpO2   02/04/21 1500   69 30 95 %   02/04/21 1445   70 30 95 %   02/04/21 1430   68 30 95 %   02/04/21 1415   68 30 95 %   02/04/21 1400   67 30 96 %   02/04/21 1345   71 12 96 %   02/04/21 1330   70 25 96 %   02/04/21 1315   70 28 96 %   02/04/21 1300   68 30 96 %   02/04/21 1245   73 28    02/04/21 1236   75 30 94 %   02/04/21 1230   73 30    02/04/21 1215 99 °F (37.2 °C) Oral 77 30    02/04/21 1200   73 30    02/04/21 1145   72 30    02/04/21 1130   72 30 93 %   02/04/21 1115   71 30 93 %   02/04/21 1100   71 30 93 %   02/04/21 1045   71 30 93 %   02/04/21 1030   71 30 90 %   02/04/21 1015   74 30 95 %   02/04/21 1000   74 30 95 %   02/04/21 0945   76 30 95 %   02/04/21 0930   77 30 96 %   02/04/21 0915   76 30 96 %   02/04/21 0900   77 30 96 %   02/04/21 0845   75 30 94 %   02/04/21 0830   73 14 94 %   02/04/21 0815   73 15 91 %   02/04/21 0800   81 14 93 %   02/04/21 0745   91 19 91 %   02/04/21 0730   94 16 (!) 87 %   02/04/21 0725   96 17 (!) 87 %       Summary of relevant labs:  Labs:  WBC 19  Creatinine normal    CRP 98  Ferritin 307  Fibrinogen 350    Micro:  Sputum culture normal aris 1/30  BC negative X to 1/19  Blood culture 2/4  Urine culture 2/4  Covid +1/14  Imaging:  Chest x-ray bilateral pulmonary changes with chronic changes  vancomycin (VANCOCIN) intermittent dosing (placeholder)   Other RX Placeholder    midazolam (VERSED) 1 mg/mL in D5W infusion           Social History:     Social History     Socioeconomic History    Marital status:      Spouse name: Not on file    Number of children: Not on file    Years of education: Not on file    Highest education level: Not on file   Occupational History    Not on file   Social Needs    Financial resource strain: Not on file    Food insecurity     Worry: Not on file     Inability: Not on file    Transportation needs     Medical: Not on file     Non-medical: Not on file   Tobacco Use    Smoking status: Former Smoker     Quit date: 2009     Years since quittin.4    Smokeless tobacco: Never Used   Substance and Sexual Activity    Alcohol use: Yes     Comment: rare    Drug use: Not on file    Sexual activity: Not on file   Lifestyle    Physical activity     Days per week: Not on file     Minutes per session: Not on file    Stress: Not on file   Relationships    Social connections     Talks on phone: Not on file     Gets together: Not on file     Attends Caodaism service: Not on file     Active member of club or organization: Not on file     Attends meetings of clubs or organizations: Not on file     Relationship status: Not on file    Intimate partner violence     Fear of current or ex partner: Not on file     Emotionally abused: Not on file     Physically abused: Not on file     Forced sexual activity: Not on file   Other Topics Concern    Not on file   Social History Narrative    Not on file       Family History:     Family History   Problem Relation Age of Onset    Stroke Mother     Heart Disease Father       Medical Decision Making:   I have independently reviewed/ordered the following labs:    CBC with Differential:   Recent Labs     21  0520 21  0530   WBC 20.2* 19.8*   HGB 12.0* 12.5*   HCT 37.1* 38.7*    156   LYMPHOPCT 1* 3* MONOPCT 5 3     BMP:  Recent Labs     02/03/21  0530 02/04/21  0325 02/04/21  0737   * 131*  --    K 4.9 5.6* 5.0   CL 91* 100  --    CO2 26 22  --    BUN 21 32*  --    CREATININE 0.64* 1.16  --      Hepatic Function Panel:   Recent Labs     02/03/21  0530 02/04/21  0325   PROT 6.7 6.3*   LABALBU 2.5* 2.7*   BILITOT 0.66 0.42   ALKPHOS 141* 130*   ALT 26 24   AST 33 24     No results for input(s): RPR in the last 72 hours. No results for input(s): HIV in the last 72 hours. No results for input(s): BC in the last 72 hours. Lab Results   Component Value Date    CREATININE 1.16 02/04/2021    GLUCOSE 246 02/04/2021       Detailed results: Thank you for allowing us to participate in the care of this patient. Please call with questions. This note is created with the assistance of a speech recognition program.  While intending to generate adocument that actually reflects the content of the visit, the document can still have some errors including those of syntax and sound a like substitutions which may escape proof reading. It such instances, actual meaningcan be extrapolated by contextual diversion.     Manohar Tavera MD  Office: [phone]  Perfect serve / office [phone]

## 2021-02-04 NOTE — PROGRESS NOTES
Occupational Therapy Not Seen Note    DATE: 2021  Name: Ashely Lozoya  : 1945  MRN: [id number]    Patient not available for Occupational Therapy due to:     Other: pt intubated/sedated, not appropriate for OT eval at this time     Next Scheduled Treatment: check back 2021    Electronically signed by MARA Navarro on 2021 at 11:38 AM

## 2021-02-04 NOTE — H&P
Critical Care - History and Physical Examination    Patient's name:  Nichole Santa  Medical Record Number: [id number]  Patient's account/billing number: [de-identified]  Patient's YOB: 1945  Age: 70 y.o. Date of Admission: 2/4/2021 12:53 AM  Date of History and Physical Examination: 2/4/2021      Primary Care Physician: Luis Owens MD  Attending Physician: Dr. Rosa Arshad Status: Full Code    Chief complaint: No chief complaint on file. COVID-19 pneumonia      HISTORY OF PRESENT ILLNESS:      Abril Ba is a 70 y. o. male admitted for  with complaints of worsening dyspnea and cough over the last 2 days. [facility] explained that his symptoms began January 12 with fever, malaise, myalgias, and cough.  He explained on 14 January that he began with respiratory difficulty and he was tested and was positive.  Since that time he explained that his symptoms were okay however yesterday he began to have increased dyspnea and dyspnea on exertion.  He denied any anosmia or loss of taste.  He denies diarrhea, constipation, melena, hematochezia or hematic emesis.  He does admit to low-grade fever.  He does have a productive cough but states that the mucus is clear only.  Denies any hemoptysis.  On arrival his temperature was 99.  WBC count was normal however chest x-ray showed pneumonia.  Lactic acid was elevated at 3 repeat was 1.7.  Due to his desaturations he was admitted and placed in the ICU on high flow and BiPAP.  Subsequently treated with 5 days of intravenous remdesivir.  Remains on methylprednisolone 40 mg every 12 hours.  Rectory hypoxemia persists.  Alternating high flow oxygen and BiPAP. FiO2 currently 85%.  I's/O+ 1.6 L.  Transaminases are normal.  Mild elevation of alkaline phosphatase.  Glucose elevated on steroids.  Arced elevation of D-dimer at 12.97.  Currently on high intensity prophylactic Lovenox.  X-ray done 1/23/2021 reviewed.  Bilateral patchy densities right greater than left. There was no improvement in oxygenation despite Solu-Medrol, received remdesivir, was on Levaquin, tracheal aspirate grew gram-positive diplococci. Patient hyponatremic. Patient got intubated on 2/3/2021, and transferred here for further care. On my evaluation, patient was on paralytic, switched to normal saline for hyponatremia. Patient initially was fine in the evening patient became hypotensive, requiring 2 pressor supports overnight. Sedation switched from propofol to fentanyl and Versed. PAST MEDICAL HISTORY:         Diagnosis Date    Asthma     Hyperlipidemia     Hypertension     Type 2 diabetes mellitus without complication ([facility] [address]          PAST SURGICAL HISTORY:         Procedure Laterality Date    HERNIA REPAIR      right inguinal at age 3 years.  WI COLONOSCOPY FLX DX W/COLLJ SPEC WHEN PFRMD N/A 10/5/2018    COLONOSCOPY performed by Leeanna Ayala DO at [address] [facility]:      No Known Allergies      HOME MEDS: :      Prior to Admission medications    Medication Sig Start Date End Date Taking?  Authorizing Provider   magnesium oxide (MAG-OX) 400 MG tablet Take 400 mg by mouth daily    Historical Provider, MD   pantoprazole (PROTONIX) 40 MG tablet Take 40 mg by mouth daily    Historical Provider, MD   albuterol sulfate HFA (VENTOLIN HFA) 108 (90 Base) MCG/ACT inhaler Inhale 2 puffs into the lungs every 6 hours as needed for Wheezing    Historical Provider, MD   cetirizine (ZYRTEC) 10 MG tablet Take 10 mg by mouth daily    Historical Provider, MD   dextromethorphan-guaiFENesin (MUCINEX DM)  MG per extended release tablet Take 1 tablet by mouth every 12 hours as needed for Cough or Congestion    Historical Provider, MD   Multiple Vitamins-Minerals (PRESERVISION AREDS PO) Take 2 tablets by mouth nightly    Historical Provider, MD   acetaminophen (TYLENOL) 500 MG tablet Take 1,000 mg by mouth every 6 hours as needed for Pain or Fever    Historical Provider, MD metFORMIN (GLUCOPHAGE) 500 MG tablet Take 500 mg by mouth daily (with breakfast)  18   Historical Provider, MD   atorvastatin (LIPITOR) 10 MG tablet Take 20 mg by mouth nightly  18   Historical Provider, MD   lisinopril (PRINIVIL;ZESTRIL) 2.5 MG tablet Take 2.5 mg by mouth daily    Historical Provider, MD   tamsulosin (FLOMAX) 0.4 MG capsule Take 0.4 mg by mouth nightly     Historical Provider, MD   mometasone (ASMANEX 120 METERED DOSES) 220 MCG/INH inhaler Inhale 2 puffs into the lungs daily    Historical Provider, MD   fluticasone (FLONASE) 50 MCG/ACT nasal spray 1 spray by Each Nare route daily    Historical Provider, MD   aspirin 81 MG tablet Take 81 mg by mouth daily    Historical Provider, MD   zinc gluconate 50 MG tablet Take 50 mg by mouth daily    Historical Provider, MD   Cholecalciferol (VITAMIN D3) 5000 units TABS Take 5,000 Units by mouth daily    Historical Provider, MD       SOCIAL HISTORY:       TOBACCO:   reports that he quit smoking about 11 years ago. He has never used smokeless tobacco.  ETOH:   reports current alcohol use. DRUGS:  has no history on file for drug.      FAMILY HISTORY:          Problem Relation Age of Onset    Stroke Mother     Heart Disease Father        REVIEW OF SYSTEMS (ROS):     Review of Systems   Unable to perform ROS: Mental status change         OBJECTIVE:     VITAL SIGNS:  BP (!) 76/42   Pulse 77   Temp 98 °F (36.7 °C) (Oral)   Resp (!) 5   Wt 237 lb 14 oz (107.9 kg)   SpO2 96%   BMI 34.13 kg/m²   Tmax over 24 hours:  Temp (24hrs), Av.1 °F (36.2 °C), Min:96.5 °F (35.8 °C), Max:98 °F (36.7 °C)      Patient Vitals for the past 8 hrs:   BP Temp Temp src Pulse Resp SpO2 Weight   21 0545 (!) 76/42   77 (!) 5 96 %    21 0530 (!) 71/39   78 (!) 6 93 %    21 0515 (!) 67/40   81 9 95 %    21 0500 (!) 97/47   82 16 (!) 87 %    21 0450    89 28 (!) 89 %    21 0445    86 25 (!) 87 %  02/04/21 0440 115/64   90 30 (!) 87 %    02/04/21 0430    81 21 (!) 87 %    02/04/21 0420 (!) 92/45   78 15 (!) 87 %    02/04/21 0410    77 27 (!) 88 %    02/04/21 0400 (!) 81/42   84 20 (!) 88 %    02/04/21 0350    97 29 (!) 86 %    02/04/21 0345    95 27 (!) 89 %    02/04/21 0300 120/60   96 (!) 7 92 %    02/04/21 0245       237 lb 14 oz (107.9 kg)   02/04/21 0200 (!) 150/72   105 24 93 %    02/04/21 0100 (!) 150/77 98 °F (36.7 °C) Oral 104 19 92 %          Intake/Output Summary (Last 24 hours) at 2/4/2021 0555  Last data filed at 2/4/2021 0548  Gross per 24 hour   Intake 1353 ml   Output 150 ml   Net 1203 ml     Date 02/04/21 0000 - 02/04/21 2359   Shift 7683-2110 0172-2847 4134-9857 24 Hour Total   INTAKE   I.V.(mL/kg) 1353(12.5)   1353(12.5)   Shift Total(mL/kg) 1353(12.5)   1353(12.5)   OUTPUT   Urine(mL/kg/hr) 150   150   Shift Total(mL/kg) 150(1.4)   150(1.4)   Weight (kg) 107.9 107.9 107.9 107.9     Wt Readings from Last 3 Encounters:   02/04/21 237 lb 14 oz (107.9 kg)   02/03/21 237 lb 14 oz (107.9 kg)   10/05/18 218 lb (98.9 kg)     Body mass index is 34.13 kg/m². PHYSICAL EXAM:  Physical Exam  Vitals signs reviewed. Constitutional:       Comments: Intubated and sedated, on paralytic   Cardiovascular:      Rate and Rhythm: Normal rate. Pulses: Normal pulses. Pulmonary:      Comments: Ventilated breath sounds  Abdominal:      General: Bowel sounds are normal.      Palpations: Abdomen is soft. Musculoskeletal: Normal range of motion. Right lower leg: No edema. Left lower leg: No edema.        MEDICATIONS:  Scheduled Meds:   sodium chloride flush  10 mL Intravenous 2 times per day    heparin (porcine)  5,000 Units Subcutaneous 3 times per day    dexamethasone  6 mg Intravenous Q24H    insulin lispro  0-6 Units Subcutaneous Q4H    insulin regular  10 Units Intravenous Once    And    dextrose  25 g Intravenous Once  calcium gluconate  2,000 mg Intravenous Once     Continuous Infusions:   dextrose      fentaNYL 150 mcg/hr (02/04/21 0445)    vasopressin (Septic Shock) infusion 0.04 Units/min (02/04/21 0534)    norepinephrine 30 mcg/kg/min (02/04/21 0510)    phenylephrine (ZABRINA-SYNEPHRINE) 50mg/250mL infusion      dexmedetomidine      phenylephrine (ZABRINA-SYNEPHRINE) 50mg/250mL infusion      dextrose       PRN Meds:       sodium chloride flush, 10 mL, PRN      promethazine, 12.5 mg, Q6H PRN    Or      ondansetron, 4 mg, Q6H PRN      polyethylene glycol, 17 g, Daily PRN      acetaminophen, 650 mg, Q6H PRN    Or      acetaminophen, 650 mg, Q6H PRN      glucose, 15 g, PRN      dextrose, 12.5 g, PRN      glucagon (rDNA), 1 mg, PRN      dextrose, 100 mL/hr, PRN      glucose, 15 g, PRN      dextrose, 12.5 g, PRN      glucagon (rDNA), 1 mg, PRN      dextrose, 100 mL/hr, PRN          ABGs:   Lab Results   Component Value Date    YHI1EDO 27 02/04/2021    FIO2 100.0 02/04/2021       DATA:  Complete Blood Count:   Recent Labs     02/02/21 0520 02/03/21  0530   WBC 20.2* 19.8*   RBC 4.39 4.56   HGB 12.0* 12.5*   HCT 37.1* 38.7*   MCV 84.5 84.9   MCH 27.3 27.4   MCHC 32.3 32.3   RDW 12.7 12.9    156   MPV 9.4 9.8        Last 3 Blood Glucose:   Recent Labs     02/02/21  0520 02/03/21  0530 02/04/21  0325   GLUCOSE 219* 198* 246*        PT/INR:    Lab Results   Component Value Date    PROTIME 14.0 02/03/2021    INR 1.1 02/03/2021     PTT:    Lab Results   Component Value Date    APTT 34.5 02/03/2021       Comprehensive Metabolic Profile:   Recent Labs     02/02/21  0520 02/03/21  0530 02/04/21  0325   * 125* 131*   K 4.8 4.9 5.6*   CL 95* 91* 100   CO2 27 26 22   BUN 24* 21 32*   CREATININE 0.65* 0.64* 1.16   GLUCOSE 219* 198* 246*   CALCIUM 9.0 9.0 8.4*   PROT 6.6 6.7 6.3*   LABALBU 2.6* 2.5* 2.7*   BILITOT 0.68 0.66 0.42   ALKPHOS 163* 141* 130*   AST 32 33 24   ALT 21 26 24 Magnesium: No results found for: MG  Phosphorus: No results found for: PHOS  Ionized Calcium: No results found for: CAION     Urinalysis: No results found for: NITRU, COLORU, PHUR, LABCAST, WBCUA, RBCUA, MUCUS, TRICHOMONAS, YEAST, BACTERIA, CLARITYU, SPECGRAV, LEUKOCYTESUR, UROBILINOGEN, BILIRUBINUR, BLOODU, GLUCOSEU, KETUA, AMORPHOUS    HgBA1c:    Lab Results   Component Value Date    LABA1C 7.3 01/20/2021     TSH:  No results found for: TSH    Lactic Acid: No results found for: LACTA   Troponin: No results for input(s): TROPONINI in the last 72 hours. Radiological imaging  Xr Chest (single View Frontal)    Result Date: 2/3/2021  EXAMINATION: ONE XRAY VIEW OF THE CHEST 2/3/2021 7:55 am COMPARISON: AP chest from 01/30/2021 HISTORY: ORDERING SYSTEM PROVIDED HISTORY: covid and sob TECHNOLOGIST PROVIDED HISTORY: covid and sob Additional history of asthma, hypertension and type 2 diabetes. FINDINGS: Overlying ECG monitor leads and respiratory apparatus. Cardiomediastinal shadow stable; diffuse interstitial and patchy ground-glass opacities again seen, unchanged. No new pulmonary or significant pleural finding. No pneumothorax. Bones stable. Unchanged findings with diffuse ground-glass and reticular opacities, compatible with a history.      Xr Chest (single View Frontal)    Result Date: 1/23/2021 EXAMINATION: ONE XRAY VIEW OF THE CHEST 1/23/2021 9:12 am COMPARISON: 01/19/2021 HISTORY: ORDERING SYSTEM PROVIDED HISTORY: covid TECHNOLOGIST PROVIDED HISTORY: covid 72-year-old male with history of COVID FINDINGS: Portable AP upright view of the chest. Cardiac monitor leads overlie the chest. Trachea midline. Stable mild cardiomegaly. Cardiac and mediastinal contours remain unchanged. No pneumothorax. Patchy bilateral airspace opacities throughout both lungs, similar to the prior exam, right greater than left. Mild elevation of the right hemidiaphragm, stable. Mild right basilar atelectasis. Visualized osseous structures remain unchanged. No free air. Visualized osseous structures remain unchanged. 1. Multifocal airspace disease throughout both lungs likely multifocal pneumonia, right greater than left. Mild right basilar atelectasis. Follow-up is recommended to document resolution. 2. Mild elevation of the right hemidiaphragm, stable. 3. Stable mild cardiomegaly. Xr Chest Portable    Result Date: 2/3/2021  EXAMINATION: ONE XRAY VIEW OF THE CHEST 2/3/2021 1:23 pm COMPARISON: 02/03/2021, 01/30/2021, 01/23/2021 HISTORY: ORDERING SYSTEM PROVIDED HISTORY: respiratory failure TECHNOLOGIST PROVIDED HISTORY: respiratory failure FINDINGS: The tip of the ET tube is 3.1 cm above the giuliano. Bilateral mixed interstitial and airspace opacities which are increased from 8 hours prior. Small right effusion. No pneumothorax. No acute bony findings. Endotracheal tube tip terminates 3.1 cm above the giuliano. Worsened bilateral mixed interstitial and airspace opacities with a probable small right pleural effusion.      Xr Chest Portable    Result Date: 1/30/2021 EXAMINATION: ONE XRAY VIEW OF THE CHEST 1/30/2021 8:06 am COMPARISON: 01/23/2021 HISTORY: ORDERING SYSTEM PROVIDED HISTORY: hypoxia TECHNOLOGIST PROVIDED HISTORY: hypoxia FINDINGS: Cardiomegaly unchanged. Diffuse reticular and airspace ground-glass opacities unchanged. Likely diffuse pneumonia. No definite pleural effusion or pneumothorax. Bones grossly intact. Diffuse ground-glass and reticular opacities. Likely diffuse pneumonia. Xr Chest Portable    Result Date: 1/19/2021  EXAMINATION: ONE XRAY VIEW OF THE CHEST 1/19/2021 8:51 pm COMPARISON: None. HISTORY: ORDERING SYSTEM PROVIDED HISTORY: dyspnea TECHNOLOGIST PROVIDED HISTORY: dyspnea 61-year-old male with dyspnea FINDINGS: Portable AP upright view of the chest. Cardiac monitor leads overlie the chest. Trachea midline. No pneumothorax. No free air. Multifocal airspace disease throughout the lungs with a bilateral middle and lower zone predominance. Trace right-sided pleural effusion. Mild cardiomegaly. Atherosclerotic calcification of the aorta. No acute osseous abnormality evident. 1. Multifocal airspace disease throughout both lungs with a mid and lower zone predominance. Trace right-sided pleural effusion. Findings concerning for multifocal pneumonia. Follow-up is recommended to document resolution. 2. Mild cardiomegaly.      Vl Dup Lower Extremity Venous Bilateral    Result Date: 1/26/2021 [facility]  Vascular Lower Extremities DVT Study Procedure   Patient Name   Rocio Gregory  Date of Study           01/25/2021                 L   Date of Birth  1945    Gender                  Male   Age            70 year(s)    Race                       Room Number    B358   Corporate ID # [id number]   Patient Acct # [de-identified]   MR #           [id number]        Sonographer             MABEL Lucero   Accession #    [id number]   Interpreting Physician  Julio Aparicio MD   Referring      Rm Ortega       Referring Physician  Nurse          Eliana Medina CNP  Practitioner  Procedure Type of Study:   Veins: Lower Extremities DVT Study, Venous Scan Lower Bilateral.  Patient Status: In Patient. Technical Quality:Adequate visualization. Comments:INDICATIONS: HYPOXIA  Conclusions   Summary   Negative for acute DVT. Signature   ----------------------------------------------------------------  Electronically signed by MABEL Lucero(Sonographer) on  01/25/2021 07:24 PM  ----------------------------------------------------------------   ----------------------------------------------------------------  Electronically signed by Julio Aparicio MD(Interpreting  physician) on 01/26/2021 12:22 PM  ----------------------------------------------------------------  Findings:   Right Impression:                    Left Impression:  The common femoral, femoral, deep    The common femoral, femoral, deep  femoral, popliteal, tibials,         femoral, popliteal, tibials,  peroneal, and saphenous veins were   peroneal, and saphenous veins were  evaluated. evaluated. All veins were compressible with     All veins were compressible with  normal doppler responses. normal doppler responses. Chronic changes were visualized in  the SSV.   Velocities are measured in cm/s ; Diameters are measured in cm Right Lower Extremities DVT Study Measurements Right 2D Measurements +------------------------------------+----------+---------------+----------+ ! Location                            ! Visualized! Compressibility! Thrombosis! +------------------------------------+----------+---------------+----------+ ! Common Femoral                      !Yes       ! Yes            ! None      ! +------------------------------------+----------+---------------+----------+ ! Prox Femoral                        !Yes       ! Yes            ! None      ! +------------------------------------+----------+---------------+----------+ ! Mid Femoral                         !Yes       ! Yes            ! None      ! +------------------------------------+----------+---------------+----------+ ! Dist Femoral                        !Yes       ! Yes            ! None      ! +------------------------------------+----------+---------------+----------+ ! Deep Femoral                        !Yes       ! Yes            ! None      ! +------------------------------------+----------+---------------+----------+ ! Popliteal                           !Yes       ! Yes            ! None      ! +------------------------------------+----------+---------------+----------+ ! Sapheno Femoral Junction            ! Yes       ! Yes            ! None      ! +------------------------------------+----------+---------------+----------+ ! PTV                                 ! Yes       ! Yes            ! None      ! +------------------------------------+----------+---------------+----------+ ! Peroneal                            !Yes       ! Yes            ! None      ! +------------------------------------+----------+---------------+----------+ ! Gastroc                             ! Yes       ! Yes            ! None      ! +------------------------------------+----------+---------------+----------+ ! GSV Thigh                           ! Yes       ! Yes            ! None      ! +------------------------------------+----------+---------------+----------+ ! GSV Knee !Yes       !Yes            ! None      ! +------------------------------------+----------+---------------+----------+ ! GSV Ankle                           ! Yes       ! Yes            ! None      ! +------------------------------------+----------+---------------+----------+ ! SSV                                 ! Yes       ! Yes            ! Chronic   ! +------------------------------------+----------+---------------+----------+ Right Doppler Measurements +---------------------------+------+------+--------------------------------+ ! Location                   ! Signal!Reflux! Reflux (msec)                   ! +---------------------------+------+------+--------------------------------+ ! Common Femoral             !Phasic!      !                                ! +---------------------------+------+------+--------------------------------+ ! Prox Femoral               !Phasic!      !                                ! +---------------------------+------+------+--------------------------------+ ! Popliteal                  !Phasic!      !                                ! +---------------------------+------+------+--------------------------------+ Left Lower Extremities DVT Study Measurements Left 2D Measurements +------------------------------------+----------+---------------+----------+ ! Location                            ! Visualized! Compressibility! Thrombosis! +------------------------------------+----------+---------------+----------+ ! Common Femoral                      !Yes       ! Yes            ! None      ! +------------------------------------+----------+---------------+----------+ ! Prox Femoral                        !Yes       ! Yes            ! None      ! +------------------------------------+----------+---------------+----------+ ! Mid Femoral                         !Yes       ! Yes            ! None      ! +------------------------------------+----------+---------------+----------+ ! Dist Femoral !Yes       !Yes            ! None      ! +------------------------------------+----------+---------------+----------+ ! Deep Femoral                        !Yes       ! Yes            ! None      ! +------------------------------------+----------+---------------+----------+ ! Popliteal                           !Yes       ! Yes            ! None      ! +------------------------------------+----------+---------------+----------+ ! Sapheno Femoral Junction            ! Yes       ! Yes            ! None      ! +------------------------------------+----------+---------------+----------+ ! PTV                                 ! Yes       ! Yes            ! None      ! +------------------------------------+----------+---------------+----------+ ! Peroneal                            !Yes       ! Yes            ! None      ! +------------------------------------+----------+---------------+----------+ ! Gastroc                             ! Yes       ! Yes            ! None      ! +------------------------------------+----------+---------------+----------+ ! GSV Thigh                           ! Yes       ! Yes            ! None      ! +------------------------------------+----------+---------------+----------+ ! GSV Knee                            ! Yes       ! Yes            ! None      ! +------------------------------------+----------+---------------+----------+ ! GSV Ankle                           ! Yes       ! Yes            ! None      ! +------------------------------------+----------+---------------+----------+ ! SSV                                 ! Yes       ! Yes            ! None      ! +------------------------------------+----------+---------------+----------+ Left Doppler Measurements +---------------------------+------+------+--------------------------------+ ! Location                   ! Signal!Reflux! Reflux (msec)                   ! +---------------------------+------+------+--------------------------------+ ! Common Femoral !Phasic!      !                                ! +---------------------------+------+------+--------------------------------+ ! Prox Femoral               !Phasic!      !                                ! +---------------------------+------+------+--------------------------------+ ! Popliteal                  !Phasic!      !                                ! +---------------------------+------+------+--------------------------------+    Xr Abdomen For Ng/og/ne Tube Placement    Result Date: 2/4/2021  EXAMINATION: ONE SUPINE XRAY VIEW(S) OF THE ABDOMEN 2/4/2021 3:48 am COMPARISON: None. HISTORY: ORDERING SYSTEM PROVIDED HISTORY: og placement TECHNOLOGIST PROVIDED HISTORY: og placement Portable? ->Yes Reason for Exam: EXSBQI,FD placement,covid pt FINDINGS: There is a nonobstructive bowel gas pattern. There is no intraperitoneal free air. There are no suspicious calcifications. Is a gastric tube with the tip in the proximal aspect of the stomach. Gastric tube with the tip in the proximal aspect of the stomach. ASSESSMENT:     Active Problems:    Acute respiratory failure with hypoxia (HCC)  Resolved Problems:    * No resolved hospital problems. *      PLAN:     ICU PROPHYLAXIS:  Stress ulcer:  [] PPI Agent  [] D2Bfhgi [] Sucralfate  [] Other:  VTE:   [] Enoxaparin  [] Unfract. Heparin Subcut  [] EPC Cuffs    NUTRITION:  [] NPO  [] Tube Feeding (Specify: ) [] TPN  [] PO    CONSULTATION NEEDED:  [] No   [] Yes     HOME MEDICATIONS RECONCILED: [] No  [] Yes    FAMILY UPDATED:    [] No   [x] Yes     ADDITIONAL PLAN:  -ARDS secondary to Covid pneumonia  -Sepsis secondary to Covid pneumoniawill rule out other etiologies, will get cultures. -Septic shockpatient currently on levo and vaso  -patient continues to breathe over the vent, will need Nimbex for paralyt on -Versed and fentanyl for sedation.   Updated the daughter about patient's critical condition  Follow-up on TSH and cortisol

## 2021-02-04 NOTE — PROCEDURES
PROCEDURE NOTE - ARTERIAL LINE PLACEMENT    PATIENT NAME: Kyle ROTH [facility] RECORD NO. [id number]  DATE: 2/4/2021  ATTENDING PHYSICIAN:  Gaye      PREOPERATIVE DIAGNOSIS:  Need for blood pressure monitoring  POSTOPERATIVE DIAGNOSIS:  Same  PROCEDURE PERFORMED: Right Radial Arterial Line Insertion  PERFORMING PHYSICIAN:  Navneet Perry MD  ESTIMATED BLOOD LOSS:  Less than 25 ml  COMPLICATIONS:  None immediately appreciated. DISCUSSION:  Rosy Cedeno is a 70 y.o. male who requires invasive pressure monitoring and frequent blood draws. The history and physical examination were reviewed and confirmed. CONSENT: Unable to be obtained due to patient's condition. PROCEDURE:  A timeout was initiated by the bedside nurse and was confirmed by those present. The patient was placed in a supine position. The skin overlying the Right Radial was prepped with chlorhexadine. Through this region, the introducer needle through catheter was inserted into right radial artery until pulsatile bright blood was seen in collection tubing. Guidewire was advanced with no resistance. Catheter was advanced into the artery, wire was pulled with brisk bleeding noted. Pressure monitoring setup was connected to the catheter, it aspirated and flushed easily. The catheter was secured to the wrist with CHG tegaderm. No immediate complication was evident. All sponge, instrument and needle counts were correct at the completion of the procedure.       Navneet Perry MD  2:07 AM, 2/4/21

## 2021-02-04 NOTE — PROCEDURES
PROCEDURE NOTE - CENTRAL VENOUS LINE PLACEMENT    PATIENT NAME: Kyle ROTH [facility] RECORD NO. [id number]  DATE: 2/4/2021  ATTENDING PHYSICIAN: Dr. Maria R Tuttle DIAGNOSIS:  vascular access, hypovolemia, long term access and centrally administered medications  POSTOPERATIVE DIAGNOSIS:  Same  PROCEDURE PERFORMED:  Right Internal Jugular Vein Central Line Insertion  PERFORMING PHYSICIAN: Demi Yan MD  ANESTHESIA:  Local utilizing 1% lidocaine  ESTIMATED BLOOD LOSS:  Less than 25 ml  COMPLICATIONS:  None immediately appreciated. DISCUSSION:  Anita Andersen is a 70y.o.-year-old male who requires central IV access vascular access, hypovolemia, long term access and centrally administered medications. The history and physical examination were reviewed and confirmed. CONSENT: Unable to be obtained due to patient's condition. PROCEDURE:  A timeout was initiated by the bedside nurse and was confirmed by those present. The patient was placed in a supine position. The skin overlying the Right Internal Jugular Vein was prepped with chlorhexadine and draped in sterile fashion. The skin was infiltrated with local anesthetic. The vessel and surrounding anatomy was visualized using ultrasound. Through the anesthetized region, the introducer needle was inserted into the internal jugular vein returning dark red non pulsatile blood. A guidewire was placed through the center of the needle with no resistance. Ultrasound confirmed presence of wire in the vein. A small incision made in the skin with a #11 scalpel blade. The dilator was inserted into the skin and vein over guidewire using Seldinger technique. The dilator was then removed and the 7 F 20 cm catheter was placed in the vein over the guidewire using Seldinger technique to a depth of 16 cm. The guidewire was then removed and all ports aspirated and flushed appropriately. The catheter then secured using silk suture and a temporary sterile dressing was applied. No immediate complication was evident. All sponge, instrument and needle counts were correct at the completion of the procedure. Postprocedural chest x-ray showed good position of the catheter with no evidence of hemothorax or pneumothorax. The patient tolerated the procedure well with no immediate complication evident.      Mandi Noel MD  4:52 AM, 2/4/21

## 2021-02-04 NOTE — PROGRESS NOTES
[facility] here to transfer pt to [facility]. Millie  Writer gave report to [facility] team.

## 2021-02-04 NOTE — PROGRESS NOTES
Pt left via stretcher with [facility] team for transfer to [facility]. JULIETTE's.

## 2021-02-04 NOTE — PROGRESS NOTES
Pt left with [facility] with hernandez catheter in place, IV's x2 with infusions running, BUE restraints, ET tube at 23 cm at the teeth (gums), Propofol infusing at 15mcg/kg/min, Fentanyl infusing at 25mcg/hr with a credit of 63mL for [facility], Nimbex infusing at 2mcg/kg/min, and normal saline at 100mL/hr. Fentanyl credit of 63mL was verified with Angela Ty RN, writer and [employer] team. Nimbex was started for vent synchrony per Dr. Lawrence Gu request and Evita Pham NP order.

## 2021-02-04 NOTE — CARE COORDINATION
Case Management Initial Discharge Plan  Ashely Lozoya,             Met with:patients daughter Ganga Anguiano, Karen Barrett to discuss discharge plans. Information verified: address, contacts, phone number, , insurance Yes    Emergency Contact/Next of Kin name & number: as per face sheet    PCP: Birgit Woods MD  Date of last visit: phone visit 3 weeks ago    Insurance Provider: medicare and human[employer]    Discharge Planning    Living Arrangements:    lives alone  Support Systems:       Home has 2 stories  4 stairs to climb to get into front door, flight of stairs to climb to reach second floor  Location of bedroom/bathroom in home upper    Patient able to perform ADL's:Independent    Current Services (outpatient & in home) DME  DME equipment: glucometer  DME provider: na    Receiving oral anticoagulation therapy? No    If indicated: baby asa  Physician managing anticoagulation treatment: na  Where does patient obtain lab work for ATC treatment? na      Potential Assistance Needed:       Patient agreeable to home care: No  [place] of choice provided:  n/a    Prior SNF/Rehab Placement and Facility: none  Agreeable to SNF/Rehab: Yes  [place] of choice provided: daughter and son are aware he may need placement and are willing to discuss when approp     Evaluation: no    Expected Discharge date:       Patient expects to be discharged to: Follow Up Appointment: Best Day/ Time:      Transportation provider: [facility]  Transportation arrangements needed for discharge: Yes    Readmission Risk              Risk of Unplanned Readmission:        24             Does patient have a readmission risk score greater than 14?: Yes  If yes, follow-up appointment must be made within 7 days of discharge.      Goals of Care: airway management      Discharge Plan: Intub vent, FIO2 100%, on IV antx, versed, levo and fentanyl, will need LTACH choice when approp Electronically signed by Kerry Hernandez RN on 2/4/21 at 11:02 AM EST

## 2021-02-04 NOTE — PLAN OF CARE
Problem: Airway Clearance - Ineffective  Goal: Achieve or maintain patent airway  2/3/2021 2047 by Mai Davis RN  Outcome: Ongoing  Note: Pt is ventilated. Airway maintained. Will continue to monitor. Problem: Airway Clearance - Ineffective  Goal: Achieve or maintain patent airway  2/3/2021 2042 by Mai Davis RN  Outcome: Ongoing     Problem: Gas Exchange - Impaired  Goal: Absence of hypoxia  2/3/2021 2047 by Mai Davis RN  Outcome: Ongoing  Note: Pulse ox WNL. No distress noted. Pt is ventilated. Will continue to assess. Problem: Breathing Pattern - Ineffective  Goal: Ability to achieve and maintain a regular respiratory rate  2/3/2021 2047 by Mai Davis RN  Outcome: Ongoing  Note: Respirations WNL. Pt is ventilated. Will continue to monitor. Problem: Body Temperature -  Risk of, Imbalanced  Goal: Complications related to the disease process, condition or treatment will be avoided or minimized  2/3/2021 2047 by Mai Davis RN  Outcome: Ongoing  Note: No temperature noted. Will continue to monitor. Problem: Risk for Fluid Volume Deficit  Goal: Maintain normal heart rhythm  2/3/2021 2047 by Mai Davis RN  Outcome: Ongoing  Note: Cardiac monitor on. Cardiac monitor continues to show ST. Will continue to assess. Problem: Falls - Risk of:  Goal: Will remain free from falls  Description: Will remain free from falls  2/3/2021 2047 by Mai Davis RN  Outcome: Ongoing  Note: Pt is at high risk for falls. Non skid socks on. Bed in low position and wheels locked. Fall sign posted and bracelet on. Siderails up x2. Bed alarm on. Call light within reach. Will continue to assess.       Problem: Skin Integrity:  Goal: Will show no infection signs and symptoms  Description: Will show no infection signs and symptoms  2/3/2021 2047 by Mai Davis RN  Outcome: Ongoing Note: Skin assessed at regular intervals. No open areas noted at this time. Pt will be repositioned every 2-3 hours and as needed. Will continue to assess. Problem: Restraint Use - Nonviolent/Non-Self-Destructive Behavior:  Goal: Absence of restraint-related injury  Description: Absence of restraint-related injury  Outcome: Ongoing  Note: No injury to BUE restraints. Will continue to monitor.

## 2021-02-04 NOTE — PROGRESS NOTES
[facility] called and set up transport with [employer]. [facility] will call with JACQUES. Supervisor aware.

## 2021-02-04 NOTE — PROGRESS NOTES
Dispatch with [facility] called with ETA of 1 hour and 20min. Paperwork to be faxed. Supervisor aware.

## 2021-02-04 NOTE — PROGRESS NOTES
Comprehensive Nutrition Assessment    Type and Reason for Visit:  Initial (vent)    Nutrition Recommendations/Plan: Start nutrition as able. If TF, suggest Semi-elemental formula with goal rate of 55 mL/hr to provide 1584 kcal and 99 g pro/day. Will follow/monitor care plans. Nutrition Assessment:  Pt admitted d/t Covid. PMH includes: DM, HTN, HLD, Asthma. Pt is currently intubated. No nutrition at present. Meds/labs reviewed. Malnutrition Assessment:  Malnutrition Status:  Insufficient data      Estimated Daily Nutrient Needs:  Energy (kcal):  1500 kcal/day  Protein (g):  110 g pro/day    Current Nutrition Therapies:    Diet NPO Effective Now    Anthropometric Measures:  · Height: 5' 10\" (177.8 cm)  · Current Body Weight: 237 lb 14 oz (107.9 kg)   · Ideal Body Weight: 166 lbs; % Ideal Body Weight  143%   · BMI: 34.1  · BMI Categories: Obese Class 1 (BMI 30.0-34. 9)       Nutrition Diagnosis:   · Inadequate oral intake related to impaired respiratory function as evidenced by NPO or clear liquid status due to medical condition    Nutrition Interventions:   Food and/or Nutrient Delivery:  Start nutrition as able. If TF, suggest Semi-elemental formula with goal rate of 55 mL/hr to provide 1584 kcal and 99 g pro/day. Nutrition Education/Counseling:  No recommendation at this time   Coordination of Nutrition Care:  Continue to monitor while inpatient    Goals:  meet % of estimated nutrient needs       Nutrition Monitoring and Evaluation:   Food/Nutrient Intake Outcomes:  Diet Advancement/Tolerance  Physical Signs/Symptoms Outcomes:  Biochemical Data, Nutrition Focused Physical Findings, Skin, Weight     Discharge Planning:     Too soon to determine     Electronically signed by Gina Colón RD, LD on 2/4/21 at 12:45 PM EST    Contact: [phone] Addendum :  RN reports plan to start TF today per Dr Joe Lua. Pt will be prone for 16 hr/day and supine for 8 hr/day. RD consulted for TF ordering/management. Recommend run TF at 20 ml/hr when prone, and 120 mL/hr when supine. This will provide a total of 1536 kcal and 96 g pro/day.

## 2021-02-04 NOTE — PLAN OF CARE
Nutrition Problem #1: Inadequate oral intake  Intervention: Food and/or Nutrient Delivery: (Start nutrition as able.  If TF, suggest Semi-elemental formula with goal rate of 55 mL/hr to provide 1584 kcal and 99 g pro/day.)  Nutritional Goals: meet % of estimated nutrient needs

## 2021-02-04 NOTE — PROGRESS NOTES
Writer called and updated Sayra Mills (pt's daughter) that pt had left and is on his way to [facility]. V's. Writer gave room number, unit phone number and visitor policy for this evening.

## 2021-02-05 NOTE — PLAN OF CARE
Problem: OXYGENATION/RESPIRATORY FUNCTION  Goal: Patient will maintain patent airway  Outcome: Ongoing  Goal: Patient will achieve/maintain normal respiratory rate/effort  Description: Respiratory rate and effort will be within normal limits for the patient  Outcome: Ongoing     Problem: MECHANICAL VENTILATION  Goal: Patient will maintain patent airway  Outcome: Ongoing  Goal: Oral health is maintained or improved  Outcome: Ongoing  Goal: ET tube will be managed safely  Outcome: Ongoing  Goal: Mobility/activity is maintained at optimum level for patient  Outcome: Ongoing     Problem: SKIN INTEGRITY  Goal: Skin integrity is maintained or improved  Outcome: Ongoing

## 2021-02-05 NOTE — PROGRESS NOTES
Critical Care Team - Daily Progress Note      Date and time: 2021 5:30 PM  Patient's name:  Shadia Robb  Medical Record Number: [id number]  Patient's account/billing number: [de-identified]  Patient's YOB: 1945  Age: 70 y.o. Date of Admission: 2021 12:53 AM  Length of stay during current admission: 1      Primary Care Physician: Mary Winters MD  ICU Attending Physician: Dr. Zuleima Lentz Status: Full Code    Reason for ICU admission: Septic shock due to Covid pneumonia      SUBJECTIVE:     OVERNIGHT EVENTS:     Patient was proned overnight which improved his saturations. This morning has YAA. Bladder scan showed 700 cc. Alvarenga in place, might have to exchange it. Fever:-  Temp (24hrs), Av.4 °F (37.4 °C), Min:98.6 °F (37 °C), Max:99.9 °F (32.5 °C)    Systolic (83WZV), KARIS:081 , Min:83 , TFB:476     Diastolic (52IKA), UYF:62, Min:57, Max:72  Urine output in the last 24 hours: In: 3543 [I.V.:1888;  NG/GT:428]  Out: 4666 [Urine:1285]  Patient Vitals for the past 96 hrs (Last 3 readings):   Weight   21 0600 249 lb 1.9 oz (113 kg)   21 0245 237 lb 14 oz (107.9 kg)       AWAKE & FOLLOWING COMMANDS:  [x] No   [] Yes    CURRENT VENTILATION STATUS:     [x] Ventilator  [] BIPAP  [] Nasal Cannula [] Room Air        SECRETIONS Amount:  [x] Small [] Moderate  [] Large  [] None  Color:     [] White [] Colored  [] Bloody    SEDATION:  RAAS Score:  [] Propofol gtt  [] Versed gtt  [] Ativan gtt   [x] No Sedation    PARALYZED:  [] No    [x] Yes    DIARRHEA:                [x] No                [] Yes  (C. Difficile status: [] positive                                                                                                                       [] negative                                                                                                                     [] pending)    VASOPRESSORS:  [] No    [x] Yes    If yes - [x] Levophed       [] Dopamine     [x] Vasopressin       [] Dobutamine  [] Phenylephrine         [] Epinephrine    CENTRAL LINES:     [] No   [x] Yes   (Date of Insertion: 2/3/2021)           If yes -     [x] Right IJ     [] Left IJ [] Right Femoral [] Left Femoral                   [] Right Subclavian [] Left Subclavian       SMALLWOOD'S CATHETER:   [] No   [x] Yes  (Date of Insertion: 2/3/2021)     URINE OUTPUT:            [x] Good   [x] Low              [] Anuric      OBJECTIVE:     VITAL SIGNS:  /72   Pulse 84   Temp 99.1 °F (37.3 °C) (Core)   Resp (!) 34   Ht 5' 10\" (1.778 m)   Wt 249 lb 1.9 oz (113 kg)   SpO2 93%   BMI 35.74 kg/m²   Tmax over 24 hours:  Temp (24hrs), Av.4 °F (37.4 °C), Min:98.6 °F (37 °C), Max:99.9 °F (37.7 °C)      Patient Vitals for the past 6 hrs:   BP Temp Temp src Pulse Resp SpO2   21 1715    84 (!) 34 93 %   21 1700    72 (!) 34 93 %   21 1645    71 (!) 34 93 %   21 1630    70 (!) 34 93 %   21 1615    72 (!) 34 91 %   21 1600 138/72 99.1 °F (37.3 °C) CORE 66 (!) 34 92 %   21 1545    65 (!) 34 92 %   21 1530    65 (!) 34 92 %   21 1515    69 (!) 34 91 %   21 1500    64 (!) 34 92 %   21 1445    65 (!) 34 92 %   21 1430    64 (!) 34 93 %   21 1415    67 (!) 34 95 %   21 1400    70 (!) 34 94 %   21 1345    67 (!) 34 95 %   21 1330    68 (!) 34 95 %   21 1315    66 (!) 34 95 %   21 1300    66 (!) 34 95 %   21 1245    67 (!) 34 95 %   21 1230    68 (!) 34 95 %   21 1215    67 (!) 34 94 %   21 1200 125/67 98.6 °F (37 °C) CORE 64 (!) 34 95 %   21 1145    68 (!) 34 95 %         Intake/Output Summary (Last 24 hours) at 2021 1730  Last data filed at 2021 1600  Gross per 24 hour   Intake 3347 ml   Output 1710 ml   Net 1637 ml     Wt Readings from Last 2 Encounters: 02/05/21 249 lb 1.9 oz (113 kg)   02/03/21 237 lb 14 oz (107.9 kg)     Body mass index is 35.74 kg/m².         PHYSICAL EXAMINATION:    General appearance -intubated and sedated and paralyzed  Mental status -intubated and sedated, unable to assess  Eyes - pupils equal and reactive, extraocular eye movements intact  Ears - bilateral TM's and external ear canals normal  Nose - normal and patent, no erythema, discharge or polyps  Mouth -intubated  Neck - supple, no significant adenopathy  Chest -crackles bilaterally  Heart - normal rate, regular rhythm, normal S1, S2, no murmurs, rubs, clicks or gallops  Abdomen - soft, nontender, nondistended, no masses or organomegaly  Neurological - alert, oriented, normal speech, no focal findings or movement disorder noted}  Extremities - peripheral pulses normal, no pedal edema, no clubbing or cyanosis  Skin - normal coloration and turgor, no rashes, no suspicious skin lesions noted      Any additional physical findings:    MEDICATIONS:    Scheduled Meds:   insulin glargine  10 Units Subcutaneous Nightly    sodium chloride flush  10 mL Intravenous 2 times per day    heparin (porcine)  5,000 Units Subcutaneous 3 times per day    dexamethasone  6 mg Intravenous Q24H    insulin lispro  0-6 Units Subcutaneous Q4H    piperacillin-tazobactam  3,375 mg Intravenous Q8H     Continuous Infusions:   sodium chloride 75 mL/hr at 02/05/21 1000    dextrose      fentaNYL 75 mcg/hr (02/05/21 1426)    vasopressin (Septic Shock) infusion 0.04 Units/min (02/05/21 1705)    norepinephrine 6 mcg/min (02/05/21 1725)    phenylephrine (ZABRINA-SYNEPHRINE) 50mg/250mL infusion Stopped (02/04/21 0735)    dexmedetomidine Stopped (02/04/21 1056)    dextrose      EPINEPHrine infusion      midazolam 10 mg/hr (02/05/21 1300)    cisatracurium (NIMBEX) infusion 2 mcg/kg/min (02/05/21 1603)    epoprostenol (VELETRI) nebulization solution 50 ng/kg/min (02/05/21 1515)     PRN Meds:   sodium chloride flush, 10 mL, PRN      promethazine, 12.5 mg, Q6H PRN    Or      ondansetron, 4 mg, Q6H PRN      polyethylene glycol, 17 g, Daily PRN      acetaminophen, 650 mg, Q6H PRN    Or      acetaminophen, 650 mg, Q6H PRN      glucose, 15 g, PRN      dextrose, 12.5 g, PRN      glucagon (rDNA), 1 mg, PRN      dextrose, 100 mL/hr, PRN      glucose, 15 g, PRN      dextrose, 12.5 g, PRN      glucagon (rDNA), 1 mg, PRN      dextrose, 100 mL/hr, PRN          VENT SETTINGS (Comprehensive) (if applicable):  Vent Information  $Ventilation: $Subsequent Day  Skin Assessment: Clean, dry, & intact  Equipment Changed: Airway securing device  Vent Type: Servo i  Vent Mode: PRVC  Vt Ordered: 550 mL  Rate Set: 34 bmp  FiO2 : 80 %  SpO2: 93 %  SpO2/FiO2 ratio: 115  Sensitivity: 3  PEEP/CPAP: 18  I Time/ I Time %: 0.7 s  Humidification Source: Heated wire  Humidification Temp: 37  Humidification Temp Measured: 37.1  Nitric Oxide/Epoprostenol In Use?: Yes  Additional Respiratory  Assessments  Pulse: 84  Resp: (!) 34  SpO2: 93 %  End Tidal CO2: 28  Position: Semi-Hogan's  Humidification Source: Heated wire  Humidification Temp: 37  Oral Care Completed?: Yes  Oral Care: Mouth swabbed, Mouth suctioned  Subglottic Suction Done?: Yes    ABGs:     Laboratory findings:    Complete Blood Count:   Recent Labs     02/03/21  0530 02/05/21  0808   WBC 19.8* 18.2*   HGB 12.5* 11.3*   HCT 38.7* 36.4*    123*        Last 3 Blood Glucose:   Recent Labs     02/03/21  0530 02/04/21  0325 02/05/21  0538   GLUCOSE 198* 246* 209*        PT/INR:    Lab Results   Component Value Date    PROTIME 14.0 02/03/2021    INR 1.1 02/03/2021     PTT:    Lab Results   Component Value Date    APTT 34.5 02/03/2021       Comprehensive Metabolic Profile:   Recent Labs     02/03/21  0530 02/04/21  0325 02/04/21  0737 02/05/21  0538   * 131*  --  131*   K 4.9 5.6* 5.0 4.7   CL 91* 100  --  101   CO2 26 22  --  24   BUN 21 32*  --  42* CREATININE 0.64* 1.16  --  1.31*   GLUCOSE 198* 246*  --  209*   CALCIUM 9.0 8.4*  --  8.5*   PROT 6.7 6.3*  --  5.9*   LABALBU 2.5* 2.7*  --  2.4*   BILITOT 0.66 0.42  --  0.60   ALKPHOS 141* 130*  --  93   AST 33 24  --  19   ALT 26 24  --  22      Magnesium: No results found for: MG  Phosphorus: No results found for: PHOS  Ionized Calcium: No results found for: CAION     Urinalysis:     Troponin: No results for input(s): TROPONINI in the last 72 hours. Microbiology:    Cultures during this admission:     Blood cultures:                 [] None drawn      [] Negative             []  Positive (Details:  )  Urine Culture:                   [] None drawn      [] Negative             []  Positive (Details:  )  Sputum Culture:               [] None drawn       [] Negative             []  Positive (Details:  )   Endotracheal aspirate:     [] None drawn       [] Negative             []  Positive (Details:  )     Other pertinent Labs:       Radiology/Imaging:     Chest Xray (2/5/2021):    ASSESSMENT:     · Active Problems:  ·   Acute respiratory failure with hypoxia ([facility] [address]  ·   Septic shock ([address]  · Resolved Problems:  ·   * No resolved hospital problems. *  ·   ·         PLAN:     WEAN PER PROTOCOL:  [] No   [] Yes  [] N/A    DISCONTINUE ANY LABS:   [] No   [] Yes    ICU PROPHYLAXIS:  Stress ulcer:  [] PPI Agent  [] D8Llusa [] Sucralfate  [] Other:  VTE:   [] Enoxaparin  [] Unfract. Heparin Subcut  [] EPC Cuffs    NUTRITION:  [] NPO [] Tube Feeding (Specify: ) [] TPN  [] PO (Diet: DIET TUBE FEED CONTINUOUS/CYCLIC NPO; Semi-elemental; Orogastric; 20)    HOME MEDICATIONS RECONCILED: [] No  [] Yes    INSULIN DRIP:   [] No   [] Yes    CONSULTATION NEEDED:  [] No   [] Yes    FAMILY UPDATED:    [] No   [] Yes    TRANSFER OUT OF ICU:   [] No   [] Yes    ADDITIONAL PLAN:    1. Neurologic:   · Neuro intact  · Neuro checks per protocol  · Sedation: Versed and fentanylNimbex  · Pain management: Fentanyl   2.  Cardiovascular: · Hemodynamically unstable  · On Levophed and vasopressin currently  3. Pulmonary:  · Maintain oxygen sats >92%  · CXR: Feels bilateral airspace disease  4. GI/Nutrition  · Diet:DIET TUBE FEED CONTINUOUS/CYCLIC NPO; Semi-elemental; Orogastric; 20  5. Renal/Fluid/Electrolyte  · YAA at this morning  · Bladder scan done showed retention despite Alvarenga  6. ID  WBC:   Lab Results   Component Value Date    WBC 18.2 (H) 2021   ·   · Tmax: Temp (24hrs), Av.4 °F (37.4 °C), Min:98.6 °F (37 °C), Max:99.9 °F (37.7 °C)  ·   · Antimicrobials: Zosyn not indicated   7. Hematology:  Recent Labs     21  0530 21  0808   HGB 12.5* 11.3*   ·  stable  8. Endocrine:   glucose controlled - most recent BGL is   Recent Labs     21  0530 21  0325 21  0538   GLUCOSE 198* 246* 209*   ·   9. DVT Prophylaxis  · SCD sleeves -thigh high and Heparin        Saad Yee M.D. Critical care resident,  Department of Internal Medicine/ Critical care  [facility] ([place]             2021, 5:30 PM    Attending Physician Statement  I have discussed the care of Miguel Garcia, including pertinent history and exam findings,  with the resident. I have seen and examined the patient and the key elements of all parts of the encounter have been performed by me. I agree with the assessment, plan and orders as documented by the resident with additions .     ARDS due to COVID-19 pneumonia  Acute kidney injury  Septic shock  Plan  Continue sedation and paralysis  Continue prone positioning  Inhaled Flolan    complete course of Decadron  Urine output improved since Alvarenga catheter was changed  Wean vasopressors keep map greater than 65  Continue IV fluids Total critical care time caring for this patient with life threatening, unstable organ failure, including direct patient contact, management of life support systems, review of data including imaging and labs, discussions with other team members and physicians at least 27   Min so far today, excluding procedures. Treatment plan Discussed with nursing staff in detail , all questions answered . Electronically signed by Taryn Wright MD on   2/5/21 at 6:10 PM EST    Please note that this chart was generated using voice recognition Dragon dictation software. Although every effort was made to ensure the accuracy of this automated transcription, some errors in transcription may have occurred.

## 2021-02-05 NOTE — PROGRESS NOTES
[facility] -   Infectious diseases evaluation  admission date 2/4/2021    reason for consultation:   bandemia    Impression :   Current:  · bandemia from steroids  · Covid pneumonia  · Covid related sepsis with ARDS   · septic shock  · Respiratory failure and intubation  · Elevated inflammatory markers CRP, LDH    Other:  ·   Discussion / summary of stay / plan of care   ·   Recommendations   · Post 5 days of remdesivir at the other hospital  · Post Levaquin course in the other hospital  · Continue steroids and anticoagulation  · ARDS protocol  · Patient on pressors  · Probably too late to give him plasma  · Follow on CRP  · Low-grade fever: 2/4  · Stop vancomycin 2/4  · 2/4, continue Zosyn    Removed  isolation after 2/5     Infection Control Recommendations   · [place] Precautions  · Contact Isolation   · Airborne isolation  · Droplet Isolation    Antimicrobial Stewardship Recommendations   · Targeted therapy    Coordination ofOutpatient Care:   · Estimated Length of IV antimicrobials:  · Patient will need Midline / picc Catheter Insertion:   · Patient will need SNF:  · Patient will need outpatient wound care:     History of Present Illness:   Initial history:  Bernardo Melara is a 70y.o.-year-old male presented because of shortness of breath and cough that has been progressive. Started January 12 with a fever aches cough shortness of breath, did not lose the taste of food or the smell, no diarrhea no blood in the sputum. Fever was associated. Presented to the ER because of the worsening of shortness of breath. Chest x-ray showed pneumonia he tested positive for Covid. Lactic acid was 3 and the patient was desaturating so he was admitted on high flow to the ICU. To another hospital, patient was not improving despite steroids and high oxygen supplementation. Patient was intubated 2/3/2021 and transferred to [facility]. Paralyzed high oxygen requirement, 2 pressors on board. WBC 2019  Creatinine is normal  Liver enzymes are normal    Interval changes  2/5/2021   Patient Vitals for the past 8 hrs:   BP Temp Temp src Pulse Resp SpO2   02/05/21 1600 138/72 99.1 °F (37.3 °C) CORE 66 (!) 34 92 %   02/05/21 1545    65 (!) 34 92 %   02/05/21 1530    65 (!) 34 92 %   02/05/21 1515    69 (!) 34 91 %   02/05/21 1500    64 (!) 34 92 %   02/05/21 1445    65 (!) 34 92 %   02/05/21 1430    64 (!) 34 93 %   02/05/21 1415    67 (!) 34 95 %   02/05/21 1400    70 (!) 34 94 %   02/05/21 1345    67 (!) 34 95 %   02/05/21 1330    68 (!) 34 95 %   02/05/21 1315    66 (!) 34 95 %   02/05/21 1300    66 (!) 34 95 %   02/05/21 1245    67 (!) 34 95 %   02/05/21 1230    68 (!) 34 95 %   02/05/21 1215    67 (!) 34 94 %   02/05/21 1200 125/67 98.6 °F (37 °C) CORE 64 (!) 34 95 %   02/05/21 1145    68 (!) 34 95 %   02/05/21 1130    71 (!) 34 95 %   02/05/21 1115    65 29    02/05/21 1113    64 27 94 %   02/05/21 1100    62 (!) 34    02/05/21 1045    63 (!) 34    02/05/21 1030    65 (!) 34    02/05/21 1015    65 (!) 34    02/05/21 1000    64 (!) 34 95 %   02/05/21 0945    69 (!) 34 95 %   02/05/21 0930    73 (!) 34 94 %   02/05/21 0915 109/69   74 (!) 34 94 %   02/05/21 0900 83/71        02/05/21 0848    98 30 96 %   02/05/21 0845    95 (!) 34 96 %   02/05/21 0830    98 (!) 34 95 %   Sputum culture normal aris from 1/13  Blood cultures have been negative  Urine culture negative    Patient remains on the ventilator sedated paralyzed, on 1 pressor.   Continues to be on 80% FiO2, high requirement of PEEP  Sputum is small due to his paralytics  T-max 99    Chest x-ray continues to show diffuse bilateral infiltrates    Summary of relevant labs:  Labs:  WBC 19-18  Creatinine 1.31    CRP 98  Ferritin 307 Fibrinogen 350      Micro:  Sputum culture normal aris 1/30  BC negative X to 1/19  Blood culture 2/4  Urine culture 2/4  Covid +1/14  Imaging:  Chest x-ray bilateral pulmonary changes with chronic changes  Ultrasound of the kidneys no hydronephrosis      I have personally reviewed the past medical history, past surgical history, medications, social history, and family history, and I haveupdated the database accordingly. Allergies:   Patient has no known allergies. Review of Systems:     Review of Systems   Unable to perform ROS: Intubated       Physical Examination :       Physical Exam  Constitutional:       General: He is in acute distress. Appearance: He is normal weight. He is ill-appearing. HENT:      Head: Normocephalic and atraumatic. Nose: Nose normal.   Eyes:      General: No scleral icterus. Conjunctiva/sclera: Conjunctivae normal.   Neck:      Musculoskeletal: Neck supple. Cardiovascular:      Rate and Rhythm: Normal rate and regular rhythm. Heart sounds: No friction rub. Pulmonary:      Breath sounds: No stridor. Abdominal:      Palpations: Abdomen is soft. There is no mass. Hernia: No hernia is present. Genitourinary:     Comments: Urine is yusra  Musculoskeletal:         General: No swelling or tenderness. Right lower leg: No edema. Left lower leg: No edema. Skin:     Coloration: Skin is not jaundiced or pale. Neurological:      Comments: Intubated   Psychiatric:      Comments: Intubated         Past Medical History:     Past Medical History:   Diagnosis Date    Asthma     Hyperlipidemia     Hypertension     Type 2 diabetes mellitus without complication ([facility] [address]        Past Surgical  History:     Past Surgical History:   Procedure Laterality Date    HERNIA REPAIR      right inguinal at age 3 years.     CA COLONOSCOPY FLX DX W/COLLJ SPEC WHEN PFRMD N/A 10/5/2018    COLONOSCOPY performed by Humaira Bliss DO at [address] Medications:      insulin glargine  10 Units Subcutaneous Nightly    sodium chloride flush  10 mL Intravenous 2 times per day    heparin (porcine)  5,000 Units Subcutaneous 3 times per day    dexamethasone  6 mg Intravenous Q24H    insulin lispro  0-6 Units Subcutaneous Q4H    piperacillin-tazobactam  3,375 mg Intravenous Q8H       Social History:     Social History     Socioeconomic History    Marital status:      Spouse name: Not on file    Number of children: Not on file    Years of education: Not on file    Highest education level: Not on file   Occupational History    Not on file   Social Needs    Financial resource strain: Not on file    Food insecurity     Worry: Not on file     Inability: Not on file    Transportation needs     Medical: Not on file     Non-medical: Not on file   Tobacco Use    Smoking status: Former Smoker     Quit date: 2009     Years since quittin.4    Smokeless tobacco: Never Used   Substance and Sexual Activity    Alcohol use: Yes     Comment: rare    Drug use: Not on file    Sexual activity: Not on file   Lifestyle    Physical activity     Days per week: Not on file     Minutes per session: Not on file    Stress: Not on file   Relationships    Social connections     Talks on phone: Not on file     Gets together: Not on file     Attends Anabaptist service: Not on file     Active member of club or organization: Not on file     Attends meetings of clubs or organizations: Not on file     Relationship status: Not on file    Intimate partner violence     Fear of current or ex partner: Not on file     Emotionally abused: Not on file     Physically abused: Not on file     Forced sexual activity: Not on file   Other Topics Concern    Not on file   Social History Narrative    Not on file       Family History:     Family History   Problem Relation Age of Onset    Stroke Mother     Heart Disease Father       Medical Decision Making: I have independently reviewed/ordered the following labs:    CBC with Differential:   Recent Labs     02/03/21  0530 02/05/21  0808   WBC 19.8* 18.2*   HGB 12.5* 11.3*   HCT 38.7* 36.4*    123*   LYMPHOPCT 3* 1*   MONOPCT 3 1     BMP:  Recent Labs     02/04/21  0325 02/04/21  0737 02/05/21  0538   *  --  131*   K 5.6* 5.0 4.7     --  101   CO2 22  --  24   BUN 32*  --  42*   CREATININE 1.16  --  1.31*     Hepatic Function Panel:   Recent Labs     02/04/21  0325 02/05/21  0538   PROT 6.3* 5.9*   LABALBU 2.7* 2.4*   BILITOT 0.42 0.60   ALKPHOS 130* 93   ALT 24 22   AST 24 19     No results for input(s): RPR in the last 72 hours. No results for input(s): HIV in the last 72 hours. No results for input(s): BC in the last 72 hours. Lab Results   Component Value Date    CREATININE 1.31 02/05/2021    GLUCOSE 209 02/05/2021       Detailed results: Thank you for allowing us to participate in the care of this patient. Please call with questions. This note is created with the assistance of a speech recognition program.  While intending to generate adocument that actually reflects the content of the visit, the document can still have some errors including those of syntax and sound a like substitutions which may escape proof reading. It such instances, actual meaningcan be extrapolated by contextual diversion.     Trey Allen MD  Office: [phone]  Perfect serve / office [phone]

## 2021-02-05 NOTE — PROGRESS NOTES
Physical Therapy    DATE: 2021    NAME: Miguel Garcia  MRN: [id number]   : 1945      Patient not seen this date for Physical Therapy due to:     Other: pt intubated, sedated, on 2 pressors, high O2 settings; continue to monitor and evaluate when appropriate      Electronically signed by Angie Brennan PT on 2021 at 11:21 AM

## 2021-02-06 NOTE — PLAN OF CARE
Problem: OXYGENATION/RESPIRATORY FUNCTION  Goal: Patient will maintain patent airway  2/6/2021 0924 by Emilie Jerome RCP  Outcome: Ongoing  2/6/2021 0819 by Tyler King RN  Outcome: Ongoing  2/6/2021 0154 by Saravanan Feng RN  Outcome: Ongoing     Problem: OXYGENATION/RESPIRATORY FUNCTION  Goal: Patient will achieve/maintain normal respiratory rate/effort  Description: Respiratory rate and effort will be within normal limits for the patient  2/6/2021 0924 by Emilie Jerome RCP  Outcome: Ongoing  2/6/2021 0819 by Tyler King RN  Outcome: Ongoing  2/6/2021 0154 by Saravanan Feng RN  Outcome: Ongoing     Problem: MECHANICAL VENTILATION  Goal: Patient will maintain patent airway  2/6/2021 0924 by Emilie Jerome RCP  Outcome: Ongoing  2/6/2021 0819 by Tyler King RN  Outcome: Ongoing  2/6/2021 0154 by Saravanan Feng RN  Outcome: Ongoing     Problem: MECHANICAL VENTILATION  Goal: Oral health is maintained or improved  2/6/2021 0924 by Emilie Jerome RCP  Outcome: Ongoing  2/6/2021 0819 by Tyler King RN  Outcome: Ongoing  2/6/2021 0154 by Saravanan Feng RN  Outcome: Ongoing     Problem: MECHANICAL VENTILATION  Goal: ET tube will be managed safely  2/6/2021 0924 by Emilie Jerome RCP  Outcome: Ongoing  2/6/2021 0819 by Tyler King RN  Outcome: Ongoing  2/6/2021 0154 by Saravanan Feng RN  Outcome: Ongoing     Problem: MECHANICAL VENTILATION  Goal: Mobility/activity is maintained at optimum level for patient  2/6/2021 0924 by Emilie Jerome RCP  Outcome: Ongoing  2/6/2021 0819 by Tyler King RN  Outcome: Ongoing  2/6/2021 0154 by Saravanan Feng RN  Outcome: Ongoing     Problem: SKIN INTEGRITY  Goal: Skin integrity is maintained or improved  2/6/2021 0924 by Emilie Jerome RCP  Outcome: Ongoing  2/6/2021 0819 by Tyler King RN  Outcome: Ongoing  2/6/2021 0154 by Saravanan Feng RN  Outcome: Ongoing

## 2021-02-06 NOTE — PLAN OF CARE
Problem: Skin Integrity:  Goal: Will show no infection signs and symptoms  Description: Will show no infection signs and symptoms  2/6/2021 0819 by Leora Galo RN  Outcome: Ongoing  2/6/2021 0154 by Brit Rodriguez RN  Outcome: Ongoing  Goal: Absence of new skin breakdown  Description: Absence of new skin breakdown  2/6/2021 0819 by Leora Galo RN  Outcome: Ongoing  2/6/2021 0154 by Brit Rodriguez RN  Outcome: Ongoing     Problem: Nutrition  Goal: Optimal nutrition therapy  Description: Nutrition Problem #1: Inadequate oral intake  Intervention: Food and/or Nutrient Delivery: (Start nutrition as able.  If TF, suggest Semi-elemental formula with goal rate of 55 mL/hr to provide 1584 kcal and 99 g pro/day.)  Nutritional Goals: meet % of estimated nutrient needs     2/6/2021 0819 by Leora Galo RN  Outcome: Ongoing  2/6/2021 0154 by Brit Rodriguez RN  Outcome: Ongoing     Problem: OXYGENATION/RESPIRATORY FUNCTION  Goal: Patient will maintain patent airway  2/6/2021 0819 by Leora Galo RN  Outcome: Ongoing  2/6/2021 0154 by Brit Rodriguez RN  Outcome: Ongoing  Goal: Patient will achieve/maintain normal respiratory rate/effort  Description: Respiratory rate and effort will be within normal limits for the patient  2/6/2021 0819 by Leora Galo RN  Outcome: Ongoing  2/6/2021 0154 by Brit Rodriguez RN  Outcome: Ongoing     Problem: MECHANICAL VENTILATION  Goal: Patient will maintain patent airway  2/6/2021 0819 by Leora Galo RN  Outcome: Ongoing  2/6/2021 0154 by Brit Rodriguez RN  Outcome: Ongoing  Goal: Oral health is maintained or improved  2/6/2021 0819 by Leora Galo RN  Outcome: Ongoing  2/6/2021 0154 by Brit Rodriguez RN  Outcome: Ongoing  Goal: ET tube will be managed safely  2/6/2021 0819 by Leora Galo RN  Outcome: Ongoing  2/6/2021 0154 by Brit Rodriguez RN  Outcome: Ongoing  Goal: Mobility/activity is maintained at optimum level for patient 2/6/2021 0819 by Yolanda Mclaughlin RN  Outcome: Ongoing  2/6/2021 0154 by Shakila Prado RN  Outcome: Ongoing     Problem: SKIN INTEGRITY  Goal: Skin integrity is maintained or improved  2/6/2021 0819 by Yolanda Mclaughlin RN  Outcome: Ongoing  2/6/2021 0154 by Shakila Prado RN  Outcome: Ongoing

## 2021-02-06 NOTE — PROGRESS NOTES
Critical Care Team - Daily Progress Note      Date and time: 2021 6:05 PM  Patient's name:  Shiela Stein  Medical Record Number: [id number]  Patient's account/billing number: [de-identified]  Patient's YOB: 1945  Age: 70 y.o. Date of Admission: 2021 12:53 AM  Length of stay during current admission: 2      Primary Care Physician: Kush Gomez MD  ICU Attending Physician: Rickie Flores MD    Code Status: Full Code    Reason for ICU admission: No chief complaint on file. Subjective:     OVERNIGHT EVENTS:         Pt was seen and examined at bedside. Resting comfortably in the bed. Vitals stable. Respiratory requirements remain elevated. 80% FiO2, PEEP of 18. Labs reviewed.    Will attempt to optimize repsiratory status    AWAKE & FOLLOWING COMMANDS:  [x] No   [] Yes    CURRENT VENTILATION STATUS:     [x] Ventilator  [] BIPAP  [] Nasal Cannula [] Room Air      SEDATION:  RAAS Score:  [] Propofol gtt  [x] Versed gtt  [] Ativan gtt   [] No Sedation    PARALYZED:   [x] No    [] Yes    VASOPRESSORS:   [] Yes     [x] No   If yes -   [] Levophed       [] Dopamine     [] Vasopressin       [] Dobutamine  [] Phenylephrine         [] Epinephrine    CENTRAL LINES:      [x] Yes (Date of Insertion:   )    [] No           If yes -    [] Right Internal Jugular [] Left Internal Jugular [] Right Femoral   [] Left Femoral [] Right Subclavian  [] Left Subclavian     SMALLWOOD'S CATHETER:    [] No   [x] Yes  (Date of Insertion:   )     URINE OUTPUT:   [] Good  [x] Low  [] Anuric    REVIEW OF SYSTEMS   · Unable to obtain    OBJECTIVE:     VITAL SIGNS:  BP (!) 112/55   Pulse 77   Temp 98.6 °F (37 °C) (Core)   Resp 11   Ht 5' 10\" (1.778 m)   Wt 245 lb 9.5 oz (111.4 kg)   SpO2 93%   BMI 35.24 kg/m²   Tmax over 24 hours:  Temp (24hrs), Av °F (37.2 °C), Min:98.6 °F (37 °C), Max:99.3 °F (37.4 °C)      Patient Vitals for the past 8 hrs:   Pulse Resp SpO2   21 1656 77 11 93 % 02/06/21 1528 78 (!) 32 93 %   02/06/21 1145 72 21 94 %   02/06/21 1130 83 (!) 34 93 %   02/06/21 1115 74 (!) 34 95 %   02/06/21 1100 74 (!) 0 94 %   02/06/21 1045 76 (!) 0 94 %   02/06/21 1030 68 (!) 0 93 %   02/06/21 1015 70 14 93 %         Intake/Output Summary (Last 24 hours) at 2/6/2021 1805  Last data filed at 2/6/2021 1700  Gross per 24 hour   Intake 2961.6 ml   Output 995 ml   Net 1966.6 ml     Date 02/06/21 0000 - 02/06/21 2359   Shift 2828-0465 4325-7603 4140-9332 24 Hour Total   INTAKE   I.V.(mL/kg) 882.5(7.9) 942(8.5) 401(3.6) 2225.5(20)   NG/GT(mL/kg) 72(0.6) 36(0.3)  108(1)   Shift Total(mL/kg) 954. 5(8.6) 978(8.8) 401(3.6) 2333. 5(20.9)   OUTPUT   Urine(mL/kg/hr) 485(0.5) 80(0.1)  565   Shift Total(mL/kg) 485(4.4) 80(0.7)  565(5.1)   Weight (kg) 111.4 111.4 111.4 111.4     Wt Readings from Last 3 Encounters:   02/06/21 245 lb 9.5 oz (111.4 kg)   02/03/21 237 lb 14 oz (107.9 kg)   10/05/18 218 lb (98.9 kg)     Body mass index is 35.24 kg/m². PHYSICAL EXAM:  Constitutional: intubated, sedated  HEENT: PERRLA, EOMI, sclera clear, anicteric  Respiratory: Ventilatory breath sounds  Cardiovascular: regular rate and rhythm, normal S1, S2, no murmur noted and 2+ pulses throughout  Abdomen: soft, nontender, nondistended, no masses or organomegaly  Neurological: Intubated and sedated on Versed. Extremities:  peripheral pulses normal, no pedal edema,.     VENT SETTINGS (Comprehensive) (if applicable):  Vent Information  $Ventilation: $Subsequent Day  Skin Assessment: Clean, dry, & intact  Suction Catheter Diameter: 14  Equipment ID: [id number] #[id number]  Equipment Changed: Airway securing device  Vent Type: Servo i  Vent Mode: PRVC  Vt Ordered: 550 mL  Rate Set: 34 bmp  FiO2 : 75 %  SpO2: 93 %  SpO2/FiO2 ratio: 124  Sensitivity: 3  PEEP/CPAP: 18  I Time/ I Time %: 0.7 s  Humidification Source: Heated wire  Humidification Temp: 37  Humidification Temp Measured: 37  Nitric Oxide/Epoprostenol In Use?: Yes Additional Respiratory  Assessments  Pulse: 77  Resp: 11  SpO2: 93 %  End Tidal CO2: 24  Position: Semi-Hogan's  Humidification Source: Heated wire  Humidification Temp: 37  Oral Care Completed?: Yes  Oral Care: Mouthwash  Subglottic Suction Done?: Yes    ABGs:   Lab Results   Component Value Date    ODL7MAG 31 02/06/2021    FIO2 80.0 02/06/2021     Lactic Acid: No results found for: LACTA    DATA:  Complete Blood Count:   Recent Labs     02/05/21  0808 02/06/21  0426   WBC 18.2* 14.0*   HGB 11.3* 10.3*   MCV 87.7 86.4   * 107*   RBC 4.15* 3.82*   HCT 36.4* 33.0*   MCH 27.2 27.0   MCHC 31.0 31.2   RDW 13.2 13.4   MPV 9.8 9.9        PT/INR:    Lab Results   Component Value Date    PROTIME 14.0 02/03/2021    INR 1.1 02/03/2021     PTT:    Lab Results   Component Value Date    APTT 34.5 02/03/2021       Basal Metabolic Profile:   Recent Labs     02/04/21  0325 02/04/21  0737 02/05/21  0538 02/06/21  0426   *  --  131* 133*   K 5.6* 5.0 4.7 5.0   BUN 32*  --  42* 45*   CREATININE 1.16  --  1.31* 1.21*     --  101 103   CO2 22  --  24 26      LFTS  Recent Labs     02/04/21  0325 02/05/21  0538   ALKPHOS 130* 93   ALT 24 22   AST 24 19   BILITOT 0.42 0.60   LABALBU 2.7* 2.4*       MEDICATIONS:  Scheduled Meds:   insulin glargine  10 Units Subcutaneous Nightly    sodium chloride flush  10 mL Intravenous 2 times per day    heparin (porcine)  5,000 Units Subcutaneous 3 times per day    dexamethasone  6 mg Intravenous Q24H    insulin lispro  0-6 Units Subcutaneous Q4H     Continuous Infusions:   sodium chloride 125 mL/hr at 02/06/21 1445    dextrose      fentaNYL 75 mcg/hr (02/06/21 0343)    vasopressin (Septic Shock) infusion Stopped (02/05/21 2124)    norepinephrine 4 mcg/min (02/06/21 1400)    phenylephrine (ZABRINA-SYNEPHRINE) 50mg/250mL infusion Stopped (02/04/21 0735)    dexmedetomidine Stopped (02/04/21 1056)    dextrose      EPINEPHrine infusion      midazolam Stopped (02/06/21 1257)  cisatracurium (NIMBEX) infusion Stopped (02/06/21 0918)    epoprostenol (VELETRI) nebulization solution 50 ng/kg/min (02/06/21 1656)     PRN Meds:       sodium chloride flush, 10 mL, PRN      promethazine, 12.5 mg, Q6H PRN    Or      ondansetron, 4 mg, Q6H PRN      polyethylene glycol, 17 g, Daily PRN      acetaminophen, 650 mg, Q6H PRN    Or      acetaminophen, 650 mg, Q6H PRN      glucose, 15 g, PRN      dextrose, 12.5 g, PRN      glucagon (rDNA), 1 mg, PRN      dextrose, 100 mL/hr, PRN      glucose, 15 g, PRN      dextrose, 12.5 g, PRN      glucagon (rDNA), 1 mg, PRN      dextrose, 100 mL/hr, PRN          ASSESSMENT:     Active Problems:    Acute respiratory failure with hypoxia (HCC)    Septic shock (HCC)  Resolved Problems:    * No resolved hospital problems. *      PLAN:     ARDS due to C19 Pneumonia  - Intubated and sedated on Fentanyl and Versed. - Continue to monitor respiratory status.  - Out of isolation period     YAA secondary to shock  - Continue IV fluids  - Urine output decreasing. Shock  - On Levophed at 4 mcg  - Attempt to wean off pressors    Type 2 DM  - Lantus 10U    DVT ppx - Heparin 5000    Garth Art MD  PGY-2, Internal Medicine Resident  [facility], [facility]         2/6/2021, 6:05 PM  Attending Physician Statement  I have discussed the care of Theadore Kayser, including pertinent history and exam findings,  with the resident. I have seen and examined the patient and the key elements of all parts of the encounter have been performed by me. I agree with the assessment, plan and orders as documented by the resident with additions .   Cont prone positioning and veletri   Cont ards ventilation Total critical care time caring for this patient with life threatening, unstable organ failure, including direct patient contact, management of life support systems, review of data including imaging and labs, discussions with other team members and physicians at least 27   Min so far today, excluding procedures. Treatment plan Discussed with nursing staff in detail , all questions answered . Electronically signed by Raghav Harris MD on   2/6/21 at 7:27 PM EST    Please note that this chart was generated using voice recognition Dragon dictation software. Although every effort was made to ensure the accuracy of this automated transcription, some errors in transcription may have occurred.

## 2021-02-06 NOTE — PLAN OF CARE
Problem: Skin Integrity:  Goal: Will show no infection signs and symptoms  Description: Will show no infection signs and symptoms  Outcome: Ongoing  Goal: Absence of new skin breakdown  Description: Absence of new skin breakdown  Outcome: Ongoing     Problem: Nutrition  Goal: Optimal nutrition therapy  Description: Nutrition Problem #1: Inadequate oral intake  Intervention: Food and/or Nutrient Delivery: (Start nutrition as able.  If TF, suggest Semi-elemental formula with goal rate of 55 mL/hr to provide 1584 kcal and 99 g pro/day.)  Nutritional Goals: meet % of estimated nutrient needs     Outcome: Ongoing     Problem: OXYGENATION/RESPIRATORY FUNCTION  Goal: Patient will maintain patent airway  Outcome: Ongoing  Goal: Patient will achieve/maintain normal respiratory rate/effort  Description: Respiratory rate and effort will be within normal limits for the patient  Outcome: Ongoing     Problem: MECHANICAL VENTILATION  Goal: Patient will maintain patent airway  Outcome: Ongoing  Goal: Oral health is maintained or improved  Outcome: Ongoing  Goal: ET tube will be managed safely  Outcome: Ongoing  Goal: Mobility/activity is maintained at optimum level for patient  Outcome: Ongoing     Problem: SKIN INTEGRITY  Goal: Skin integrity is maintained or improved  Outcome: Ongoing

## 2021-02-06 NOTE — PROGRESS NOTES
[facility] -   Infectious diseases evaluation. Progress Note    admission date 2/4/2021    reason for consultation:   bandemia    Impression :   Current:  · bandemia from steroids  · Covid pneumonia  · Covid related sepsis with ARDS   · septic shock  · Respiratory failure and intubation  · Elevated inflammatory markers CRP, LDH      Discussion / summary of stay / plan of care   ·   Recommendations   · Covid Rx:  · Completed 5 days of remdesivir at the referring hospital  · Post Levaquin course at the other hospital  · Out of the window for plasma  · Continue steroids and anticoagulation  · ARDS protocol   · Monitor CRP  · OK to D/C isolation and transfer out of COVID unit  · Antibiotic Rx:  · Stopped vancomycin 2/4  · Discontinue Zosyn    Infection Control Recommendations   · Blessing Precautions  · Contact Isolation   · Airborne isolation  · Droplet Isolation    Antimicrobial Stewardship Recommendations   · Targeted therapy    Coordination ofOutpatient Care:   · Estimated Length of IV antimicrobials:  · Patient will need Midline / picc Catheter Insertion:   · Patient will need SNF:  · Patient will need outpatient wound care:     History of Present Illness:       INITIAL HISTORY:    Theadore Kayser is a 70y.o.-year-old male who presented because of shortness of breath and cough that has been progressive. Started January 12 with a fever aches cough shortness of breath, did not lose the taste of food or the smell, no diarrhea no blood in the sputum. Fever was associated. Presented to the ER because of the worsening of shortness of breath. Chest x-ray showed pneumonia he tested positive for Covid. Lactic acid was 3 and the patient was desaturating so he was admitted on high flow to the ICU. To another hospital, patient was not improving despite steroids and high oxygen supplementation. Patient was intubated 2/3/2021 and transferred to [facility]. Paralyzed high oxygen requirement, 2 pressors on board. WBC 2019  Creatinine is normal  Liver enzymes are normal    CURRENT EVALUATION :  2/6/2021   Patient Vitals for the past 8 hrs:   BP Temp Temp src Pulse Resp SpO2 Weight   02/06/21 1115     (!) 34     02/06/21 0903    79 (!) 0 96 %    02/06/21 0815    73 (!) 34 96 %    02/06/21 0800    72 (!) 33 97 %    02/06/21 0745    73 (!) 34 96 %    02/06/21 0730    71 (!) 34 97 %    02/06/21 0715    73 (!) 34 96 %    02/06/21 0700    72 (!) 34 96 %    02/06/21 0645    75 (!) 34 96 %    02/06/21 0630    76 (!) 34 96 %    02/06/21 0615    74 (!) 34 96 %    02/06/21 0600    75 (!) 34 96 % 245 lb 9.5 oz (111.4 kg)   02/06/21 0545    75 (!) 34 96 %    02/06/21 0530    77 (!) 34 96 %    02/06/21 0515    77 (!) 34 96 %    02/06/21 0500    77 (!) 34 96 %    02/06/21 0445    82 (!) 34 96 %    02/06/21 0430    83 (!) 34 95 %    02/06/21 0415    79 (!) 34 96 %    02/06/21 0400 (!) 112/55 98.6 °F (37 °C) CORE 82 (!) 34 96 %    02/06/21 0345    79 (!) 34 96 %      Patient evaluated and examined in the ICU. Out of Covid isolation     Afebrile  VS stable  BP supported with Levophed 2 mcg    Patient remains on the ventilator sedated paralyzed, on 1 pressor. FIO2 80-->70 %  RR 34  PEEP 18-->16  02 sat 96    Chest x-ray [id number] to show diffuse bilateral interstitial infiltrates. Picture of ARDS.  Improved compared to 2-3-21    Summary of relevant labs: 2/6/2021    Labs:  WBC 19-18-->14  Hb 10.3  Plat 107    BUN 45  Cr 1.21    CRP 98  Ferritin 307  Fibrinogen 350      Micro:  Sputum culture   · 1-30-21 Normal aris    Blood:  · 2-4-21: No growth  · 1-19-21: No growth  ·     Urine   · 2-4-21: Enterococcus faecalis 10-50 K    Covid +1/14    Imaging:  Chest x-ray bilateral pulmonary changes with chronic changes Ultrasound of the kidneys no hydronephrosis      I have personally reviewed the past medical history, past surgical history, medications, social history, and family history, and I haveupdated the database accordingly. Allergies:   Patient has no known allergies. Review of Systems:     Review of Systems   Unable to perform ROS: Intubated       Physical Examination :       Physical Exam  Constitutional:       General: He is in acute distress. Appearance: He is normal weight. He is ill-appearing. HENT:      Head: Normocephalic and atraumatic. Nose: Nose normal.   Eyes:      General: No scleral icterus. Conjunctiva/sclera: Conjunctivae normal.   Neck:      Musculoskeletal: Neck supple. Cardiovascular:      Rate and Rhythm: Normal rate and regular rhythm. Heart sounds: No friction rub. Pulmonary:      Breath sounds: No stridor. Abdominal:      Palpations: Abdomen is soft. There is no mass. Hernia: No hernia is present. Genitourinary:     Comments: Urine is yusra  Musculoskeletal:         General: No swelling or tenderness. Right lower leg: No edema. Left lower leg: No edema. Skin:     Coloration: Skin is not jaundiced or pale. Neurological:      Comments: Intubated   Psychiatric:      Comments: Intubated         Past Medical History:     Past Medical History:   Diagnosis Date    Asthma     Hyperlipidemia     Hypertension     Type 2 diabetes mellitus without complication ([facility] [address]        Past Surgical  History:     Past Surgical History:   Procedure Laterality Date    HERNIA REPAIR      right inguinal at age 3 years.     [place] COLONOSCOPY FLX DX W/COLLJ SPEC WHEN PFRMD N/A 10/5/2018    COLONOSCOPY performed by Claudene Nevins, DO at [address]       Medications:      insulin glargine  10 Units Subcutaneous Nightly    sodium chloride flush  10 mL Intravenous 2 times per day    heparin (porcine)  5,000 Units Subcutaneous 3 times per day  dexamethasone  6 mg Intravenous Q24H    insulin lispro  0-6 Units Subcutaneous Q4H    piperacillin-tazobactam  3,375 mg Intravenous Q8H       Social History:     Social History     Socioeconomic History    Marital status:      Spouse name: Not on file    Number of children: Not on file    Years of education: Not on file    Highest education level: Not on file   Occupational History    Not on file   Social Needs    Financial resource strain: Not on file    Food insecurity     Worry: Not on file     Inability: Not on file    Transportation needs     Medical: Not on file     Non-medical: Not on file   Tobacco Use    Smoking status: Former Smoker     Quit date: 2009     Years since quittin.4    Smokeless tobacco: Never Used   Substance and Sexual Activity    Alcohol use: Yes     Comment: rare    Drug use: Not on file    Sexual activity: Not on file   Lifestyle    Physical activity     Days per week: Not on file     Minutes per session: Not on file    Stress: Not on file   Relationships    Social connections     Talks on phone: Not on file     Gets together: Not on file     Attends Christianity service: Not on file     Active member of club or organization: Not on file     Attends meetings of clubs or organizations: Not on file     Relationship status: Not on file    Intimate partner violence     Fear of current or ex partner: Not on file     Emotionally abused: Not on file     Physically abused: Not on file     Forced sexual activity: Not on file   Other Topics Concern    Not on file   Social History Narrative    Not on file       Family History:     Family History   Problem Relation Age of Onset    Stroke Mother     Heart Disease Father       Medical Decision Making:   I have independently reviewed/ordered the following labs:    CBC with Differential:   Recent Labs     21  0808 21  0426   WBC 18.2* 14.0*   HGB 11.3* 10.3*   HCT 36.4* 33.0*   * 107* LYMPHOPCT 1* 1*   MONOPCT 1 0*     BMP:  Recent Labs     02/05/21  0538 02/06/21  0426   * 133*   K 4.7 5.0    103   CO2 24 26   BUN 42* 45*   CREATININE 1.31* 1.21*     Hepatic Function Panel:   Recent Labs     02/04/21  0325 02/05/21  0538   PROT 6.3* 5.9*   LABALBU 2.7* 2.4*   BILITOT 0.42 0.60   ALKPHOS 130* 93   ALT 24 22   AST 24 19     No results for input(s): RPR in the last 72 hours. No results for input(s): HIV in the last 72 hours. No results for input(s): BC in the last 72 hours. Lab Results   Component Value Date    CREATININE 1.21 02/06/2021    GLUCOSE 233 02/06/2021       Detailed results: Thank you for allowing us to participate in the care of this patient. Please call with questions. This note is created with the assistance of a speech recognition program.  While intending to generate adocument that actually reflects the content of the visit, the document can still have some errors including those of syntax and sound a like substitutions which may escape proof reading. It such instances, actual meaningcan be extrapolated by contextual diversion.     Nate Ace MD  Office: [phone]  Perfect serve / office [phone]

## 2021-02-07 NOTE — PLAN OF CARE
Problem: Skin Integrity:  Goal: Will show no infection signs and symptoms  Description: Will show no infection signs and symptoms  2/6/2021 2019 by Eugenio Hebert RN  Outcome: Ongoing  2/6/2021 0819 by Eugenio Hebert RN  Outcome: Ongoing  Goal: Absence of new skin breakdown  Description: Absence of new skin breakdown  2/6/2021 2019 by Eugenio Hebert RN  Outcome: Ongoing  2/6/2021 0819 by Eugenio Hebert RN  Outcome: Ongoing     Problem: Nutrition  Goal: Optimal nutrition therapy  Description: Nutrition Problem #1: Inadequate oral intake  Intervention: Food and/or Nutrient Delivery: (Start nutrition as able.  If TF, suggest Semi-elemental formula with goal rate of 55 mL/hr to provide 1584 kcal and 99 g pro/day.)  Nutritional Goals: meet % of estimated nutrient needs     2/6/2021 2019 by Eugenio Hebert RN  Outcome: Ongoing  2/6/2021 0819 by Eugenio Hebert RN  Outcome: Ongoing     Problem: OXYGENATION/RESPIRATORY FUNCTION  Goal: Patient will maintain patent airway  2/6/2021 2019 by Eugenio Hebert RN  Outcome: Ongoing  2/6/2021 0924 by Dayo Reese RCP  Outcome: Ongoing  2/6/2021 0819 by Eugenio Hebert RN  Outcome: Ongoing  Goal: Patient will achieve/maintain normal respiratory rate/effort  Description: Respiratory rate and effort will be within normal limits for the patient  2/6/2021 2019 by Eugenio Hebert RN  Outcome: Ongoing  2/6/2021 0924 by Dayo Reese RCP  Outcome: Ongoing  2/6/2021 0819 by Eugenio Hebert RN  Outcome: Ongoing     Problem: MECHANICAL VENTILATION  Goal: Patient will maintain patent airway  2/6/2021 2019 by Eugenio Hebert RN  Outcome: Ongoing  2/6/2021 0924 by Dayo Reese RCP  Outcome: Ongoing  2/6/2021 0819 by Eugenio Hebert RN  Outcome: Ongoing  Goal: Oral health is maintained or improved  2/6/2021 2019 by Eugenio Hebert RN  Outcome: Ongoing  2/6/2021 0924 by Dayo Reese RCP  Outcome: Ongoing  2/6/2021 0819 by Eugenio Hebert RN  Outcome: Ongoing  Goal: ET tube will be managed safely

## 2021-02-07 NOTE — PLAN OF CARE
Problem: Skin Integrity:  Goal: Will show no infection signs and symptoms  Description: Will show no infection signs and symptoms  2/7/2021 1005 by Kim Zee RN  Outcome: Ongoing  2/7/2021 9751 by Willa Chiu RN  Outcome: Ongoing  2/6/2021 2019 by Kim Zee RN  Outcome: Ongoing  Goal: Absence of new skin breakdown  Description: Absence of new skin breakdown  2/7/2021 1005 by Kim Zee RN  Outcome: Ongoing  2/7/2021 0633 by Willa Chiu RN  Outcome: Ongoing  2/6/2021 2019 by Kim Zee RN  Outcome: Ongoing     Problem: Nutrition  Goal: Optimal nutrition therapy  Description: Nutrition Problem #1: Inadequate oral intake  Intervention: Food and/or Nutrient Delivery: (Start nutrition as able.  If TF, suggest Semi-elemental formula with goal rate of 55 mL/hr to provide 1584 kcal and 99 g pro/day.)  Nutritional Goals: meet % of estimated nutrient needs     2/7/2021 1005 by Kim Zee RN  Outcome: Ongoing  2/7/2021 0633 by Willa Chiu RN  Outcome: Ongoing  2/6/2021 2019 by Kim Zee RN  Outcome: Ongoing     Problem: OXYGENATION/RESPIRATORY FUNCTION  Goal: Patient will maintain patent airway  2/7/2021 1005 by Kim Zee RN  Outcome: Ongoing  2/7/2021 0937 by Terrance Benitez RCP  Outcome: Ongoing  2/7/2021 0633 by Willa Chiu RN  Outcome: Ongoing  2/6/2021 2019 by Kim Zee RN  Outcome: Ongoing  Goal: Patient will achieve/maintain normal respiratory rate/effort  Description: Respiratory rate and effort will be within normal limits for the patient  2/7/2021 1005 by Kim Zee RN  Outcome: Ongoing  2/7/2021 0937 by Terrance Benitez RCP  Outcome: Ongoing  2/7/2021 0633 by Willa Chiu RN  Outcome: Ongoing  2/6/2021 2019 by Kim Zee RN  Outcome: Ongoing     Problem: MECHANICAL VENTILATION  Goal: Patient will maintain patent airway  2/7/2021 1005 by Kim Zee RN  Outcome: Ongoing  2/7/2021 0937 by Terrance Benitez RCP  Outcome: Ongoing  2/7/2021 0633 by Willa Chiu RN Outcome: Ongoing  2/6/2021 2019 by Mauri Velazquez RN  Outcome: Ongoing  Goal: Oral health is maintained or improved  2/7/2021 1005 by Mauri Velazquez RN  Outcome: Ongoing  2/7/2021 0937 by Amanda Friend RCP  Outcome: Ongoing  2/7/2021 0633 by Ana Blank RN  Outcome: Ongoing  2/6/2021 2019 by Mauri Velazquez RN  Outcome: Ongoing  Goal: ET tube will be managed safely  2/7/2021 1005 by Mauri Velazquez RN  Outcome: Ongoing  2/7/2021 0937 by Amanda Friend RCP  Outcome: Ongoing  2/7/2021 0633 by Ana Blank RN  Outcome: Ongoing  2/6/2021 2019 by Mauri Velazquez RN  Outcome: Ongoing  Goal: Mobility/activity is maintained at optimum level for patient  2/7/2021 1005 by Mauri Velazquez RN  Outcome: Ongoing  2/7/2021 0937 by Amanda Friend RCP  Outcome: Ongoing  2/7/2021 0633 by Ana Blank RN  Outcome: Ongoing  2/6/2021 2019 by Mauri Velazquez RN  Outcome: Ongoing     Problem: SKIN INTEGRITY  Goal: Skin integrity is maintained or improved  2/7/2021 1005 by Mauri Velazquez RN  Outcome: Ongoing  2/7/2021 0937 by Amanda Friend RCP  Outcome: Ongoing  2/7/2021 0633 by Ana Blank RN  Outcome: Ongoing  2/6/2021 2019 by Mauri Velazquez RN  Outcome: Ongoing

## 2021-02-07 NOTE — PROGRESS NOTES
[facility] -   Infectious diseases evaluation. Progress Note    admission date 2/4/2021    reason for consultation:   bandemia    Impression :   Current:  · Bandemia, likely from steroids  · COVID-19 virus pneumonia  · COVID-19 related sepsis with arts  · Septic shock  · Respiratory failure requiring mechanical ventilation  · Elevated inflammatory markers CRP, LDH      Discussion / summary of stay / plan of care   ·   Recommendations   · Covid Rx:  · Completed 5 days of remdesivir at the referring hospital  · Post Levaquin course at the other hospital  · Out of the window for plasma  · Continue steroids and anticoagulation  · ARDS protocol   · Monitor CRP  · OK to D/C isolation and transfer out of COVID unit  · Antibiotic Rx:  · Patient is status post vancomycin and Zosyn  · Maintain off antibiotics    Infection Control Recommendations   · [place] Precautions  · Contact Isolation   · Airborne isolation removed  · Droplet Isolation removed    Antimicrobial Stewardship Recommendations   · Targeted therapy    Coordination ofOutpatient Care:   · Estimated Length of IV antimicrobials:  · Patient will need Midline / picc Catheter Insertion:   · Patient will need SNF:  · Patient will need outpatient wound care:     History of Present Illness:       INITIAL HISTORY:    Tamir Jeffries is a 70y.o.-year-old male who presented because of shortness of breath and cough that has been progressive. Started January 12 with a fever aches cough shortness of breath, did not lose the taste of food or the smell, no diarrhea no blood in the sputum. Fever was associated. Presented to the ER because of the worsening of shortness of breath. Chest x-ray showed pneumonia he tested positive for Covid. Lactic acid was 3 and the patient was desaturating so he was admitted on high flow to the ICU. To another hospital, patient was not improving despite steroids and high oxygen supplementation. Patient was intubated 2/3/2021 and transferred to [facility]. Paralyzed high oxygen requirement, 2 pressors on board. WBC 2019  Creatinine is normal  Liver enzymes are normal    CURRENT EVALUATION :  2/7/2021   Patient Vitals for the past 8 hrs:   Pulse Resp SpO2   02/07/21 1239 78 23 93 %   02/07/21 1115 72 (!) 34 98 %   02/07/21 1000 65 (!) 33 (!) 89 %   02/07/21 0945 68 (!) 2 91 %   02/07/21 0930 71 8 93 %   02/07/21 0915 75 20 93 %   02/07/21 0900 76 (!) 34 96 %   02/07/21 0845 76 (!) 34 97 %   02/07/21 0830 77 (!) 34 97 %   02/07/21 0815 76 (!) 34 97 %   02/07/21 0800 78 (!) 34 96 %   02/07/21 0745 80 (!) 34 97 %   02/07/21 0730 78 (!) 34 97 %   02/07/21 0715 77 (!) 34 97 %   02/07/21 0700 77 (!) 34 97 %     Patient seen and evaluated in the ICU  Remains intubated, FiO2 100%  No temperatures have been documented in 24 hours  He continues to be proned 16 hours of the day, he is currently supine, oxygen saturation in the 80s currently    Patient remains on the ventilator sedated paralyzed, on 1 pressor. FIO2 80%  RR 34  PEEP 18-->16  02 sat 93    Chest x-ray [id number] to show diffuse bilateral interstitial infiltrates. Picture of ARDS.  Improved compared to 2-3-21    Summary of relevant labs: 2/7/2021    Labs:  WBC 19-18-->14  Hb 10.3  Plat 107    BUN 45  Cr 1.21    CRP 98  Ferritin 307  Fibrinogen 350      Micro:  Sputum culture   · 1-30-21 Normal aris    Blood:  · 2-4-21: No growth  · 1-19-21: No growth  ·     Urine   · 2-4-21: Enterococcus faecalis 10-50 K    Covid +1/14    Imaging:  Chest x-ray bilateral pulmonary changes with chronic changes  Ultrasound of the kidneys no hydronephrosis I have personally reviewed the past medical history, past surgical history, medications, social history, and family history, and I haveupdated the database accordingly. Allergies:   Patient has no known allergies. Review of Systems:     Review of Systems   Unable to perform ROS: Intubated       Physical Examination :       Physical Exam  Constitutional:       Appearance: He is obese. He is ill-appearing. Interventions: He is sedated and intubated. HENT:      Head: Normocephalic and atraumatic. Neck:      Musculoskeletal: Neck supple. Cardiovascular:      Rate and Rhythm: Normal rate and regular rhythm. Pulmonary:      Effort: He is intubated. Breath sounds: Normal breath sounds. Abdominal:      Palpations: Abdomen is soft. There is no mass. Hernia: No hernia is present. Genitourinary:     Comments: Urine is yusra  Musculoskeletal:         General: No swelling or tenderness. Right lower leg: No edema. Left lower leg: No edema. Skin:     Coloration: Skin is not jaundiced or pale. Neurological:      Comments: Intubated   Psychiatric:      Comments: Intubated         Past Medical History:     Past Medical History:   Diagnosis Date    Asthma     Hyperlipidemia     Hypertension     Type 2 diabetes mellitus without complication ([facility] [address]        Past Surgical  History:     Past Surgical History:   Procedure Laterality Date    HERNIA REPAIR      right inguinal at age 3 years.     MI COLONOSCOPY FLX DX W/COLLJ SPEC WHEN PFRMD N/A 10/5/2018    COLONOSCOPY performed by Shala Bruno DO at [address]       Medications:      lactulose  20 g Oral TID    insulin glargine  10 Units Subcutaneous Nightly    sodium chloride flush  10 mL Intravenous 2 times per day    heparin (porcine)  5,000 Units Subcutaneous 3 times per day    dexamethasone  6 mg Intravenous Q24H    insulin lispro  0-6 Units Subcutaneous Q4H       Social History:     Social History     Socioeconomic History  Marital status:      Spouse name: Not on file    Number of children: Not on file    Years of education: Not on file    Highest education level: Not on file   Occupational History    Not on file   Social Needs    Financial resource strain: Not on file    Food insecurity     Worry: Not on file     Inability: Not on file    Transportation needs     Medical: Not on file     Non-medical: Not on file   Tobacco Use    Smoking status: Former Smoker     Quit date: 2009     Years since quittin.4    Smokeless tobacco: Never Used   Substance and Sexual Activity    Alcohol use: Yes     Comment: rare    Drug use: Not on file    Sexual activity: Not on file   Lifestyle    Physical activity     Days per week: Not on file     Minutes per session: Not on file    Stress: Not on file   Relationships    Social connections     Talks on phone: Not on file     Gets together: Not on file     Attends Anabaptist service: Not on file     Active member of club or organization: Not on file     Attends meetings of clubs or organizations: Not on file     Relationship status: Not on file    Intimate partner violence     Fear of current or ex partner: Not on file     Emotionally abused: Not on file     Physically abused: Not on file     Forced sexual activity: Not on file   Other Topics Concern    Not on file   Social History Narrative    Not on file       Family History:     Family History   Problem Relation Age of Onset    Stroke Mother     Heart Disease Father       Medical Decision Making:   I have independently reviewed/ordered the following labs:    CBC with Differential:   Recent Labs     21  0426 21  0542   WBC 14.0* 8.4   HGB 10.3* 9.5*   HCT 33.0* 30.5*   * 82*   LYMPHOPCT 1* 2*   MONOPCT 0* 4     BMP:  Recent Labs     21  0426 21  0542   * 137   K 5.0 5.2    107   CO2 26 25   BUN 45* 43*   CREATININE 1.21* 0.78     Hepatic Function Panel:   Recent Labs 02/05/21  0538   PROT 5.9*   LABALBU 2.4*   BILITOT 0.60   ALKPHOS 93   ALT 22   AST 19     No results for input(s): RPR in the last 72 hours. No results for input(s): HIV in the last 72 hours. No results for input(s): BC in the last 72 hours. Lab Results   Component Value Date    CREATININE 0.78 02/07/2021    GLUCOSE 158 02/07/2021       Detailed results: Thank you for allowing us to participate in the care of this patient. Please call with questions. This note is created with the assistance of a speech recognition program.  While intending to generate adocument that actually reflects the content of the visit, the document can still have some errors including those of syntax and sound a like substitutions which may escape proof reading. It such instances, actual meaningcan be extrapolated by contextual diversion.     KATIA Covarrubias - CNP  Office: [phone]  Perfect serve / office [phone]

## 2021-02-07 NOTE — PLAN OF CARE
Problem: Skin Integrity:  Goal: Will show no infection signs and symptoms  Description: Will show no infection signs and symptoms  2/7/2021 0633 by Efrain Styles RN  Outcome: Ongoing  2/6/2021 2019 by Leticia Mancera RN  Outcome: Ongoing  Goal: Absence of new skin breakdown  Description: Absence of new skin breakdown  2/7/2021 0633 by Efrain Styles RN  Outcome: Ongoing  2/6/2021 2019 by Leticia Mancera RN  Outcome: Ongoing     Problem: Nutrition  Goal: Optimal nutrition therapy  Description: Nutrition Problem #1: Inadequate oral intake  Intervention: Food and/or Nutrient Delivery: (Start nutrition as able.  If TF, suggest Semi-elemental formula with goal rate of 55 mL/hr to provide 1584 kcal and 99 g pro/day.)  Nutritional Goals: meet % of estimated nutrient needs     2/7/2021 8844 by Efrain Styles RN  Outcome: Ongoing  2/6/2021 2019 by Leticia Mancera RN  Outcome: Ongoing     Problem: OXYGENATION/RESPIRATORY FUNCTION  Goal: Patient will maintain patent airway  2/7/2021 0937 by Dayo Garland RCP  Outcome: Ongoing  2/7/2021 0633 by Efrain Styles RN  Outcome: Ongoing  2/6/2021 2019 by Leticia Mancera RN  Outcome: Ongoing  Goal: Patient will achieve/maintain normal respiratory rate/effort  Description: Respiratory rate and effort will be within normal limits for the patient  2/7/2021 0937 by Dayo Garland RCP  Outcome: Ongoing  2/7/2021 0633 by Efrain Styles RN  Outcome: Ongoing  2/6/2021 2019 by Leticia Mancera RN  Outcome: Ongoing     Problem: MECHANICAL VENTILATION  Goal: Patient will maintain patent airway  2/7/2021 0937 by Dayo Garland RCP  Outcome: Ongoing  2/7/2021 0633 by Efrain Styles RN  Outcome: Ongoing  2/6/2021 2019 by Leticia Mancera RN  Outcome: Ongoing  Goal: Oral health is maintained or improved  2/7/2021 0937 by Dayo Garland RCP  Outcome: Ongoing  2/7/2021 0633 by Efrain Styles RN  Outcome: Ongoing  2/6/2021 2019 by Leticia Mancera RN  Outcome: Ongoing  Goal: ET tube will be managed safely 2/7/2021 6706 by Vargas Ruth RCP  Outcome: Ongoing  2/7/2021 0633 by Cheryl Pardo RN  Outcome: Ongoing  2/6/2021 2019 by Glory Stanton RN  Outcome: Ongoing  Goal: Mobility/activity is maintained at optimum level for patient  2/7/2021 0937 by Vargas Ruth RCP  Outcome: Ongoing  2/7/2021 0633 by Cheryl Pardo RN  Outcome: Ongoing  2/6/2021 2019 by Glory Stanton RN  Outcome: Ongoing     Problem: SKIN INTEGRITY  Goal: Skin integrity is maintained or improved  2/7/2021 0937 by Vargas Ruth RCP  Outcome: Ongoing  2/7/2021 0633 by Cheryl Pardo RN  Outcome: Ongoing  2/6/2021 2019 by Glory Stanton RN  Outcome: Ongoing

## 2021-02-07 NOTE — PROGRESS NOTES
Critical Care Team - Daily Progress Note      Date and time: 2/7/2021 4:51 PM  Patient's name:  Joan Manjarrez  Medical Record Number: [id number]  Patient's account/billing number: [de-identified]  Patient's YOB: 1945  Age: 70 y.o. Date of Admission: 2/4/2021 12:53 AM  Length of stay during current admission: 3      Primary Care Physician: Shawn Marin MD  ICU Attending Physician: Narayan Damon MD    Code Status: Full Code    Reason for ICU admission: No chief complaint on file. Subjective:     OVERNIGHT EVENTS:           Pt was seen and examined at bedside. Increased on oxygen requirement was tachypneic  Continued on prone positioning, ARDS ventilation  Labs reviewed. Will attempt to optimize repsiratory status. AWAKE & FOLLOWING COMMANDS:  [x] No   [] Yes    CURRENT VENTILATION STATUS:     [x] Ventilator  [] BIPAP  [] Nasal Cannula [] Room Air      SEDATION:  RAAS Score:  [] Propofol gtt  [x] Versed gtt  [] Ativan gtt   [] No Sedation    PARALYZED:   [x] No    [] Yes    VASOPRESSORS:   [] Yes     [x] No   If yes -   [] Levophed       [] Dopamine     [] Vasopressin       [] Dobutamine  [] Phenylephrine         [] Epinephrine    CENTRAL LINES:      [x] Yes (Date of Insertion:   )    [] No           If yes -    [] Right Internal Jugular [] Left Internal Jugular [] Right Femoral   [] Left Femoral [] Right Subclavian  [] Left Subclavian     SMALLWOOD'S CATHETER:    [] No   [x] Yes  (Date of Insertion:   )     URINE OUTPUT:   [] Good  [x] Low  [] Anuric    REVIEW OF SYSTEMS   · Unable to obtain    OBJECTIVE:     VITAL SIGNS:  BP (!) 112/55   Pulse 85   Temp 98.6 °F (37 °C) (Core)   Resp 18   Ht 5' 10\" (1.778 m)   Wt 245 lb 9.5 oz (111.4 kg)   SpO2 (!) 85%   BMI 35.24 kg/m²   Tmax over 24 hours:  No data recorded.       Patient Vitals for the past 8 hrs:   Pulse Resp SpO2   02/07/21 1517 85 18 (!) 85 %   02/07/21 1239 78 23 93 %   02/07/21 1115 72 (!) 34 98 % 02/07/21 1000 65 (!) 33 (!) 89 %   02/07/21 0945 68 (!) 2 91 %   02/07/21 0930 71 8 93 %   02/07/21 0915 75 20 93 %   02/07/21 0900 76 (!) 34 96 %         Intake/Output Summary (Last 24 hours) at 2/7/2021 1651  Last data filed at 2/7/2021 1600  Gross per 24 hour   Intake 4083 ml   Output 1350 ml   Net 2733 ml     Date 02/07/21 0000 - 02/07/21 2359   Shift 1534-8539 2808-5635 7591-8409 24 Hour Total   INTAKE   I.V.(mL/kg) 0589(89.4) 1144(10.3) 550(4.9) 8625(59.7)   NG/GT(mL/kg) 81(0.7)   81(0.7)   Shift Total(mL/kg) 1350(12.1) 1144(10.3) 550(4.9) 6726(35.6)   OUTPUT   Urine(mL/kg/hr) 500(0.6) 675(0.8)  1175   Shift Total(mL/kg) 500(4.5) 675(6.1)  1175(10.5)   Weight (kg) 111.4 111.4 111.4 111.4     Wt Readings from Last 3 Encounters:   02/06/21 245 lb 9.5 oz (111.4 kg)   02/03/21 237 lb 14 oz (107.9 kg)   10/05/18 218 lb (98.9 kg)     Body mass index is 35.24 kg/m². PHYSICAL EXAM:  Constitutional: intubated, sedated  HEENT: PERRLA, EOMI, sclera clear, anicteric  Respiratory: Ventilatory breath sounds  Cardiovascular: regular rate and rhythm, normal S1, S2, no murmur noted and 2+ pulses throughout  Abdomen: soft, nontender, nondistended, no masses or organomegaly  Neurological: Intubated and sedated on Versed. Extremities:  peripheral pulses normal, no pedal edema,.     VENT SETTINGS (Comprehensive) (if applicable):  Vent Information  $Ventilation: $Subsequent Day  Skin Assessment: Clean, dry, & intact  Suction Catheter Diameter: 14  Equipment ID: [id number] #[id number]  Equipment Changed: Airway securing device  Vent Type: Servo i  Vent Mode: PRVC  Vt Ordered: 550 mL  Rate Set: 34 bmp  FiO2 : 100 %  SpO2: (!) 85 %  SpO2/FiO2 ratio: 85  Sensitivity: 3  PEEP/CPAP: 18  I Time/ I Time %: 0.7 s  Humidification Source: Heated wire  Humidification Temp: 37  Humidification Temp Measured: 37.1  Circuit Condensation: Drained  Nitric Oxide/Epoprostenol In Use?: Yes  Additional Respiratory  Assessments  Pulse: 85  Resp: 18 SpO2: (!) 85 %  End Tidal CO2: 26  Position: Semi-Hogan's  Humidification Source: Heated wire  Humidification Temp: 37  Circuit Condensation: Drained  Oral Care Completed?: Yes  Oral Care: Mouthwash, Mouth swabbed, Mouth moisturizer, Mouth suctioned, Suction toothette  Subglottic Suction Done?: Yes    ABGs:   Lab Results   Component Value Date    QTS8TBR 32 02/07/2021    FIO2 75.0 02/07/2021     Lactic Acid: No results found for: LACTA    DATA:  Complete Blood Count:   Recent Labs     02/05/21  0808 02/06/21  0426 02/07/21  0542   WBC 18.2* 14.0* 8.4   HGB 11.3* 10.3* 9.5*   MCV 87.7 86.4 87.6   * 107* 82*   RBC 4.15* 3.82* 3.48*   HCT 36.4* 33.0* 30.5*   MCH 27.2 27.0 27.3   MCHC 31.0 31.2 31.1   RDW 13.2 13.4 13.7   MPV 9.8 9.9 9.8        PT/INR:    Lab Results   Component Value Date    PROTIME 14.0 02/03/2021    INR 1.1 02/03/2021     PTT:    Lab Results   Component Value Date    APTT 34.5 02/03/2021       Basal Metabolic Profile:   Recent Labs     02/05/21  0538 02/06/21  0426 02/07/21  0542   * 133* 137   K 4.7 5.0 5.2   BUN 42* 45* 43*   CREATININE 1.31* 1.21* 0.78    103 107   CO2 24 26 25      LFTS  Recent Labs     02/05/21  0538   ALKPHOS 93   ALT 22   AST 19   BILITOT 0.60   LABALBU 2.4*       MEDICATIONS:  Scheduled Meds:   lactulose  20 g Oral TID    insulin glargine  10 Units Subcutaneous Nightly    sodium chloride flush  10 mL Intravenous 2 times per day    heparin (porcine)  5,000 Units Subcutaneous 3 times per day    dexamethasone  6 mg Intravenous Q24H    insulin lispro  0-6 Units Subcutaneous Q4H     Continuous Infusions:   sodium chloride 125 mL/hr at 02/06/21 1445    dextrose      fentaNYL 75 mcg/hr (02/07/21 0618)    vasopressin (Septic Shock) infusion Stopped (02/05/21 2124)    norepinephrine Stopped (02/06/21 2034)    phenylephrine (ZABRINA-SYNEPHRINE) 50mg/250mL infusion Stopped (02/04/21 0735)    dexmedetomidine Stopped (02/04/21 1056)    dextrose  EPINEPHrine infusion      midazolam 9 mg/hr (02/07/21 0225)    cisatracurium (NIMBEX) infusion Stopped (02/07/21 0930)    epoprostenol (VELETRI) nebulization solution 50 ng/kg/min (02/07/21 1239)     PRN Meds:       sodium chloride flush, 10 mL, PRN      promethazine, 12.5 mg, Q6H PRN    Or      ondansetron, 4 mg, Q6H PRN      polyethylene glycol, 17 g, Daily PRN      acetaminophen, 650 mg, Q6H PRN    Or      acetaminophen, 650 mg, Q6H PRN      glucose, 15 g, PRN      dextrose, 12.5 g, PRN      glucagon (rDNA), 1 mg, PRN      dextrose, 100 mL/hr, PRN      glucose, 15 g, PRN      dextrose, 12.5 g, PRN      glucagon (rDNA), 1 mg, PRN      dextrose, 100 mL/hr, PRN          ASSESSMENT:     Active Problems:    Acute respiratory failure with hypoxia (HCC)    Septic shock (HCC)  Resolved Problems:    * No resolved hospital problems. *      PLAN:     ARDS due to C19 Pneumonia  - Continue to monitor respiratory status. ,  Continue on prone ventilation.  - Out of isolation period ,    YAA secondary to shock- Continue IV fluids,  Monitor output     Shock-resolved, and off pressors    Type 2 DM-continue on Lantus 10 units and sliding scale, blood glucose control with monitor POC glucose    DVT ppx - Heparin 5000    Kentrell Humphries MD  Internal Medicine Resident, PGY- [facility]; [place], [place]  2/7/2021, 4:54 PM  Attending Physician Statement  I have discussed the care of Mike Guadarrama, including pertinent history and exam findings,  with the resident. I have seen and examined the patient and the key elements of all parts of the encounter have been performed by me. I agree with the assessment, plan and orders as documented by the resident with additions . Total critical care time caring for this patient with life threatening, unstable organ failure, including direct patient contact, management of life support systems, review of data including imaging and labs, discussions with other team members and physicians at least 27   Min so far today, excluding procedures. Treatment plan Discussed with nursing staff in detail , all questions answered . Electronically signed by Bull Correia MD on   2/7/21 at 5:35 PM EST    Please note that this chart was generated using voice recognition Dragon dictation software. Although every effort was made to ensure the accuracy of this automated transcription, some errors in transcription may have occurred.

## 2021-02-07 NOTE — PLAN OF CARE
Problem: Skin Integrity:  Goal: Will show no infection signs and symptoms  Description: Will show no infection signs and symptoms  2/7/2021 0633 by Tuan Bal RN  Outcome: Ongoing  2/6/2021 2019 by Tyler King RN  Outcome: Ongoing  Goal: Absence of new skin breakdown  Description: Absence of new skin breakdown  2/7/2021 0633 by Tuan Bal RN  Outcome: Ongoing  2/6/2021 2019 by Tyler King RN  Outcome: Ongoing     Problem: Nutrition  Goal: Optimal nutrition therapy  Description: Nutrition Problem #1: Inadequate oral intake  Intervention: Food and/or Nutrient Delivery: (Start nutrition as able.  If TF, suggest Semi-elemental formula with goal rate of 55 mL/hr to provide 1584 kcal and 99 g pro/day.)  Nutritional Goals: meet % of estimated nutrient needs     2/7/2021 5595 by Tuan Bal RN  Outcome: Ongoing  2/6/2021 2019 by Tyler King RN  Outcome: Ongoing     Problem: OXYGENATION/RESPIRATORY FUNCTION  Goal: Patient will maintain patent airway  2/7/2021 0633 by Tuan Bal RN  Outcome: Ongoing  2/6/2021 2019 by Tyler King RN  Outcome: Ongoing  Goal: Patient will achieve/maintain normal respiratory rate/effort  Description: Respiratory rate and effort will be within normal limits for the patient  2/7/2021 9782 by Tuan Bal RN  Outcome: Ongoing  2/6/2021 2019 by Tyler King RN  Outcome: Ongoing     Problem: MECHANICAL VENTILATION  Goal: Patient will maintain patent airway  2/7/2021 0633 by Tuan Bal RN  Outcome: Ongoing  2/6/2021 2019 by Tyler King RN  Outcome: Ongoing  Goal: Oral health is maintained or improved  2/7/2021 0633 by Tuan Bal RN  Outcome: Ongoing  2/6/2021 2019 by Tyler King RN  Outcome: Ongoing  Goal: ET tube will be managed safely  2/7/2021 0633 by Tuan Bal RN  Outcome: Ongoing  2/6/2021 2019 by Tyler King RN  Outcome: Ongoing  Goal: Mobility/activity is maintained at optimum level for patient  2/7/2021 3480 by Tuan Bal RN Outcome: Ongoing  2/6/2021 2019 by Leticia Mancera RN  Outcome: Ongoing     Problem: SKIN INTEGRITY  Goal: Skin integrity is maintained or improved  2/7/2021 0633 by Efrain Styles RN  Outcome: Ongoing  2/6/2021 2019 by Leticia Mancera RN  Outcome: Ongoing

## 2021-02-08 NOTE — PROGRESS NOTES
Critical Care Team - Daily Progress Note      Date and time: 2/8/2021 11:06 AM  Patient's name:  [address] Record Number: [id number]  Patient's account/billing number: [de-identified]  Patient's YOB: 1945  Age: 70 y.o. Date of Admission: 2/4/2021 12:53 AM  Length of stay during current admission: 4      Primary Care Physician: Angy Pierce MD  ICU Attending Physician: Christina Robert MD.    Code Status: Full Code    Reason for ICU admission: No chief complaint on file.       Subjective:     OVERNIGHT EVENTS:      02/08/20 :    No acute events overnight  Tube feedings were held   Blood pressure 112/60, tachypneic respiratory rate 34  Continued on mechanical ventilation PRVC 70/18/34/550 on midazolam and fentanyl  WBC stable, hemoglobin stable 11.0  BMP unremarkable potassium at 5.1, POC glucose 208  ABG pH 7.2/67.6/32.6  Continued on prone ventilation overnight  Pepcid 20 mg started, eardrops started  Continued on methylprednisolone and epoprostenol nebulization  ID on board  We will continue to wean down on oxygen requirement            AWAKE & FOLLOWING COMMANDS:  [x] No   [] Yes    CURRENT VENTILATION STATUS:     [x] Ventilator  [] BIPAP  [] Nasal Cannula [] Room Air      SEDATION:  RAAS Score:  [] Propofol gtt  [x] Versed gtt  [] Ativan gtt   [] No Sedation    PARALYZED:   [] No    [x] Yes    VASOPRESSORS:   [] Yes     [x] No   If yes -   [] Levophed       [] Dopamine     [] Vasopressin       [] Dobutamine  [] Phenylephrine         [] Epinephrine    CENTRAL LINES:      [x] Yes (Date of Insertion:   )    [] No           If yes -    [] Right Internal Jugular [] Left Internal Jugular [] Right Femoral   [] Left Femoral [] Right Subclavian  [] Left Subclavian     SMALLWOOD'S CATHETER:    [] No   [x] Yes  (Date of Insertion:   )     URINE OUTPUT:   [] Good  [x] Low  [] Anuric    REVIEW OF SYSTEMS   · Unable to obtain    OBJECTIVE:     VITAL SIGNS: Body mass index is 35.11 kg/m². PHYSICAL EXAM:  Constitutional: intubated, sedated  HEENT: PERRLA, EOMI, sclera clear, anicteric  Respiratory: Ventilatory breath sounds  Cardiovascular: regular rate and rhythm, normal S1, S2, no murmur noted and 2+ pulses throughout  Abdomen: soft, nontender, nondistended, no masses or organomegaly  Neurological: Intubated and sedated on Versed. Extremities:  peripheral pulses normal, no pedal edema,.     VENT SETTINGS (Comprehensive) (if applicable):  Vent Information  $Ventilation: $Subsequent Day  Skin Assessment: Clean, dry, & intact  Suction Catheter Diameter: 14  Equipment ID: [id number] #[id number]  Equipment Changed: Airway securing device  Vent Type: Servo i  Vent Mode: PRVC  Vt Ordered: 550 mL  Rate Set: 34 bmp  FiO2 : (S) 80 %(pt desating)  SpO2: 90 %  SpO2/FiO2 ratio: 112.5  Sensitivity: 3  PEEP/CPAP: 18  I Time/ I Time %: 0.7 s  Humidification Source: Heated wire  Humidification Temp: 37.1  Humidification Temp Measured: 37.1  Circuit Condensation: Drained  Nitric Oxide/Epoprostenol In Use?: Yes  Additional Respiratory  Assessments  Pulse: 90  Resp: (!) 34  SpO2: 90 %  End Tidal CO2: 27  Position: Semi-Hogan's  Humidification Source: Heated wire  Humidification Temp: 37.1  Circuit Condensation: Drained  Oral Care Completed?: Yes  Oral Care: Mouth moisturizer, Mouth suctioned  Subglottic Suction Done?: Yes    ABGs:   Lab Results   Component Value Date    WGE2HGT 35 02/08/2021    FIO2 80.0 02/08/2021     Lactic Acid: No results found for: LACTA    DATA:  Complete Blood Count:   Recent Labs     02/06/21  0426 02/07/21  0542 02/08/21  0437   WBC 14.0* 8.4 9.3   HGB 10.3* 9.5* 11.0*   MCV 86.4 87.6 88.5   * 82* 90*   RBC 3.82* 3.48* 4.10*   HCT 33.0* 30.5* 36.3*   MCH 27.0 27.3 26.8   MCHC 31.2 31.1 30.3   RDW 13.4 13.7 14.0   MPV 9.9 9.8 10.1        PT/INR:    Lab Results   Component Value Date    PROTIME 14.0 02/03/2021    INR 1.1 02/03/2021     PTT:    Lab Results Type 2 diabetes mellitus ([facility] [address]    Septic shock (HCC)  Resolved Problems:    * No resolved hospital problems. *      PLAN:     ARDS due to Covid 19 Pneumonia  - Continue to monitor respiratory status,  Continue on prone ventilation, wean down on Fi02   - Out of isolation period  - Continue on epoprostenol.  - ID on board. - Continue on methyl prednisone. YAA secondary to shock-  Resolved. Shock- resolved, and off pressors. Type 2 DM-continue on Lantus 10 units and sliding scale, blood glucose control with monitor POC glucose. DVT ppx - Heparin 5000    Andrew Albrecht MD  Internal Medicine Resident, PGY- [address], [place]  2/8/2021, 11:14 AM

## 2021-02-08 NOTE — PROGRESS NOTES
Physical Therapy    DATE: 2021    NAME: Lucian Arredondo  MRN: [id number]   : 1945      Patient not seen this date for Physical Therapy due to: Other: intubated/sedated and proning.  PT will check back 21      Electronically signed by Bronwyn Case PT on 2021 at 11:48 AM

## 2021-02-08 NOTE — PROGRESS NOTES
[facility] -   Infectious diseases evaluation. Progress Note    admission date 2/4/2021    reason for consultation:   bandemia    Impression :   Current:  · Bandemia, likely from steroids  · COVID-19 virus pneumonia  · COVID-19 related sepsis with arts  · Septic shock  · Respiratory failure requiring mechanical ventilation  · Elevated inflammatory markers CRP, LDH      Discussion / summary of stay / plan of care   ·   Recommendations   · Covid Rx:  · Completed 5 days of remdesivir at the referring hospital  · Post Levaquin course at the other hospital  · Out of the window for plasma  · Continue steroids and anticoagulation  · D/C isolation and transfer out of COVID unit  · Antibiotic Rx:  · Patient is status post vancomycin and Zosyn  · Maintain off antibiotics    Infection Control Recommendations   · Straughn Precautions  · Contact Isolation   · Airborne isolation removed  · Droplet Isolation removed    Antimicrobial Stewardship Recommendations   · Targeted therapy    Coordination ofOutpatient Care:   · Estimated Length of IV antimicrobials:  · Patient will need Midline / picc Catheter Insertion:   · Patient will need SNF:  · Patient will need outpatient wound care:     History of Present Illness:       INITIAL HISTORY:    Destinee Tariq is a 70y.o.-year-old male who presented because of shortness of breath and cough that has been progressive. Started January 12 with a fever aches cough shortness of breath, did not lose the taste of food or the smell, no diarrhea no blood in the sputum. Fever was associated. Presented to the ER because of the worsening of shortness of breath. Chest x-ray showed pneumonia he tested positive for Covid. Lactic acid was 3 and the patient was desaturating so he was admitted on high flow to the ICU. To another hospital, patient was not improving despite steroids and high oxygen supplementation. Patient was intubated 2/3/2021 and transferred to [facility]. Paralyzed high oxygen requirement, 2 pressors on board. WBC 2019  Creatinine is normal  Liver enzymes are normal    CURRENT EVALUATION :  2/8/2021   Patient Vitals for the past 8 hrs:   BP Temp Temp src Pulse Resp SpO2   02/08/21 1600 129/70 98.4 °F (36.9 °C) CORE 82 (!) 34 93 %   02/08/21 1549    79 (!) 34    02/08/21 1500    84 (!) 34 93 %   02/08/21 1400    84 (!) 34 92 %   02/08/21 1300    88 (!) 34 92 %   02/08/21 1200    96 (!) 34 93 %   02/08/21 1150    92 (!) 34 92 %   02/08/21 1100    94 (!) 34 93 %   02/08/21 1035     (!) 34 90 %   02/08/21 1000    90 (!) 34 (!) 87 %     Continues to be proned, 80% FiO2 and 18 of PEEP  WBC 9.3  Creatinine normal  Urine culture 2/4 Enterococcus faecalis sensitive to ampicillin    Chest x-ray [id number] to show diffuse bilateral interstitial infiltrates. Summary of relevant labs: 2/8/2021    Labs:  WBC 19-18-->14  Hb 10.3  Plat 107    BUN 45  Cr 1.21    CRP 98  Ferritin 307  Fibrinogen 350      Micro:  Sputum culture   · 1-30-21 Normal aris    Blood:  · 2-4-21: No growth  · 1-19-21: No growth  ·     Urine   · 2-4-21: Enterococcus faecalis 10-50 K    Covid +1/14    Imaging:  Chest x-ray bilateral pulmonary changes with chronic changes  Ultrasound of the kidneys no hydronephrosis      I have personally reviewed the past medical history, past surgical history, medications, social history, and family history, and I haveupdated the database accordingly. Allergies:   Patient has no known allergies. Review of Systems:     Review of Systems   Unable to perform ROS: Intubated       Physical Examination :       Physical Exam  Constitutional:       Appearance: He is obese. He is ill-appearing. Interventions: He is sedated and intubated. HENT:      Head: Normocephalic and atraumatic.       Nose: Nose normal.      Mouth/Throat: Mouth: Mucous membranes are moist.   Eyes:      General: No scleral icterus. Conjunctiva/sclera: Conjunctivae normal.   Neck:      Musculoskeletal: Neck supple. Cardiovascular:      Rate and Rhythm: Normal rate and regular rhythm. Heart sounds: No murmur. No friction rub. Pulmonary:      Effort: No respiratory distress. He is intubated. Breath sounds: Normal breath sounds. Abdominal:      General: There is no distension. Palpations: Abdomen is soft. There is no mass. Hernia: No hernia is present. Genitourinary:     Comments: Urine is yusra  Musculoskeletal:         General: No swelling or tenderness. Right lower leg: No edema. Left lower leg: No edema. Skin:     Coloration: Skin is not jaundiced or pale. Neurological:      Comments: Intubated   Psychiatric:      Comments: Intubated         Past Medical History:     Past Medical History:   Diagnosis Date    Asthma     Hyperlipidemia     Hypertension     Type 2 diabetes mellitus without complication ([facility] [address]        Past Surgical  History:     Past Surgical History:   Procedure Laterality Date    HERNIA REPAIR      right inguinal at age 3 years.     CO COLONOSCOPY FLX DX W/COLLJ SPEC WHEN PFRMD N/A 10/5/2018    COLONOSCOPY performed by Yasmeen Guevara DO at [address]       Medications:      famotidine (PEPCID) injection  20 mg Intravenous BID    lactulose  20 g Oral TID    insulin glargine  10 Units Subcutaneous Nightly    sodium chloride flush  10 mL Intravenous 2 times per day    heparin (porcine)  5,000 Units Subcutaneous 3 times per day    dexamethasone  6 mg Intravenous Q24H    insulin lispro  0-6 Units Subcutaneous Q4H       Social History:     Social History     Socioeconomic History    Marital status:      Spouse name: Not on file    Number of children: Not on file    Years of education: Not on file    Highest education level: Not on file   Occupational History    Not on file Social Needs    Financial resource strain: Not on file    Food insecurity     Worry: Not on file     Inability: Not on file    Transportation needs     Medical: Not on file     Non-medical: Not on file   Tobacco Use    Smoking status: Former Smoker     Quit date: 2009     Years since quittin.4    Smokeless tobacco: Never Used   Substance and Sexual Activity    Alcohol use: Yes     Comment: rare    Drug use: Not on file    Sexual activity: Not on file   Lifestyle    Physical activity     Days per week: Not on file     Minutes per session: Not on file    Stress: Not on file   Relationships    Social connections     Talks on phone: Not on file     Gets together: Not on file     Attends Religion service: Not on file     Active member of club or organization: Not on file     Attends meetings of clubs or organizations: Not on file     Relationship status: Not on file    Intimate partner violence     Fear of current or ex partner: Not on file     Emotionally abused: Not on file     Physically abused: Not on file     Forced sexual activity: Not on file   Other Topics Concern    Not on file   Social History Narrative    Not on file       Family History:     Family History   Problem Relation Age of Onset    Stroke Mother     Heart Disease Father       Medical Decision Making:   I have independently reviewed/ordered the following labs:    CBC with Differential:   Recent Labs     21  0542 21   WBC 8.4 9.3   HGB 9.5* 11.0*   HCT 30.5* 36.3*   PLT 82* 90*   LYMPHOPCT 2* 2*   MONOPCT 4 5     BMP:  Recent Labs     21  0542 21    139   K 5.2 5.1    106   CO2 25 28   BUN 43* 47*   CREATININE 0.78 0.84     Hepatic Function Panel:   No results for input(s): PROT, LABALBU, BILIDIR, IBILI, BILITOT, ALKPHOS, ALT, AST in the last 72 hours. No results for input(s): RPR in the last 72 hours. No results for input(s): HIV in the last 72 hours. No results for input(s): BC in the last 72 hours. Lab Results   Component Value Date    CREATININE 0.84 02/08/2021    GLUCOSE 187 02/08/2021       Detailed results: Thank you for allowing us to participate in the care of this patient. Please call with questions. This note is created with the assistance of a speech recognition program.  While intending to generate adocument that actually reflects the content of the visit, the document can still have some errors including those of syntax and sound a like substitutions which may escape proof reading. It such instances, actual meaningcan be extrapolated by contextual diversion.     Jony Brito MD  Office: [phone]  Perfect serve / office [phone]

## 2021-02-08 NOTE — PROGRESS NOTES
Comprehensive Nutrition Assessment    Type and Reason for Visit:  Reassess    Nutrition Recommendations/Plan: Restart TF at low rate as able; monitor need for TPN. Will continue to follow. Nutrition Assessment:  Pt remains on vent. Tube feeding is currently on hold d/t high residuals (noted up to 700 mL). RN reports +flatus at present, but no BM since 2/3/21. Meds include: Lactulose, Decadron, Lantus, Humalog SS. Labs reviewed. Malnutrition Assessment:  Malnutrition Status:  Insufficient data    Context:  Acute Illness       Estimated Daily Nutrient Needs:  Energy (kcal):  1500 kcal/day  Protein (g):  110 g pro/day    Current Nutrition Therapies:    DIET TUBE FEED CONTINUOUS/CYCLIC NPO; Semi-elemental; Orogastric; 20  Current Tube Feeding (TF) Orders:  · Formula: Semi-Elemental  · Schedule:  20 mL/hr x 16 hours/day while prone, and 120 mL/hr x 8 hours while supine. Currently on hold d/t high GRV.    · Goal TF & Flush Orders Provides: 1536 kcal and 96 g pro/day   Anthropometric Measures:  · Height: 5' 10\" (177.8 cm)  · Current Body Weight: 244 lb 11.4 oz (111 kg)   · Admission Body Weight: 237 lb (107.5 kg)      · Ideal Body Weight: 166 lbs; % Ideal Body Weight  147%   · BMI: 35.1  · BMI Categories: Obese Class 2 (BMI 35.0 -39.9)       Nutrition Diagnosis:   · Inadequate oral intake related to impaired respiratory function as evidenced by NPO or clear liquid status due to medical condition, nutrition support - enteral nutrition    Nutrition Interventions:   Food and/or Nutrient Delivery:  Restart TF as able; monitor need for TPN  Nutrition Education/Counseling:  No recommendation at this time   Coordination of Nutrition Care:  Continue to monitor while inpatient    Goals:  meet % of estimated nutrient needs -no progress towards goal      Nutrition Monitoring and Evaluation:     Food/Nutrient Intake Outcomes:  Enteral Nutrition Intake/Tolerance Physical Signs/Symptoms Outcomes:  Biochemical Data, Nutrition Focused Physical Findings, Skin, Weight     Discharge Planning:     Too soon to determine     Electronically signed by Juan Ramon Garcia RD, AYESHA on 2/8/21 at 4:18 PM EST    Contact: [phone]

## 2021-02-09 NOTE — PLAN OF CARE
Problem: Skin Integrity:  Goal: Will show no infection signs and symptoms  Description: Will show no infection signs and symptoms  Outcome: Ongoing  Goal: Absence of new skin breakdown  Description: Absence of new skin breakdown  Outcome: Ongoing     Problem: Nutrition  Goal: Optimal nutrition therapy  Description: Nutrition Problem #1: Inadequate oral intake  Intervention: Food and/or Nutrient Delivery: (Start nutrition as able.  If TF, suggest Semi-elemental formula with goal rate of 55 mL/hr to provide 1584 kcal and 99 g pro/day.)  Nutritional Goals: meet % of estimated nutrient needs     Outcome: Ongoing     Problem: OXYGENATION/RESPIRATORY FUNCTION  Goal: Patient will maintain patent airway  2/9/2021 0102 by Adrianna Hickey RN  Outcome: Ongoing  2/8/2021 1227 by Amy Garcia RCP  Outcome: Ongoing  Goal: Patient will achieve/maintain normal respiratory rate/effort  Description: Respiratory rate and effort will be within normal limits for the patient  2/9/2021 0102 by Adrianna Hickey RN  Outcome: Ongoing  2/8/2021 1227 by Amy Garcia RCP  Outcome: Ongoing     Problem: MECHANICAL VENTILATION  Goal: Patient will maintain patent airway  2/9/2021 0102 by Adrianna Hickey RN  Outcome: Ongoing  2/8/2021 1227 by Amy Garcia RCP  Outcome: Ongoing  Goal: Oral health is maintained or improved  2/9/2021 0102 by Adrianna Hickey RN  Outcome: Ongoing  2/8/2021 1227 by Amy Garcia RCP  Outcome: Ongoing  Goal: ET tube will be managed safely  2/9/2021 0102 by Adrianna Hickey RN  Outcome: Ongoing  2/8/2021 1227 by Amy Garcia RCP  Outcome: Ongoing  Goal: Mobility/activity is maintained at optimum level for patient  2/9/2021 0102 by Adrianna Hickey RN  Outcome: Ongoing  2/8/2021 1227 by Amy Garcia RCP  Outcome: Ongoing     Problem: SKIN INTEGRITY  Goal: Skin integrity is maintained or improved  2/9/2021 0102 by Adrianna Hickey RN  Outcome: Ongoing  2/8/2021 1227 by Amy Garcia RCP  Outcome: Ongoing

## 2021-02-09 NOTE — DISCHARGE SUMMARY
Discharge Summary    Evita Veliz  :  1945  MRN:  [id number]    Admit date:  2021      Discharge date:  2/3/2021     Admitting Physician:  Rama Martel MD    Discharge Diagnoses:      Principal Problem:    COVID-19 virus infection  Active Problems:    Acute respiratory failure with hypoxia ([address]    Type 2 diabetes mellitus ([address]    Hypertension    Mild malnutrition ([address]  Resolved Problems:    * No resolved hospital problems.  *      Active Hospital Problems    Diagnosis Date Noted    Mild malnutrition ([address] [E44.1] 2021    Acute respiratory failure with hypoxia ([address] [J96.01] 2021    Type 2 diabetes mellitus ([address] [E11.9]     Hypertension [I10]     COVID-19 virus infection [U07.1] 2021       Discharge Medications:       Roge Cheltenham   Gunpowder Medication Instructions [facility]:[id number]    Printed on:21 983   Medication Information                      acetaminophen (TYLENOL) 500 MG tablet  Take 1,000 mg by mouth every 6 hours as needed for Pain or Fever             albuterol sulfate HFA (VENTOLIN HFA) 108 (90 Base) MCG/ACT inhaler  Inhale 2 puffs into the lungs every 6 hours as needed for Wheezing             aspirin 81 MG tablet  Take 81 mg by mouth daily             atorvastatin (LIPITOR) 10 MG tablet  Take 20 mg by mouth nightly              cetirizine (ZYRTEC) 10 MG tablet  Take 10 mg by mouth daily             Cholecalciferol (VITAMIN D3) 5000 units TABS  Take 5,000 Units by mouth daily             dextromethorphan-guaiFENesin (MUCINEX DM)  MG per extended release tablet  Take 1 tablet by mouth every 12 hours as needed for Cough or Congestion             fluticasone (FLONASE) 50 MCG/ACT nasal spray  1 spray by Each Nare route daily             lisinopril (PRINIVIL;ZESTRIL) 2.5 MG tablet  Take 2.5 mg by mouth daily             magnesium oxide (MAG-OX) 400 MG tablet  Take 400 mg by mouth daily             metFORMIN (GLUCOPHAGE) 500 MG tablet Take 500 mg by mouth daily (with breakfast)              mometasone (ASMANEX 120 METERED DOSES) 220 MCG/INH inhaler  Inhale 2 puffs into the lungs daily             Multiple Vitamins-Minerals (PRESERVISION AREDS PO)  Take 2 tablets by mouth nightly             pantoprazole (PROTONIX) 40 MG tablet  Take 40 mg by mouth daily             tamsulosin (FLOMAX) 0.4 MG capsule  Take 0.4 mg by mouth nightly              zinc gluconate 50 MG tablet  Take 50 mg by mouth daily                 Consultants:   Pulmonary critical care     Hospital Course:   Miguel Garcia is a 70 y.o. male admitted with  COVID-19 virus infection. Poor progression requiring BiPAP plan eventual intubation. Transfer   Tertiary care facility in critical condition. Exam:  Regular. Diminished breath sounds.    His ventilator    Condition:  critical    Disposition:    Transfer to tertiary care facility        Signed:  Dariel Grimes  2/9/2021, 5:32 PM

## 2021-02-09 NOTE — PROGRESS NOTES
Physical Therapy    DATE: 2021    NAME: Juan White  MRN: [id number]   : 1945      Patient not seen this date for Physical Therapy due to:     Other: intubated, proning, ck       Electronically signed by Ronal Bardales PT on 2184 at 11:23 AM

## 2021-02-09 NOTE — PROCEDURES
Pt currently proned, head turned to the left, no skin breakdown noticed. ETT secured by twill. Pt tolerated well. JEZ Crooks assisted.

## 2021-02-09 NOTE — PROGRESS NOTES
Pt currently proned, head turned to the right, no skin breakdown noticed. ETT secured by twill. Pt tolerated well. JEZ Marlow assisted.

## 2021-02-09 NOTE — PROGRESS NOTES
Pt currently proned, head turned to the left, no skin breakdown noticed. ETT secured by twill. Pt tolerated well. Glory Timothy RRT assisted.

## 2021-02-09 NOTE — PROGRESS NOTES
Critical Care Team - Daily Progress Note      Date and time: 2/9/2021 10:06 AM  Patient's name:  [address] Record Number: [id number]  Patient's account/billing number: [de-identified]  Patient's YOB: 1945  Age: 70 y.o. Date of Admission: 2/4/2021 12:53 AM  Length of stay during current admission: 5      Primary Care Physician: Paula Grayson MD  ICU Attending Physician: Sascha Kim MD.    Code Status: Full Code    Reason for ICU admission: No chief complaint on file. Subjective:     OVERNIGHT EVENTS:      02/09/20 :    No acute events overnightTube feeds continued   Blood pressure 139/64, tachypneic respiratory rate 34  Continued on mechanical ventilation PRVC 70/18/34/550 on midazolam and fentanyl  WBC down trending 7.7 today, hemoglobin stable 9.8  BMP unremarkable   ABG pH 7.303/67.2/73.1/33.3  Continued on prone ventilation overnight  Continued on methylprednisolone and epoprostenol nebulization  ID on board  Had 3 bowel movements overnight. Will hold lactulose.     AWAKE & FOLLOWING COMMANDS:  [x] No   [] Yes    CURRENT VENTILATION STATUS:     [x] Ventilator  [] BIPAP  [] Nasal Cannula [] Room Air      SEDATION:  RAAS Score:  [] Propofol gtt  [x] Versed gtt  [] Ativan gtt   [] No Sedation    PARALYZED:   [] No    [x] Yes    VASOPRESSORS:   [] Yes     [x] No   If yes -   [] Levophed       [] Dopamine     [] Vasopressin       [] Dobutamine  [] Phenylephrine         [] Epinephrine    CENTRAL LINES:      [x] Yes (Date of Insertion:   )    [] No           If yes -    [] Right Internal Jugular [] Left Internal Jugular [] Right Femoral   [] Left Femoral [] Right Subclavian  [] Left Subclavian     SMALLWOOD'S CATHETER:    [] No   [x] Yes  (Date of Insertion:   )     URINE OUTPUT:   [] Good  [x] Low  [] Anuric    REVIEW OF SYSTEMS   · Unable to obtain    OBJECTIVE:     VITAL SIGNS: /60   Pulse 73   Temp 97.2 °F (36.2 °C) (Core)   Resp (!) 34   Ht 5' 10\" (1.778 m)   Wt 241 lb 13.5 oz (109.7 kg)   SpO2 96%   BMI 34.70 kg/m²   Tmax over 24 hours:  Temp (24hrs), Av.8 °F (36.6 °C), Min:97.2 °F (36.2 °C), Max:98.4 °F (36.9 °C)      Patient Vitals for the past 8 hrs:   BP Temp Temp src Pulse Resp SpO2 Weight   21 0900    73 (!) 34 96 %    21 0800 136/60 97.2 °F (36.2 °C) CORE 78 (!) 34 95 %    21 0700    72 (!) 34 94 %    21 0645    72 (!) 34 93 %    21 0630    72 (!) 34 93 %    21 0615    72 (!) 34 93 %    21 0600    72 (!) 34 94 %    21 0545    74 (!) 34 94 %    21 0530    74 (!) 34 94 %    21 0515    75 (!) 34 95 %    21 0500    77 (!) 34 95 %    21 0448       241 lb 13.5 oz (109.7 kg)   21 0445    87 28 96 %    21 0430    88 (!) 32 95 %    21 0422     (!) 34 95 %    21 0415    80 (!) 34 96 %    21 0400 (!) 114/50 97.3 °F (36.3 °C) Bladder 80 (!) 34 96 %    21 0345    81 (!) 34 97 %    21 0340    81 (!) 34 97 %    21 0330    81 (!) 34 97 %    21 0315    81 (!) 34 97 %    21 0300    82 (!) 34 97 %    21 0245    82 (!) 34 97 %    21 0230    81 (!) 34 97 %    21 0215    83 (!) 34 97 %          Intake/Output Summary (Last 24 hours) at 2021 1006  Last data filed at 2021 0900  Gross per 24 hour   Intake 1206.66 ml   Output 2534 ml   Net -1327.34 ml     Date 21 0000 - 21 2359   Shift 6655-5156 5192-0641 4671-6780 24 Hour Total   INTAKE   I.V.(mL/kg)  290(2.6)  290(2.6)   NG/GT(mL/kg)  86(0.8)  86(0.8)   Shift Total(mL/kg)  376(3.4)  376(3.4)   OUTPUT   Urine(mL/kg/hr) 774(0.9) 200  974   Shift Total(mL/kg) 774(7.1) 200(1.8)  974(8.9)   Weight (kg) 109.7 109.7 109.7 109.7     Wt Readings from Last 3 Encounters: 02/09/21 241 lb 13.5 oz (109.7 kg)   02/03/21 237 lb 14 oz (107.9 kg)   10/05/18 218 lb (98.9 kg)     Body mass index is 34.7 kg/m². PHYSICAL EXAM:  Constitutional: intubated, sedated  HEENT: PERRLA, EOMI, sclera clear, anicteric  Respiratory: Ventilatory breath sounds  Cardiovascular: regular rate and rhythm, normal S1, S2, no murmur noted and 2+ pulses throughout  Abdomen: soft, nontender, nondistended, no masses or organomegaly  Neurological: Intubated and sedated on Versed. Extremities:  peripheral pulses normal, no pedal edema,.     VENT SETTINGS (Comprehensive) (if applicable):  Vent Information  $Ventilation: $Subsequent Day  Skin Assessment: Clean, dry, & intact  Suction Catheter Diameter: 14  Equipment ID: [id number] #[id number]  Equipment Changed: Expiratory Filter(both filters & HME chged)  Vent Type: Servo i  Vent Mode: PRVC  Vt Ordered: 550 mL  Rate Set: 34 bmp  FiO2 : 70 %  SpO2: 96 %  SpO2/FiO2 ratio: 137.14  Sensitivity: 3  PEEP/CPAP: 18  I Time/ I Time %: 0.7 s  Humidification Source: Heated wire  Humidification Temp: 37  Humidification Temp Measured: 36.7  Circuit Condensation: Drained  Nitric Oxide/Epoprostenol In Use?: No  Additional Respiratory  Assessments  Pulse: 73  Resp: (!) 34  SpO2: 96 %  End Tidal CO2: 27  Position: Prone  Humidification Source: Heated wire  Humidification Temp: 37  Circuit Condensation: Drained  Oral Care Completed?: Yes  Oral Care: Mouth moisturizer, Mouth suctioned  Subglottic Suction Done?: Yes    ABGs:   Lab Results   Component Value Date    MOM0GHY 35 02/09/2021    FIO2 70.0 02/09/2021     Lactic Acid: No results found for: LACTA    DATA:  Complete Blood Count:   Recent Labs     02/07/21  0542 02/08/21  0437 02/09/21  0444   WBC 8.4 9.3 7.7   HGB 9.5* 11.0* 9.8*   MCV 87.6 88.5 89.5   PLT 82* 90* 95*   RBC 3.48* 4.10* 3.62*   HCT 30.5* 36.3* 32.4*   MCH 27.3 26.8 27.1   MCHC 31.1 30.3 30.2   RDW 13.7 14.0 14.1   MPV 9.8 10.1 10.0        PT/INR:    Lab Results Component Value Date    PROTIME 14.0 02/03/2021    INR 1.1 02/03/2021     PTT:    Lab Results   Component Value Date    APTT 34.5 02/03/2021       Basal Metabolic Profile:   Recent Labs     02/07/21  0542 02/08/21  0437 02/09/21  0444    139 141   K 5.2 5.1 4.8   BUN 43* 47* 39*   CREATININE 0.78 0.84 0.67*    106 106   CO2 25 28 29      LFTS  No results for input(s): ALKPHOS, ALT, AST, BILITOT, BILIDIR, LABALBU in the last 72 hours. MEDICATIONS:  Scheduled Meds:   famotidine (PEPCID) injection  20 mg Intravenous BID    lactulose  20 g Oral TID    insulin glargine  10 Units Subcutaneous Nightly    sodium chloride flush  10 mL Intravenous 2 times per day    heparin (porcine)  5,000 Units Subcutaneous 3 times per day    dexamethasone  6 mg Intravenous Q24H    insulin lispro  0-6 Units Subcutaneous Q4H     Continuous Infusions:   fentaNYL 75 mcg/hr (02/08/21 2106)    norepinephrine Stopped (02/06/21 2034)    dextrose      midazolam 9 mg/hr (02/09/21 0500)    cisatracurium (NIMBEX) infusion 3 mcg/kg/min (02/09/21 0300)    epoprostenol (VELETRI) nebulization solution Stopped (02/08/21 2100)     PRN Meds:       polyvinyl alcohol, 1 drop, PRN      sodium chloride flush, 10 mL, PRN      promethazine, 12.5 mg, Q6H PRN    Or      ondansetron, 4 mg, Q6H PRN      polyethylene glycol, 17 g, Daily PRN      acetaminophen, 650 mg, Q6H PRN    Or      acetaminophen, 650 mg, Q6H PRN      glucose, 15 g, PRN      dextrose, 12.5 g, PRN      glucagon (rDNA), 1 mg, PRN      dextrose, 100 mL/hr, PRN          ASSESSMENT:     Principal Problem:    Acute respiratory failure with hypoxia (HCC)  Active Problems:    COVID-19 virus infection    Type 2 diabetes mellitus (HCC)    Septic shock (HCC)  Resolved Problems:    * No resolved hospital problems.  *      PLAN:     ARDS due to Covid 19 Pneumonia  - Continue to monitor respiratory status,  Continue on prone ventilation, wean down on Fi02 - Out of isolation period  - Continue on epoprostenol.  - ID on board. - Continue on methyl prednisone.  - Continue on midazolam and nimbex    YAA secondary to shock-  Resolved. Shock- resolved, and off pressors. Type 2 DM-continue on Lantus 10 units and sliding scale, blood glucose control with monitor POC glucose. DVT ppx - Heparin 5000 porcine units     Catarina Puckett MD  Internal Medicine Resident, PGY- [address], [place]  2/9/2021, 10:06 AM

## 2021-02-09 NOTE — PROGRESS NOTES
Occupational Therapy    Occupational Therapy Not Seen Note    DATE: 2021  Name: Shadia Robb  : 1945  MRN: [id number]    Patient not available for Occupational Therapy due to: Other: Pt vented and in prone position upon arrival, not appropriate for OT eval.    Next Scheduled Treatment: Attempt on 2/10 as appropriate.     Electronically signed by Lawyer Yuly OT on 2021 at 9:23 AM

## 2021-02-10 NOTE — PROGRESS NOTES
Pt currently proned, head turned to the right, no skin breakdown noticed. ETT secured by twill. Pt tolerated well. RN Norah Kanner assisted.

## 2021-02-10 NOTE — PLAN OF CARE
Problem: Skin Integrity:  Goal: Will show no infection signs and symptoms  Description: Will show no infection signs and symptoms  2/10/2021 0927 by Eboni Suarez RCP  Outcome: Ongoing     Problem: Skin Integrity:  Goal: Absence of new skin breakdown  Description: Absence of new skin breakdown  2/10/2021 0927 by Eboni Suarez RCP  Outcome: Ongoing     Problem: OXYGENATION/RESPIRATORY FUNCTION  Goal: Patient will maintain patent airway  2/10/2021 0927 by Eboni Suarez RCP  Outcome: Ongoing     Problem: OXYGENATION/RESPIRATORY FUNCTION  Goal: Patient will achieve/maintain normal respiratory rate/effort  Description: Respiratory rate and effort will be within normal limits for the patient  2/10/2021 2487 by Eboni Suarez RCP  Outcome: Ongoing     Problem: MECHANICAL VENTILATION  Goal: Patient will maintain patent airway  2/10/2021 0927 by Eboni Suarez RCP  Outcome: Ongoing     Problem: MECHANICAL VENTILATION  Goal: Oral health is maintained or improved  2/10/2021 0927 by Eboni Suarez RCP  Outcome: Ongoing     Problem: MECHANICAL VENTILATION  Goal: ET tube will be managed safely  2/10/2021 0927 by Eboni Suarez RCP  Outcome: Ongoing     Problem: MECHANICAL VENTILATION  Goal: Mobility/activity is maintained at optimum level for patient  2/10/2021 4422 by Eboni Suarez RCP  Outcome: Ongoing     Problem: SKIN INTEGRITY  Goal: Skin integrity is maintained or improved  2/10/2021 0927 by Eboni Suarez RCP  Outcome: Ongoing

## 2021-02-10 NOTE — PLAN OF CARE
Problem: Skin Integrity:  Goal: Will show no infection signs and symptoms  Description: Will show no infection signs and symptoms  2/10/2021 1752 by Dino Campo RN  Outcome: Ongoing     Problem: Skin Integrity:  Goal: Absence of new skin breakdown  Description: Absence of new skin breakdown  2/10/2021 1752 by Dino Campo RN  Outcome: Ongoing     Problem: Nutrition  Goal: Optimal nutrition therapy  Description: Nutrition Problem #1: Inadequate oral intake  Intervention: Food and/or Nutrient Delivery: (Start nutrition as able.  If TF, suggest Semi-elemental formula with goal rate of 55 mL/hr to provide 1584 kcal and 99 g pro/day.)  Nutritional Goals: meet % of estimated nutrient needs     Outcome: Ongoing     Problem: OXYGENATION/RESPIRATORY FUNCTION  Goal: Patient will maintain patent airway  2/10/2021 1752 by Dino Campo RN  Outcome: Ongoing     Problem: OXYGENATION/RESPIRATORY FUNCTION  Goal: Patient will achieve/maintain normal respiratory rate/effort  Description: Respiratory rate and effort will be within normal limits for the patient  2/10/2021 1752 by Dino Campo RN  Outcome: Ongoing     Problem: MECHANICAL VENTILATION  Goal: Patient will maintain patent airway  2/10/2021 1752 by Dino Campo RN  Outcome: Ongoing     Problem: MECHANICAL VENTILATION  Goal: Oral health is maintained or improved  2/10/2021 1752 by Dino Campo RN  Outcome: Ongoing     Problem: MECHANICAL VENTILATION  Goal: ET tube will be managed safely  2/10/2021 1752 by Dino Campo RN  Outcome: Ongoing     Problem: MECHANICAL VENTILATION  Goal: Mobility/activity is maintained at optimum level for patient  2/10/2021 1752 by Dino Campo RN  Outcome: Ongoing     Problem: SKIN INTEGRITY  Goal: Skin integrity is maintained or improved  2/10/2021 1752 by Dino Campo RN  Outcome: Ongoing

## 2021-02-10 NOTE — PROGRESS NOTES
[facility] -   Infectious diseases evaluation. Progress Note    admission date 2/4/2021    reason for consultation:   bandemia    Impression :   Current:  · Bandemia, likely from steroids  · COVID-19 virus pneumonia  · COVID-19 related sepsis with arts  · Septic shock  · Respiratory failure requiring mechanical ventilation  · Elevated inflammatory markers CRP, LDH      Discussion / summary of stay / plan of care   ·   Recommendations   · Covid Rx:  · Completed 5 days of remdesivir at the referring hospital  · Post Levaquin course at the other hospital  · Out of the window for plasma  · Continue steroids and anticoagulation  · D/C isolation and transfer out of COVID unit  · post vancomycin and Zosyn,   · Maintain off antibiotics    Infection Control Recommendations   · [place] Precautions  · Contact Isolation   · Airborne isolation removed  · Droplet Isolation removed    Antimicrobial Stewardship Recommendations   · Targeted therapy    Coordination ofOutpatient Care:   · Estimated Length of IV antimicrobials:  · Patient will need Midline / picc Catheter Insertion:   · Patient will need SNF:  · Patient will need outpatient wound care:     History of Present Illness:       INITIAL HISTORY:    Darrius Tavarez is a 70y.o.-year-old male who presented because of shortness of breath and cough that has been progressive. Started January 12 with a fever aches cough shortness of breath, did not lose the taste of food or the smell, no diarrhea no blood in the sputum. Fever was associated. Presented to the ER because of the worsening of shortness of breath. Chest x-ray showed pneumonia he tested positive for Covid. Lactic acid was 3 and the patient was desaturating so he was admitted on high flow to the ICU. To another hospital, patient was not improving despite steroids and high oxygen supplementation. Patient was intubated 2/3/2021 and transferred to [place] [address]. Paralyzed high oxygen requirement, 2 pressors on board. WBC 2019  Creatinine is normal  Liver enzymes are normal    CURRENT EVALUATION :  2/9/2021   Patient Vitals for the past 8 hrs:   BP Temp Temp src Pulse Resp SpO2   02/09/21 1900    77 (!) 34 91 %   02/09/21 1800    69 (!) 34 91 %   02/09/21 1700    65 (!) 34 90 %   02/09/21 1600 (!) 113/58 98.1 °F (36.7 °C) CORE 76 (!) 34 92 %   02/09/21 1500    72 (!) 34 95 %   02/09/21 1400    74 (!) 34 96 %   02/09/21 1300    71 (!) 34 96 %   02/09/21 1200    75 (!) 34 95 %   02/09/21 1139    78 (!) 34 95 %     Continues to be on 70% FiO2 and 18 of PEEP, on the vent, OG tube in place tube feed, upright today  Continues to prone on and off    Chest x-ray [id number] to show diffuse bilateral interstitial infiltrates. Summary of relevant labs: 2/9/2021    Labs:  WBC 19-18-->14-97  Hb 10.3  Plat 107    BUN 45  Cr 1.21    CRP 98  Ferritin 307  Fibrinogen 350      Micro:  Sputum culture 1-30-21 Normal aris    Blood:  · 2-4-21: No growth  · 1-19-21: No growth    Urine  2-4-21: Enterococcus faecalis 10-50 K    Covid +1/14    Imaging:  Chest x-ray bilateral pulmonary changes with chronic changes  Ultrasound of the kidneys no hydronephrosis      I have personally reviewed the past medical history, past surgical history, medications, social history, and family history, and I haveupdated the database accordingly. Allergies:   Patient has no known allergies. Review of Systems:     Review of Systems   Unable to perform ROS: Intubated       Physical Examination :       Physical Exam  Constitutional:       Appearance: He is obese. He is ill-appearing. Interventions: He is sedated and intubated. HENT:      Head: Normocephalic and atraumatic. Nose: Nose normal.      Mouth/Throat:      Mouth: Mucous membranes are moist.   Eyes:      General: No scleral icterus. Conjunctiva/sclera: Conjunctivae normal.   Neck:      Musculoskeletal: Neck supple. Cardiovascular:      Rate and Rhythm: Normal rate and regular rhythm. Heart sounds: No murmur. No friction rub. Pulmonary:      Effort: No respiratory distress. He is intubated. Breath sounds: Normal breath sounds. Abdominal:      General: There is no distension. Palpations: Abdomen is soft. There is no mass. Hernia: No hernia is present. Genitourinary:     Comments: Urine is yusra  Musculoskeletal:         General: No swelling or tenderness. Right lower leg: No edema. Left lower leg: No edema. Skin:     Coloration: Skin is not jaundiced or pale. Neurological:      Comments: Intubated   Psychiatric:      Comments: Intubated         Past Medical History:     Past Medical History:   Diagnosis Date    Asthma     Hyperlipidemia     Hypertension     Type 2 diabetes mellitus without complication ([facility] [address]        Past Surgical  History:     Past Surgical History:   Procedure Laterality Date    HERNIA REPAIR      right inguinal at age 3 years.     OR COLONOSCOPY FLX DX W/COLLJ SPEC WHEN PFRMD N/A 10/5/2018    COLONOSCOPY performed by Anthony James DO at [address]       Medications:      famotidine (PEPCID) injection  20 mg Intravenous BID    lactulose  20 g Oral TID    insulin glargine  10 Units Subcutaneous Nightly    sodium chloride flush  10 mL Intravenous 2 times per day    heparin (porcine)  5,000 Units Subcutaneous 3 times per day    dexamethasone  6 mg Intravenous Q24H    insulin lispro  0-6 Units Subcutaneous Q4H       Social History:     Social History     Socioeconomic History    Marital status:      Spouse name: Not on file    Number of children: Not on file    Years of education: Not on file    Highest education level: Not on file   Occupational History    Not on file   Social Needs    Financial resource strain: Not on file    Food insecurity Worry: Not on file     Inability: Not on file    Transportation needs     Medical: Not on file     Non-medical: Not on file   Tobacco Use    Smoking status: Former Smoker     Quit date: 2009     Years since quittin.4    Smokeless tobacco: Never Used   Substance and Sexual Activity    Alcohol use: Yes     Comment: rare    Drug use: Not on file    Sexual activity: Not on file   Lifestyle    Physical activity     Days per week: Not on file     Minutes per session: Not on file    Stress: Not on file   Relationships    Social connections     Talks on phone: Not on file     Gets together: Not on file     Attends [employer] service: Not on file     Active member of club or organization: Not on file     Attends meetings of clubs or organizations: Not on file     Relationship status: Not on file    Intimate partner violence     Fear of current or ex partner: Not on file     Emotionally abused: Not on file     Physically abused: Not on file     Forced sexual activity: Not on file   Other Topics Concern    Not on file   Social History Narrative    Not on file       Family History:     Family History   Problem Relation Age of Onset    Stroke Mother     Heart Disease Father       Medical Decision Making:   I have independently reviewed/ordered the following labs:    CBC with Differential:   Recent Labs     217 21  0444   WBC 9.3 7.7   HGB 11.0* 9.8*   HCT 36.3* 32.4*   PLT 90* 95*   LYMPHOPCT 2* 6*   MONOPCT 5 6     BMP:  Recent Labs     217 21  0444    141   K 5.1 4.8    106   CO2 28 29   BUN 47* 39*   CREATININE 0.84 0.67*     Hepatic Function Panel:   No results for input(s): PROT, LABALBU, BILIDIR, IBILI, BILITOT, ALKPHOS, ALT, AST in the last 72 hours. No results for input(s): RPR in the last 72 hours. No results for input(s): HIV in the last 72 hours. No results for input(s): BC in the last 72 hours.   Lab Results   Component Value Date CREATININE 0.67 02/09/2021    GLUCOSE 121 02/09/2021       Detailed results: Thank you for allowing us to participate in the care of this patient. Please call with questions. This note is created with the assistance of a speech recognition program.  While intending to generate adocument that actually reflects the content of the visit, the document can still have some errors including those of syntax and sound a like substitutions which may escape proof reading. It such instances, actual meaningcan be extrapolated by contextual diversion.     Cliff Brandt MD  Office: [phone]  Perfect serve / office [phone]

## 2021-02-10 NOTE — PROCEDURES
Patient Name: Lydia Vogel   Medical Record Number: [id number]  Date: 2/10/2021   Time: 8:46 AM   Room/Bed: 0108/0108-01  Arterial Line Placement Procedure Note                   Indication: mechanical ventilation    Consent: Unable to be obtained due to patient's condition. Sandor's Test: Normal    Procedure: The skin over the left radial artery was prepped with betadine and draped in a sterile fashion. Local anesthesia was not performed due to the emergent nature of this procedure. A 20 gauge arterial line catheter was then inserted, over a needle, into the vessel. The transducer set was then attached and securely fastened to the skin with an adhesive dressing. Waveforms on the monitor were observed and found to be adequate. The patient had good distal perfusion after the procedure. The site was then dressed in a sterile fashion. The patient tolerated the procedure well.      Complications: None

## 2021-02-10 NOTE — PLAN OF CARE
Problem: Skin Integrity:  Goal: Will show no infection signs and symptoms  Description: Will show no infection signs and symptoms  2/10/2021 0047 by Eun Booker RN  Outcome: Ongoing  2/9/2021 1922 by Mj Gaston RCP  Outcome: Ongoing  Goal: Absence of new skin breakdown  Description: Absence of new skin breakdown  2/10/2021 0047 by Eun Booker RN  Outcome: Ongoing  2/9/2021 1922 by Mj Gaston RCP  Outcome: Ongoing     Problem: Nutrition  Goal: Optimal nutrition therapy  Description: Nutrition Problem #1: Inadequate oral intake  Intervention: Food and/or Nutrient Delivery: (Start nutrition as able.  If TF, suggest Semi-elemental formula with goal rate of 55 mL/hr to provide 1584 kcal and 99 g pro/day.)  Nutritional Goals: meet % of estimated nutrient needs     Outcome: Ongoing     Problem: OXYGENATION/RESPIRATORY FUNCTION  Goal: Patient will maintain patent airway  2/10/2021 0047 by Eun Booker RN  Outcome: Ongoing  2/9/2021 1922 by Mj Gaston RCP  Outcome: Ongoing  Goal: Patient will achieve/maintain normal respiratory rate/effort  Description: Respiratory rate and effort will be within normal limits for the patient  2/10/2021 0047 by Eun Booker RN  Outcome: Ongoing  2/9/2021 1922 by Mj Gaston RCP  Outcome: Ongoing     Problem: MECHANICAL VENTILATION  Goal: Patient will maintain patent airway  2/10/2021 0047 by Eun Booker RN  Outcome: Ongoing  2/9/2021 1922 by Mj Gaston RCP  Outcome: Ongoing  Goal: Oral health is maintained or improved  2/10/2021 0047 by Eun Booker RN  Outcome: Ongoing  2/9/2021 1922 by Mj Gaston RCP  Outcome: Ongoing  Goal: ET tube will be managed safely  2/10/2021 0047 by Eun Booker RN  Outcome: Ongoing  2/9/2021 1922 by Mj Gaston RCP  Outcome: Ongoing  Goal: Mobility/activity is maintained at optimum level for patient  2/10/2021 0047 by Eun Booker RN  Outcome: Ongoing 2/9/2021 1922 by Sweta Quijano RCP  Outcome: Ongoing     Problem: SKIN INTEGRITY  Goal: Skin integrity is maintained or improved  2/10/2021 0047 by Meka Quiñonez RN  Outcome: Ongoing  2/9/2021 1922 by Sweta Quijano RCP  Outcome: Ongoing     Electronically signed by Meka Quiñonez RN on 2/10/2021 at 12:51 AM

## 2021-02-10 NOTE — PROGRESS NOTES
Critical Care Team - Daily Progress Note      Date and time: 2/10/2021 2:08 PM  Patient's name:  Joan Manjarrez  Medical Record Number: [id number]  Patient's account/billing number: [de-identified]  Patient's YOB: 1945  Age: 70 y.o. Date of Admission: 2/4/2021 12:53 AM  Length of stay during current admission: 6    Primary Care Physician: Shawn Marin MD  ICU Attending Physician: Ranjana Gallagher MD    Code Status: Full Code    Reason for ICU admission: Shortness of breath    Subjective:     OVERNIGHT EVENTS:         Pt was seen and examined at bedside. Intubated and sedated  Vitals stable. Labs reviewed. No acute events overnight. On 70% FiO2. BRIEF SUMMARY:  Patient was admitted for shortness of breath, cough, and dyspnea since January 4th. Was diagnosed with Covid19 Pneumonia. He continued to worsen and was admitted to the ICU for further management. He was started on high flow and BiPAP.  Subsequently treated with 5 days of intravenous remdesivir. Patient needed intubation on 2/3/2021. At this time patient is on ARDS protocol, inhalational epoprostenol and is pronated and paralyzed.     AWAKE & FOLLOWING COMMANDS:  [x] No   [] Yes    CURRENT VENTILATION STATUS:     [x] Ventilator  [] BIPAP  [] Nasal Cannula [] Room Air      SEDATION:  RAAS Score:  [] Propofol gtt  [x] Versed gtt  [] Ativan gtt   [] No Sedation    PARALYZED:   [] No    [x] Yes    VASOPRESSORS:   [] Yes     [x] No   If yes -   [] Levophed       [] Dopamine     [] Vasopressin       [] Dobutamine  [] Phenylephrine         [] Epinephrine    CENTRAL LINES:      [x] Yes (Date of Insertion:   )    [] No           If yes -    [] Right Internal Jugular [] Left Internal Jugular [] Right Femoral   [] Left Femoral [] Right Subclavian  [] Left Subclavian     SMALLWOOD'S CATHETER:    [] No   [x] Yes  (Date of Insertion:   )     URINE OUTPUT:   [x] Good  [] Low  [] Anuric    REVIEW OF SYSTEMS  Unable to obtain due to mentation    OBJECTIVE: VITAL SIGNS:  /72   Pulse 62   Temp 97.7 °F (36.5 °C) (Core)   Resp (!) 34   Ht 5' 10\" (1.778 m)   Wt 242 lb 8.1 oz (110 kg)   SpO2 95%   BMI 34.80 kg/m²   Tmax over 24 hours:  Temp (24hrs), Av.8 °F (36.6 °C), Min:97.7 °F (36.5 °C), Max:98.1 °F (36.7 °C)      Patient Vitals for the past 8 hrs:   BP Pulse Resp SpO2   02/10/21 1000 127/72 62 (!) 34 95 %   02/10/21 0945  61 (!) 34 94 %   02/10/21 0930  61 (!) 34 93 %   02/10/21 0915 118/60 66 (!) 33 93 %   02/10/21 0800  68 (!) 34 90 %   02/10/21 0745  85 (!) 33 92 %   02/10/21 0730 121/83 86 (!) 34 95 %   02/10/21 0715  85 (!) 34 94 %   02/10/21 0700  85 (!) 34 94 %   02/10/21 0615  80 (!) 34 92 %         Intake/Output Summary (Last 24 hours) at 2/10/2021 1408  Last data filed at 2/10/2021 1000  Gross per 24 hour   Intake 1394.9 ml   Output 1850 ml   Net -455.1 ml     Date 02/10/21 0000 - 02/10/21 2359   Shift 3142-1467 9144-9049 6342-4895 24 Hour Total   INTAKE   I.V.(mL/kg) 275(2.5) 179(1.6)  454(4.1)   NG/GT(mL/kg) 177(1.6) 94(0.9)  271(2.5)   Shift Total(mL/kg) 452(4.1) 273(2.5)  725(6.6)   OUTPUT   Urine(mL/kg/hr) 605(0.7) 250  855   Shift Total(mL/kg) 605(5.5) 250(2.3)  855(7.8)   Weight (kg) 110 110 110 110     Wt Readings from Last 3 Encounters:   02/10/21 242 lb 8.1 oz (110 kg)   21 237 lb 14 oz (107.9 kg)   10/05/18 218 lb (98.9 kg)     Body mass index is 34.8 kg/m². PHYSICAL EXAM:  Constitutional: Intubated and sedated  HEENT: PERRLA, EOMI, sclera clear, anicteric  Respiratory: Ventilatory breath sounds. Coarse rhonchi bilaterally. Cardiovascular: regular rate and rhythm, normal S1, S2, no murmur noted and 2+ pulses throughout  Abdomen: soft, nontender, nondistended, no masses or organomegaly  Neurological: Intubated and sedated. Extremities:  peripheral pulses normal, no pedal edema,.     VENT SETTINGS (Comprehensive) (if applicable):  Vent Information  $Ventilation: $Subsequent Day Skin Assessment: Clean, dry, & intact  Suction Catheter Diameter: 14  Equipment ID: [id number] #[id number]  Equipment Changed: Expiratory Filter  Vent Type: Servo i  Vent Mode: PRVC  Vt Ordered: 550 mL  Rate Set: 34 bmp  FiO2 : 65 %  SpO2: 95 %  SpO2/FiO2 ratio: 138.46  Sensitivity: 3  PEEP/CPAP: 18  I Time/ I Time %: 0.7 s  Humidification Source: Heated wire  Humidification Temp: 37  Humidification Temp Measured: 37  Circuit Condensation: Drained  Nitric Oxide/Epoprostenol In Use?: Yes  Additional Respiratory  Assessments  Pulse: 62  Resp: (!) 34  SpO2: 95 %  End Tidal CO2: 30  Position: Semi-Hogan's  Humidification Source: Heated wire  Humidification Temp: 37  Circuit Condensation: Drained  Oral Care Completed?: Yes  Oral Care: Mouth suctioned, Mouth swabbed  Subglottic Suction Done?: Yes    ABGs:   Lab Results   Component Value Date    CCY2MWS 37 02/10/2021    FIO2 65.0 02/10/2021     Lactic Acid: No results found for: LACTA    DATA:  Complete Blood Count:   Recent Labs     02/08/21  0437 02/09/21  0444 02/10/21  0423   WBC 9.3 7.7 10.0   HGB 11.0* 9.8* 9.9*   MCV 88.5 89.5 88.0   PLT 90* 95* 106*   RBC 4.10* 3.62* 3.67*   HCT 36.3* 32.4* 32.3*   MCH 26.8 27.1 27.0   MCHC 30.3 30.2 30.7   RDW 14.0 14.1 13.8   MPV 10.1 10.0 10.0        PT/INR:    Lab Results   Component Value Date    PROTIME 14.0 02/03/2021    INR 1.1 02/03/2021     PTT:    Lab Results   Component Value Date    APTT 34.5 02/03/2021       Basal Metabolic Profile:   Recent Labs     02/08/21  0437 02/09/21  0444 02/10/21  0423 02/10/21  0906    141 139  --    K 5.1 4.8 5.5* 5.6*   BUN 47* 39* 38*  --    CREATININE 0.84 0.67* 0.67*  --     106 103  --    CO2 28 29 31  --       LFTS  No results for input(s): ALKPHOS, ALT, AST, BILITOT, BILIDIR, LABALBU in the last 72 hours.     MEDICATIONS:  Scheduled Meds:   famotidine (PEPCID) injection  20 mg Intravenous BID    lactulose  20 g Oral TID    insulin glargine  10 Units Subcutaneous Nightly  sodium chloride flush  10 mL Intravenous 2 times per day    heparin (porcine)  5,000 Units Subcutaneous 3 times per day    dexamethasone  6 mg Intravenous Q24H    insulin lispro  0-6 Units Subcutaneous Q4H     Continuous Infusions:   dextrose      fentaNYL 75 mcg/hr (02/10/21 1131)    norepinephrine Stopped (02/06/21 2034)    dextrose      midazolam 9 mg/hr (02/10/21 0610)    cisatracurium (NIMBEX) infusion 3 mcg/kg/min (02/10/21 1127)    epoprostenol (VELETRI) nebulization solution Stopped (02/08/21 2100)     PRN Meds:       glucose, 15 g, PRN      dextrose, 12.5 g, PRN      glucagon (rDNA), 1 mg, PRN      dextrose, 100 mL/hr, PRN      polyvinyl alcohol, 1 drop, PRN      sodium chloride flush, 10 mL, PRN      promethazine, 12.5 mg, Q6H PRN    Or      ondansetron, 4 mg, Q6H PRN      polyethylene glycol, 17 g, Daily PRN      acetaminophen, 650 mg, Q6H PRN    Or      acetaminophen, 650 mg, Q6H PRN      glucose, 15 g, PRN      dextrose, 12.5 g, PRN      glucagon (rDNA), 1 mg, PRN      dextrose, 100 mL/hr, PRN          ASSESSMENT:     Principal Problem:    Acute respiratory failure with hypoxia (HCC)  Active Problems:    COVID-19 virus infection    Type 2 diabetes mellitus (HCC)    Septic shock (HCC)  Resolved Problems:    * No resolved hospital problems. *      PLAN:     ARDS due to Covid 19 Pneumonia  - Continue to monitor respiratory status  - Continue on prone ventilation, attempt to wean down on Fi02   - Continue epoprostenol and Decadron   - Continue on midazolam and nimbex     YAA secondary to shock-  Resolved. Hyperkalemia - Start lokelma x 3 doses.  Repeat potassium at 4pm and Q6h.     Shock- resolved, and off pressors.     Type 2 DM-continue on Lantus 10 units and sliding scale, blood glucose control with monitor POC glucose.     DVT ppx - Heparin 5000 porcine units     Garth Rico MD  PGY-2, Internal Medicine Resident [address] [facility],  [address]         2/10/2021, 2:08 PM

## 2021-02-10 NOTE — PROGRESS NOTES
Comprehensive Nutrition Assessment    Type and Reason for Visit:  Reassess    Nutrition Recommendations/Plan:   1. Continue TF of Semi-elemental (Vital AF 1.2) @ goal 55 mL/hr continuous to provide 1584 kcal/day and 99 g/day protein. If proning, 20 mL/hr x 16 hrs while prone and 120 mL/hr x 8 hrs while supine. 2. Will continue to monitor tolerance     Nutrition Assessment:  Pt remains on vent. Pt in supine position during visit and TF running @ goal 120 mL/hr x 8 hrs. Noted improvement in residuals 5-30 mL. Pt tolerating current TF regimen. Last BM 2/8/21. Meds: Dexamethasone, Lactulose, Humalog, Lantus. Labs K 5.6 mmol/L, Glu 168 mg/dL. If K remains high, consider change to Renal Formula. Malnutrition Assessment:  Malnutrition Status:  Insufficient data      Estimated Daily Nutrient Needs:  Energy (kcal):  1500 kcal/day; Weight Used for Energy Requirements:  Admission     Protein (g):  110 g pro/day; Weight Used for Protein Requirements:  Ideal(1.5)          Nutrition Related Findings:  +1/+2 Edema        Current Nutrition Therapies:    Current Tube Feeding (TF) Orders:  · Formula: Semi-Elemental   · Schedule:  20 mL/hr x 16 hours/day while prone, and 120 mL/hr x 8 hours while supine.  Currently @ goal 120 mL x 8 hrs supine  · Goal TF & Flush Orders Provides: 1536 kcal and 96 g pro/day      Anthropometric Measures:  · Height: 5' 10\" (177.8 cm)  · Current Body Weight: 244 lb 11.4 oz (111 kg)   · Admission Body Weight: 237 lb (107.5 kg)    · Ideal Body Weight: 166 lbs; % Ideal Body Weight     · BMI: 35.1  · BMI Categories: Obese Class 2 (BMI 35.0 -39.9)       Nutrition Diagnosis:   · Inadequate oral intake related to impaired respiratory function as evidenced by NPO or clear liquid status due to medical condition, nutrition support - enteral nutrition      Nutrition Interventions:   Food and/or Nutrient Delivery:  Continue Current Tube Feeding  Nutrition Education/Counseling:  No recommendation at this time Coordination of Nutrition Care:  Continue to monitor while inpatient    Goals:  meet % of estimated nutrient needs       Nutrition Monitoring and Evaluation:   Behavioral-Environmental Outcomes:  None Identified   Food/Nutrient Intake Outcomes:  Enteral Nutrition Intake/Tolerance  Physical Signs/Symptoms Outcomes:  Biochemical Data, Nutrition Focused Physical Findings, Skin, Weight     Discharge Planning:     Too soon to determine     Electronically signed by Jeff Marroquin MS, RD, LD on 2/10/21 at 2:06 PM EST    Contact: [phone]

## 2021-02-11 NOTE — PROGRESS NOTES
[facility] -   Infectious diseases evaluation. Progress Note    admission date 2/4/2021    reason for consultation:   bandemia    Impression :   Current:  · Bandemia, likely from steroids  · COVID-19 virus pneumonia  · COVID-19 related sepsis with arts  · Septic shock  · Respiratory failure requiring mechanical ventilation  · Elevated inflammatory markers CRP, LDH      Discussion / summary of stay / plan of care   ·   Recommendations     Completed 5 days of remdesivir at the referring hospital  Post Levaquin course at the other hospital  Out of the window for plasma  D/C isolation and transfer out of COVID unit  post vancomycin and Zosyn,   · Maintain off antibiotics  · Continues on steroids anticoagulation    Infection Control Recommendations   · [place] Precautions  · Contact Isolation   · Airborne isolation removed  · Droplet Isolation removed    Antimicrobial Stewardship Recommendations   · Targeted therapy    Coordination ofOutpatient Care:   · Estimated Length of IV antimicrobials:  · Patient will need Midline / picc Catheter Insertion:   · Patient will need SNF:  · Patient will need outpatient wound care:     History of Present Illness:       INITIAL HISTORY:    Rosy Cedeno is a 70y.o.-year-old male who presented because of shortness of breath and cough that has been progressive. Started January 12 with a fever aches cough shortness of breath, did not lose the taste of food or the smell, no diarrhea no blood in the sputum. Fever was associated. Presented to the ER because of the worsening of shortness of breath. Chest x-ray showed pneumonia he tested positive for Covid. Lactic acid was 3 and the patient was desaturating so he was admitted on high flow to the ICU. To another hospital, patient was not improving despite steroids and high oxygen supplementation. Patient was intubated 2/3/2021 and transferred to [facility]. Paralyzed high oxygen requirement, 2 pressors on board. WBC 2019  Creatinine is normal  Liver enzymes are normal    CURRENT EVALUATION :  2/10/2021   Patient Vitals for the past 8 hrs:   BP Temp Temp src Pulse Resp SpO2   02/10/21 2200 134/79   90 (!) 32 93 %   02/10/21 2145    89 (!) 32 93 %   02/10/21 2130    89 (!) 32 93 %   02/10/21 2115    88 (!) 32 93 %   02/10/21 2100    89 (!) 32 93 %   02/10/21 2045    87 (!) 32 94 %   02/10/21 2030    90 (!) 32 95 %   02/10/21 2020    91 30 96 %   02/10/21 2015    90 (!) 31 94 %   02/10/21 2000    88 (!) 32 93 %   02/10/21 1945    86 (!) 32 93 %   02/10/21 1938 139/68 97.9 °F (36.6 °C) Axillary 83 (!) 33 93 %   02/10/21 1930    80 (!) 33 93 %   02/10/21 1915    75 (!) 32 93 %   02/10/21 1900    77 (!) 32 93 %   02/10/21 1845    75 (!) 32 93 %   02/10/21 1830    72 (!) 32 92 %   02/10/21 1815    73 (!) 32 91 %   02/10/21 1800    74 (!) 33 91 %   02/10/21 1745    74 (!) 32 91 %   02/10/21 1730    76 (!) 32 91 %   02/10/21 1715    84 30 90 %   02/10/21 1700    83 (!) 31    02/10/21 1645      (!) 89 %   02/10/21 1630    77 28 (!) 89 %   02/10/21 1615    81 28 (!) 89 %   02/10/21 1600 130/73   80 27 (!) 89 %   02/10/21 1500 133/68   77 (!) 31 90 %         Sleepy not responsive, s FiO2 70% 18 of PEEP, blood pressure 136/65  He has edema both lower extremity,   edated on the ventilator,    No fever, white count is normal  No new positive cultures    Chest x-ray [id number] to show diffuse bilateral interstitial infiltrates.     Summary of relevant labs: 2/10/2021    Labs:  WBC 19-18-->14-97  Hb 10.3  Plat 107    BUN 45  Cr 1.21    CRP 98  Ferritin 307  Fibrinogen 350      Micro:  Sputum culture 1-30-21 Normal aris    Blood:  · 2-4-21: No growth  · 1-19-21: No growth    Urine  2-4-21: Enterococcus faecalis 10-50 K Covid +1/14    Imaging:  Chest x-ray bilateral pulmonary changes with chronic changes  Ultrasound of the kidneys no hydronephrosis      I have personally reviewed the past medical history, past surgical history, medications, social history, and family history, and I haveupdated the database accordingly. Allergies:   Patient has no known allergies. Review of Systems:     Review of Systems   Unable to perform ROS: Intubated       Physical Examination :       Physical Exam  Constitutional:       Appearance: He is obese. He is not ill-appearing. Interventions: He is sedated and intubated. HENT:      Head: Normocephalic and atraumatic. Nose: Nose normal.      Mouth/Throat:      Mouth: Mucous membranes are moist.   Eyes:      General: No scleral icterus. Conjunctiva/sclera: Conjunctivae normal.   Neck:      Musculoskeletal: Neck supple. Cardiovascular:      Rate and Rhythm: Normal rate and regular rhythm. Heart sounds: No murmur. No friction rub. Pulmonary:      Effort: No respiratory distress. He is intubated. Breath sounds: Normal breath sounds. Abdominal:      General: There is no distension. Palpations: Abdomen is soft. There is no mass. Hernia: No hernia is present. Genitourinary:     Comments: Urine is yusra  Musculoskeletal:         General: No swelling, tenderness or signs of injury. Right lower leg: No edema. Left lower leg: No edema. Skin:     Coloration: Skin is not jaundiced or pale. Findings: No erythema. Neurological:      Comments: Intubated   Psychiatric:      Comments: Intubated         Past Medical History:     Past Medical History:   Diagnosis Date    Asthma     Hyperlipidemia     Hypertension     Type 2 diabetes mellitus without complication ([facility] [address]        Past Surgical  History:     Past Surgical History:   Procedure Laterality Date    HERNIA REPAIR      right inguinal at age 3 years.  SD COLONOSCOPY FLX DX W/COLLJ SPEC WHEN PFRMD N/A 10/5/2018    COLONOSCOPY performed by Maria E Ellison DO at [address]       Medications:      sodium zirconium cyclosilicate  5 g Oral TID    famotidine (PEPCID) injection  20 mg Intravenous BID    lactulose  20 g Oral TID    insulin glargine  10 Units Subcutaneous Nightly    sodium chloride flush  10 mL Intravenous 2 times per day    heparin (porcine)  5,000 Units Subcutaneous 3 times per day    dexamethasone  6 mg Intravenous Q24H    insulin lispro  0-6 Units Subcutaneous Q4H       Social History:     Social History     Socioeconomic History    Marital status:      Spouse name: Not on file    Number of children: Not on file    Years of education: Not on file    Highest education level: Not on file   Occupational History    Not on file   Social Needs    Financial resource strain: Not on file    Food insecurity     Worry: Not on file     Inability: Not on file    Transportation needs     Medical: Not on file     Non-medical: Not on file   Tobacco Use    Smoking status: Former Smoker     Quit date: 2009     Years since quittin.4    Smokeless tobacco: Never Used   Substance and Sexual Activity    Alcohol use: Yes     Comment: rare    Drug use: Not on file    Sexual activity: Not on file   Lifestyle    Physical activity     Days per week: Not on file     Minutes per session: Not on file    Stress: Not on file   Relationships    Social connections     Talks on phone: Not on file     Gets together: Not on file     Attends [employer] service: Not on file     Active member of club or organization: Not on file     Attends meetings of clubs or organizations: Not on file     Relationship status: Not on file    Intimate partner violence     Fear of current or ex partner: Not on file     Emotionally abused: Not on file     Physically abused: Not on file     Forced sexual activity: Not on file   Other Topics Concern    Not on file

## 2021-02-11 NOTE — PROGRESS NOTES
Pt currently proned, head turned to the right no skin breakdown noticed. ETT secured by twill. Pt tolerated well . RN Shari De Leon  assisted. Respiratory will continue to monitor.

## 2021-02-11 NOTE — CONSULTS
Nephrology Consult Note    Reason for Consult: Hyperkalemia   requesting Physician: Dr. Wilmer Sarabia  Chief Complaint: As above  History Obtained From: Chart review  History of Present Illness: This is a 70 y.o. male who was initially admitted to hospital on 1/20/2021 with worsening of shortness of breath and cough for 2 days duration. He was diagnosed with Covid. His hospital course was further complicated by endotracheal intubation, respiratory failure, and encephalopathy. He is a still intubated, receiving tube feeds. He is tolerating tube feeds very well without diarrhea. The recent nephrology was consulted because of hyperkalemia. His last few potassium results are as follows:  Results for Aliyah Castañeda (MRN [id number]) as of 2/11/2021 15:49   Ref. Range 2/10/2021 09:06 2/10/2021 16:37 2/10/2021 22:28 2/11/2021 04:30 2/11/2021 10:28   Potassium Latest Ref Range: 3.7 - 5.3 mmol/L 5.6 (H) 5.6 (H) 5.4 (H) 5.2 6.0 (HH)          Patient is on Lokelma 5 mg 3 times daily. Because of potassium up to 6 mEq/L he received insulin and glucose and also days of dose of Lasix. Repeat potassium is pending. Patient is not receiving any potassium supplement, he is not on TPN, there is no evidence of GI bleed or tissue trauma  Patient is not on angiotensin receptor blocker or ingestion tension converting enzyme inhibitor. Past Medical History:        Diagnosis Date    Asthma     Hyperlipidemia     Hypertension     Type 2 diabetes mellitus without complication ([facility] [address]        Past Surgical History:        Procedure Laterality Date    HERNIA REPAIR      right inguinal at age 3 years.     [facility] COLONOSCOPY FLX DX W/COLLJ SPEC WHEN PFRMD N/A 10/5/2018    COLONOSCOPY performed by Jojo Mancera DO at [address]       Current Medications:        sodium zirconium cyclosilicate (LOKELMA) oral suspension 5 g, TID      glucose (GLUTOSE) 40 % oral gel 15 g, PRN      dextrose 50 % IV solution, PRN   glucagon (rDNA) injection 1 mg, PRN      dextrose 5 % solution, PRN      famotidine (PEPCID) injection 20 mg, BID      polyvinyl alcohol (LIQUIFILM TEARS) 1.4 % ophthalmic solution 1 drop, PRN      lactulose (CHRONULAC) 10 GM/15ML solution 20 g, TID      insulin glargine (LANTUS) injection vial 10 Units, Nightly      sodium chloride flush 0.9 % injection 10 mL, 2 times per day      sodium chloride flush 0.9 % injection 10 mL, PRN      promethazine (PHENERGAN) tablet 12.5 mg, Q6H PRN    Or      ondansetron (ZOFRAN) injection 4 mg, Q6H PRN      polyethylene glycol (GLYCOLAX) packet 17 g, Daily PRN      acetaminophen (TYLENOL) tablet 650 mg, Q6H PRN    Or      acetaminophen (TYLENOL) suppository 650 mg, Q6H PRN      heparin (porcine) injection 5,000 Units, 3 times per day      dexamethasone (DECADRON) injection 6 mg, Q24H      fentaNYL 20 mcg/mL Infusion, Continuous      insulin lispro (HUMALOG) injection vial 0-6 Units, Q4H      norepinephrine (LEVOPHED) 16 mg in sodium chloride 0.9 % 250 mL infusion, Continuous      glucose (GLUTOSE) 40 % oral gel 15 g, PRN      dextrose 50 % IV solution, PRN      glucagon (rDNA) injection 1 mg, PRN      dextrose 5 % solution, PRN      midazolam (VERSED) 1 mg/mL in D5W infusion, Continuous      cisatracurium besylate (NIMBEX) 200 mg in sodium chloride 0.9 % 100 mL infusion, Continuous      epoprostenol 1,500 mcg in sodium chloride 0.9 % 50 mL nebulization solution, Continuous        Allergies:  Patient has no known allergies.     Social History:   Social History     Socioeconomic History    Marital status:      Spouse name: Not on file    Number of children: Not on file    Years of education: Not on file    Highest education level: Not on file   Occupational History    Not on file   Social Needs    Financial resource strain: Not on file    Food insecurity     Worry: Not on file     Inability: Not on file   [employer] needs Medical: Not on file     Non-medical: Not on file   Tobacco Use    Smoking status: Former Smoker     Quit date: 2009     Years since quittin.4    Smokeless tobacco: Never Used   Substance and Sexual Activity    Alcohol use: Yes     Comment: rare    Drug use: Not on file    Sexual activity: Not on file   Lifestyle    Physical activity     Days per week: Not on file     Minutes per session: Not on file    Stress: Not on file   Relationships    Social connections     Talks on phone: Not on file     Gets together: Not on file     Attends Yazdanism service: Not on file     Active member of club or organization: Not on file     Attends meetings of clubs or organizations: Not on file     Relationship status: Not on file    Intimate partner violence     Fear of current or ex partner: Not on file     Emotionally abused: Not on file     Physically abused: Not on file     Forced sexual activity: Not on file   Other Topics Concern    Not on file   Social History Narrative    Not on file       Family History:   Family History   Problem Relation Age of Onset    Stroke Mother     Heart Disease Father        Review of Systems:    Constitutional: Afebrile intubated and sedated      Objective:  CURRENT TEMPERATURE:  Temp: 97.7 °F (36.5 °C)  MAXIMUM TEMPERATURE OVER 24HRS:  Temp (24hrs), Av °F (36.7 °C), Min:97.5 °F (36.4 °C), Max:98.6 °F (37 °C)    CURRENT RESPIRATORY RATE:  Resp: (!) 35  CURRENT PULSE:  Pulse: 74  CURRENT BLOOD PRESSURE:  BP: 106/70  24HR BLOOD PRESSURE RANGE:  Systolic (63YGB), XAX:097 , Min:100 , OV   ; Diastolic (01PLS), MARLENE:91, Min:65, Max:79    24HR INTAKE/OUTPUT:      Intake/Output Summary (Last 24 hours) at 2021 1551  Last data filed at 2021 1539  Gross per 24 hour   Intake 2072.3 ml   Output 3275 ml   Net -1202.7 ml     Patient Vitals for the past 96 hrs (Last 3 readings):   Weight   21 0600 242 lb 15.2 oz (110.2 kg)   02/10/21 0600 242 lb 8.1 oz (110 kg) 02/09/21 0448 241 lb 13.5 oz (109.7 kg)     Physical Exam:  General appearance: Intubated and sedated   skin: Warm to touch but decreased skin turgor   eyes: Pulse reactive to light  ENT: :no thrush no pharyngeal congestion    Neck: No lymphadenopathy   pulmonary: No wheezing   cardiovascular: S1 and S2 audible no S3  Abdomen: soft nontender, bowel sounds present, no organomegaly,  no ascites  Extremities: No edema    Labs:   CBC:  Recent Labs     02/09/21  0444 02/10/21  0423 02/11/21 0430   WBC 7.7 10.0 10.2   RBC 3.62* 3.67* 3.71*   HGB 9.8* 9.9* 10.1*   HCT 32.4* 32.3* 33.4*   MCV 89.5 88.0 90.0   MCH 27.1 27.0 27.2   MCHC 30.2 30.7 30.2   RDW 14.1 13.8 14.0   PLT 95* 106* 117*   MPV 10.0 10.0 9.7      BMP:   Recent Labs     02/10/21  2228 02/11/21  0430 02/11/21  1028    140 133*   K 5.4* 5.2 6.0*    101 97*   CO2 33* 34* 33*   BUN 37* 36* 38*   CREATININE 0.64* 0.67* 0.62*   GLUCOSE 159* 134* 246*   CALCIUM 9.1 8.9 8.9      FERRITIN:    Lab Results   Component Value Date    FERRITIN 307 01/20/2021     SPEP:   Lab Results   Component Value Date    PROT 5.9 02/05/2021   Urine Sodium:    Lab Results   Component Value Date    FELIX 56 02/05/2021      Urine Potassium:    Lab Results   Component Value Date    KUR 34.8 02/11/2021   Urine Osmolarity:    Lab Results   Component Value Date    OSMOU 439 02/04/2021     Urine Creatinine:    Lab Results   Component Value Date    LABCREA 98.2 02/05/2021   Urinalysis:  U/A:   Lab Results   Component Value Date    NITRU NEGATIVE 02/05/2021    COLORU YELLOW 02/05/2021    PHUR 5.0 02/05/2021    WBCUA 5 TO 10 02/05/2021    RBCUA 5 TO 10 02/05/2021    MUCUS NOT REPORTED 02/05/2021    TRICHOMONAS NOT REPORTED 02/05/2021    YEAST NOT REPORTED 02/05/2021    BACTERIA NOT REPORTED 02/05/2021    SPECGRAV 1.021 02/05/2021    LEUKOCYTESUR TRACE 02/05/2021    UROBILINOGEN Normal 02/05/2021    BILIRUBINUR NEGATIVE 02/05/2021    GLUCOSEU NEGATIVE 02/05/2021 [address] NEGATIVE 02/05/2021    AMORPHOUS NOT REPORTED 02/05/2021         Radiology:  Reviewed as available. Assessment:  1. Hyperkalemia patient was running potassium in the range of 5.2-5.6 from last several days and most recent potassium is 6. Differential include interstitial nephritis caused by Covid viremia versus decreased delivery of sodium to distal tubule resulting in impaired potassium excretion versus volume depletion. Or hyper Zain state. 2.  Clinically failure and on ventilator  3. History of Covid pneumonia        Plan:  1. Follow urinary potassium level  2. Stone urine potassium level will give Florinef if excretion is low along with Lokelma  3. If needed Lokelma dose can be increased to 10 g 3 times daily  4. Continue to check potassium every 6 hours    Thank you for the consultation. Please do not hesitate to call with questions.     Electronically signed by Kait King MD on 2/11/2021 at 3:51 PM

## 2021-02-11 NOTE — PLAN OF CARE
Problem: OXYGENATION/RESPIRATORY FUNCTION  Goal: Patient will maintain patent airway  2/11/2021 1005 by Chapis Devine RCP  Outcome: Ongoing  2/11/2021 0621 by Sybil Sumner RCP  Outcome: Ongoing  2/11/2021 0008 by Golden Tenorio RN  Outcome: Ongoing  Goal: Patient will achieve/maintain normal respiratory rate/effort  Description: Respiratory rate and effort will be within normal limits for the patient  2/11/2021 1005 by Chapis Devine RCP  Outcome: Ongoing  2/11/2021 0621 by Sybil Sumner RCP  Outcome: Ongoing  2/11/2021 0008 by Golden Tenorio RN  Outcome: Ongoing     Problem: MECHANICAL VENTILATION  Goal: Patient will maintain patent airway  2/11/2021 1005 by Chapis Devine RCP  Outcome: Ongoing  2/11/2021 0621 by Sybil Sumner RCP  Outcome: Ongoing  2/11/2021 0008 by Golden Tenorio RN  Outcome: Ongoing  Goal: Oral health is maintained or improved  2/11/2021 1005 by Chapis Devine RCP  Outcome: Ongoing  2/11/2021 0621 by Sybil Sumner RCP  Outcome: Ongoing  2/11/2021 0008 by Golden Tenorio RN  Outcome: Ongoing  Goal: ET tube will be managed safely  2/11/2021 1005 by Chapis Devine RCP  Outcome: Ongoing  2/11/2021 0621 by Sybil Sumner RCP  Outcome: Ongoing  2/11/2021 0008 by Golden Tenorio RN  Outcome: Ongoing  Goal: Mobility/activity is maintained at optimum level for patient  2/11/2021 1005 by Chapis Devine RCP  Outcome: Ongoing  2/11/2021 0621 by Sybil Sumner RCP  Outcome: Ongoing  2/11/2021 0008 by Golden Tenorio RN  Outcome: Ongoing

## 2021-02-11 NOTE — PLAN OF CARE
Problem: OXYGENATION/RESPIRATORY FUNCTION  Goal: Patient will maintain patent airway  2/11/2021 0621 by Gene Tolliver RCP  Outcome: Ongoing     Problem: OXYGENATION/RESPIRATORY FUNCTION  Goal: Patient will achieve/maintain normal respiratory rate/effort  Description: Respiratory rate and effort will be within normal limits for the patient  2/11/2021 9692 by Gene Tolliver RCP  Outcome: Ongoing     Problem: MECHANICAL VENTILATION  Goal: Patient will maintain patent airway  2/11/2021 0621 by Gene Tolliver RCP  Outcome: Ongoing     Problem: MECHANICAL VENTILATION  Goal: Oral health is maintained or improved  2/11/2021 0621 by Gene Tolliver RCP  Outcome: Ongoing     Problem: MECHANICAL VENTILATION  Goal: ET tube will be managed safely  2/11/2021 0621 by Gene Tolliver RCP  Outcome: Ongoing     Problem: MECHANICAL VENTILATION  Goal: Mobility/activity is maintained at optimum level for patient  2/11/2021 1252 by Gene Tolliver RCP  Outcome: Ongoing

## 2021-02-11 NOTE — PROGRESS NOTES
Critical Care Team - Daily Progress Note      Date and time: 2/11/2021 10:57 AM  Patient's name:  [address] Record Number: [id number]  Patient's account/billing number: [de-identified]  Patient's YOB: 1945  Age: 70 y.o. Date of Admission: 2/4/2021 12:53 AM  Length of stay during current admission: 7  Primary Care Physician: Roscoe Cabrera MD  ICU Attending Physician: Everardo Linton MD    Code Status: Full Code  Reason for ICU admission: No chief complaint on file. Subjective:     OVERNIGHT EVENTS:         Pt was seen and examined at bedside. Intubated and sedated  Vitals stable. Labs reviewed. Hyperkalemia improved. No acute events overnight. Patient required reintubation this morning after ET tube malfunction after proning. BRIEF SUMMARY:  Patient was admitted for shortness of breath, cough, and dyspnea since January 4th. Was diagnosed with Covid19 Pneumonia. He continued to worsen and was admitted to the ICU for further management. He was started on high flow and BiPAP.  Subsequently treated with 5 days of intravenous remdesivir. Patient needed intubation on 2/3/2021.      At this time patient is on ARDS protocol, inhalational epoprostenol and is pronated and paralyzed.  Et tube reinserted on 2/11/2021    AWAKE & FOLLOWING COMMANDS:  [x] No   [] Yes    CURRENT VENTILATION STATUS:     [x] Ventilator  [] BIPAP  [] Nasal Cannula [] Room Air      SEDATION:  RAAS Score:  [] Propofol gtt  [x] Versed gtt  [] Ativan gtt   [] No Sedation    PARALYZED:   [] No    [] Yes    VASOPRESSORS:   [] Yes     [x] No   If yes -   [] Levophed       [] Dopamine     [] Vasopressin       [] Dobutamine  [] Phenylephrine         [] Epinephrine    CENTRAL LINES:      [x] Yes (Date of Insertion:   )    [] No           If yes -    [] Right Internal Jugular [] Left Internal Jugular [] Right Femoral   [] Left Femoral [] Right Subclavian  [] Left Subclavian     SMALLWOOD'S CATHETER: [] No   [x] Yes  (Date of Insertion:   )     URINE OUTPUT:   [x] Good  [] Low  [] Anuric    REVIEW OF SYSTEMS  · Unable to obtain      OBJECTIVE:     VITAL SIGNS:  /75   Pulse 82   Temp 97.5 °F (36.4 °C) (Bladder)   Resp (!) 34   Ht 5' 10\" (1.778 m)   Wt 242 lb 15.2 oz (110.2 kg)   SpO2 (!) 84%   BMI 34.86 kg/m²   Tmax over 24 hours:  Temp (24hrs), Av °F (36.7 °C), Min:97.5 °F (36.4 °C), Max:98.6 °F (37 °C)      Patient Vitals for the past 8 hrs:   BP Temp Temp src Pulse Resp SpO2 Weight   21 1000 128/75   82 (!) 34 (!) 84 %    21 0959    80 (!) 35 (!) 84 %    21 0900    82 11 (!) 88 %    21 0800 125/72 97.5 °F (36.4 °C) Bladder 78 (!) 35 95 %    21 0700    78 (!) 35 94 %    21 0645    78 (!) 35 94 %    21 0630    80 (!) 35 94 %    21 0615    78 (!) 35 94 %    21 0600 120/76   81 (!) 35 94 % 242 lb 15.2 oz (110.2 kg)   21 0545    81 (!) 35 95 %    21 0530    80 (!) 35 95 %    21 0515    82 (!) 35 94 %    21 0500    84 (!) 35 94 %    21 0445    84 (!) 35 95 %    21 0438    83 (!) 32 95 %    21 0430    83 (!) 32 95 %    21 0415    84 (!) 32 95 %    21 0400 115/75 98.6 °F (37 °C) Axillary 82 (!) 32 95 %    21 0345    82 (!) 32 95 %    21 0330    84 (!) 32 95 %    21 0315    85 (!) 32 95 %    21 0300    84 (!) 32 95 %          Intake/Output Summary (Last 24 hours) at 2021 1057  Last data filed at 2021 0819  Gross per 24 hour   Intake 1983.39 ml   Output 1970 ml   Net 13.39 ml     Date 21 0000 - 21 2359   Shift 9648-5057 5144-2070 9296-2877 24 Hour Total   INTAKE   I.V.(mL/kg) 240(2.2) 131.6(1.2)  371.6(3.4)   NG/GT(mL/kg) 92(0.8) 86(0.8)  178(1.6)   Shift Total(mL/kg) 332(3) 217.6(2)  549. 6(5)   OUTPUT   Urine(mL/kg/hr) 520(0.6) 120  640 Shift Total(mL/kg) 520(4.7) 120(1.1)  640(5.8)   Weight (kg) 110.2 110.2 110.2 110.2     Wt Readings from Last 3 Encounters:   02/11/21 242 lb 15.2 oz (110.2 kg)   02/03/21 237 lb 14 oz (107.9 kg)   10/05/18 218 lb (98.9 kg)     Body mass index is 34.86 kg/m². PHYSICAL EXAM:  Constitutional: Intubated and sedated. HEENT: PERRLA, EOMI, sclera clear, anicteric  Respiratory: Ventilatory breath sounds. Bilateral air entry. Cardiovascular: regular rate and rhythm, normal S1, S2, no murmur noted and 2+ pulses throughout  Abdomen: soft, nontender, nondistended, no masses or organomegaly  Neurological: Intubated, sedated and paralyzed. Extremities:  peripheral pulses normal, no pedal edema.     VENT SETTINGS (Comprehensive) (if applicable):  Vent Information  $Ventilation: $Subsequent Day  Skin Assessment: Clean, dry, & intact  Suction Catheter Diameter: 14  Equipment ID: [id number] #[id number]  Equipment Changed: Expiratory Filter(HME)  Vent Type: Servo i  Vent Mode: PRVC  Vt Ordered: 550 mL  Rate Set: 35 bmp  FiO2 : 100 %  SpO2: (!) 84 %  SpO2/FiO2 ratio: 84  Sensitivity: 3  PEEP/CPAP: 20  I Time/ I Time %: 0.7 s  Humidification Source: Heated wire  Humidification Temp: 37  Humidification Temp Measured: 37  Circuit Condensation: Drained  Nitric Oxide/Epoprostenol In Use?: No  Additional Respiratory  Assessments  Pulse: 82  Resp: (!) 34  SpO2: (!) 84 %  End Tidal CO2: 28  Position: Semi-Hogan's  Humidification Source: Heated wire  Humidification Temp: 37  Circuit Condensation: Drained  Oral Care Completed?: Yes  Oral Care: Teeth brushed, Mouthwash, Mouth swabbed, Mouth suctioned  Subglottic Suction Done?: Yes    ABGs:   Lab Results   Component Value Date    IGP7OFY 42 02/11/2021    FIO2 100.0 02/11/2021     DATA:  Complete Blood Count:   Recent Labs     02/09/21  0444 02/10/21  0423 02/11/21  0430   WBC 7.7 10.0 10.2   HGB 9.8* 9.9* 10.1*   MCV 89.5 88.0 90.0   PLT 95* 106* 117*   RBC 3.62* 3.67* 3.71* HCT 32.4* 32.3* 33.4*   MCH 27.1 27.0 27.2   MCHC 30.2 30.7 30.2   RDW 14.1 13.8 14.0   MPV 10.0 10.0 9.7        PT/INR:    Lab Results   Component Value Date    PROTIME 14.0 02/03/2021    INR 1.1 02/03/2021     PTT:    Lab Results   Component Value Date    APTT 34.5 02/03/2021       Basal Metabolic Profile:   Recent Labs     02/10/21  1637 02/10/21  2228 02/11/21  0430    138 140   K 5.6* 5.4* 5.2   BUN 40* 37* 36*   CREATININE 0.65* 0.64* 0.67*   CL 99 100 101   CO2 34* 33* 34*      MEDICATIONS:  Scheduled Meds:   famotidine (PEPCID) injection  20 mg Intravenous BID    lactulose  20 g Oral TID    insulin glargine  10 Units Subcutaneous Nightly    sodium chloride flush  10 mL Intravenous 2 times per day    heparin (porcine)  5,000 Units Subcutaneous 3 times per day    dexamethasone  6 mg Intravenous Q24H    insulin lispro  0-6 Units Subcutaneous Q4H     Continuous Infusions:   dextrose      fentaNYL 75 mcg/hr (02/11/21 0710)    norepinephrine Stopped (02/06/21 2034)    dextrose      midazolam 8 mg/hr (02/11/21 0710)    cisatracurium (NIMBEX) infusion 3.5 mcg/kg/min (02/11/21 0849)    epoprostenol (VELETRI) nebulization solution Stopped (02/08/21 2100)     PRN Meds:       glucose, 15 g, PRN      dextrose, 12.5 g, PRN      glucagon (rDNA), 1 mg, PRN      dextrose, 100 mL/hr, PRN      polyvinyl alcohol, 1 drop, PRN      sodium chloride flush, 10 mL, PRN      promethazine, 12.5 mg, Q6H PRN    Or      ondansetron, 4 mg, Q6H PRN      polyethylene glycol, 17 g, Daily PRN      acetaminophen, 650 mg, Q6H PRN    Or      acetaminophen, 650 mg, Q6H PRN      glucose, 15 g, PRN      dextrose, 12.5 g, PRN      glucagon (rDNA), 1 mg, PRN      dextrose, 100 mL/hr, PRN      ASSESSMENT:     Principal Problem:    Acute respiratory failure with hypoxia (HCC)  Active Problems:    COVID-19 virus infection    Type 2 diabetes mellitus (HCC)    Septic shock (HCC)  Resolved Problems:

## 2021-02-11 NOTE — PLAN OF CARE
Problem: Skin Integrity:  Goal: Will show no infection signs and symptoms  Description: Will show no infection signs and symptoms  2/11/2021 0008 by Jing Vidal RN  Outcome: Ongoing  2/10/2021 1752 by José Miguel Velasquez RN  Outcome: Ongoing  Goal: Absence of new skin breakdown  Description: Absence of new skin breakdown  2/11/2021 0008 by Jing Vidal RN  Outcome: Ongoing  2/10/2021 1752 by José Miguel Velasquez RN  Outcome: Ongoing     Problem: Nutrition  Goal: Optimal nutrition therapy  Description: Nutrition Problem #1: Inadequate oral intake  Intervention: Food and/or Nutrient Delivery: (Start nutrition as able.  If TF, suggest Semi-elemental formula with goal rate of 55 mL/hr to provide 1584 kcal and 99 g pro/day.)  Nutritional Goals: meet % of estimated nutrient needs     2/11/2021 0008 by Jing Vidal RN  Outcome: Ongoing  2/10/2021 1752 by José Miguel Velasquez RN  Outcome: Ongoing     Problem: OXYGENATION/RESPIRATORY FUNCTION  Goal: Patient will maintain patent airway  2/11/2021 0008 by Jing Vidal RN  Outcome: Ongoing  2/10/2021 1752 by José Miguel Velasquez RN  Outcome: Ongoing  Goal: Patient will achieve/maintain normal respiratory rate/effort  Description: Respiratory rate and effort will be within normal limits for the patient  2/11/2021 0008 by Jing Vidal RN  Outcome: Ongoing  2/10/2021 1752 by José Miguel Velasquez RN  Outcome: Ongoing     Problem: MECHANICAL VENTILATION  Goal: Patient will maintain patent airway  2/11/2021 0008 by Jing Vidal RN  Outcome: Ongoing  2/10/2021 1752 by José Miguel Velasquez RN  Outcome: Ongoing  Goal: Oral health is maintained or improved  2/11/2021 0008 by Jing Vidal RN  Outcome: Ongoing  2/10/2021 1752 by José Miguel Velasquez RN  Outcome: Ongoing  Goal: ET tube will be managed safely  2/11/2021 0008 by Jing Vidal RN  Outcome: Ongoing  2/10/2021 1752 by José Miguel Velasquez RN  Outcome: Ongoing Goal: Mobility/activity is maintained at optimum level for patient  2/11/2021 0008 by Eun Booker RN  Outcome: Ongoing  2/10/2021 1752 by Kacey Cedeño RN  Outcome: Ongoing     Problem: SKIN INTEGRITY  Goal: Skin integrity is maintained or improved  2/11/2021 0008 by Eun Booker RN  Outcome: Ongoing  2/10/2021 1752 by Kacey Cedeño RN  Outcome: Ongoing     Electronically signed by Eun Booker RN on 2/11/2021 at 12:08 AM

## 2021-02-11 NOTE — PROGRESS NOTES
[facility] -   Infectious diseases evaluation. Progress Note    admission date 2/4/2021    reason for consultation:   bandemia    Impression :   Current:  · Bandemia, likely from steroids  · COVID-19 virus pneumonia  · COVID-19 related sepsis with arts  · Septic shock  · Respiratory failure requiring mechanical ventilation  · Elevated inflammatory markers CRP, LDH      Discussion / summary of stay / plan of care   ·   Recommendations     Completed 5 days of remdesivir at the referring hospital  Post Levaquin course at the other hospital  Out of the window for plasma  D/C isolation and transfer out of COVID unit  post vancomycin and Zosyn,   · Maintain off antibiotics  · Continues on steroids anticoagulation    Infection Control Recommendations   · [place] Precautions  · Contact Isolation   · Airborne isolation removed  · Droplet Isolation removed    Antimicrobial Stewardship Recommendations   · Targeted therapy    Coordination ofOutpatient Care:   · Estimated Length of IV antimicrobials:  · Patient will need Midline / picc Catheter Insertion:   · Patient will need SNF:  · Patient will need outpatient wound care:     History of Present Illness:       INITIAL HISTORY:    Jenna Sanders is a 70y.o.-year-old male who presented because of shortness of breath and cough that has been progressive. Started January 12 with a fever aches cough shortness of breath, did not lose the taste of food or the smell, no diarrhea no blood in the sputum. Fever was associated. Presented to the ER because of the worsening of shortness of breath. Chest x-ray showed pneumonia he tested positive for Covid. Lactic acid was 3 and the patient was desaturating so he was admitted on high flow to the ICU. To another hospital, patient was not improving despite steroids and high oxygen supplementation. Patient was intubated 2/3/2021 and transferred to [place] [address]. Paralyzed high oxygen requirement, 2 pressors on board. WBC 2019  Creatinine is normal  Liver enzymes are normal    CURRENT EVALUATION :  2/11/2021   Patient Vitals for the past 8 hrs:   BP Temp Temp src Pulse Resp SpO2   02/11/21 1400 106/70   88 (!) 35 99 %   02/11/21 1302    93 (!) 33 99 %   02/11/21 1300    88 (!) 35 99 %   02/11/21 1200 100/65 97.7 °F (36.5 °C) Bladder 86 (!) 35 100 %   02/11/21 1100    83 (!) 35 100 %   02/11/21 1000 128/75   82 (!) 34 (!) 84 %   02/11/21 0959    80 (!) 35 (!) 84 %   02/11/21 0944    85 (!) 35 (!) 78 %   02/11/21 0900    82 11 (!) 88 %   02/11/21 0800 125/72 97.5 °F (36.4 °C) Bladder 78 (!) 35 95 %   02/11/21 0700    78 (!) 35 94 %   02/11/21 0645    78 (!) 35 94 %       FiO2 70PEEP of 16    still is on the ventilator sedated, chest x-ray bilateral severe infiltrates  WBC is normal  No fever  Chest x-ray [id number] to show diffuse bilateral interstitial infiltrates. Summary of relevant labs: 2/11/2021    Labs:  WBC 19-18-->14- 9 7 - 10  Hb 10.3  Plat 107    BUN 45  Cr 1.21    CRP 98  Ferritin 307  Fibrinogen 350      Micro:  Sputum culture 1-30-21 Normal aris    Blood:  · 2-4-21: No growth  · 1-19-21: No growth    Urine  2-4-21: Enterococcus faecalis 10-50 K    Covid +1/14    Imaging:  CXR 2/11  Otherwise, severe diffuse faint parenchymal and interstitial opacities   throughout both lungs unchanged. Chest x-ray bilateral pulmonary changes with chronic changes  Ultrasound of the kidneys no hydronephrosis      I have personally reviewed the past medical history, past surgical history, medications, social history, and family history, and I haveupdated the database accordingly. Allergies:   Patient has no known allergies.      Review of Systems:     Review of Systems   Unable to perform ROS: Intubated       Physical Examination :       Physical Exam Constitutional:       Appearance: He is obese. He is not ill-appearing. Interventions: He is sedated and intubated. HENT:      Head: Normocephalic and atraumatic. Nose: Nose normal.      Mouth/Throat:      Mouth: Mucous membranes are moist.   Eyes:      General: No scleral icterus. Conjunctiva/sclera: Conjunctivae normal.   Neck:      Musculoskeletal: Neck supple. Cardiovascular:      Rate and Rhythm: Normal rate and regular rhythm. Heart sounds: No murmur. No friction rub. Pulmonary:      Effort: No respiratory distress. He is intubated. Breath sounds: Normal breath sounds. No stridor. No rhonchi. Abdominal:      General: There is no distension. Palpations: Abdomen is soft. There is no mass. Hernia: No hernia is present. Genitourinary:     Comments: Urine is yusra  Musculoskeletal:         General: No swelling, tenderness or signs of injury. Right lower leg: No edema. Left lower leg: No edema. Skin:     Coloration: Skin is not jaundiced or pale. Findings: No erythema. Neurological:      Comments: Intubated   Psychiatric:      Comments: Intubated         Past Medical History:     Past Medical History:   Diagnosis Date    Asthma     Hyperlipidemia     Hypertension     Type 2 diabetes mellitus without complication ([facility] [address]        Past Surgical  History:     Past Surgical History:   Procedure Laterality Date    HERNIA REPAIR      right inguinal at age 3 years.     MT COLONOSCOPY FLX DX W/COLLJ SPEC WHEN PFRMD N/A 10/5/2018    COLONOSCOPY performed by Anthony James DO at [address]       Medications:      sodium zirconium cyclosilicate  5 g Oral TID    famotidine (PEPCID) injection  20 mg Intravenous BID    lactulose  20 g Oral TID    insulin glargine  10 Units Subcutaneous Nightly    sodium chloride flush  10 mL Intravenous 2 times per day    heparin (porcine)  5,000 Units Subcutaneous 3 times per day    dexamethasone  6 mg Intravenous Q24H  insulin lispro  0-6 Units Subcutaneous Q4H       Social History:     Social History     Socioeconomic History    Marital status:      Spouse name: Not on file    Number of children: Not on file    Years of education: Not on file    Highest education level: Not on file   Occupational History    Not on file   Social Needs    Financial resource strain: Not on file    Food insecurity     Worry: Not on file     Inability: Not on file    Transportation needs     Medical: Not on file     Non-medical: Not on file   Tobacco Use    Smoking status: Former Smoker     Quit date: 2009     Years since quittin.4    Smokeless tobacco: Never Used   Substance and Sexual Activity    Alcohol use: Yes     Comment: rare    Drug use: Not on file    Sexual activity: Not on file   Lifestyle    Physical activity     Days per week: Not on file     Minutes per session: Not on file    Stress: Not on file   Relationships    Social connections     Talks on phone: Not on file     Gets together: Not on file     Attends Buddhist service: Not on file     Active member of club or organization: Not on file     Attends meetings of clubs or organizations: Not on file     Relationship status: Not on file    Intimate partner violence     Fear of current or ex partner: Not on file     Emotionally abused: Not on file     Physically abused: Not on file     Forced sexual activity: Not on file   Other Topics Concern    Not on file   Social History Narrative    Not on file       Family History:     Family History   Problem Relation Age of Onset    Stroke Mother     Heart Disease Father       Medical Decision Making:   I have independently reviewed/ordered the following labs:    CBC with Differential:   Recent Labs     02/10/21  0423 21   WBC 10.0 10.2   HGB 9.9* 10.1*   HCT 32.3* 33.4*   * 117*   LYMPHOPCT 3* 7*   MONOPCT 4 6     BMP:  Recent Labs     21  0430 21  1028    133* K 5.2 6.0*    97*   CO2 34* 33*   BUN 36* 38*   CREATININE 0.67* 0.62*     Hepatic Function Panel:   No results for input(s): PROT, LABALBU, BILIDIR, IBILI, BILITOT, ALKPHOS, ALT, AST in the last 72 hours. No results for input(s): RPR in the last 72 hours. No results for input(s): HIV in the last 72 hours. No results for input(s): BC in the last 72 hours. Lab Results   Component Value Date    CREATININE 0.62 02/11/2021    GLUCOSE 246 02/11/2021       Detailed results: Thank you for allowing us to participate in the care of this patient. Please call with questions. This note is created with the assistance of a speech recognition program.  While intending to generate adocument that actually reflects the content of the visit, the document can still have some errors including those of syntax and sound a like substitutions which may escape proof reading. It such instances, actual meaningcan be extrapolated by contextual diversion.     Angela Resendiz MD  Office: [phone]  Perfect serve / office [phone]

## 2021-02-11 NOTE — PROCEDURES
PROCEDURE NOTE - EMERGENCY INTUBATION    PATIENT NAME: Tamir Jeffries  MEDICAL RECORD NO. [id number]  DATE: 2/11/2021  ATTENDING PHYSICIAN: Dr. Sly Alford DIAGNOSIS:  Acute Respiratory Failure  POSTOPERATIVE DIAGNOSIS:  Same  PROCEDURE PERFORMED:  Emergency endotracheal intubation  PERFORMING PHYSICIAN: Bernie Centeno MD    MEDICATIONS: Nimbex, Fentanyl, and Versed ggt already running    DISCUSSION:  Tamir Jeffries is a 70y.o.-year-old male who requires intubation and ventilatory support due to failure of ET tube already in place. The history and physical examination were reviewed and confirmed. CONSENT: Unable to be obtained due to the emergent nature of this procedure. PROCEDURE:  Emergent intubation of the ventilated patient due to malfunction of tube already in place. The patient was placed in the appropriate position. Cricoid pressure was not required. Intubation was performed via video assisted glidescope an 8.0 cuffed endotracheal tube. The cuff was then inflated and the tube was secured appropriately at a distance of 23 cm to the dental ridge. Initial confirmation of placement included bilateral breath sounds and an end tidal CO2 detector. A chest x-ray to verify correct placement of the tube has been ordered but is still pending. The patient tolerated the procedure well. COMPLICATIONS:  multiple attempts due to first tube not properly inflating with placement confirmed with Glidescope, subsequently replaced with similar tube and inflated with 14ml air with adequate seal obtained. ADDENDUM: CXR reviewed with adequate tube placement.       Bernie Centeno MD  9:26 AM, 2/11/21

## 2021-02-12 NOTE — PROGRESS NOTES
Nephrology Progress Note      SUBJECTIVE       Pt was seen and examined. On ventilator with FIO2 of 90%. Renal function is good stable with a creatinine of 0.69. He is sedated and paralyzed on the ventilator. Stable hemodynamics . Serum potassium remains high at 5.3 mmol/liter with serial potassium checks ordered. ABG show pH 7.35, pCO2 of 70, PO2 of 56 and HCO3 of 39.              OBJECTIVE      CURRENT TEMPERATURE:  Temp: 99.7 °F (37.6 °C)  MAXIMUM TEMPERATURE OVER 24HRS:  Temp (24hrs), Av.6 °F (37 °C), Min:97.7 °F (36.5 °C), Max:99.7 °F (37.6 °C)    CURRENT RESPIRATORY RATE:  Resp: (!) 35  CURRENT PULSE:  Pulse: 106  CURRENT BLOOD PRESSURE:  BP: 107/63  24HR BLOOD PRESSURE RANGE:  Systolic (09MDM), TXV:082 , Min:100 , JLF:002   ; Diastolic (68CZT), WJH:71, Min:60, Max:90    24HR INTAKE/OUTPUT:      Intake/Output Summary (Last 24 hours) at 2021 1115  Last data filed at 2021 1000  Gross per 24 hour   Intake 1872.71 ml   Output 4150 ml   Net -2277.29 ml     WEIGHT :  Patient Vitals for the past 96 hrs (Last 3 readings):   Weight   21 0600 242 lb 8.1 oz (110 kg)   21 0600 242 lb 15.2 oz (110.2 kg)   02/10/21 0600 242 lb 8.1 oz (110 kg)     PHYSICAL EXAM      GENERAL APPEARANCE: Sedated and medically paralyzed on the ventilator, weight is stable the last 3 days  SKIN: warm and dry, no rash or erythema  ENT: oral endotracheal tube is in place  NECK:   No JVD, midline trachea and no accessory muscle use  PULMONARY: lungs are clear to auscultation bilaterally anteriorly and laterally without any rales or wheezing or rhonchi   CARDIOVASCULAR: Tachycardic rate and regular rhythm with a positive S1 and S2 and no rubs   ABDOMEN: soft, mildly distended with positive bowel sounds  EXTREMITIES: no cyanosis or edema with 2+ DP pulses bilaterally     CURRENT MEDICATIONS          insulin lispro (HUMALOG) injection vial 0-12 Units, Q4H   sodium zirconium cyclosilicate (LOKELMA) oral suspension 10 g, TID      lidocaine 1 % injection 5 mL, Once      sodium chloride flush 0.9 % injection 10 mL, 2 times per day      sodium chloride flush 0.9 % injection 10 mL, PRN      glucose (GLUTOSE) 40 % oral gel 15 g, PRN      dextrose 50 % IV solution, PRN      glucagon (rDNA) injection 1 mg, PRN      dextrose 5 % solution, PRN      famotidine (PEPCID) injection 20 mg, BID      polyvinyl alcohol (LIQUIFILM TEARS) 1.4 % ophthalmic solution 1 drop, PRN      lactulose (CHRONULAC) 10 GM/15ML solution 20 g, TID      insulin glargine (LANTUS) injection vial 10 Units, Nightly      sodium chloride flush 0.9 % injection 10 mL, 2 times per day      sodium chloride flush 0.9 % injection 10 mL, PRN      promethazine (PHENERGAN) tablet 12.5 mg, Q6H PRN    Or      ondansetron (ZOFRAN) injection 4 mg, Q6H PRN      polyethylene glycol (GLYCOLAX) packet 17 g, Daily PRN      acetaminophen (TYLENOL) tablet 650 mg, Q6H PRN    Or      acetaminophen (TYLENOL) suppository 650 mg, Q6H PRN      heparin (porcine) injection 5,000 Units, 3 times per day      dexamethasone (DECADRON) injection 6 mg, Q24H      fentaNYL 20 mcg/mL Infusion, Continuous      norepinephrine (LEVOPHED) 16 mg in sodium chloride 0.9 % 250 mL infusion, Continuous      glucose (GLUTOSE) 40 % oral gel 15 g, PRN      dextrose 50 % IV solution, PRN      glucagon (rDNA) injection 1 mg, PRN      dextrose 5 % solution, PRN      midazolam (VERSED) 1 mg/mL in D5W infusion, Continuous      cisatracurium besylate (NIMBEX) 200 mg in sodium chloride 0.9 % 100 mL infusion, Continuous      epoprostenol 1,500 mcg in sodium chloride 0.9 % 50 mL nebulization solution, Continuous          LABS      CBC:   Recent Labs     02/10/21  0423 02/11/21  0430 02/12/21  0454   WBC 10.0 10.2 14.4*   RBC 3.67* 3.71* 3.84*   HGB 9.9* 10.1* 10.4*   HCT 32.3* 33.4* 33.7*   MCV 88.0 90.0 87.8   MCH 27.0 27.2 27.1

## 2021-02-12 NOTE — PLAN OF CARE
Nutrition Problem #1: Inadequate oral intake  Intervention: Food and/or Nutrient Delivery: Modify Tube Feeding  Nutritional Goals: meet % of estimated nutrient needs

## 2021-02-12 NOTE — PLAN OF CARE
Problem: Skin Integrity:  Goal: Will show no infection signs and symptoms  Description: Will show no infection signs and symptoms  2/12/2021 1408 by Hayden Gonsales RN  Outcome: Ongoing  2/12/2021 0237 by Eun Booker RN  Outcome: Ongoing  2/12/2021 0235 by Eun Booker RN  Outcome: Ongoing  Goal: Absence of new skin breakdown  Description: Absence of new skin breakdown  2/12/2021 1408 by Hayden Gonsales RN  Outcome: Ongoing  2/12/2021 0237 by Eun Booker RN  Outcome: Ongoing  2/12/2021 0235 by Eun Booker RN  Outcome: Ongoing     Problem: Nutrition  Goal: Optimal nutrition therapy  Description: Nutrition Problem #1: Inadequate oral intake  Intervention: Food and/or Nutrient Delivery: (Start nutrition as able.  If TF, suggest Semi-elemental formula with goal rate of 55 mL/hr to provide 1584 kcal and 99 g pro/day.)  Nutritional Goals: meet % of estimated nutrient needs     2/12/2021 1408 by Hayden Gonsales RN  Outcome: Ongoing  2/12/2021 1340 by Leobardo Medina, MS, RD, LD  Outcome: Ongoing  Note: Nutrition Problem #1: Inadequate oral intake  Intervention: Food and/or Nutrient Delivery: Modify Tube Feeding  Nutritional Goals: meet % of estimated nutrient needs   2/12/2021 0237 by Eun Booker RN  Outcome: Ongoing  2/12/2021 0235 by Eun Booker RN  Outcome: Ongoing     Problem: OXYGENATION/RESPIRATORY FUNCTION  Goal: Patient will maintain patent airway  2/12/2021 1408 by Hayden Gonsales RN  Outcome: Ongoing  2/12/2021 0237 by Eun Booker RN  Outcome: Ongoing  2/12/2021 0235 by Eun Booker RN  Outcome: Ongoing  2/12/2021 0203 by Celine Landau, RCP  Outcome: Ongoing  Goal: Patient will achieve/maintain normal respiratory rate/effort  Description: Respiratory rate and effort will be within normal limits for the patient  2/12/2021 1408 by Hayden Gonsales RN  Outcome: Ongoing  2/12/2021 0237 by Eun Booker RN  Outcome: Ongoing 2/12/2021 0235 by Golden Tenorio RN  Outcome: Ongoing  2/12/2021 0203 by Sybil Sumner RCP  Outcome: Ongoing     Problem: MECHANICAL VENTILATION  Goal: Patient will maintain patent airway  2/12/2021 1408 by Mabel Alberto RN  Outcome: Ongoing  2/12/2021 0237 by Golden Tenorio RN  Outcome: Ongoing  2/12/2021 0235 by Golden Tenorio RN  Outcome: Ongoing  2/12/2021 0203 by Sybil Sumner RCP  Outcome: Ongoing  Goal: Oral health is maintained or improved  2/12/2021 1408 by Mabel Alberto RN  Outcome: Ongoing  2/12/2021 0237 by Golden Tenorio RN  Outcome: Ongoing  2/12/2021 0235 by Golden Tenorio RN  Outcome: Ongoing  2/12/2021 0203 by Sybil Sumner RCP  Outcome: Ongoing  Goal: ET tube will be managed safely  2/12/2021 1408 by Mabel Alberto RN  Outcome: Ongoing  2/12/2021 0237 by Golden Tenorio RN  Outcome: Ongoing  2/12/2021 0235 by Golden Tenorio RN  Outcome: Ongoing  Goal: Mobility/activity is maintained at optimum level for patient  2/12/2021 1408 by Mabel Alberto RN  Outcome: Ongoing  2/12/2021 0237 by Golden Tenorio RN  Outcome: Ongoing  2/12/2021 0235 by Golden Tenorio RN  Outcome: Ongoing  2/12/2021 0203 by Sybil Sumner RCP  Outcome: Ongoing     Problem: SKIN INTEGRITY  Goal: Skin integrity is maintained or improved  2/12/2021 1408 by Mabel Alberto RN  Outcome: Ongoing  2/12/2021 0237 by Golden Tenorio RN  Outcome: Ongoing  2/12/2021 0235 by Golden Tenorio RN  Outcome: Ongoing     Problem: Metabolic:  Goal: Ability to maintain appropriate glucose levels will improve  Description: Ability to maintain appropriate glucose levels will improve  2/12/2021 1408 by Mabel Alberto RN  Outcome: Ongoing  2/12/2021 0237 by Golden Tenorio RN  Outcome: Ongoing     Problem: Nutritional:  Goal: Maintenance of adequate nutrition will improve  Description: Maintenance of adequate nutrition will improve  2/12/2021 1408 by Mabel Alberto RN Outcome: Ongoing  2/12/2021 0237 by Clarissa Pierce RN  Outcome: Ongoing  Goal: Progress toward achieving an optimal weight will improve  Description: Progress toward achieving an optimal weight will improve  2/12/2021 1408 by Bekah Parker RN  Outcome: Ongoing  2/12/2021 0237 by Clarissa Pierce RN  Outcome: Ongoing     Problem: Mental Status - Impaired:  Goal: Mental status will improve  Description: Mental status will improve  2/12/2021 1408 by Bekah Parker RN  Outcome: Ongoing  2/12/2021 0237 by Clarissa Pierce RN  Outcome: Ongoing

## 2021-02-12 NOTE — PROGRESS NOTES
Critical Care Team - Daily Progress Note      Date and time: 2/12/2021 5:37 PM  Patient's name:  Lucian Arredondo  Medical Record Number: [id number]  Patient's account/billing number: [de-identified]  Patient's YOB: 1945  Age: 70 y.o. Date of Admission: 2/4/2021 12:53 AM  Length of stay during current admission: 8  Primary Care Physician: Antelmo Orozco MD  ICU Attending Physician: Shayan Reyes MD    Code Status: Full Code  Reason for ICU admission: Shortness of breath      Subjective:     OVERNIGHT EVENTS:         Pt was seen and examined at bedside. Intubated and sedated  Will restart proning today  No sedation holiday today. Vitals stable. Labs reviewed. Potassium improved. No acute events overnight. BRIEF SUMMARY:  Patient was admitted for shortness of breath, cough, and dyspnea since January 4th. Was diagnosed with Covid19 Pneumonia. He continued to worsen and was admitted to the ICU for further management. He was started on high flow and BiPAP.  Subsequently treated with 5 days of intravenous remdesivir. Patient needed intubation on 2/3/2021.      At this time patient is on ARDS protocol, inhalational epoprostenol and is pronated and paralyzed.  Et tube reinserted on 2/11/2021    AWAKE & FOLLOWING COMMANDS:  [x] No   [] Yes    CURRENT VENTILATION STATUS:     [x] Ventilator  [] BIPAP  [] Nasal Cannula [] Room Air      SEDATION:  RAAS Score:  [] Propofol gtt  [x] Versed gtt  [] Ativan gtt   [] No Sedation    PARALYZED:   [] No    [] Yes    VASOPRESSORS:   [] Yes     [x] No   If yes -   [] Levophed       [] Dopamine     [] Vasopressin       [] Dobutamine  [] Phenylephrine         [] Epinephrine    CENTRAL LINES:      [x] Yes (Date of Insertion:   )    [] No           If yes -    [] Right Internal Jugular [] Left Internal Jugular [] Right Femoral   [] Left Femoral [] Right Subclavian  [] Left Subclavian     SMALLWOOD'S CATHETER:    [] No   [x] Yes  (Date of Insertion:   )     URINE OUTPUT: [x] Good  [] Low  [] Anuric    REVIEW OF SYSTEMS  Unable to obtain    OBJECTIVE:     VITAL SIGNS:  /80   Pulse 104   Temp 100.2 °F (37.9 °C) (Bladder)   Resp (!) 35   Ht 5' 10\" (1.778 m)   Wt 242 lb 8.1 oz (110 kg)   SpO2 90%   BMI 34.80 kg/m²   Tmax over 24 hours:  Temp (24hrs), Av.2 °F (37.3 °C), Min:98.2 °F (36.8 °C), Max:100.2 °F (37.9 °C)      Patient Vitals for the past 8 hrs:   BP Temp Temp src Pulse Resp SpO2   21 1700    104 (!) 35 90 %   21 1500    106 (!) 35 90 %   21 1401    102 (!) 35 (!) 89 %   21 1400 123/80   101 (!) 35 (!) 89 %   21 1330    101 (!) 35 (!) 89 %   21 1300    100 30 91 %   21 1200 132/78 100.2 °F (37.9 °C) Bladder 102 (!) 35    21 1100    106 (!) 35    21 1000 118/74   106 (!) 35          Intake/Output Summary (Last 24 hours) at 2021 1737  Last data filed at 2021 1400  Gross per 24 hour   Intake 1582.8 ml   Output 2270 ml   Net -687.2 ml     Date 21 0000 - 21 2359   Shift 7336-8461 1539-8799 2604-4397 24 Hour Total   INTAKE   I.V.(mL/kg) 265(2.4) 203(1.8)  468(4.3)   NG/GT(mL/kg) 137(1.2) 362(3.3)  499(4.5)   Shift Total(mL/kg) 402(3.7) 565(5.1)  967(8.8)   OUTPUT   Urine(mL/kg/hr) 500(0.6) 795(0.9)  1295   Shift Total(mL/kg) 500(4.5) 795(7.2)  1295(11.8)   Weight (kg) 110 110 110 110     Wt Readings from Last 3 Encounters:   21 242 lb 8.1 oz (110 kg)   21 237 lb 14 oz (107.9 kg)   10/05/18 218 lb (98.9 kg)     Body mass index is 34.8 kg/m². PHYSICAL EXAM:  Constitutional: Intubated and sedated. HEENT: PERRLA. Atraumatic. ET tube in place. Respiratory: Ventilatory breath sounds. Bilateral air entry +. Cardiovascular: regular rate and rhythm, normal S1, S2  Abdomen: soft, no grimacing to palpation, nondistended, no masses or organomegaly  Neurological: Intubated, sedated and paralyzed. Extremities:  peripheral pulses normal, no pedal edema. VENT SETTINGS (Comprehensive) (if applicable):  Vent Information  $Ventilation: $Subsequent Day  Skin Assessment: Clean, dry, & intact  Suction Catheter Diameter: 14  Equipment ID: [id number] #[id number]  Equipment Changed: Expiratory Filter  Vent Type: Servo i  Vent Mode: PRVC  Vt Ordered: 550 mL  Rate Set: 35 bmp  FiO2 : 100 %  SpO2: 90 %  SpO2/FiO2 ratio: 90  Sensitivity: 3  PEEP/CPAP: 18  I Time/ I Time %: 0.65 s  Humidification Source: Heated wire  Humidification Temp: 31  Humidification Temp Measured: 31  Circuit Condensation: Drained  Nitric Oxide/Epoprostenol In Use?: Yes  NO Set: 10  NO Analyzed: 0.2 ppm  NO2 Analyzed: 14 ppm  Additional Respiratory  Assessments  Pulse: 104  Resp: (!) 35  SpO2: 90 %  End Tidal CO2: 27  Position: Semi-Hogan's  Humidification Source: Heated wire  Humidification Temp: 31  Circuit Condensation: Drained  Oral Care Completed?: Yes  Oral Care: Lip moisturizer applied, Mouth swabbed, Mouth moisturizer, Mouth suctioned  Subglottic Suction Done?: Yes    ABGs:   Lab Results   Component Value Date    DAG0DJM 43 02/12/2021    FIO2 90.0 02/12/2021     Lactic Acid: No results found for: LACTA    DATA:  Complete Blood Count:   Recent Labs     02/10/21  0423 02/11/21  0430 02/12/21  0454   WBC 10.0 10.2 14.4*   HGB 9.9* 10.1* 10.4*   MCV 88.0 90.0 87.8   * 117* 128*   RBC 3.67* 3.71* 3.84*   HCT 32.3* 33.4* 33.7*   MCH 27.0 27.2 27.1   MCHC 30.7 30.2 30.9   RDW 13.8 14.0 14.1   MPV 10.0 9.7 10.1        PT/INR:    Lab Results   Component Value Date    PROTIME 14.0 02/03/2021    INR 1.1 02/03/2021     PTT:    Lab Results   Component Value Date    APTT 34.5 02/03/2021       Basal Metabolic Profile:   Recent Labs     02/11/21  2200 02/12/21  0454 02/12/21  1053    135 138   K 5.0 5.3 5.8*   BUN 38* 39* 37*   CREATININE 0.69* 0.69* 0.73   CL 94* 94* 95*   CO2 34* 35* 33*      LFTS  No results for input(s): ALKPHOS, ALT, AST, BILITOT, BILIDIR, LABALBU in the last 72 hours.     MEDICATIONS: Scheduled Meds:   insulin lispro  0-12 Units Subcutaneous Q4H    sodium zirconium cyclosilicate  10 g Oral TID    lidocaine 1 % injection  5 mL Intradermal Once    sodium chloride flush  10 mL Intravenous 2 times per day    vancomycin (VANCOCIN) intermittent dosing (placeholder)   Other RX Placeholder    vancomycin  1,500 mg Intravenous Q12H    anidulafungin  200 mg Intravenous Once    And    [START ON 2/13/2021] anidulafungin  100 mg Intravenous Q24H    famotidine (PEPCID) injection  20 mg Intravenous BID    lactulose  20 g Oral TID    insulin glargine  10 Units Subcutaneous Nightly    sodium chloride flush  10 mL Intravenous 2 times per day    heparin (porcine)  5,000 Units Subcutaneous 3 times per day    dexamethasone  6 mg Intravenous Q24H     Continuous Infusions:   dextrose      fentaNYL 75 mcg/hr (02/12/21 1446)    norepinephrine Stopped (02/06/21 2034)    dextrose      midazolam 8 mg/hr (02/12/21 1659)    cisatracurium (NIMBEX) infusion 3.5 mcg/kg/min (02/12/21 1701)    epoprostenol (VELETRI) nebulization solution Stopped (02/08/21 2100)     PRN Meds:       sodium chloride flush, 10 mL, PRN      glucose, 15 g, PRN      dextrose, 12.5 g, PRN      glucagon (rDNA), 1 mg, PRN      dextrose, 100 mL/hr, PRN      polyvinyl alcohol, 1 drop, PRN      sodium chloride flush, 10 mL, PRN      promethazine, 12.5 mg, Q6H PRN    Or      ondansetron, 4 mg, Q6H PRN      polyethylene glycol, 17 g, Daily PRN      acetaminophen, 650 mg, Q6H PRN    Or      acetaminophen, 650 mg, Q6H PRN      glucose, 15 g, PRN      dextrose, 12.5 g, PRN      glucagon (rDNA), 1 mg, PRN      dextrose, 100 mL/hr, PRN          ASSESSMENT:     Principal Problem:    Acute respiratory failure with hypoxia (HCC)  Active Problems:    COVID-19 virus infection    Type 2 diabetes mellitus (HCC)    Septic shock (HCC)  Resolved Problems:    * No resolved hospital problems.  *      PLAN:     ARDS due to Covid 19 Pneumonia - Continue to monitor respiratory status. - Restart prone ventilation, attempt to wean down on Fi02   - Continue epoprostenol and Decadron.  - Continue on midazolam and nimbex     YAA secondary to shock-  Resolved.     Hyperkalemia - Continue Lokelma 10 x 3 doses. Improved.  Continue lactulose.     Shock- resolved, and off pressors.     Type 2 DM-continue on Lantus 10 units and sliding scale, blood glucose control with monitor POC glucose.     DVT ppx - Heparin 5000 porcine units   Garth Ralph MD  PGY-2, Internal Medicine Resident  [facility], [place]         2/12/2021, 5:37 PM

## 2021-02-12 NOTE — PROGRESS NOTES
PH 7.35  PCO2 70.6  PO2 56.1  HCO3 39.7  BE 11.5    Glu 270    8823 2/12/21  Critical Results called to Dr. Kehinde Bautista     Results did not cross over from [facility]

## 2021-02-12 NOTE — PLAN OF CARE
Problem: OXYGENATION/RESPIRATORY FUNCTION  Goal: Patient will maintain patent airway  2/12/2021 0203 by Erlinda Leblanc RCP  Outcome: Ongoing     Problem: OXYGENATION/RESPIRATORY FUNCTION  Goal: Patient will achieve/maintain normal respiratory rate/effort  Description: Respiratory rate and effort will be within normal limits for the patient  2/12/2021 0203 by Erlinda Leblanc RCP  Outcome: Ongoing     Problem: MECHANICAL VENTILATION  Goal: Patient will maintain patent airway  2/12/2021 0203 by Erlinda Leblanc RCP  Outcome: Ongoing     Problem: MECHANICAL VENTILATION  Goal: Oral health is maintained or improved  2/12/2021 0203 by Erlinda Leblanc RCP  Outcome: Ongoing     Problem: MECHANICAL VENTILATION  Goal: Mobility/activity is maintained at optimum level for patient  Outcome: Ongoing

## 2021-02-12 NOTE — PROGRESS NOTES
Pharmacy Note  Vancomycin Consult    Lorenzo Jones is a 70 y.o. male started on Vancomycin for sepsis; consult received from Dr. Rah Deleon to manage therapy. Patient Active Problem List   Diagnosis    Grade II internal hemorrhoids    COVID-19 virus infection    Acute respiratory failure with hypoxia (HCC)    Type 2 diabetes mellitus (HCC)    Hypertension    Mild malnutrition (HCC)    Septic shock (HCC)     Allergies:  Patient has no known allergies. Temp max: 100.2 F    Recent Labs     02/11/21  0430 02/11/21  0430 02/12/21  0454 02/12/21  1053   BUN 36*   < > 39* 37*   CREATININE 0.67*   < > 0.69* 0.73   WBC 10.2  --  14.4*  --     < > = values in this interval not displayed. Intake/Output Summary (Last 24 hours) at 2/12/2021 1510  Last data filed at 2/12/2021 1400  Gross per 24 hour   Intake 1975.81 ml   Output 4275 ml   Net -2299.19 ml     Culture Date      Source                       Results  02.04                   Urine                          E. Faecalis  02.04                   Blood                          No growth    Ht Readings from Last 1 Encounters:   02/04/21 5' 10\" (1.778 m)        Wt Readings from Last 1 Encounters:   02/12/21 242 lb 8.1 oz (110 kg)       Body mass index is 34.8 kg/m². Estimated Creatinine Clearance: 112 mL/min (based on SCr of 0.73 mg/dL). Goal Trough Level: 15-20 mcg/mL    Assessment/Plan:  Will initiate Vancomycin 1500 mg IV every 12 hours. Timing of trough level will be determined based on culture results, renal function, and clinical response. Thank you for the consult. Will continue to follow.   Bisi Gonzales PharmD BCPS [facility]  2/12/2021 3:10 PM

## 2021-02-12 NOTE — PROGRESS NOTES
Comprehensive Nutrition Assessment    Type and Reason for Visit:  Reassess    Nutrition Recommendations/Plan:   1. Switch TF to Renal (Nepro w/ Carbsteady) @ goal 30 mL/hr continuous + Proteinex 2go BID to provide 1504 kcal/day, 110 g/day protein    - If proning, 20 mL/hr x 16 hrs while prone and 50 mL/hr x 8 hrs while supine. 2. Hold TF if residuals > 500 mL  3. Will continue to monitor TF adequacy/tolerance     Nutrition Assessment:  Pt remains on vent. TF Semi-elemental running @ goal 55 mL/hr x 24 hrs. Noted 350 residuals this morning. Last BM 2/11. Meds: Dexamethasone, Lantus, Humalog, Lokelma. Labs K 5.8 mmol/L, Glu 237 mg/dL. Malnutrition Assessment:  Malnutrition Status:  Insufficient data      Estimated Daily Nutrient Needs:  Energy (kcal):  7224-1382 kcal/day; Weight Used for Energy Requirements:  Admission     Protein (g):  110 g pro/day; Weight Used for Protein Requirements:  Ideal(1.5)          Nutrition Related Findings:  +1 nonpitting edema      Wounds:   none    Current Nutrition Therapies:    Current Tube Feeding (TF) Orders:  · Feeding Route: Orogastric  · Formula: Semi-Elemental @ goal 55 ml/hr.    · Goal TF & Flush Orders Provides: 1536 kcal and 96 g pro/day      Anthropometric Measures:  · Height: 5' 10\" (177.8 cm)  · Current Body Weight: 244 lb 11.4 oz (111 kg)   · Admission Body Weight: 237 lb (107.5 kg)    · Ideal Body Weight: 166 lbs  · BMI: 35.1  · BMI Categories: Obese Class 2 (BMI 35.0 -39.9)       Nutrition Diagnosis:   · Inadequate oral intake related to impaired respiratory function as evidenced by NPO or clear liquid status due to medical condition, nutrition support - enteral nutrition      Nutrition Interventions:   Food and/or Nutrient Delivery:  Modify Tube Feeding  Nutrition Education/Counseling:  No recommendation at this time   Coordination of Nutrition Care:  Continue to monitor while inpatient    Goals:  meet % of estimated nutrient needs Nutrition Monitoring and Evaluation:   Behavioral-Environmental Outcomes:  None Identified   Food/Nutrient Intake Outcomes:  Enteral Nutrition Intake/Tolerance  Physical Signs/Symptoms Outcomes:  Biochemical Data, Nutrition Focused Physical Findings, Skin, Weight     Discharge Planning:     Too soon to determine     Electronically signed by Osiris Kidd MS, RD, LD on 2/12/21 at 1:21 PM EST    Contact: [phone]

## 2021-02-12 NOTE — PROGRESS NOTES
[facility] -   Infectious diseases evaluation. Progress Note    admission date 2/4/2021    reason for consultation:   bandemia    Impression :   Current:  · Bandemia, likely from steroids  · COVID-19 virus pneumonia  · COVID-19 related sepsis with arts  · Septic shock  · Respiratory failure requiring mechanical ventilation  · Elevated inflammatory markers CRP, LDH      Discussion / summary of stay / plan of care   ·   Recommendations     Completed 5 days of remdesivir at the referring hospital  Post Levaquin course at the other hospital  Out of the window for plasma  D/C isolation and transfer out of COVID unit  post vancomycin and Zosyn,   · Maintain off antibiotics  · Continues on steroids anticoagulation  · 2/12WBC 14 and low-grade fever:   · Get a blood cultures and a sputum culture  · Start micafungin, vanco    Infection Control Recommendations   · Lake Precautions  · Contact Isolation   · Airborne isolation removed  · Droplet Isolation removed    Antimicrobial Stewardship Recommendations   · Targeted therapy    Coordination ofOutpatient Care:   · Estimated Length of IV antimicrobials:  · Patient will need Midline / picc Catheter Insertion:   · Patient will need SNF:  · Patient will need outpatient wound care:     History of Present Illness:       INITIAL HISTORY:    Johanna Cruz is a 70y.o.-year-old male who presented because of shortness of breath and cough that has been progressive. Started January 12 with a fever aches cough shortness of breath, did not lose the taste of food or the smell, no diarrhea no blood in the sputum. Fever was associated. Presented to the ER because of the worsening of shortness of breath. Chest x-ray showed pneumonia he tested positive for Covid. Lactic acid was 3 and the patient was desaturating so he was admitted on high flow to the ICU. To another hospital, patient was not improving despite steroids and high oxygen supplementation. Patient was intubated 2/3/2021 and transferred to [facility]. Paralyzed high oxygen requirement, 2 pressors on board. WBC 2019  Creatinine is normal  Liver enzymes are normal    CURRENT EVALUATION :  2/12/2021   Patient Vitals for the past 8 hrs:   BP Temp Temp src Pulse Resp SpO2   02/12/21 1401    102 (!) 35 (!) 89 %   02/12/21 1400 123/80   101 (!) 35 (!) 89 %   02/12/21 1330    101 (!) 35 (!) 89 %   02/12/21 1300    100 30 91 %   02/12/21 1200 132/78 100.2 °F (37.9 °C) Bladder 102 (!) 35    02/12/21 1100    106 (!) 35    02/12/21 1000 118/74   106 (!) 35    02/12/21 0932    106 (!) 35 96 %   02/12/21 0900    102 (!) 35 96 %   02/12/21 0800 107/63 99.7 °F (37.6 °C) Bladder 100 (!) 35 96 %       Continues to be on 70% FiO2 and 16 of PEEP  Unresponsive, sputum is small  Low-grade fever  Abdomen is soft  No new positive cultures  White count is 14    Chest x-ray 2/11 same findings diffusely    Summary of relevant labs: 2/12/2021    Labs:  WBC 19-18-->14- 9 7 - 10 - 14  Hb 10.3  Plat 107    BUN 45  Cr 1.21    CRP 98  Ferritin 307  Fibrinogen 350      Micro:  Sputum culture 1-30-21 Normal aris    Blood:  · 2-4-21: No growth  · 1-19-21: No growth    Urine  2-4-21: Enterococcus faecalis 10-50 K    Covid +1/14    Imaging:  CXR 2/11  Otherwise, severe diffuse faint parenchymal and interstitial opacities   throughout both lungs unchanged. Chest x-ray bilateral pulmonary changes with chronic changes  Ultrasound of the kidneys no hydronephrosis      I have personally reviewed the past medical history, past surgical history, medications, social history, and family history, and I haveupdated the database accordingly. Allergies:   Patient has no known allergies. Review of Systems:     Review of Systems   Unable to perform ROS: Intubated       Physical Examination :       Physical Exam  Constitutional:       General: He is not in acute distress. Appearance: He is obese. He is not ill-appearing. Interventions: He is sedated and intubated. HENT:      Head: Normocephalic and atraumatic. Nose: Nose normal.      Mouth/Throat:      Mouth: Mucous membranes are moist.   Eyes:      General: No scleral icterus. Conjunctiva/sclera: Conjunctivae normal.   Neck:      Musculoskeletal: Neck supple. Cardiovascular:      Rate and Rhythm: Normal rate and regular rhythm. Heart sounds: No murmur. No friction rub. Pulmonary:      Effort: No respiratory distress. He is intubated. Breath sounds: Normal breath sounds. No stridor. No rhonchi. Abdominal:      General: There is no distension. Palpations: Abdomen is soft. There is no mass. Hernia: No hernia is present. Genitourinary:     Comments: Urine is yusra  Musculoskeletal:         General: No swelling, tenderness or signs of injury. Right lower leg: No edema. Left lower leg: No edema. Skin:     Coloration: Skin is not jaundiced or pale. Findings: No bruising or erythema. Neurological:      Comments: Intubated   Psychiatric:      Comments: Intubated         Past Medical History:     Past Medical History:   Diagnosis Date    Asthma     Hyperlipidemia     Hypertension     Type 2 diabetes mellitus without complication ([address]        Past Surgical  History:     Past Surgical History:   Procedure Laterality Date    HERNIA REPAIR      right inguinal at age 3 years.     [place] COLONOSCOPY FLX DX W/COLLJ SPEC WHEN PFRMD N/A 10/5/2018    COLONOSCOPY performed by Mossindi Angel, DO at [address]       Medications:      insulin lispro  0-12 Units Subcutaneous Q4H    sodium zirconium cyclosilicate  10 g Oral TID  lidocaine 1 % injection  5 mL Intradermal Once    sodium chloride flush  10 mL Intravenous 2 times per day    famotidine (PEPCID) injection  20 mg Intravenous BID    lactulose  20 g Oral TID    insulin glargine  10 Units Subcutaneous Nightly    sodium chloride flush  10 mL Intravenous 2 times per day    heparin (porcine)  5,000 Units Subcutaneous 3 times per day    dexamethasone  6 mg Intravenous Q24H       Social History:     Social History     Socioeconomic History    Marital status:      Spouse name: Not on file    Number of children: Not on file    Years of education: Not on file    Highest education level: Not on file   Occupational History    Not on file   Social Needs    Financial resource strain: Not on file    Food insecurity     Worry: Not on file     Inability: Not on file    Transportation needs     Medical: Not on file     Non-medical: Not on file   Tobacco Use    Smoking status: Former Smoker     Quit date: 2009     Years since quittin.4    Smokeless tobacco: Never Used   Substance and Sexual Activity    Alcohol use: Yes     Comment: rare    Drug use: Not on file    Sexual activity: Not on file   Lifestyle    Physical activity     Days per week: Not on file     Minutes per session: Not on file    Stress: Not on file   Relationships    Social connections     Talks on phone: Not on file     Gets together: Not on file     Attends Yazidi service: Not on file     Active member of club or organization: Not on file     Attends meetings of clubs or organizations: Not on file     Relationship status: Not on file    Intimate partner violence     Fear of current or ex partner: Not on file     Emotionally abused: Not on file     Physically abused: Not on file     Forced sexual activity: Not on file   Other Topics Concern    Not on file   Social History Narrative    Not on file       Family History:     Family History   Problem Relation Age of Onset  Stroke Mother     Heart Disease Father       Medical Decision Making:   I have independently reviewed/ordered the following labs:    CBC with Differential:   Recent Labs     02/11/21  0430 02/12/21  0454   WBC 10.2 14.4*   HGB 10.1* 10.4*   HCT 33.4* 33.7*   * 128*   LYMPHOPCT 7* 2*   MONOPCT 6 4     BMP:  Recent Labs     02/12/21  0454 02/12/21  1053    138   K 5.3 5.8*   CL 94* 95*   CO2 35* 33*   BUN 39* 37*   CREATININE 0.69* 0.73     Hepatic Function Panel:   No results for input(s): PROT, LABALBU, BILIDIR, IBILI, BILITOT, ALKPHOS, ALT, AST in the last 72 hours. No results for input(s): RPR in the last 72 hours. No results for input(s): HIV in the last 72 hours. No results for input(s): BC in the last 72 hours. Lab Results   Component Value Date    CREATININE 0.73 02/12/2021    GLUCOSE 237 02/12/2021       Detailed results: Thank you for allowing us to participate in the care of this patient. Please call with questions. This note is created with the assistance of a speech recognition program.  While intending to generate adocument that actually reflects the content of the visit, the document can still have some errors including those of syntax and sound a like substitutions which may escape proof reading. It such instances, actual meaningcan be extrapolated by contextual diversion.     Brett Umanzor MD  Office: [phone]  Perfect serve / office [phone]

## 2021-02-13 NOTE — CARE COORDINATION
TRANSITIONAL CARE PLANNING/ 2 [facility] [place] Day: 9    Reason for Admission: Acute respiratory failure with hypoxia ([facility] [address] [J96.01]     Treatment Plan of Care: Remains int/vent FIO2 80%, crit care/ID/NEPH following, on nimbex/fent/versed/ngt w Tube Feeding, and Eraxis     Tests/Procedures still needed: Daily labs    Barriers to Discharge: airway, med clearance    Readmission Risk              Risk of Unplanned Readmission:        23         Patient goals/Treatment: will need LTACH choice    Patient/family seen: No:        Informed patient/family of [facility] Medical Bundle Program with potential outreach by either Care Transitions Team or [facility]Health Team based on hospital admission and location.        [facility] Notification Letter given: Yes mailed    Current discharge plan: Remains int/vent FIO2 80%, crit care/ID/NEPH following, on nimbex/fent/versed/ngt w Tube Feeding, and Eraxis

## 2021-02-13 NOTE — PROGRESS NOTES
Pt currently proned, head turned to the Left  no skin breakdown noticed. ETT secured by twill. Pt tolerated well. RN Anurag POOLE  assisted.  Respiratory will continue to monitor

## 2021-02-13 NOTE — PROGRESS NOTES
[facility] -   Infectious diseases evaluation. Progress Note    admission date 2/4/2021    reason for consultation:   bandemia    Impression :   Current:  · Bandemia, likely from steroids  · COVID-19 virus pneumonia  · COVID-19 related sepsis with arts  · Septic shock  · Respiratory failure requiring mechanical ventilation  · Elevated inflammatory markers CRP, LDH      Discussion / summary of stay / plan of care   ·   Recommendations     Completed 5 days of remdesivir at the referring hospital  Post Levaquin course at the other hospital  E faecalis UTI treated with 3 days of zosyn  Out of the window for plasma  D/C isolation and transfer out of COVID unit  post vancomycin and Zosyn,   · Continues on steroids anticoagulation  · 2/12WBC 14 and low-grade fever:   · Follow blood culture   · Continue anidulafungin and vancomycin     Infection Control Recommendations   · [place] Precautions  · Contact Isolation   · Airborne isolation removed  · Droplet Isolation removed    Antimicrobial Stewardship Recommendations   · Targeted therapy    Coordination ofOutpatient Care:   · Estimated Length of IV antimicrobials:  · Patient will need Midline / picc Catheter Insertion:   · Patient will need SNF:  · Patient will need outpatient wound care:     History of Present Illness:       INITIAL HISTORY:    Wyatt Guidry is a 70y.o.-year-old male who presented because of shortness of breath and cough that has been progressive. Started January 12 with a fever aches cough shortness of breath, did not lose the taste of food or the smell, no diarrhea no blood in the sputum. Fever was associated. Presented to the ER because of the worsening of shortness of breath. Chest x-ray showed pneumonia he tested positive for Covid. Lactic acid was 3 and the patient was desaturating so he was admitted on high flow to the ICU. To another hospital, patient was not improving despite steroids and high oxygen supplementation. Patient was intubated 2/3/2021 and transferred to [place] [id number]. Paralyzed high oxygen requirement, 2 pressors on board. WBC 2019  Creatinine is normal  Liver enzymes are normal    CURRENT EVALUATION :  2/13/2021   Patient Vitals for the past 8 hrs:   BP Temp Temp src Pulse Resp SpO2   02/13/21 0935    81 (!) 35 95 %   02/13/21 0934     (!) 35 94 %   02/13/21 0800 108/78 98.4 °F (36.9 °C) Bladder 85 (!) 35 97 %   02/13/21 0700    87 (!) 35 97 %   02/13/21 0600 94/65   90 (!) 35 96 %   02/13/21 0500    95 (!) 35 97 %   02/13/21 0400 123/78 99.1 °F (37.3 °C) Bladder 94 (!) 35 97 %   02/13/21 0335    94 (!) 35 94 %   02/13/21 0300 120/70   100 (!) 35 94 %     Continues to have stable vent requirements  Ow grade temp of 100.2 but no fevers  Mentation remains stable, but followed commands today  Cultures remain negative at this time  WBC improving to 12.2  cxr stable  Hyponatremia resolved     Chest x-ray 2/11 same findings diffusely    Summary of relevant labs: 2/13/2021    Labs:  WBC 19-18-->14- 9 7 - 10 - 14-12.2 -   Hb 10.3-9.8  Plat 107-127    BUN 45-39  Cr 1.21-0.67    CRP 98  Ferritin 307  Fibrinogen 350      Micro:  Sputum culture 1-30-21 Normal aris    Blood:  · 2-4-21: No growth  · 1-19-21: No growth     Urine  2-4-21: Enterococcus faecalis  K    Covid +1/14    Imaging:  CXR 2/11  Otherwise, severe diffuse faint parenchymal and interstitial opacities   throughout both lungs unchanged.        Chest x-ray bilateral pulmonary changes with chronic changes  Ultrasound of the kidneys no hydronephrosis I have personally reviewed the past medical history, past surgical history, medications, social history, and family history, and I haveupdated the database accordingly. Allergies:   Patient has no known allergies. Review of Systems:     Review of Systems   Unable to perform ROS: Intubated       Physical Examination :       Physical Exam  Constitutional:       General: He is not in acute distress. Appearance: He is obese. He is not ill-appearing. Interventions: He is sedated and intubated. HENT:      Head: Normocephalic and atraumatic. Nose: Nose normal.      Mouth/Throat:      Mouth: Mucous membranes are moist.   Eyes:      General: No scleral icterus. Conjunctiva/sclera: Conjunctivae normal.   Neck:      Musculoskeletal: Neck supple. Cardiovascular:      Rate and Rhythm: Normal rate and regular rhythm. Heart sounds: No murmur. No friction rub. Pulmonary:      Effort: No respiratory distress. He is intubated. Breath sounds: Normal breath sounds. No stridor. No rhonchi. Abdominal:      General: There is no distension. Palpations: Abdomen is soft. There is no mass. Hernia: No hernia is present. Genitourinary:     Comments: Urine is yusra  Musculoskeletal:         General: No swelling, tenderness or signs of injury. Right lower leg: No edema. Left lower leg: No edema. Skin:     Coloration: Skin is not jaundiced or pale. Findings: No bruising or erythema. Neurological:      Comments: Intubated   Psychiatric:      Comments: Intubated, folowed commands         Past Medical History:     Past Medical History:   Diagnosis Date    Asthma     Hyperlipidemia     Hypertension     Type 2 diabetes mellitus without complication ([facility] [address]        Past Surgical  History:     Past Surgical History:   Procedure Laterality Date    HERNIA REPAIR      right inguinal at age 3 years.     IN COLONOSCOPY FLX DX W/COLLJ SPEC WHEN PFRMD N/A 10/5/2018 COLONOSCOPY performed by Evelyn Marlow DO at [facility]       Medications:      insulin glargine  12 Units Subcutaneous Nightly    insulin lispro  0-18 Units Subcutaneous TID WC    insulin lispro  0-9 Units Subcutaneous Nightly    sodium zirconium cyclosilicate  10 g Oral TID    lidocaine 1 % injection  5 mL Intradermal Once    sodium chloride flush  10 mL Intravenous 2 times per day    vancomycin (VANCOCIN) intermittent dosing (placeholder)   Other RX Placeholder    vancomycin  1,500 mg Intravenous Q12H    anidulafungin  100 mg Intravenous Q24H    famotidine (PEPCID) injection  20 mg Intravenous BID    lactulose  20 g Oral TID    sodium chloride flush  10 mL Intravenous 2 times per day    heparin (porcine)  5,000 Units Subcutaneous 3 times per day       Social History:     Social History     Socioeconomic History    Marital status:      Spouse name: Not on file    Number of children: Not on file    Years of education: Not on file    Highest education level: Not on file   Occupational History    Not on file   Social Needs    Financial resource strain: Not on file    Food insecurity     Worry: Not on file     Inability: Not on file    Transportation needs     Medical: Not on file     Non-medical: Not on file   Tobacco Use    Smoking status: Former Smoker     Quit date: 2009     Years since quittin.4    Smokeless tobacco: Never Used   Substance and Sexual Activity    Alcohol use: Yes     Comment: rare    Drug use: Not on file    Sexual activity: Not on file   Lifestyle    Physical activity     Days per week: Not on file     Minutes per session: Not on file    Stress: Not on file   Relationships    Social connections     Talks on phone: Not on file     Gets together: Not on file     Attends Church service: Not on file     Active member of club or organization: Not on file     Attends meetings of clubs or organizations: Not on file     Relationship status: Not on file

## 2021-02-13 NOTE — PLAN OF CARE
Problem: OXYGENATION/RESPIRATORY FUNCTION  Goal: Patient will maintain patent airway  2/13/2021 0940 by Cathy Fox RCP  Outcome: Ongoing  2/13/2021 0205 by Radha Panchal RCP  Outcome: Ongoing  2/13/2021 0135 by Lucie Deshpande RN  Outcome: Ongoing  Goal: Patient will achieve/maintain normal respiratory rate/effort  Description: Respiratory rate and effort will be within normal limits for the patient  2/13/2021 0940 by Cathy Fox RCP  Outcome: Ongoing  2/13/2021 0205 by Radha Panchal RCP  Outcome: Ongoing  2/13/2021 0135 by Lucie Deshpande RN  Outcome: Ongoing     Problem: MECHANICAL VENTILATION  Goal: Patient will maintain patent airway  2/13/2021 0940 by Cathy Fox RCP  Outcome: Ongoing  2/13/2021 0205 by SAMEERA HopeP  Outcome: Ongoing  2/13/2021 0135 by Lucie Deshpande RN  Outcome: Ongoing  Goal: Oral health is maintained or improved  2/13/2021 0940 by Catyh Fox RCP  Outcome: Ongoing  2/13/2021 0205 by SAMEERA HopeP  Outcome: Ongoing  2/13/2021 0135 by Lucie Deshpande RN  Outcome: Ongoing  Goal: ET tube will be managed safely  2/13/2021 0940 by Cathy Fox RCP  Outcome: Ongoing  2/13/2021 0205 by SAMEERA HopeP  Outcome: Ongoing  2/13/2021 0135 by Lucie Deshpande RN  Outcome: Ongoing  Goal: Mobility/activity is maintained at optimum level for patient  2/13/2021 0940 by Cathy Fox RCP  Outcome: Ongoing  2/13/2021 0205 by SAMEERA HopeP  Outcome: Ongoing  2/13/2021 0135 by Lucie Deshpande RN  Outcome: Ongoing     Problem: SKIN INTEGRITY  Goal: Skin integrity is maintained or improved  2/13/2021 0135 by Lucie Deshpande RN  Outcome: Ongoing     Problem: Gas Exchange - Impaired  Goal: Absence of hypoxia  2/13/2021 0138 by Lucie Deshpande RN  Outcome: Ongoing  Goal: Promote optimal lung function  2/13/2021 0138 by Lucie Deshpande RN  Outcome: Ongoing

## 2021-02-13 NOTE — PLAN OF CARE
Problem: Skin Integrity:  Goal: Will show no infection signs and symptoms  Description: Will show no infection signs and symptoms  Outcome: Ongoing  Goal: Absence of new skin breakdown  Description: Absence of new skin breakdown  Outcome: Ongoing     Problem: Nutrition  Goal: Optimal nutrition therapy  Description: Nutrition Problem #1: Inadequate oral intake  Intervention: Food and/or Nutrient Delivery: (Start nutrition as able.  If TF, suggest Semi-elemental formula with goal rate of 55 mL/hr to provide 1584 kcal and 99 g pro/day.)  Nutritional Goals: meet % of estimated nutrient needs     Outcome: Ongoing     Problem: OXYGENATION/RESPIRATORY FUNCTION  Goal: Patient will maintain patent airway  2/13/2021 1611 by Zelda Martinez RN  Outcome: Ongoing  2/13/2021 0940 by Kelly Hyde RCP  Outcome: Ongoing  Goal: Patient will achieve/maintain normal respiratory rate/effort  Description: Respiratory rate and effort will be within normal limits for the patient  2/13/2021 1611 by Zelda Martinez RN  Outcome: Ongoing  2/13/2021 0940 by Kelly Hyde RCP  Outcome: Ongoing     Problem: MECHANICAL VENTILATION  Goal: Patient will maintain patent airway  2/13/2021 1611 by Zelda Martinez RN  Outcome: Ongoing  2/13/2021 0940 by Kelly Hyde RCP  Outcome: Ongoing  Goal: Oral health is maintained or improved  2/13/2021 1611 by Zelda Martinez RN  Outcome: Ongoing  2/13/2021 0940 by Kelly Hyde RCP  Outcome: Ongoing  Goal: ET tube will be managed safely  2/13/2021 1611 by Zelda Martinez RN  Outcome: Ongoing  2/13/2021 0940 by Kelly Hyde RCP  Outcome: Ongoing  Goal: Mobility/activity is maintained at optimum level for patient  2/13/2021 1611 by Zelda Martinez RN  Outcome: Ongoing  2/13/2021 0940 by Kelly Hyde RCP  Outcome: Ongoing     Problem: SKIN INTEGRITY  Goal: Skin integrity is maintained or improved  Outcome: Ongoing     Problem: Metabolic: Goal: Ability to maintain appropriate glucose levels will improve  Description: Ability to maintain appropriate glucose levels will improve  Outcome: Ongoing     Problem: Nutritional:  Goal: Maintenance of adequate nutrition will improve  Description: Maintenance of adequate nutrition will improve  Outcome: Ongoing  Goal: Progress toward achieving an optimal weight will improve  Description: Progress toward achieving an optimal weight will improve  Outcome: Ongoing     Problem: Mental Status - Impaired:  Goal: Mental status will improve  Description: Mental status will improve  Outcome: Ongoing     Problem: Gas Exchange - Impaired  Goal: Absence of hypoxia  Outcome: Ongoing  Goal: Promote optimal lung function  Outcome: Ongoing     Problem:  Body Temperature -  Risk of, Imbalanced  Goal: Ability to maintain a body temperature within defined limits  Outcome: Ongoing  Goal: Will regain or maintain usual level of consciousness  Outcome: Ongoing  Goal: Complications related to the disease process, condition or treatment will be avoided or minimized  Outcome: Ongoing

## 2021-02-13 NOTE — PROGRESS NOTES
Nephrology Progress Note      SUBJECTIVE    Patient seen and examined at bedside. Remains intubated and sedated. Patient is on 80% of FiO2 and a PEEP of 18. Continues on Nimbex, fentanyl, Versed. Discontinued Levophed overnight. No longer on vasopressors. Intake output with last 24 hours: 2160 3/2085 =  +70 mL overall in the last 24 hours.  +3.2 L since admission. Patient does not have daily diuretic medication ordered. Last dose of Lasix was given on 21 as 40 mg x 1. Labs reviewed. Sodium 128 potassium 5.2 creatinine 0.67 glucose 214 blood gas this morning was 7.390 pH PCO2 85.6 PCO2 63.3 and bicarb 38.3 CO2 is 35. White blood cell count 12.2 hemoglobin 9.8. Blood cultures pending. Patient continued on tube feed when not in a prone position. Orogastric 10; 30; 24. Initial rate discharge at genitals and hour verbal 30 mils an hour with duration to goal rate within 24 hours. Protein supplementation ordered as well. OBJECTIVE      CURRENT TEMPERATURE:  Temp: 98.4 °F (36.9 °C)  MAXIMUM TEMPERATURE OVER 24HRS:  Temp (24hrs), Av.6 °F (37.6 °C), Min:98.4 °F (36.9 °C), Max:100.2 °F (37.9 °C)    CURRENT RESPIRATORY RATE:  Resp: (!) 35  CURRENT PULSE:  Pulse: 87  CURRENT BLOOD PRESSURE:  BP: 94/65  24HR BLOOD PRESSURE RANGE:  Systolic (81IHS), KAK:789 , Min:94 , GPT:850   ; Diastolic (05URW), SFI:46, Min:65, Max:80    24HR INTAKE/OUTPUT:      Intake/Output Summary (Last 24 hours) at 2021 0817  Last data filed at 2021 0800  Gross per 24 hour   Intake 2015 ml   Output 2000 ml   Net 15 ml     WEIGHT :  Patient Vitals for the past 96 hrs (Last 3 readings):   Weight   21 0600 242 lb 8.1 oz (110 kg)   21 0600 242 lb 15.2 oz (110.2 kg)   02/10/21 0600 242 lb 8.1 oz (110 kg)     PHYSICAL EXAM      GENERAL APPEARANCE: Sedated and medically paralyzed on the ventilator, weight is stable the last 4 days. 110 kg. Currently in a prone position.   SKIN: warm and dry, no rash or erythema ENT: oral endotracheal tube is in place  NECK:   No JVD, midline trachea and no accessory muscle use  PULMONARY: lungs are clear to auscultation bilaterally anteriorly and laterally without any rales or wheezing or rhonchi   CARDIOVASCULAR: Tachycardic rate and regular rhythm with a positive S1 and S2 and no rubs   ABDOMEN: soft, mildly distended with positive bowel sounds  EXTREMITIES: no cyanosis or edema with 2+ DP pulses bilaterally     CURRENT MEDICATIONS          insulin lispro (HUMALOG) injection vial 0-12 Units, Q4H      sodium zirconium cyclosilicate (LOKELMA) oral suspension 10 g, TID      lidocaine 1 % injection 5 mL, Once      sodium chloride flush 0.9 % injection 10 mL, 2 times per day      sodium chloride flush 0.9 % injection 10 mL, PRN      vancomycin (VANCOCIN) intermittent dosing (placeholder), RX Placeholder      vancomycin (VANCOCIN) 1500 mg in dextrose 5 % 250 mL IVPB, Q12H      anidulafungin (ERAXIS) 100 mg in dextrose 5 % 130 mL IVPB, Q24H      glucose (GLUTOSE) 40 % oral gel 15 g, PRN      dextrose 50 % IV solution, PRN      glucagon (rDNA) injection 1 mg, PRN      dextrose 5 % solution, PRN      famotidine (PEPCID) injection 20 mg, BID      polyvinyl alcohol (LIQUIFILM TEARS) 1.4 % ophthalmic solution 1 drop, PRN      lactulose (CHRONULAC) 10 GM/15ML solution 20 g, TID      insulin glargine (LANTUS) injection vial 10 Units, Nightly      sodium chloride flush 0.9 % injection 10 mL, 2 times per day      sodium chloride flush 0.9 % injection 10 mL, PRN      promethazine (PHENERGAN) tablet 12.5 mg, Q6H PRN    Or      ondansetron (ZOFRAN) injection 4 mg, Q6H PRN      polyethylene glycol (GLYCOLAX) packet 17 g, Daily PRN      acetaminophen (TYLENOL) tablet 650 mg, Q6H PRN    Or      acetaminophen (TYLENOL) suppository 650 mg, Q6H PRN      heparin (porcine) injection 5,000 Units, 3 times per day      fentaNYL 20 mcg/mL Infusion, Continuous   glucose (GLUTOSE) 40 % oral gel 15 g, PRN      dextrose 50 % IV solution, PRN      glucagon (rDNA) injection 1 mg, PRN      dextrose 5 % solution, PRN      midazolam (VERSED) 1 mg/mL in D5W infusion, Continuous      cisatracurium besylate (NIMBEX) 200 mg in sodium chloride 0.9 % 100 mL infusion, Continuous      epoprostenol 1,500 mcg in sodium chloride 0.9 % 50 mL nebulization solution, Continuous          LABS      CBC:   Recent Labs     02/11/21  0430 02/12/21  0454 02/13/21  0400   WBC 10.2 14.4* 12.2*   RBC 3.71* 3.84* 3.57*   HGB 10.1* 10.4* 9.8*   HCT 33.4* 33.7* 31.7*   MCV 90.0 87.8 88.8   MCH 27.2 27.1 27.5   MCHC 30.2 30.9 30.9   RDW 14.0 14.1 14.6*   * 128* See Reflexed IPF Result   MPV 9.7 10.1 NOT REPORTED      BMP:   Recent Labs     02/12/21  1746 02/12/21  2209 02/13/21  0400    132* 128*   K 5.2 5.1 5.2   CL 94* 92* 90*   CO2 35* 35* 35*   BUN 35* 37* 37*   CREATININE 0.66* 0.78 0.67*   GLUCOSE 155* 150* 231*   CALCIUM 9.2 8.8 8.6      FERRITIN:    Lab Results   Component Value Date    FERRITIN 307 01/20/2021     SPEP:   Lab Results   Component Value Date    PROT 5.9 02/05/2021     URINE SODIUM:    Lab Results   Component Value Date    EFLIX 56 02/05/2021      URINE CREATININE:    Lab Results   Component Value Date    LABCREA 98.2 02/05/2021     URINE EOSINOPHILS:   Lab Results   Component Value Date    UREO NONE SEEN 02/05/2021     URINALYSIS:  U/A:   Lab Results   Component Value Date    NITRU NEGATIVE 02/05/2021    COLORU YELLOW 02/05/2021    PHUR 5.0 02/05/2021    WBCUA 5 TO 10 02/05/2021    RBCUA 5 TO 10 02/05/2021    MUCUS NOT REPORTED 02/05/2021    TRICHOMONAS NOT REPORTED 02/05/2021    YEAST NOT REPORTED 02/05/2021    BACTERIA NOT REPORTED 02/05/2021    SPECGRAV 1.021 02/05/2021    LEUKOCYTESUR TRACE 02/05/2021    UROBILINOGEN Normal 02/05/2021    BILIRUBINUR NEGATIVE 02/05/2021    GLUCOSEU NEGATIVE 02/05/2021    KETUA NEGATIVE 02/05/2021 AMORPHOUS NOT REPORTED 02/05/2021     RADIOLOGY    Reviewed as available. ASSESSMENT    1. Hyperkalemia. Differentials include interstitial nephritis caused by Covid viremia versus decreased delivery of sodium to distal tubule resulting in. Potassium secretion versus volume depletion or possibly hyper Zain state. , relatively stable 5.2 today. Was 5.1 yesterday. Continues on Lokelma 10 mg p.o. 3 times daily, dose increased yesterday. 2. COVID-19 pneumonia   3. Ventilator dependent acute respiratory failure   4. Hyponatremia -128. PLAN    1. Continue with Lokelma 10 mg oral three times a day. 2. Watch serial potassium checks  3. Follow up labs ordered. 4. Following along        Please do not hesitate to call with questions. This note is created with the assistance of a speech-recognition program. While intending to generate a document that actually reflects the content of the visit, no guarantees can be provided that every mistake has been identified and corrected by editing    Electronically signed by Jyoti Farrell CNP, APRN - CNP on 2/13/2021 at 8:17 AM     Attending Physician Statement  I have discussed the care of Nichole Santa, including pertinent history and exam findings with the CNP. I have reviewed the key elements of all parts of the encounter with the CNP. I have seen and examined the patient. I agree with the assessment and plan and status of the problem list as documented.        Saloni Gant MD  Nephrology Attending Physician  [facility]

## 2021-02-13 NOTE — PROGRESS NOTES
Critical Care Team - Daily Progress Note      Date and time: 2/13/2021 8:46 AM  Patient's name:  Tami Gannon  Medical Record Number: [id number]  Patient's account/billing number: [de-identified]  Patient's YOB: 1945  Age: 70 y.o. Date of Admission: 2/4/2021 12:53 AM  Length of stay during current admission: 9  Primary Care Physician: Shirley Guardado MD  ICU Attending Physician: Sherrie Vaca MD    Code Status: Full Code  Reason for ICU admission: Shortness of breath      Subjective:     OVERNIGHT EVENTS:         No acute overnight issues. Started proning from yesterday. Still requiring high PEEP pressures. But FiO2 came down from 100 to 80%  Low-grade fevers of 99100  PRVC 35/550/18/80%  On Nimbex, fentanyl, midazolam.  Vancomycin and Eraxis per ID with low-grade fevers, leukocytosis. Cultures no growth yet. Respiratory culture has few yeasts. Urine culture has E faecalis 50100,000    BRIEF SUMMARY:  Patient was admitted for shortness of breath, cough, and dyspnea since January 4th. Was diagnosed with Covid19 Pneumonia. He continued to worsen and was admitted to the ICU for further management. He was started on high flow and BiPAP.  Subsequently treated with 5 days of intravenous remdesivir. Patient needed intubation on 2/3/2021.      At this time patient is on ARDS protocol, inhalational epoprostenol and is pronated and paralyzed.  Et tube reinserted on 2/11/2021    AWAKE & FOLLOWING COMMANDS:  [x] No   [] Yes    CURRENT VENTILATION STATUS:     [x] Ventilator  [] BIPAP  [] Nasal Cannula [] Room Air      SEDATION:  RAAS Score:  [] Propofol gtt  [x] Versed gtt  [] Ativan gtt   [] No Sedation    PARALYZED:   [] No    [x] Yes    VASOPRESSORS:   [] Yes     [x] No   If yes -   [] Levophed       [] Dopamine     [] Vasopressin       [] Dobutamine  [] Phenylephrine         [] Epinephrine    CENTRAL LINES:      [x] Yes (Date of Insertion:   )    [] No           If yes -  PICC line 2/12 Respiratory: Ventilatory breath sounds. Bilateral air entry +. Proned  Cardiovascular: regular rate and rhythm, normal S1, S2  Abdomen: soft, no grimacing to palpation, nondistended, no masses or organomegaly  Neurological: Intubated, sedated and paralyzed. Extremities:  peripheral pulses normal, no pedal edema.     VENT SETTINGS (Comprehensive) (if applicable):  Vent Information  $Ventilation: $Subsequent Day  Skin Assessment: Clean, dry, & intact  Suction Catheter Diameter: 14  Equipment ID: [id number] #[id number]  Equipment Changed: Expiratory Filter  Vent Type: Servo i  Vent Mode: PRVC  Vt Ordered: 550 mL  Rate Set: 35 bmp  FiO2 : (S) 80 %(wean)  SpO2: 97 %  SpO2/FiO2 ratio: 107.78  Sensitivity: 3  PEEP/CPAP: 18  I Time/ I Time %: 0.65 s  Humidification Source: Heated wire  Humidification Temp: 31  Humidification Temp Measured: 31  Circuit Condensation: Drained  Nitric Oxide/Epoprostenol In Use?: Yes  NO Set: 10  NO Analyzed: 0.2 ppm  NO2 Analyzed: 11 ppm  Additional Respiratory  Assessments  Pulse: 85  Resp: (!) 35  SpO2: 97 %  End Tidal CO2: 27  Position: Prone  Humidification Source: Heated wire  Humidification Temp: 31  Circuit Condensation: Drained  Oral Care Completed?: Yes  Oral Care: Mouth swabbed, Mouth suctioned  Subglottic Suction Done?: Yes    ABGs:   Lab Results   Component Value Date    RWE5JTF 40 02/13/2021    FIO2 90.0 02/13/2021     Lactic Acid: No results found for: LACTA    DATA:  Complete Blood Count:   Recent Labs     02/11/21  0430 02/12/21  0454 02/13/21  0400   WBC 10.2 14.4* 12.2*   HGB 10.1* 10.4* 9.8*   MCV 90.0 87.8 88.8   * 128* See Reflexed IPF Result   RBC 3.71* 3.84* 3.57*   HCT 33.4* 33.7* 31.7*   MCH 27.2 27.1 27.5   MCHC 30.2 30.9 30.9   RDW 14.0 14.1 14.6*   MPV 9.7 10.1 NOT REPORTED        PT/INR:    Lab Results   Component Value Date    PROTIME 14.0 02/03/2021    INR 1.1 02/03/2021     PTT:    Lab Results   Component Value Date    APTT 34.5 02/03/2021 Basal Metabolic Profile:   Recent Labs     02/12/21  1746 02/12/21  2209 02/13/21  0400    132* 128*   K 5.2 5.1 5.2   BUN 35* 37* 37*   CREATININE 0.66* 0.78 0.67*   CL 94* 92* 90*   CO2 35* 35* 35*      LFTS  No results for input(s): ALKPHOS, ALT, AST, BILITOT, BILIDIR, LABALBU in the last 72 hours.     MEDICATIONS:  Scheduled Meds:   insulin lispro  0-12 Units Subcutaneous Q4H    sodium zirconium cyclosilicate  10 g Oral TID    lidocaine 1 % injection  5 mL Intradermal Once    sodium chloride flush  10 mL Intravenous 2 times per day    vancomycin (VANCOCIN) intermittent dosing (placeholder)   Other RX Placeholder    vancomycin  1,500 mg Intravenous Q12H    anidulafungin  100 mg Intravenous Q24H    famotidine (PEPCID) injection  20 mg Intravenous BID    lactulose  20 g Oral TID    insulin glargine  10 Units Subcutaneous Nightly    sodium chloride flush  10 mL Intravenous 2 times per day    heparin (porcine)  5,000 Units Subcutaneous 3 times per day     Continuous Infusions:   dextrose      fentaNYL 100 mcg/hr (02/13/21 0251)    dextrose      midazolam 8 mg/hr (02/13/21 0254)    cisatracurium (NIMBEX) infusion 4 mcg/kg/min (02/13/21 0834)    epoprostenol (VELETRI) nebulization solution Stopped (02/08/21 2100)     PRN Meds:       sodium chloride flush, 10 mL, PRN      glucose, 15 g, PRN      dextrose, 12.5 g, PRN      glucagon (rDNA), 1 mg, PRN      dextrose, 100 mL/hr, PRN      polyvinyl alcohol, 1 drop, PRN      sodium chloride flush, 10 mL, PRN      promethazine, 12.5 mg, Q6H PRN    Or      ondansetron, 4 mg, Q6H PRN      polyethylene glycol, 17 g, Daily PRN      acetaminophen, 650 mg, Q6H PRN    Or      acetaminophen, 650 mg, Q6H PRN      glucose, 15 g, PRN      dextrose, 12.5 g, PRN      glucagon (rDNA), 1 mg, PRN      dextrose, 100 mL/hr, PRN          ASSESSMENT:     Principal Problem:    Acute respiratory failure with hypoxia (HCC)  Active Problems:

## 2021-02-13 NOTE — PROGRESS NOTES
Pt currently proned, head turned to the left, no skin breakdown noticed. ETT secured by twill. Pt tolerated well RN Dany POOLE  assisted.  Respiratory will continue to monitor

## 2021-02-13 NOTE — PROGRESS NOTES
Pt currently proned, head turned to the right , no skin breakdown noticed. ETT secured by twill. Pt tolerated well  . JEZ POOLE  assisted.  Respiratory will continue to monitor

## 2021-02-13 NOTE — PLAN OF CARE
Problem: Skin Integrity:  Goal: Absence of new skin breakdown  Description: Absence of new skin breakdown  2/13/2021 0135 by Gian Becerra RN  Outcome: Met This Shift  Problem: Skin Integrity:  Goal: Will show no infection signs and symptoms  Description: Will show no infection signs and symptoms  2/13/2021 0135 by Gian Becerra RN  Outcome: Ongoing     Problem: Nutrition  Goal: Optimal nutrition therapy  Description: Nutrition Problem #1: Inadequate oral intake  Intervention: Food and/or Nutrient Delivery: (Start nutrition as able.  If TF, suggest Semi-elemental formula with goal rate of 55 mL/hr to provide 1584 kcal and 99 g pro/day.)  Nutritional Goals: meet % of estimated nutrient needs     2/13/2021 0135 by Gian Becerra RN  Outcome: Ongoing  Note: Nutrition Problem #1: Inadequate oral intake  Intervention: Food and/or Nutrient Delivery: Modify Tube Feeding  Nutritional Goals: meet % of estimated nutrient needs      Problem: OXYGENATION/RESPIRATORY FUNCTION  Goal: Patient will maintain patent airway  2/13/2021 0135 by Gian Becerra RN  Outcome: Ongoing  Goal: Patient will achieve/maintain normal respiratory rate/effort  Description: Respiratory rate and effort will be within normal limits for the patient  2/13/2021 0135 by Gian Becerra RN  Outcome: Ongoing     Problem: MECHANICAL VENTILATION  Goal: Patient will maintain patent airway  2/13/2021 0135 by Gian Becerra RN  Outcome: Ongoing  Goal: Oral health is maintained or improved  2/13/2021 0135 by Gian Becerra RN  Outcome: Ongoing  Goal: ET tube will be managed safely  2/13/2021 0135 by Gian Becerra RN  Outcome: Ongoing  Goal: Mobility/activity is maintained at optimum level for patient  2/13/2021 0135 by Gian Becerra RN  Outcome: Ongoing     Problem: SKIN INTEGRITY  Goal: Skin integrity is maintained or improved  2/13/2021 0135 by Gian Becerra RN  Outcome: Ongoing     Problem: Metabolic: Goal: Ability to maintain appropriate glucose levels will improve  Description: Ability to maintain appropriate glucose levels will improve  2/13/2021 0135 by Lydia Cardenas RN  Outcome: Ongoing  Problem: Nutritional:  Goal: Maintenance of adequate nutrition will improve  Description: Maintenance of adequate nutrition will improve  2/13/2021 0135 by Lydia Cardenas RN  Outcome: Ongoing  Goal: Progress toward achieving an optimal weight will improve  Description: Progress toward achieving an optimal weight will improve  2/13/2021 0135 by Lydia Cardenas RN  Outcome: Ongoing  Problem: Mental Status - Impaired:  Goal: Mental status will improve  Description: Mental status will improve  2/13/2021 0135 by Lydia Cardenas RN  Outcome: Ongoing     Problem: Gas Exchange - Impaired  Goal: Absence of hypoxia  Outcome: Ongoing  Goal: Promote optimal lung function  Outcome: Ongoing     Problem:  Body Temperature -  Risk of, Imbalanced  Goal: Ability to maintain a body temperature within defined limits  Outcome: Ongoing  Goal: Will regain or maintain usual level of consciousness  Outcome: Ongoing  Goal: Complications related to the disease process, condition or treatment will be avoided or minimized  Outcome: Ongoing

## 2021-02-13 NOTE — PROGRESS NOTES
Pt currently proned, head turned to the right, no skin breakdown noticed. ETT secured by twill. Pt tolerated well JEZ POOLE assisted.

## 2021-02-13 NOTE — PROGRESS NOTES
Pt currently proned, head turned to the left, no skin breakdown noticed. ETT secured by twill. Pt tolerated well RN Timoteo POOLE assisted. Respiratory will continue to monitor.

## 2021-02-13 NOTE — PLAN OF CARE
Problem: OXYGENATION/RESPIRATORY FUNCTION  Goal: Patient will maintain patent airway  2/13/2021 0205 by Derrick Bello RCP  Outcome: Ongoing     Problem: OXYGENATION/RESPIRATORY FUNCTION  Goal: Patient will achieve/maintain normal respiratory rate/effort  Description: Respiratory rate and effort will be within normal limits for the patient  2/13/2021 0205 by Derrick Bello RCP  Outcome: Ongoing     Problem: MECHANICAL VENTILATION  Goal: Patient will maintain patent airway  2/13/2021 0205 by Derrick Bello RCP  Outcome: Ongoing

## 2021-02-14 NOTE — PLAN OF CARE
Problem: Skin Integrity:  Goal: Will show no infection signs and symptoms  Description: Will show no infection signs and symptoms  Outcome: Ongoing  Goal: Absence of new skin breakdown  Description: Absence of new skin breakdown  Outcome: Ongoing     Problem: Nutrition  Goal: Optimal nutrition therapy  Description: Nutrition Problem #1: Inadequate oral intake  Intervention: Food and/or Nutrient Delivery: (Start nutrition as able.  If TF, suggest Semi-elemental formula with goal rate of 55 mL/hr to provide 1584 kcal and 99 g pro/day.)  Nutritional Goals: meet % of estimated nutrient needs     Outcome: Ongoing     Problem: OXYGENATION/RESPIRATORY FUNCTION  Goal: Patient will maintain patent airway  2/14/2021 1838 by Katharine Mckeon RN  Outcome: Ongoing  2/14/2021 0746 by Ean Calvillo RCP  Outcome: Ongoing  2/14/2021 0457 by Linnea Rouse RCP  Outcome: Ongoing  Goal: Patient will achieve/maintain normal respiratory rate/effort  Description: Respiratory rate and effort will be within normal limits for the patient  2/14/2021 1838 by Katharine Mckeon RN  Outcome: Ongoing  2/14/2021 0746 by Ean Calvillo RCP  Outcome: Ongoing  2/14/2021 0457 by Linnea Rouse RCP  Outcome: Ongoing     Problem: MECHANICAL VENTILATION  Goal: Patient will maintain patent airway  2/14/2021 1838 by Katharine Mckeon RN  Outcome: Ongoing  2/14/2021 0746 by Ean Calvillo RCP  Outcome: Ongoing  2/14/2021 0457 by Linnea Rouse RCP  Outcome: Ongoing  Goal: Oral health is maintained or improved  2/14/2021 1838 by Katharine Mckeon RN  Outcome: Ongoing  2/14/2021 0746 by Ean Calvillo RCP  Outcome: Ongoing  2/14/2021 0457 by Linnea Rouse RCP  Outcome: Ongoing  Goal: ET tube will be managed safely  2/14/2021 1838 by Katharine Mckeon RN  Outcome: Ongoing  2/14/2021 0746 by Ean Calvillo RCP  Outcome: Ongoing  2/14/2021 0457 by Linnea Rouse RCP  Outcome: Ongoing Outcome: Ongoing  2/14/2021 0457 by Flako Gamez RCP  Outcome: Ongoing

## 2021-02-14 NOTE — PROGRESS NOTES
[facility] -   Infectious diseases evaluation. Progress Note    admission date 2/4/2021    reason for consultation:   bandemia    Impression :   Current:  · Bandemia, likely from steroids  · COVID-19 virus pneumonia  · COVID-19 related sepsis with arts  · Septic shock  · Respiratory failure requiring mechanical ventilation  · Elevated inflammatory markers CRP, LDH      Discussion / summary of stay / plan of care   ·   Recommendations     Completed 5 days of remdesivir at the referring hospital  Post Levaquin course at the other hospital  E faecalis UTI treated with 3 days of zosyn  Out of the window for plasma  D/C isolation and transfer out of COVID unit  post vancomycin and Zosyn,   · Continues on steroids anticoagulation  · 2/12WBC 14 and low-grade fever: better  · Sp and  blood culture   · Continue anidulafungin and vancomycin , follow creatinine    Infection Control Recommendations   · [place] Precautions  · Contact Isolation   · Airborne isolation removed  · Droplet Isolation removed    Antimicrobial Stewardship Recommendations   · Targeted therapy    Coordination ofOutpatient Care:   · Estimated Length of IV antimicrobials:  · Patient will need Midline / picc Catheter Insertion:   · Patient will need SNF:  · Patient will need outpatient wound care:     History of Present Illness:       INITIAL HISTORY:    Joan Manjarrez is a 70y.o.-year-old male who presented because of shortness of breath and cough that has been progressive. Started January 12 with a fever aches cough shortness of breath, did not lose the taste of food or the smell, no diarrhea no blood in the sputum. Fever was associated. Presented to the ER because of the worsening of shortness of breath. Chest x-ray showed pneumonia he tested positive for Covid. Lactic acid was 3 and the patient was desaturating so he was admitted on high flow to the ICU. To another hospital, patient was not improving despite steroids and high oxygen supplementation. Patient was intubated 2/3/2021 and transferred to [facility]. Paralyzed high oxygen requirement, 2 pressors on board. WBC 2019  Creatinine is normal  Liver enzymes are normal    CURRENT EVALUATION :  2/14/2021   Patient Vitals for the past 8 hrs:   BP Temp Temp src Pulse Resp SpO2   02/14/21 1600    80 (!) 5 90 %   02/14/21 1556 (!) 110/57        02/14/21 1500 (!) 141/72 98.4 °F (36.9 °C) CORE 82 25 (!) 89 %   02/14/21 1455    80 (!) 35 90 %   02/14/21 1400    81 (!) 35 91 %   02/14/21 1300    76 (!) 34 91 %   02/14/21 1200 (!) 142/71 96.8 °F (36 °C) CORE 74 (!) 34 91 %   02/14/21 1116    72 (!) 32 91 %   02/14/21 1100    69 9 (!) 87 %   02/14/21 1000    72 27 (!) 88 %     No fever, no paralytics, appropriate follows commands  Very weak, sputum is clear  Continues to have 80% FiO2 and 16 of PEEP  No new positive cultures, blood remains negative and sputum is normal aris  Creat stable    Summary of relevant labs: 2/14/2021    Labs:  WBC 19-18-->14- 9 7 - 10 - 14-12.2 - 10    Cr 1.21-0.67    CRP 98  Ferritin 307  Fibrinogen 350      Micro:  Sputum culture 1-30-21 Normal aris    Blood:  · 2-4-21: No growth  · 1-19-21: No growth     Urine  2-4-21: Enterococcus faecalis     Covid +1/14    Imaging:  CXR 2/11  Otherwise, severe diffuse faint parenchymal and interstitial opacities   throughout both lungs unchanged. Chest x-ray bilateral pulmonary changes with chronic changes  Ultrasound of the kidneys no hydronephrosis      I have personally reviewed the past medical history, past surgical history, medications, social history, and family history, and I haveupdated the database accordingly.       Allergies: Patient has no known allergies. Review of Systems:     Review of Systems   Unable to perform ROS: Intubated       Physical Examination :       Physical Exam  Constitutional:       General: He is not in acute distress. Appearance: He is obese. He is not ill-appearing. Interventions: He is sedated and intubated. HENT:      Head: Normocephalic and atraumatic. Nose: Nose normal.      Mouth/Throat:      Mouth: Mucous membranes are moist.   Eyes:      General: No scleral icterus. Conjunctiva/sclera: Conjunctivae normal.   Neck:      Musculoskeletal: Neck supple. Cardiovascular:      Rate and Rhythm: Normal rate and regular rhythm. Heart sounds: No murmur. No friction rub. Pulmonary:      Effort: No respiratory distress. He is intubated. Breath sounds: Normal breath sounds. No stridor. No rhonchi. Abdominal:      General: There is no distension. Palpations: Abdomen is soft. There is no mass. Hernia: No hernia is present. Genitourinary:     Comments: Urine is yusra  Musculoskeletal:         General: No swelling, tenderness or signs of injury. Right lower leg: No edema. Left lower leg: No edema. Skin:     Coloration: Skin is not jaundiced or pale. Findings: No bruising or erythema. Neurological:      Comments: Intubated, very weak in general   Psychiatric:      Comments: Intubated, folowed commands, appropriate         Past Medical History:     Past Medical History:   Diagnosis Date    Asthma     Hyperlipidemia     Hypertension     Type 2 diabetes mellitus without complication ([facility] [address]        Past Surgical  History:     Past Surgical History:   Procedure Laterality Date    HERNIA REPAIR      right inguinal at age 3 years.     NM COLONOSCOPY FLX DX W/COLLJ SPEC WHEN PFRMD N/A 10/5/2018    COLONOSCOPY performed by Olinda Su DO at [facility] AT THE [facility] OR       Medications:      predniSONE  40 mg Oral Daily    Followed by  [START ON 2021] predniSONE  20 mg Oral Daily    Followed by   Leigh Manning ON 2021] predniSONE  10 mg Oral Daily    Followed by   Leigh Manning ON 2021] predniSONE  5 mg Oral Daily    amLODIPine  10 mg Per NG tube Daily    insulin glargine  12 Units Subcutaneous Nightly    insulin lispro  0-18 Units Subcutaneous 4 times per day    lidocaine 1 % injection  5 mL Intradermal Once    sodium chloride flush  10 mL Intravenous 2 times per day    vancomycin (VANCOCIN) intermittent dosing (placeholder)   Other RX Placeholder    vancomycin  1,500 mg Intravenous Q12H    anidulafungin  100 mg Intravenous Q24H    famotidine (PEPCID) injection  20 mg Intravenous BID    lactulose  20 g Oral TID    sodium chloride flush  10 mL Intravenous 2 times per day    heparin (porcine)  5,000 Units Subcutaneous 3 times per day       Social History:     Social History     Socioeconomic History    Marital status:      Spouse name: Not on file    Number of children: Not on file    Years of education: Not on file    Highest education level: Not on file   Occupational History    Not on file   Social Needs    Financial resource strain: Not on file    Food insecurity     Worry: Not on file     Inability: Not on file    Transportation needs     Medical: Not on file     Non-medical: Not on file   Tobacco Use    Smoking status: Former Smoker     Quit date: 2009     Years since quittin.4    Smokeless tobacco: Never Used   Substance and Sexual Activity    Alcohol use: Yes     Comment: rare    Drug use: Not on file    Sexual activity: Not on file   Lifestyle    Physical activity     Days per week: Not on file     Minutes per session: Not on file    Stress: Not on file   Relationships    Social connections     Talks on phone: Not on file     Gets together: Not on file     Attends Protestant service: Not on file     Active member of club or organization: Not on file Attends meetings of clubs or organizations: Not on file     Relationship status: Not on file    Intimate partner violence     Fear of current or ex partner: Not on file     Emotionally abused: Not on file     Physically abused: Not on file     Forced sexual activity: Not on file   Other Topics Concern    Not on file   Social History Narrative    Not on file       Family History:     Family History   Problem Relation Age of Onset    Stroke Mother     Heart Disease Father       Medical Decision Making:   I have independently reviewed/ordered the following labs:    CBC with Differential:   Recent Labs     02/13/21  0400 02/14/21  0246   WBC 12.2* 10.8   HGB 9.8* 9.0*   HCT 31.7* 29.0*   PLT See Reflexed IPF Result 132*   LYMPHOPCT 4* 4*   MONOPCT 6 6     BMP:  Recent Labs     02/13/21  1649 02/14/21  0246   * 132*   K 4.7 4.3   CL 90* 92*   CO2 35* 36*   BUN 40* 36*   CREATININE 0.65* 0.55*     Hepatic Function Panel:   No results for input(s): PROT, LABALBU, BILIDIR, IBILI, BILITOT, ALKPHOS, ALT, AST in the last 72 hours. No results for input(s): RPR in the last 72 hours. No results for input(s): HIV in the last 72 hours. No results for input(s): BC in the last 72 hours. Lab Results   Component Value Date    CREATININE 0.55 02/14/2021    GLUCOSE 142 02/14/2021       Detailed results: Thank you for allowing us to participate in the care of this patient. Please call with questions. This note is created with the assistance of a speech recognition program.  While intending to generate adocument that actually reflects the content of the visit, the document can still have some errors including those of syntax and sound a like substitutions which may escape proof reading. It such instances, actual meaningcan be extrapolated by contextual diversion.     Loan Woo MD  Office: [phone]  Perfect serve / office [phone] I have discussed the care of the patient, including pertinent history and exam findings,  with the resident. I have seen and examined the patient and the key elements of all parts of the encounter have been performed by me. I agree with the assessment, plan and orders as documented by the resident.     Yecenia Velasco, Infectious Diseases

## 2021-02-14 NOTE — PROGRESS NOTES
Nephrology Progress Note      SUBJECTIVE    Patient seen and examined at bedside. Remains intubated and sedated. Patient is on 60% of FiO2 and a PEEP of 16. Continues on Nimbex, fentanyl, Versed. Intake output with last 24 hours: 2040/0 =  + 980 mL overall in the last 24 hours. +4.1 L since admission. Labs reviewed. Sodium 132 potassium 4.3 creatinine 0.55 glucose 142  Tube feeds continue as previous. Currently in prone position. Family meeting with critical care done today. Biological sons and POA present. Receptive to DNR CCA option, but no decision made, still a FULL CODE. OBJECTIVE      CURRENT TEMPERATURE:  Temp: 97.5 °F (36.4 °C)  MAXIMUM TEMPERATURE OVER 24HRS:  Temp (24hrs), Av.8 °F (36.6 °C), Min:97.5 °F (36.4 °C), Max:98.2 °F (36.8 °C)    CURRENT RESPIRATORY RATE:  Resp: (!) 35  CURRENT PULSE:  Pulse: 73  CURRENT BLOOD PRESSURE:  BP: 122/78  24HR BLOOD PRESSURE RANGE:  Systolic (80ANL), SWL:767 , Min:96 , PUI:612   ; Diastolic (21MUX), KWK:37, Min:63, Max:78    24HR INTAKE/OUTPUT:      Intake/Output Summary (Last 24 hours) at 2021 8720  Last data filed at 2021 0600  Gross per 24 hour   Intake 1947 ml   Output 1000 ml   Net 947 ml     WEIGHT :  Patient Vitals for the past 96 hrs (Last 3 readings):   Weight   21 0600 239 lb 3.2 oz (108.5 kg)   21 0600 242 lb 8.1 oz (110 kg)   21 0600 242 lb 15.2 oz (110.2 kg)     PHYSICAL EXAM      GENERAL APPEARANCE: Sedated and medically paralyzed on the ventilator, weight is stable the last 4 days. 108.5 kg. Currently in a prone position.   SKIN: warm and dry, no rash or erythema  ENT: oral endotracheal tube is in place  NECK:  No JVD, midline trachea and no accessory muscle use  PULMONARY: lungs are clear to auscultation bilaterally anteriorly and laterally without any rales or wheezing or rhonchi   CARDIOVASCULAR: Tachycardic rate and regular rhythm with a positive S1 and S2 and no rubs ABDOMEN: soft, mildly distended with positive bowel sounds  EXTREMITIES: no cyanosis or edema with 2+ DP pulses bilaterally     CURRENT MEDICATIONS          insulin glargine (LANTUS) injection vial 12 Units, Nightly      insulin lispro (HUMALOG) injection vial 0-18 Units, 4 times per day      sodium zirconium cyclosilicate (LOKELMA) oral suspension 10 g, TID      lidocaine 1 % injection 5 mL, Once      sodium chloride flush 0.9 % injection 10 mL, 2 times per day      sodium chloride flush 0.9 % injection 10 mL, PRN      vancomycin (VANCOCIN) intermittent dosing (placeholder), RX Placeholder      vancomycin (VANCOCIN) 1500 mg in dextrose 5 % 250 mL IVPB, Q12H      anidulafungin (ERAXIS) 100 mg in dextrose 5 % 130 mL IVPB, Q24H      glucose (GLUTOSE) 40 % oral gel 15 g, PRN      dextrose 50 % IV solution, PRN      glucagon (rDNA) injection 1 mg, PRN      dextrose 5 % solution, PRN      famotidine (PEPCID) injection 20 mg, BID      polyvinyl alcohol (LIQUIFILM TEARS) 1.4 % ophthalmic solution 1 drop, PRN      lactulose (CHRONULAC) 10 GM/15ML solution 20 g, TID      sodium chloride flush 0.9 % injection 10 mL, 2 times per day      sodium chloride flush 0.9 % injection 10 mL, PRN      promethazine (PHENERGAN) tablet 12.5 mg, Q6H PRN    Or      ondansetron (ZOFRAN) injection 4 mg, Q6H PRN      polyethylene glycol (GLYCOLAX) packet 17 g, Daily PRN      acetaminophen (TYLENOL) tablet 650 mg, Q6H PRN    Or      acetaminophen (TYLENOL) suppository 650 mg, Q6H PRN      heparin (porcine) injection 5,000 Units, 3 times per day      fentaNYL 20 mcg/mL Infusion, Continuous      glucose (GLUTOSE) 40 % oral gel 15 g, PRN      dextrose 50 % IV solution, PRN      glucagon (rDNA) injection 1 mg, PRN      dextrose 5 % solution, PRN      midazolam (VERSED) 1 mg/mL in D5W infusion, Continuous      cisatracurium besylate (NIMBEX) 200 mg in sodium chloride 0.9 % 100 mL infusion, Continuous 1. Hyperkalemia. Differentials include interstitial nephritis caused by Covid viremia versus decreased delivery of sodium to distal tubule resulting in. Potassium secretion versus volume depletion or possibly hyper Zain state. , relatively stable 4.3 today. Continues on Lokelma 10 mg p.o. 3 times daily  2. COVID-19 pneumonia   3. Ventilator dependent acute respiratory failure   4. Hyponatremia -132. PLAN    1. Continue with Lokelma 10 mg oral three times a day. 2. Labs are stable. 3. Labs in am   4. Following    Please do not hesitate to call with questions. This note is created with the assistance of a speech-recognition program. While intending to generate a document that actually reflects the content of the visit, no guarantees can be provided that every mistake has been identified and corrected by editing    Electronically signed by Dolly Shearer CNP, KATIA - CNP on 2/14/2021 at 8:21 AM     Attending Physician Statement  I have discussed the care of Jenna Sanders, including pertinent history and exam findings with the CNP. I have reviewed the key elements of all parts of the encounter with the CNP. I have seen and examined the patient. I agree with the assessment and plan and status of the problem list as documented. Additionally I recommend with the improvement in the hyperkalemia, discontinue the OLIMPIA KALPANA. [facility].     Ciro Richmond MD  Nephrology Attending Physician  [facility]

## 2021-02-14 NOTE — PROGRESS NOTES
Critical Care Team - Daily Progress Note      Date and time: 2/14/2021 1:37 PM  Patient's name:  Bette Duran  Medical Record Number: [id number]  Patient's account/billing number: [de-identified]  Patient's YOB: 1945  Age: 70 y.o. Date of Admission: 2/4/2021 12:53 AM  Length of stay during current admission: 10  Primary Care Physician: Angy Pierce MD  ICU Attending Physician: Christina Robert MD    Code Status: Full Code  Reason for ICU admission: Shortness of breath      Subjective:     OVERNIGHT EVENTS:         No acute overnight issues. Prone overnight. Currently FiO2 70%, requiring PEEP 16. ABG showing 7.43/PO2 74/PCO2 53/bicarb 35  Started on prednisone taper. Family meeting done this afternoon. BRIEF SUMMARY:  Patient was admitted for shortness of breath, cough, and dyspnea since January 4th. Was diagnosed with Covid19 Pneumonia. He continued to worsen and was admitted to the ICU for further management. He was started on high flow and BiPAP.  Subsequently treated with 5 days of intravenous remdesivir. Patient needed intubation on 2/3/2021.      At this time patient is on ARDS protocol, inhalational epoprostenol and is pronated and paralyzed.  Et tube reinserted on 2/11/2021    AWAKE & FOLLOWING COMMANDS:  [x] No   [] Yes    CURRENT VENTILATION STATUS:     [x] Ventilator  [] BIPAP  [] Nasal Cannula [] Room Air      SEDATION:  RAAS Score:  [] Propofol gtt  [x] Versed gtt  [] Ativan gtt   [] No Sedation    PARALYZED:   [x] No    [] Yes    VASOPRESSORS:   [] Yes     [x] No   If yes -   [] Levophed       [] Dopamine     [] Vasopressin       [] Dobutamine  [] Phenylephrine         [] Epinephrine    CENTRAL LINES:      [x] Yes (Date of Insertion:   )    [] No           If yes -  PICC line 2/12  [] Right Internal Jugular [] Left Internal Jugular [] Right Femoral   [] Left Femoral [] Right Subclavian  [] Left Subclavian     SMALLWOOD'S CATHETER:    [] No   [x] Yes  (Date of Insertion:   ) URINE OUTPUT:   [x] Good  [] Low  [] Anuric    REVIEW OF SYSTEMS  Unable to obtain    OBJECTIVE:     VITAL SIGNS:  /78   Pulse 72   Temp 97.5 °F (36.4 °C) (Bladder)   Resp (!) 32   Ht 5' 10\" (1.778 m)   Wt 239 lb 3.2 oz (108.5 kg)   SpO2 91%   BMI 34.32 kg/m²   Tmax over 24 hours:  Temp (24hrs), Av.9 °F (36.6 °C), Min:97.5 °F (36.4 °C), Max:98.2 °F (36.8 °C)      Patient Vitals for the past 8 hrs:   BP Pulse Resp SpO2 Weight   21 1116  72 (!) 32 91 %    21 0741  73 (!) 35 93 %    21 0600 122/78 75 (!) 35 95 % 239 lb 3.2 oz (108.5 kg)         Intake/Output Summary (Last 24 hours) at 2021 1337  Last data filed at 2021 0600  Gross per 24 hour   Intake 1844 ml   Output 780 ml   Net 1064 ml     Date 21 0000 - 21 2359   Shift 6613-5228 8169-7192 0850-8005 24 Hour Total   INTAKE   I.V.(mL/kg) 582(5.4)   582(5.4)   NG/GT(mL/kg) 177(1.6)   177(1.6)   Shift Total(mL/kg) 759(7)   759(7)   OUTPUT   Urine(mL/kg/hr) 370(0.4)   370   Emesis/NG output(mL/kg) 0(0)   0(0)   Shift Total(mL/kg) 370(3.4)   370(3.4)   Weight (kg) 108. 5 108.5 108.5 108.5     Wt Readings from Last 3 Encounters:   21 239 lb 3.2 oz (108.5 kg)   21 237 lb 14 oz (107.9 kg)   10/05/18 218 lb (98.9 kg)     Body mass index is 34.32 kg/m². PHYSICAL EXAM:  Constitutional: Intubated and sedated. HEENT: PERRLA. Atraumatic. ET tube in place. Respiratory:  Bilateral decreased breath sounds with inspiratory wet rales. No wheezing. Cardiovascular: regular rate and rhythm, normal S1, S2  Abdomen: soft, no grimacing to palpation, nondistended, no masses or organomegaly  Neurological: Intubated, sedated. Extremities:  peripheral pulses normal, no pedal edema.     VENT SETTINGS (Comprehensive) (if applicable):  Vent Information  $Ventilation: $Subsequent Day  Skin Assessment: Clean, dry, & intact  Suction Catheter Diameter: 14  Equipment ID: [id number] #[id number]  Equipment Changed: Expiratory Filter Vent Type: Servo i  Vent Mode: PRVC  Vt Ordered: 550 mL  Rate Set: 35 bmp  FiO2 : 70 %  SpO2: 91 %  SpO2/FiO2 ratio: 130  Sensitivity: 3  PEEP/CPAP: 16  I Time/ I Time %: 0.65 s  Humidification Source: HME  Humidification Temp: 37  Humidification Temp Measured: 37  Circuit Condensation: Drained  Nitric Oxide/Epoprostenol In Use?: Yes  NO Set: 10  NO Analyzed: 0.2 ppm  NO2 Analyzed: 11 ppm  Additional Respiratory  Assessments  Pulse: 72  Resp: (!) 32  SpO2: 91 %  End Tidal CO2: 24  Position: Prone  Humidification Source: HME  Humidification Temp: 37  Circuit Condensation: Drained  Oral Care Completed?: Yes  Oral Care: Mouth swabbed, Mouth moisturizer, Mouth suctioned  Subglottic Suction Done?: Yes    ABGs:   Lab Results   Component Value Date    YKP4TIP 37 02/14/2021    FIO2 70.0 02/14/2021     Lactic Acid: No results found for: LACTA    DATA:  Complete Blood Count:   Recent Labs     02/12/21  0454 02/13/21  0400 02/14/21  0246   WBC 14.4* 12.2* 10.8   HGB 10.4* 9.8* 9.0*   MCV 87.8 88.8 87.3   * See Reflexed IPF Result 132*   RBC 3.84* 3.57* 3.32*   HCT 33.7* 31.7* 29.0*   MCH 27.1 27.5 27.1   MCHC 30.9 30.9 31.0   RDW 14.1 14.6* 14.6*   MPV 10.1 NOT REPORTED 9.6        PT/INR:    Lab Results   Component Value Date    PROTIME 14.0 02/03/2021    INR 1.1 02/03/2021     PTT:    Lab Results   Component Value Date    APTT 34.5 02/03/2021       Basal Metabolic Profile:   Recent Labs     02/13/21  0926 02/13/21  1649 02/14/21  0246    132* 132*   K 5.3 4.7 4.3   BUN 39* 40* 36*   CREATININE 0.67* 0.65* 0.55*   CL 94* 90* 92*   CO2 33* 35* 36*      LFTS  No results for input(s): ALKPHOS, ALT, AST, BILITOT, BILIDIR, LABALBU in the last 72 hours.     MEDICATIONS:  Scheduled Meds:   predniSONE  40 mg Oral Daily    Followed by   Colette Nelson ON 2/17/2021] predniSONE  20 mg Oral Daily    Followed by   Colette Nelson ON 2/20/2021] predniSONE  10 mg Oral Daily    Followed by  [START ON 2/23/2021] predniSONE  5 mg Oral Daily    insulin glargine  12 Units Subcutaneous Nightly    insulin lispro  0-18 Units Subcutaneous 4 times per day    lidocaine 1 % injection  5 mL Intradermal Once    sodium chloride flush  10 mL Intravenous 2 times per day    vancomycin (VANCOCIN) intermittent dosing (placeholder)   Other RX Placeholder    vancomycin  1,500 mg Intravenous Q12H    anidulafungin  100 mg Intravenous Q24H    famotidine (PEPCID) injection  20 mg Intravenous BID    lactulose  20 g Oral TID    sodium chloride flush  10 mL Intravenous 2 times per day    heparin (porcine)  5,000 Units Subcutaneous 3 times per day     Continuous Infusions:   dextrose      fentaNYL 100 mcg/hr (02/14/21 0900)    dextrose      midazolam 8 mg/hr (02/14/21 4920)    cisatracurium (NIMBEX) infusion Stopped (02/14/21 1040)    epoprostenol (VELETRI) nebulization solution Stopped (02/08/21 2100)     PRN Meds:       sodium chloride flush, 10 mL, PRN      glucose, 15 g, PRN      dextrose, 12.5 g, PRN      glucagon (rDNA), 1 mg, PRN      dextrose, 100 mL/hr, PRN      polyvinyl alcohol, 1 drop, PRN      sodium chloride flush, 10 mL, PRN      promethazine, 12.5 mg, Q6H PRN    Or      ondansetron, 4 mg, Q6H PRN      polyethylene glycol, 17 g, Daily PRN      acetaminophen, 650 mg, Q6H PRN    Or      acetaminophen, 650 mg, Q6H PRN      glucose, 15 g, PRN      dextrose, 12.5 g, PRN      glucagon (rDNA), 1 mg, PRN      dextrose, 100 mL/hr, PRN          ASSESSMENT:     Principal Problem:    Acute respiratory failure with hypoxia (HCC)  Active Problems:    COVID-19 virus infection    Type 2 diabetes mellitus (HCC)    Septic shock (HCC)  Resolved Problems:    * No resolved hospital problems. *      PLAN:     Severe ARDS due to Covid 19 Pneumonia  - Plan to wean off Nimbex and see the response without proning.  - Continue epoprostenol.  -Completed remdesivir, Decadron 10 doses. -COVID-19 symptoms started 2021, hospitalized  with BiPAP for 14 days and then transferred to [facility] and intubated on 2/3/2021.  - Palliative care consulted.     YAA secondary to shock-  Resolved. Hypernatremia - resolved. Shock- resolved, and off pressors. Type 2 DM-changed to Lantus 12 units and high-dose sliding scale, blood glucose control with monitor POC glucose.     DVT ppx - Heparin 5000 TID  Bowel regimen: Lactulose  Urine output: [place] in. Good urine output. Family meetinpm  I met the family along with the palliative care, and RN, to discuss about goals of care and current clinical status update. Patient has 5 biological children, daughter Karel Carbone is the POA. Meeting held in the conference room, all questions are answered. Updated on clinical status with a guarded prognosis. Currently patient requiring high FiO2, PEEP pressures, and not a candidate for trach/PEG at this time. Discussed about all types of CODE STATUS and family receptive to DNR CCA. However they would like to think over again and give us a decision. We will respect the family wishes.       Dannis Duane, MD, PGY-2  Internal Medicine Residency Program  [facility]  2021 1:37 PM

## 2021-02-14 NOTE — PROGRESS NOTES
Pt currently proned, head turned to the left no skin breakdown noticed. ETT secured by twill. Pt tolerated well. JEZ POOLE assisted.  Respiratory will continue to monitor

## 2021-02-14 NOTE — PLAN OF CARE
Problem: OXYGENATION/RESPIRATORY FUNCTION  Goal: Patient will maintain patent airway  2/14/2021 0746 by Mary Banda RCP  Outcome: Ongoing     Problem: OXYGENATION/RESPIRATORY FUNCTION  Goal: Patient will achieve/maintain normal respiratory rate/effort  Description: Respiratory rate and effort will be within normal limits for the patient  2/14/2021 0746 by Mary Banda RCP  Outcome: Ongoing     Problem: MECHANICAL VENTILATION  Goal: Patient will maintain patent airway  2/14/2021 0746 by Mary Banda RCP  Outcome: Ongoing     Problem: MECHANICAL VENTILATION  Goal: Oral health is maintained or improved  2/14/2021 0746 by Mary Banda RCP  Outcome: Ongoing     Problem: MECHANICAL VENTILATION  Goal: ET tube will be managed safely  2/14/2021 0746 by Mary Banda RCP  Outcome: Ongoing     Problem: MECHANICAL VENTILATION  Goal: Mobility/activity is maintained at optimum level for patient  2/14/2021 0746 by Mary Banda RCP  Outcome: Ongoing     Problem: SKIN INTEGRITY  Goal: Skin integrity is maintained or improved  Outcome: Ongoing

## 2021-02-14 NOTE — PLAN OF CARE
Problem: OXYGENATION/RESPIRATORY FUNCTION  Goal: Patient will maintain patent airway  2/14/2021 0457 by Nataliia Grant RCP  Outcome: Ongoing     Problem: OXYGENATION/RESPIRATORY FUNCTION  Goal: Patient will achieve/maintain normal respiratory rate/effort  Description: Respiratory rate and effort will be within normal limits for the patient  2/14/2021 0457 by Nataliia Grant RCP  Outcome: Ongoing     Problem: MECHANICAL VENTILATION  Goal: Patient will maintain patent airway  2/14/2021 0457 by Nataliia Grant RCP  Outcome: Ongoing     Problem: MECHANICAL VENTILATION  Goal: Oral health is maintained or improved  2/14/2021 0457 by Nataliia Grant RCP  Outcome: Ongoing     Problem: MECHANICAL VENTILATION  Goal: ET tube will be managed safely  2/14/2021 0457 by Nataliia Grant RCP  Outcome: Ongoing     Problem: MECHANICAL VENTILATION  Goal: Mobility/activity is maintained at optimum level for patient  2/14/2021 0457 by Nataliia Grant RCP  Outcome: Ongoing     Problem: Gas Exchange - Impaired  Goal: Absence of hypoxia  2/14/2021 0457 by Nataliia Grant RCP  Outcome: Ongoing     Problem: Gas Exchange - Impaired  Goal: Promote optimal lung function  2/14/2021 0457 by Nataliia Grant RCP  Outcome: Ongoing     Problem: Body Temperature -  Risk of, Imbalanced  Goal: Ability to maintain a body temperature within defined limits  2/14/2021 0457 by Nataliia Grant RCP  Outcome: Ongoing     Problem: Body Temperature -  Risk of, Imbalanced  Goal: Will regain or maintain usual level of consciousness  2/14/2021 0457 by Nataliia Grant RCP  Outcome: Ongoing     Problem:  Body Temperature -  Risk of, Imbalanced  Goal: Complications related to the disease process, condition or treatment will be avoided or minimized  2/14/2021 0457 by Nataliia Grant RCP  Outcome: Ongoing

## 2021-02-14 NOTE — PROGRESS NOTES
The tube was secured with an endotracheal tube cee. The endotracheal tube was moved and the skin assessed. Skin assessment revealed no problems. Endotracheal tube was taped at the  24 cm andreina. Oral gastric tube was not retaped to the endotracheal tube. Endotracheal tube cee was applied on February 14.  Action steps for any skin breakdown: none noted    ROBERT PIPER  3:39 PM

## 2021-02-14 NOTE — PROGRESS NOTES
Pt currently proned, head turned to the right, no skin breakdown noticed. ETT secured by twill. Pt toleratedwell. RN Pablo POOLE assisted.

## 2021-02-14 NOTE — PROGRESS NOTES
Pt currently proned, head turned to the Right  no skin breakdown noticed. ETT secured by twill. Pt tolerated well   JEZ POOLE assisted. Respiratory will continue to monitor.

## 2021-02-14 NOTE — CONSULTS
Palliative Care Inpatient Consult    NAME:  Kyle Brewer RECORD NUMBER:  [id number]  AGE: 70 y.o. GENDER: male  : 1945  TODAY'S DATE:  2021    Reasons for Consultation:    Symptom and/or pain management  Provision of information regarding PC and/or hospice philosophies  Complex, time-intensive communication and interdisciplinary psychosocial support  Clarification of goals of care and/or assistance with difficult decision-making  Guidance in regards to resources and transition(s)    Members of PC team contributing to this consultation are :  Servando Pugh, Palliative Care CNP  History of Present Illness     The patient is a 70 y.o. Non-/non  male who presents with No chief complaint on file.       Referred to Palliative Care by   [x] Physician   [] Nursing  [] Family Request   [] Other: He was admitted to the primary service for Acute respiratory failure with hypoxia ([facility] [J96.01]. His hospital course has been associated with Acute respiratory failure with hypoxia ([facility] elevated inflammatory markers, Covid-19 pneumonia, Septic Shock, UTI, malnutrition, hyponatremia, and hyperkalemia. The patient has a complicated medical history and has been hospitalized since 2/4/2021 12:53 AM. I reviewed patients EMR, spoke to RN, and patients POA/family to collect history. Patient has history of DM, asthma, HLD, and HTN. Patient came to ER with complaints of dyspnea, cough, myalgia, malaise, and fever x2 days. On the 14th he reported having worsening respiratory distress, and tested positive for Covid-19. Patient denied any loss of taste or smell. Patients admitting labs included; Na 131, , PC02 33.2, HC03 20.3, 02 sat 59.9, lactic 3, Hgb 12.1, Ca 8.2, Fibrinogen 540, CRP 98.4, , and D-dimer 1.51. CXR revealed multi focal airspace disease throughout both lungs with a mid and lower zone predominance. Trace right sided pleural effusion, and mild cardiomegaly noted. Findings were concerning for Mx focal pneumonia. EKG revealed NSR with nonspecific T wave abnormality. Patient received Remdesivir, steroids, and antibiotics. Patient was at [facility], and had worsening hypoxia on Bipap so was transferred to [facility] and subsequently intubated. ID, CC, and nephrology consulted. ID reported too late for plasma. Nephrology on for hyperkalemia, and patient was given insulin, dextrose, and lasix. Patient is now on Lucia Mehrdad. Patient is day #12 on vent. Fi02 is at 70% and peep of 16. He was on Nimbex but is off, and has been following commands on sedation holidays per RN. Patient is presently on Fentanyl and versed drip. No pressors, and OG tube with tube feeds. Patient is presently a Full Code. Palliative care consulted for review of goals, code status, and support.  pertinent labs include; Hgb 9, plt 132, Bun 36, Ca 8.4, Na 132, C02 36, PC02 53.3, P02 74.6, HC03 35.8. Active Hospital Problems    Diagnosis Date Noted    Sept[facility] [A41.9, R65.21]     Acute respiratory failure with hypoxia ([facility] [J96.01] 2021    Type 2 diabetes mellitus ([facility] [E11.9]     COVID-19 virus infection [U07.1] 2021       PAST MEDICAL HISTORY      Diagnosis Date    Asthma     Hyperlipidemia     Hypertension     Type 2 diabetes mellitus without complication ([facility]        PAST SURGICAL HISTORY  Past Surgical History:   Procedure Laterality Date    HERNIA REPAIR      right inguinal at age 3 years.     [place] COLONOSCOPY FLX DX W/COLLJ SPEC WHEN PFRMD N/A 10/5/2018    COLONOSCOPY performed by Lester Bishop DO at [address]   Social History     Tobacco Use    Smoking status: Former Smoker     Quit date: 2009     Years since quittin.4    Smokeless tobacco: Never Used   Substance Use Topics    Alcohol use: Yes     Comment: rare    Drug use: Not on file       ALLERGIES  No Known Allergies      MEDICATIONS  Current Medications    predniSONE  40 mg Oral Daily    Followed by   Gerlene Douglas ON 2021] predniSONE  20 mg Oral Daily    Followed by   Gerlene Douglas ON 2021] predniSONE  10 mg Oral Daily    Followed by   Gerlene Douglas ON 2021] predniSONE  5 mg Oral Daily    insulin glargine  12 Units Subcutaneous Nightly    insulin lispro  0-18 Units Subcutaneous 4 times per day    lidocaine 1 % injection  5 mL Intradermal Once    sodium chloride flush  10 mL Intravenous 2 times per day    vancomycin (VANCOCIN) intermittent dosing (placeholder)   Other RX Placeholder    vancomycin  1,500 mg Intravenous Q12H    anidulafungin  100 mg Intravenous Q24H    famotidine (PEPCID) injection  20 mg Intravenous BID    lactulose  20 g Oral TID    sodium chloride flush  10 mL Intravenous 2 times per day  heparin (porcine)  5,000 Units Subcutaneous 3 times per day     sodium chloride flush, glucose, dextrose, glucagon (rDNA), dextrose, polyvinyl alcohol, sodium chloride flush, promethazine **OR** ondansetron, polyethylene glycol, acetaminophen **OR** acetaminophen, glucose, dextrose, glucagon (rDNA), dextrose  IV Drips/Infusions   dextrose      fentaNYL 100 mcg/hr (02/14/21 0900)    dextrose      midazolam 8 mg/hr (02/14/21 0637)    cisatracurium (NIMBEX) infusion Stopped (02/14/21 1040)    epoprostenol (VELETRI) nebulization solution Stopped (02/08/21 2100)     Home Medications  No current facility-administered medications on file prior to encounter.       Current Outpatient Medications on File Prior to Encounter   Medication Sig Dispense Refill    magnesium oxide (MAG-OX) 400 MG tablet Take 400 mg by mouth daily      pantoprazole (PROTONIX) 40 MG tablet Take 40 mg by mouth daily      albuterol sulfate HFA (VENTOLIN HFA) 108 (90 Base) MCG/ACT inhaler Inhale 2 puffs into the lungs every 6 hours as needed for Wheezing      cetirizine (ZYRTEC) 10 MG tablet Take 10 mg by mouth daily      dextromethorphan-guaiFENesin (MUCINEX DM)  MG per extended release tablet Take 1 tablet by mouth every 12 hours as needed for Cough or Congestion      Multiple Vitamins-Minerals (PRESERVISION AREDS PO) Take 2 tablets by mouth nightly      acetaminophen (TYLENOL) 500 MG tablet Take 1,000 mg by mouth every 6 hours as needed for Pain or Fever      metFORMIN (GLUCOPHAGE) 500 MG tablet Take 500 mg by mouth daily (with breakfast)   3    atorvastatin (LIPITOR) 10 MG tablet Take 20 mg by mouth nightly   3    lisinopril (PRINIVIL;ZESTRIL) 2.5 MG tablet Take 2.5 mg by mouth daily      tamsulosin (FLOMAX) 0.4 MG capsule Take 0.4 mg by mouth nightly       mometasone (ASMANEX 120 METERED DOSES) 220 MCG/INH inhaler Inhale 2 puffs into the lungs daily  fluticasone (FLONASE) 50 MCG/ACT nasal spray 1 spray by Each Nare route daily      aspirin 81 MG tablet Take 81 mg by mouth daily      zinc gluconate 50 MG tablet Take 50 mg by mouth daily      Cholecalciferol (VITAMIN D3) 5000 units TABS Take 5,000 Units by mouth daily         Data         /78   Pulse 72   Temp 97.5 °F (36.4 °C) (Bladder)   Resp (!) 32   Ht 5' 10\" (1.778 m)   Wt 239 lb 3.2 oz (108.5 kg)   SpO2 91%   BMI 34.32 kg/m²     Wt Readings from Last 3 Encounters:   02/14/21 239 lb 3.2 oz (108.5 kg)   02/03/21 237 lb 14 oz (107.9 kg)   10/05/18 218 lb (98.9 kg)        Code Status: Full Code     ADVANCED CARE PLANNING:  Patient has capacity for medical decisions: no  Health Care Power of : yes- daughter Orvel Cap  Living Will: yes     Personal, Social, and Family History  Marital Status:   Living situation:Unsure  Importance of jenny/Yarsanism/spiritual beliefs: [] Very [] Somewhat [] Not   Psychological Distress: sedated on vent  Does patient understand diagnosis/treatment? not asked  Does caregiver understand diagnosis/treatment? yes      Assessment        REVIEW OF SYSTEMS  Patient is on vent and sedated    PHYSICAL ASSESSMENT:  Constitutional: Patient is on vent and sedated  Head: Normocephalic and atraumatic. Eyes: Pupils are equal, round   Neck: Neck supple. No tracheal deviation present. Cardiovascular: Normal rate and regular rhythm, S1, S2, no murmur   Pulmonary/Chest: Effort normal and breath sounds normal. No rales or wheezes. +Vent FI02 70% and peep 16. Abdomen: Soft. No tenderness, not distended, no ascites, no organomegaly +TF  Musculoskeletal: +2 BUE edema, and +1 BLE edema  Neurological: Patient is sedated, and presently not opening eyes to verbal stimuli.    Skin: Normal turgor, no bleeding, no bruising     Palliative Performance Scale: Additional Assessments:   Principal Problem:    Acute respiratory failure with hypoxia (HCC)  Active Problems:    COVID-19 virus infection    Type 2 diabetes mellitus ([facility]    Septic shock (HCC)  Resolved Problems:    * No resolved hospital problems. *    1- Symptom management/ pain control     Pain Assessment:  The patient is not having any pain. Anxiety:  none                          Dyspnea:  acute dyspnea                          Fatigue:  sedated on vent    Other: We feel the patient symptoms are being controlled. his current regimen is reviewed by myself and discussed with the staff. 2- Goals of care evaluation   The patient goals of care are live longer, improve or maintain function/quality of life and support for family/caregiver   Goals of care discussed with:    [] Patient independently    [] Patient and Family    [x] Family or Healthcare DPOA independently    [] Unable to discuss with patient, family/DPOA not present    3- Code Status  Full Code    4- Other recommendations   - We will continue to provide comfort and support to the patient and the family  Please call with any palliative questions or concerns. Palliative Care Team is available via perfect serve or via phone. Palliative Care will continue to follow Mr. Ba's care as needed. Thank you for allowing Palliative Care to participate in the care of Mr. David Veliz . This note has been dictated by dragon, typing errors may be a possibility. The total time I spent in seeing the patient, discussing goals of care, advanced directives, code status and other major issues was more than 60 minutes      Electronically signed by   Jerrie Bosworth, APRN - [facility]  Palliative Care Team  on 2/14/2021 at 11:41 AM    Palliative Care can be reached via ZoomForth.

## 2021-02-14 NOTE — PROGRESS NOTES
Pt currently proned, head turned to the left no skin breakdown noticed. ETT secured by twill. Pt tolerated well   RN Swetha POOLE assisted.  Respiratory will continue to monitor

## 2021-02-15 NOTE — PROGRESS NOTES
Occupational Therapy Not Seen Note    DATE: 2/15/2021  Name: Rosy Cedeno  : 1945  MRN: [id number]    Patient not available for Occupational Therapy due to:     Other: pt intubated and sedated, not appropriate for OT eval at this time     Next Scheduled Treatment: check back when pt able to actively participate in OT eval     Electronically signed by MARA Green on 2/15/2021 at 10:12 AM

## 2021-02-15 NOTE — PROGRESS NOTES
.Palliative Care Family Conference    Patient: Tamir Jeffries  Room: 0108/0108-01    Attended By: Danyelle Mancera, Dr. Anu Smith, Myself Marisabel Myers, Palliative Care CNP), and 4 of 5 biological children including MAYLIN Sykes. Reason for meeting:Routine meeting  Discuss goals of care  Treatment options/plans  Provide clinical updates and answer questions  Share information and provider education for the family  Listen to patient/family concerns  Assess family understanding, concerns, and coping  Interdiscplinary collaboration  Address patient/family distress  Build trust  Poor prognosis is anticipated  Provide emotional support to the family  Elicit patient's goals and values (through substituted judgment of a surrogate decision maker), and use these to establish or modify goals of care  l     Conference Summary: Dr. Anu Smith provided family with thorough medical update, and allowed time for family to ask questions. RN provided patients updated also. We discussed Day #12 on vent, and the continued high 02 needs. Family is aware that patient is following commands on sedation holidays. We discussed all three code classifications in full detail. Family reports wanting to remain hopeful that patient will turn around. We discussed if patient 02 and peep become stable that family would need to consider trach/peg. We discussed what this is, and long term possible outlook. I explained all three code classifications in full detail. We discussed resuscitative measures including CPR. We answered all questions. Support was offered to family, and time was given for family to visit patient per RN's approval.       Conclusion/Plan: Family is considering DNRCC-A code status, but would like time to communicate alone with each other. Daughter Chan Ribeiro reports that she will let RN, or writer know what they decide for code status at a later time.           Electronically signed by   KATIA Mitchell CNP  Palliative Care Team on 2/14/2021 at 7:24 PM

## 2021-02-15 NOTE — PLAN OF CARE
Problem: Skin Integrity:  Goal: Will show no infection signs and symptoms  Description: Will show no infection signs and symptoms  2/14/2021 2020 by Eloy Nelson RN  Outcome: Ongoing  2/14/2021 1838 by You Ocampo RN  Outcome: Ongoing  Goal: Absence of new skin breakdown  Description: Absence of new skin breakdown  2/14/2021 2020 by Eloy Nelson RN  Outcome: Ongoing  2/14/2021 1838 by You Ocampo RN  Outcome: Ongoing     Problem: Nutrition  Goal: Optimal nutrition therapy  Description: Nutrition Problem #1: Inadequate oral intake  Intervention: Food and/or Nutrient Delivery: (Start nutrition as able.  If TF, suggest Semi-elemental formula with goal rate of 55 mL/hr to provide 1584 kcal and 99 g pro/day.)  Nutritional Goals: meet % of estimated nutrient needs     2/14/2021 2020 by Eloy Nelson RN  Outcome: Ongoing  2/14/2021 1838 by You Ocampo RN  Outcome: Ongoing     Problem: OXYGENATION/RESPIRATORY FUNCTION  Goal: Patient will maintain patent airway  2/14/2021 2020 by Eloy Nelson RN  Outcome: Ongoing  2/14/2021 1838 by You Ocampo RN  Outcome: Ongoing  2/14/2021 0746 by Bianca Dominguez RCP  Outcome: Ongoing  Goal: Patient will achieve/maintain normal respiratory rate/effort  Description: Respiratory rate and effort will be within normal limits for the patient  2/14/2021 2020 by Eloy Nelson RN  Outcome: Ongoing  2/14/2021 1838 by You Ocampo RN  Outcome: Ongoing  2/14/2021 0746 by Bianca Dominguez RCP  Outcome: Ongoing     Problem: MECHANICAL VENTILATION  Goal: Patient will maintain patent airway  2/14/2021 2020 by Eloy Nelson RN  Outcome: Ongoing  2/14/2021 1838 by You Ocampo RN  Outcome: Ongoing  2/14/2021 0746 by Bianca Dominguez RCP  Outcome: Ongoing  Goal: Oral health is maintained or improved  2/14/2021 2020 by Eloy Nelson RN  Outcome: Ongoing  2/14/2021 1838 by You Ocampo RN  Outcome: Ongoing  2/14/2021 0746 by Bianca Dominguez RCP  Outcome: Ongoing Goal: ET tube will be managed safely  2/14/2021 1838 by Dario John RN  Outcome: Ongoing  2/14/2021 0746 by Lucy Parsons RCP  Outcome: Ongoing  Goal: Mobility/activity is maintained at optimum level for patient  2/14/2021 1838 by Dario John RN  Outcome: Ongoing  2/14/2021 0746 by Lucy Parsons RCP  Outcome: Ongoing     Problem: SKIN INTEGRITY  Goal: Skin integrity is maintained or improved  2/14/2021 1838 by Dario John RN  Outcome: Ongoing  2/14/2021 0746 by Lucy Parsons RCP  Outcome: Ongoing     Problem: Metabolic:  Goal: Ability to maintain appropriate glucose levels will improve  Description: Ability to maintain appropriate glucose levels will improve  2/14/2021 1838 by Dario John RN  Outcome: Ongoing     Problem: Nutritional:  Goal: Maintenance of adequate nutrition will improve  Description: Maintenance of adequate nutrition will improve  2/14/2021 1838 by Dario John RN  Outcome: Ongoing  Goal: Progress toward achieving an optimal weight will improve  Description: Progress toward achieving an optimal weight will improve  2/14/2021 1838 by Dario John RN  Outcome: Ongoing     Problem: Mental Status - Impaired:  Goal: Mental status will improve  Description: Mental status will improve  2/14/2021 1838 by Dario John RN  Outcome: Ongoing     Problem: Gas Exchange - Impaired  Goal: Absence of hypoxia  2/14/2021 1838 by Dario John RN  Outcome: Ongoing  Goal: Promote optimal lung function  2/14/2021 1838 by Dario John RN  Outcome: Ongoing     Problem:  Body Temperature -  Risk of, Imbalanced  Goal: Ability to maintain a body temperature within defined limits  2/14/2021 1838 by Dario John RN  Outcome: Ongoing  Goal: Will regain or maintain usual level of consciousness  2/14/2021 1838 by Dario John RN  Outcome: Ongoing  Goal: Complications related to the disease process, condition or treatment will be avoided or minimized  2/14/2021 1838 by Dario John RN  Outcome: Ongoing

## 2021-02-15 NOTE — PLAN OF CARE
Problem: OXYGENATION/RESPIRATORY FUNCTION  Goal: Patient will maintain patent airway  2/15/2021 0500 by Flako Gamez RCP  Outcome: Ongoing     Problem: OXYGENATION/RESPIRATORY FUNCTION  Goal: Patient will achieve/maintain normal respiratory rate/effort  Description: Respiratory rate and effort will be within normal limits for the patient  2/15/2021 0500 by Flako Gamez RCP  Outcome: Ongoing     Problem: MECHANICAL VENTILATION  Goal: Patient will maintain patent airway  2/15/2021 0500 by Flako Gamez RCP  Outcome: Ongoing     Problem: MECHANICAL VENTILATION  Goal: Oral health is maintained or improved  2/15/2021 0500 by Flako Gamez RCP  Outcome: Ongoing     Problem: MECHANICAL VENTILATION  Goal: ET tube will be managed safely  2/15/2021 0500 by Flako Gamez RCP  Outcome: Ongoing     Problem: MECHANICAL VENTILATION  Goal: Mobility/activity is maintained at optimum level for patient  2/15/2021 0500 by Flako Gamez RCP  Outcome: Ongoing     Problem: Gas Exchange - Impaired  Goal: Absence of hypoxia  2/15/2021 0500 by Flako Gamez RCP  Outcome: Ongoing     Problem: Gas Exchange - Impaired  Goal: Promote optimal lung function  2/15/2021 0500 by Flako Gamez RCP  Outcome: Ongoing

## 2021-02-15 NOTE — PROGRESS NOTES
[facility] -   Infectious diseases evaluation. Progress Note    admission date 2/4/2021    reason for consultation:   bandemia    Impression :   Current:  · Bandemia, likely from steroids  · COVID-19 virus pneumonia  · COVID-19 related sepsis with arts  · Septic shock  · Respiratory failure requiring mechanical ventilation  · Elevated inflammatory markers CRP, LDH      Discussion / summary of stay / plan of care   ·   Recommendations     Completed 5 days of remdesivir at the referring hospital  Post Levaquin course at the other hospital  E faecalis UTI treated with 3 days of zosyn  Out of the window for plasma  D/C isolation and transfer out of COVID unit  post vancomycin and Zosyn,   · Continues on steroids anticoagulation  · 2/12WBC 14 and low-grade fever: better  · Sp and  blood culture   · Continue anidulafungin and vancomycin , follow creatinine    Infection Control Recommendations   · [place] Precautions  · Contact Isolation   · Airborne isolation removed  · Droplet Isolation removed    Antimicrobial Stewardship Recommendations   · Targeted therapy    Coordination ofOutpatient Care:   · Estimated Length of IV antimicrobials:  · Patient will need Midline / picc Catheter Insertion:   · Patient will need SNF:  · Patient will need outpatient wound care:     History of Present Illness:       INITIAL HISTORY:    Lydia Vogel is a 70y.o.-year-old male who presented because of shortness of breath and cough that has been progressive. Started January 12 with a fever aches cough shortness of breath, did not lose the taste of food or the smell, no diarrhea no blood in the sputum. Fever was associated. Presented to the ER because of the worsening of shortness of breath. Chest x-ray showed pneumonia he tested positive for Covid. Lactic acid was 3 and the patient was desaturating so he was admitted on high flow to the ICU. To another hospital, patient was not improving despite steroids and high oxygen supplementation. Patient was intubated 2/3/2021 and transferred to [facility]. Paralyzed high oxygen requirement, 2 pressors on board.     WBC 2019  Creatinine is normal  Liver enzymes are normal    CURRENT EVALUATION :  2/15/2021   Patient Vitals for the past 8 hrs:   BP Temp Temp src Pulse Resp SpO2   02/15/21 1115    76 15 (!) 89 %   02/15/21 1100    73 (!) 0 (!) 89 %   02/15/21 1045    77 (!) 35 90 %   02/15/21 1030    77 (!) 35 90 %   02/15/21 1029     (!) 35 90 %   02/15/21 1015    77 (!) 35 90 %   02/15/21 1000    77 (!) 35 (!) 89 %   02/15/21 0945    77 (!) 35 (!) 89 %   02/15/21 0930    75 (!) 35 (!) 87 %   02/15/21 0915    75 (!) 35 (!) 87 %   02/15/21 0900    75 28 (!) 87 %   02/15/21 0857    73 (!) 34 (!) 87 %   02/15/21 0845    85 22 (!) 87 %   02/15/21 0830    79 27 (!) 87 %   02/15/21 0815    73 (!) 35 (!) 89 %   02/15/21 0800    74 (!) 35 (!) 89 %   02/15/21 0745    70 29 (!) 88 %   02/15/21 0730    78 13 90 %   02/15/21 0715    78 13 90 %   02/15/21 0700    94 16 90 %   02/15/21 0600    79 16 (!) 88 %   02/15/21 0500    77 18 (!) 89 %   02/15/21 0400 121/63 99.1 °F (37.3 °C) CORE 82 (!) 35 92 %   02/15/21 0358     (!) 35 91 %     No fever - WBC normal  Follows commands on the vent  All cx so far are neg     Continues to have 80% FiO2 and 16 of PEEP  Creat stable    Summary of relevant labs: 2/15/2021    Labs:  WBC 19-18-->14- 9 7 - 10 - 14-12.2 - 10    Cr 1.21-0.67    CRP 98  Ferritin 307  Fibrinogen 350      Micro:  Sputum culture 1-30-21 Normal aris    Blood:  · 2-4-21: No growth  · 1-19-21: No growth     Urine  2-4-21: Enterococcus faecalis     Covid +1/14    Imaging: CXR 2/11  Otherwise, severe diffuse faint parenchymal and interstitial opacities   throughout both lungs unchanged. Chest x-ray bilateral pulmonary changes with chronic changes  Ultrasound of the kidneys no hydronephrosis      I have personally reviewed the past medical history, past surgical history, medications, social history, and family history, and I haveupdated the database accordingly. Allergies:   Patient has no known allergies. Review of Systems:     Review of Systems   Unable to perform ROS: Intubated       Physical Examination :       Physical Exam  Constitutional:       General: He is not in acute distress. Appearance: He is obese. He is not ill-appearing. Interventions: He is sedated and intubated. HENT:      Head: Normocephalic and atraumatic. Nose: Nose normal.      Mouth/Throat:      Mouth: Mucous membranes are moist.   Eyes:      General: No scleral icterus. Conjunctiva/sclera: Conjunctivae normal.   Neck:      Musculoskeletal: Neck supple. Cardiovascular:      Rate and Rhythm: Normal rate and regular rhythm. Heart sounds: No murmur. No friction rub. No gallop. Pulmonary:      Effort: No respiratory distress. He is intubated. Breath sounds: Normal breath sounds. No stridor. No wheezing or rhonchi. Abdominal:      General: There is no distension. Palpations: Abdomen is soft. There is no mass. Hernia: No hernia is present. Genitourinary:     Comments: Urine is yusra  Musculoskeletal:         General: No swelling, tenderness or signs of injury. Right lower leg: No edema. Left lower leg: No edema. Skin:     Coloration: Skin is not jaundiced or pale. Findings: No bruising or erythema.    Neurological:      Comments: Intubated, very weak in general   Psychiatric:      Comments: Intubated, folowed commands, appropriate         Past Medical History:     Past Medical History:   Diagnosis Date    Asthma     Hyperlipidemia  Drug use: Not on file    Sexual activity: Not on file   Lifestyle    Physical activity     Days per week: Not on file     Minutes per session: Not on file    Stress: Not on file   Relationships    Social connections     Talks on phone: Not on file     Gets together: Not on file     Attends Faith service: Not on file     Active member of club or organization: Not on file     Attends meetings of clubs or organizations: Not on file     Relationship status: Not on file    Intimate partner violence     Fear of current or ex partner: Not on file     Emotionally abused: Not on file     Physically abused: Not on file     Forced sexual activity: Not on file   Other Topics Concern    Not on file   Social History Narrative    Not on file       Family History:     Family History   Problem Relation Age of Onset    Stroke Mother     Heart Disease Father       Medical Decision Making:   I have independently reviewed/ordered the following labs:    CBC with Differential:   Recent Labs     02/14/21  0246 02/15/21  0424   WBC 10.8 9.6   HGB 9.0* 8.7*   HCT 29.0* 28.5*   * 134*   LYMPHOPCT 4* 3*   MONOPCT 6 5     BMP:  Recent Labs     02/14/21  0246 02/15/21  0424   * 133*   K 4.3 4.2   CL 92* 92*   CO2 36* 35*   BUN 36* 28*   CREATININE 0.55* 0.57*     Hepatic Function Panel:   No results for input(s): PROT, LABALBU, BILIDIR, IBILI, BILITOT, ALKPHOS, ALT, AST in the last 72 hours. No results for input(s): RPR in the last 72 hours. No results for input(s): HIV in the last 72 hours. No results for input(s): BC in the last 72 hours. Lab Results   Component Value Date    CREATININE 0.57 02/15/2021    GLUCOSE 204 02/15/2021       Detailed results: Thank you for allowing us to participate in the care of this patient. Please call with questions. This note is created with the assistance of a speech recognition program.  While intending to generate adocument that actually reflects the content of the visit, the document can still have some errors including those of syntax and sound a like substitutions which may escape proof reading. It such instances, actual meaningcan be extrapolated by contextual diversion. Shira Shirley MD  Office: [phone]  Perfect serve / office [phone]        I have discussed the care of the patient, including pertinent history and exam findings,  with the resident. I have seen and examined the patient and the key elements of all parts of the encounter have been performed by me. I agree with the assessment, plan and orders as documented by the resident.     Yecenia Velasco, Infectious Diseases

## 2021-02-15 NOTE — PLAN OF CARE
Problem: Skin Integrity:  Goal: Will show no infection signs and symptoms  Description: Will show no infection signs and symptoms  2/15/2021 1029 by Sharl Reusing, RCP  Outcome: Ongoing  Goal: Absence of new skin breakdown  Description: Absence of new skin breakdown  2/15/2021 1029 by Sharl Reusing, RCP  Outcome: Ongoing     Problem: OXYGENATION/RESPIRATORY FUNCTION  Goal: Patient will maintain patent airway  2/15/2021 1029 by Sharl Reusing, RCP  Outcome: Ongoing  2/15/2021 0500 by Jade Paige, RCP  Outcome: Ongoing  Goal: Patient will achieve/maintain normal respiratory rate/effort  Description: Respiratory rate and effort will be within normal limits for the patient  2/15/2021 1029 by Sharl Redarcy, RCP  Outcome: Ongoing  2/15/2021 0500 by Jade Paige, RCP  Outcome: Ongoing     Problem: MECHANICAL VENTILATION  Goal: Patient will maintain patent airway  2/15/2021 1029 by Sharl Reusing, RCP  Outcome: Ongoing  2/15/2021 0500 by Jade Paige, RCP  Outcome: Ongoing  Goal: Oral health is maintained or improved  2/15/2021 1029 by Sharl Reusing, RCP  Outcome: Ongoing  2/15/2021 0500 by Jade Paige, RCP  Outcome: Ongoing  Goal: ET tube will be managed safely  2/15/2021 1029 by Pool Redarcy, RCP  Outcome: Ongoing  2/15/2021 0500 by Jade Paige, RCP  Outcome: Ongoing  Goal: Mobility/activity is maintained at optimum level for patient  2/15/2021 1029 by Sharl Reusing, RCP  Outcome: Ongoing  2/15/2021 0500 by Jade Paige, RCP  Outcome: Ongoing     Problem: SKIN INTEGRITY  Goal: Skin integrity is maintained or improved  2/15/2021 1029 by Sharl Reusing, RCP  Outcome: Ongoing     Problem: Gas Exchange - Impaired  Goal: Absence of hypoxia  2/15/2021 1029 by Sharl Redarcy, RCP  Outcome: Ongoing  2/15/2021 0500 by Jade Paige, RCP  Outcome: Ongoing  Goal: Promote optimal lung function  2/15/2021 1029 by Sharl Reusing, RCP  Outcome: Ongoing  2/15/2021 0500 by Jade Paige, RCP

## 2021-02-15 NOTE — PROGRESS NOTES
..    Palliative Care Progress Note    NAME:  Kyle Nickerson  RECORD NUMBER:  [id number]  AGE: 70 y.o. GENDER: male  : 1945   TODAY'S DATE:  2/15/2021      Reason for Consult:  goals of care, code status,  and support. History of Present Illness     The patient is a 70 y.o. Non-/non  male who presents with No chief complaint on file. Referred to Palliative Care by  [x] Physician   [] Nursing  [] Family Request   [] Other:       He was admitted to the primary service for Acute respiratory failure with hypoxia ([facility] [address] [J96.01]. His hospital course has been associated with Acute respiratory failure with hypoxia ([address] elevated inflammatory markers, Covid-19 pneumonia, Septic Shock, UTI, malnutrition, hyponatremia, and hyperkalemia. The patient has a complicated medical history and has been hospitalized since 2021 12:53 AM. Patient remains off Nimbex, but sedation continues and RN reports patient being unable to tolerate sedation holiday earlier today. Fi02 increased to 80%, with continued hypoxia. Kidneys WNL, and Nephrology is signing off. WBC decreased, and highest temp 99.7 in last 24 hours. Palliative care following for review of goals, code status, and support. OVERNIGHT EVENTS: Increased sedation, and Fi02 with continued hypoxia and tachypnea. 2/15 pertinent labs include; PCO2 68.2, PO2 65.5, HCO3 39.4, O2 sat 91%, hemoglobin 8.7, platelets 484, BUN 28, sodium 133, and CO2 35. /63   Pulse 73   Temp 99.1 °F (37.3 °C) (Core)   Resp (!) 34   Ht 5' 10\" (1.778 m)   Wt 240 lb 11.9 oz (109.2 kg)   SpO2 (!) 87%   BMI 34.54 kg/m²     Assessment        REVIEW OF SYSTEMS    [x]   UNABLE TO OBTAIN: Patient is intubated and sedated.     Constitutional:  []   Chills   []  Fatigue   []  Fevers   []  Malaise   []  Weight loss   [] Other:     Respiratory:   []  Cough    []  Shortness of breath    []  Chest pain    [] Other:     Cardiovascular: []  Chest pain  []  Dyspnea    []  Exertional chest pressure/discomfort     [] Fatigue      []  Palpitations    []  Syncope   [] Other:     Gastrointestinal:   []  Abdominal pain   []  Constipation    []  Diarrhea    []   Dysphagia   []  Reflux             []  Vomiting   [] Other:     Genitourinary:  []  Dysuria     []  Frequency   []  Hematuria   [] Nocturia   []  Urinary incontinence   [] Other:     Musculoskeletal:   [] Back pain    []  Muscle weakness   []  Myalgias    []  Neck pain   []  Stiff joints   []  Other:     Behavioral/Psych:   [] Anxiety    []  Depression     []  Mood swings   [] Other:     PHYSICAL ASSESSMENT:     General: []  Oriented x3      [] well appearing      [x] Intubated      [] ill appearing      [] Other:    Mental Status: [] normal mental status exam      [x] drowsy      [] Confused      [] Other:     Cardiovascular: [x]  Regular rate/rhythm      [] Arrhythmia      [] Other:     Chest: [x] Effort normal      [] lungs clear      [] respiratory distress      [] Tachypnea      [x]  Other: Rhonchi/diminished    Abdomen: [x] Soft/non-tender      [x]  Normal appearance      [] Distended      [] Ascites      [] Other:    Neurological: [] Normal Speech      [] Normal Sensation      [x]  Deficits present: Sedated    Extremity:  [] normal skin color/temp      [] clubbing/cyanosis      []  No edema      [x] Other: +1 BLE edema/Pale    Palliative Performance Scale:  ___60%  Ambulation reduced; Significant disease; Can't do hobbies/housework; intake normal or reduced; occasional assist; LOC full/confusion  ___50%  Mainly sit/lie; Extensive disease; Can't do any work; Considerable assist; intake normal or reduced; LOC full/confusion  ___40%  Mainly in bed; Extensive disease; Mainly assist; intake normal or reduced; LOC full/confusion   ___30%  Bed Bound; Extensive disease; Total care; intake reduced; LOCfull/confusion  ___20%  Bed Bound; Extensive disease;  Total care; intake minimal; Drowsy/coma _x__10%  Bed Bound; Extensive disease; Total care; Mouth care only; Drowsy/coma  ___0       Death      Plan      Palliative Interaction: I visited patient, and he remains on vent and sedated. RN William Arreguin at bedside, and provided update. Patient remains off Nimbex paralytic, but is requiring a lot of sedation. RN reports inability to do sedation holiday holiday this a.m. due to patient not tolerating. Overnight patient has had increased oxygen needs. [place] remains with adequate urine output. Tube feeds continue, and tolerated. Patient's SPO2 90% when visiting, and RN suction patient. I informed RN that I would reach out to Deniz Randle Dr patient's daughter, and follow-up from family meeting yesterday. I reached out to Tex Pollard, and introduced myself and reminded her that I was from palliative care. Tex Pollard reports talking to her 4 siblings that were present at family meeting yesterday, and all wanting patient to be a DNR CCA. She reports her fifth sibling arriving home last evening, and wanting to to further discuss with him. She reports once discussed with him, then she will call RN to update. Tex Pollard reports not receiving update today, and I provided her with my assessment and RN's update. She denied any questions at this time. Tex Pollard reports sister-in-law in medical field, and that she has a cousin that is a doctor assisting them with understanding/decisions. I offered Tex Pollard our contact information, and much emotional support at this time.      Education/support to staff  Education/support to family  Communications with primary service  Providing support for coping/adaptation/distress of family  Continue with current plan of care  Clarification of medical condition to patient and family  Code status clarified: Full Code  Code status clarified: [facility][1]  Code status clarified: [facility]  Validating patient/family distress  Continued communication updates [facility] [employer] [facility]. POA reports fifth sibling arrived home last evening, and all children considering DNR CCA CODE STATUS. Giovanna Carranza reports that she will call RN once they have decided today. Principle Problem/Diagnosis:  Acute respiratory failure with hypoxia (HCC)    Additional Assessments:  Principal Problem:    Acute respiratory failure with hypoxia (HCC)  Active Problems:    COVID-19 virus infection    Type 2 diabetes mellitus ([address]    Septic shock (HCC)    Palliative care encounter    Goals of care, counseling/discussion    DNR (do not resuscitate) discussion  Resolved Problems:    * No resolved hospital problems. *    1- Symptom management/ pain control     Pain Assessment:  The patient is not having any pain. Anxiety:  none                          Dyspnea:  acute dyspnea                          Fatigue:  Sedated and on vent. Other: We feel the patient symptoms are being controlled. his current regimen is reviewed by myself and discussed with the staff. 2- Goals of care evaluation   The patient goals of care are live longer, improve or maintain function/quality of life and support for family/caregiver   Goals of care discussed with:    [] Patient independently    [] Patient and Family    [x] Family or Healthcare DPOA independently    [] Unable to discuss with patient, family/DPOA not present    3- Code Status  Full Code    4- Other recommendations  - We will continue to provide comfort and support to the patient and the family    Please call with any palliative questions or concerns. Palliative Care Team is available via perfect serve or via phone. Palliative Care will continue to follow Mr. Ba's care as needed. The note has been dictated by dragon, typing errors may be a possibility     Thank you for allowing Palliative Care to participate in the care of Mr. William Quiroga .        Electronically signed by   KATIA Lackey - CNP  Palliative Care Team  on 2/15/2021 at 10:27 AM Palliative care can be reached via perfect serve.

## 2021-02-15 NOTE — PROGRESS NOTES
Critical Care Team - Daily Progress Note      Date and time: 2/15/2021 3:43 PM  Patient's name:  Beti Gorman  Medical Record Number: [id number]  Patient's account/billing number: [de-identified]  Patient's YOB: 1945  Age: 70 y.o. Date of Admission: 2/4/2021 12:53 AM  Length of stay during current admission: 11  Primary Care Physician: Isa Ortega MD  ICU Attending Physician: Beck Calabrese MD    Code Status: Full Code  Reason for ICU admission: Shortness of breath      Subjective:     OVERNIGHT EVENTS:         No acute overnight issues. Family still deciding on DNR [facility] CODE STATUS changed. Patient not verbalized yesterday, no proning done. Current PRVC 35/550/16/80%  Currently on fentanyl and Versed. Without sedation, mild stimulation causing increased sympathetic activity with hypertension. Good urine output, hypokalemia, hyper natremia resolved. BRIEF SUMMARY:  Patient was admitted for shortness of breath, cough, and dyspnea since January 4th. Was diagnosed with Covid19 Pneumonia. He continued to worsen and was admitted to the ICU for further management. He was started on high flow and BiPAP.  Subsequently treated with 5 days of intravenous remdesivir. Patient needed intubation on 2/3/2021.      At this time patient is on ARDS protocol, inhalational epoprostenol and is pronated and paralyzed.  Et tube reinserted on 2/11/2021    AWAKE & FOLLOWING COMMANDS:  [x] No   [] Yes    CURRENT VENTILATION STATUS:     [x] Ventilator  [] BIPAP  [] Nasal Cannula [] Room Air      SEDATION:  RAAS Score:  [] Propofol gtt  [x] Versed gtt  [] Ativan gtt   [] No Sedation    PARALYZED:   [x] No    [] Yes    VASOPRESSORS:   [] Yes     [x] No   If yes -   [] Levophed       [] Dopamine     [] Vasopressin       [] Dobutamine  [] Phenylephrine         [] Epinephrine    CENTRAL LINES:      [x] Yes (Date of Insertion:   )    [] No           If yes -  PICC line 2/12 [] Right Internal Jugular [] Left Internal Jugular [] Right Femoral   [] Left Femoral [] Right Subclavian  [] Left Subclavian     SMALLWOOD'S CATHETER:    [] No   [x] Yes  (Date of Insertion:   )     URINE OUTPUT:   [x] Good  [] Low  [] Anuric    REVIEW OF SYSTEMS  Unable to obtain    OBJECTIVE:     VITAL SIGNS:  /63   Pulse 77   Temp 99.1 °F (37.3 °C) (Core)   Resp (!) 35   Ht 5' 10\" (1.778 m)   Wt 240 lb 11.9 oz (109.2 kg)   SpO2 91%   BMI 34.54 kg/m²   Tmax over 24 hours:  Temp (24hrs), Av.6 °F (37.6 °C), Min:99.1 °F (37.3 °C), Max:99.9 °F (37.7 °C)      Patient Vitals for the past 8 hrs:   Pulse Resp SpO2   02/15/21 1315 77 (!) 35 91 %   02/15/21 1300 76 21 (!) 89 %   02/15/21 1245 78 (!) 35 (!) 89 %   02/15/21 1230 79 (!) 35 (!) 88 %   02/15/21 1215 82 26 91 %   02/15/21 1200 82 11 (!) 89 %   02/15/21 1145 81 (!) 3 (!) 89 %   02/15/21 1130 80 (!) 6 (!) 89 %   02/15/21 1115 76 15 (!) 89 %   02/15/21 1100 73 (!) 0 (!) 89 %   02/15/21 1045 77 (!) 35 90 %   02/15/21 1030 77 (!) 35 90 %   02/15/21 1029  (!) 35 90 %   02/15/21 1015 77 (!) 35 90 %   02/15/21 1000 77 (!) 35 (!) 89 %   02/15/21 0945 77 (!) 35 (!) 89 %   02/15/21 0930 75 (!) 35 (!) 87 %   02/15/21 0915 75 (!) 35 (!) 87 %   02/15/21 0900 75 28 (!) 87 %   02/15/21 0857 73 (!) 34 (!) 87 %   02/15/21 0845 85 22 (!) 87 %   02/15/21 0830 79 27 (!) 87 %   02/15/21 0815 73 (!) 35 (!) 89 %   02/15/21 0800 74 (!) 35 (!) 89 %   02/15/21 0745 70 29 (!) 88 %         Intake/Output Summary (Last 24 hours) at 2/15/2021 1543  Last data filed at 2/15/2021 1200  Gross per 24 hour   Intake 2125 ml   Output 1030 ml   Net 1095 ml     Date 02/15/21 0000 - 02/15/21 2359   Shift 9729-0460 1314-8671 9297-8896 24 Hour Total   INTAKE   I.V.(mL/kg) 552(5.1) 500(4.6)  1052(9.6)   NG/GT(mL/kg) 250(2.3) 235(2.2)  485(4.4)   Shift Total(mL/kg) 802(7.3) 735(6.7)  1537(14.1)   OUTPUT   Urine(mL/kg/hr) 355(0.4) 325  680   Shift Total(mL/kg) 355(3.3) 325(3)  680(6.2) Weight (kg) 109.2 109.2 109.2 109.2     Wt Readings from Last 3 Encounters:   02/14/21 240 lb 11.9 oz (109.2 kg)   02/03/21 237 lb 14 oz (107.9 kg)   10/05/18 218 lb (98.9 kg)     Body mass index is 34.54 kg/m². PHYSICAL EXAM:  Constitutional: Intubated and sedated. HEENT: PERRLA. Atraumatic. ET tube in place. Respiratory:  Bilateral decreased breath sounds with inspiratory wet rales. No wheezing. Cardiovascular: regular rate and rhythm, normal S1, S2  Abdomen: soft, no grimacing to palpation, nondistended, no masses or organomegaly  Neurological: Intubated, sedated. No sedation holiday today. Extremities:  peripheral pulses normal, no pedal edema.     VENT SETTINGS (Comprehensive) (if applicable):  Vent Information  $Ventilation: $Subsequent Day  Skin Assessment: Clean, dry, & intact  Suction Catheter Diameter: 14  Equipment ID: [id number] #[id number]  Equipment Changed: Expiratory Filter  Vent Type: Servo i  Vent Mode: PRVC  Vt Ordered: 550 mL  Rate Set: 35 bmp  FiO2 : 80 %  SpO2: 91 %  SpO2/FiO2 ratio: 112.5  Sensitivity: 3  PEEP/CPAP: 16  I Time/ I Time %: 0.65 s  Humidification Source: Heated wire  Humidification Temp: 37  Humidification Temp Measured: 37  Circuit Condensation: Drained  Nitric Oxide/Epoprostenol In Use?: Yes  NO Set: 5  NO Analyzed: 5.5 ppm  NO2 Analyzed: 0.2 ppm  Additional Respiratory  Assessments  Pulse: 77  Resp: (!) 35  SpO2: 91 %  End Tidal CO2: 33  Position: Semi-Hogan's  Humidification Source: Heated wire  Humidification Temp: 37  Circuit Condensation: Drained  Oral Care Completed?: Yes  Oral Care: Mouthwash, Mouth swabbed, Mouth suctioned  Subglottic Suction Done?: Yes    ABGs:   Lab Results   Component Value Date    [id number] 42 02/15/2021    FIO2 NOT REPORTED 02/15/2021     Lactic Acid: No results found for: LACTA    DATA:  Complete Blood Count:   Recent Labs     02/13/21  0400 02/14/21  0246 02/15/21  0424   WBC 12.2* 10.8 9.6   HGB 9.8* 9.0* 8.7*   MCV 88.8 87.3 88.8 PLT See Reflexed IPF Result 132* 134*   RBC 3.57* 3.32* 3.21*   HCT 31.7* 29.0* 28.5*   MCH 27.5 27.1 27.1   MCHC 30.9 31.0 30.5   RDW 14.6* 14.6* 14.8*   MPV NOT REPORTED 9.6 9.7        PT/INR:    Lab Results   Component Value Date    PROTIME 14.0 02/03/2021    INR 1.1 02/03/2021     PTT:    Lab Results   Component Value Date    APTT 34.5 02/03/2021       Basal Metabolic Profile:   Recent Labs     02/13/21  1649 02/14/21  0246 02/15/21  0424   * 132* 133*   K 4.7 4.3 4.2   BUN 40* 36* 28*   CREATININE 0.65* 0.55* 0.57*   CL 90* 92* 92*   CO2 35* 36* 35*      LFTS  No results for input(s): ALKPHOS, ALT, AST, BILITOT, BILIDIR, LABALBU in the last 72 hours.     MEDICATIONS:  Scheduled Meds:   predniSONE  40 mg Oral Daily    Followed by   Colette Nelson ON 2/17/2021] predniSONE  20 mg Oral Daily    Followed by   Colette Nelson ON 2/20/2021] predniSONE  10 mg Oral Daily    Followed by   Colette Nelson ON 2/23/2021] predniSONE  5 mg Oral Daily    amLODIPine  10 mg Per NG tube Daily    insulin glargine  12 Units Subcutaneous Nightly    insulin lispro  0-18 Units Subcutaneous 4 times per day    lidocaine 1 % injection  5 mL Intradermal Once    sodium chloride flush  10 mL Intravenous 2 times per day    vancomycin (VANCOCIN) intermittent dosing (placeholder)   Other RX Placeholder    vancomycin  1,500 mg Intravenous Q12H    anidulafungin  100 mg Intravenous Q24H    famotidine (PEPCID) injection  20 mg Intravenous BID    lactulose  20 g Oral TID    sodium chloride flush  10 mL Intravenous 2 times per day    heparin (porcine)  5,000 Units Subcutaneous 3 times per day     Continuous Infusions:   dextrose      fentaNYL 150 mcg/hr (02/15/21 1031)    dextrose      midazolam 6 mg/hr (02/15/21 0800)    cisatracurium (NIMBEX) infusion Stopped (02/14/21 1040)    epoprostenol (VELETRI) nebulization solution Stopped (02/08/21 2100)     PRN Meds:       labetalol, 10 mg, Q6H PRN      sodium chloride flush, 10 mL, PRN   glucose, 15 g, PRN      dextrose, 12.5 g, PRN      glucagon (rDNA), 1 mg, PRN      dextrose, 100 mL/hr, PRN      polyvinyl alcohol, 1 drop, PRN      sodium chloride flush, 10 mL, PRN      promethazine, 12.5 mg, Q6H PRN    Or      ondansetron, 4 mg, Q6H PRN      polyethylene glycol, 17 g, Daily PRN      acetaminophen, 650 mg, Q6H PRN    Or      acetaminophen, 650 mg, Q6H PRN      glucose, 15 g, PRN      dextrose, 12.5 g, PRN      glucagon (rDNA), 1 mg, PRN      dextrose, 100 mL/hr, PRN          ASSESSMENT:     Principal Problem:    Acute respiratory failure with hypoxia (HCC)  Active Problems:    COVID-19 virus infection    Type 2 diabetes mellitus (HCC)    Septic shock (HCC)    Palliative care encounter    Goals of care, counseling/discussion    DNR (do not resuscitate) discussion  Resolved Problems:    * No resolved hospital problems. *      PLAN:     Severe ARDS due to Covid 19 Pneumonia  - No proning and no paralytics for today as well. - Continue epoprostenol.  -Completed remdesivir, Decadron 10 doses. -COVID-19 symptoms started 1/14/2021, hospitalized 1/20 with BiPAP for 14 days and then transferred to [facility] and intubated on 2/3/2021.     YAA secondary to shock-  Resolved. Hypernatremia - resolved. Nephro signed off. Shock- resolved, and off pressors. Type 2 DM- Lantus 12 units and high-dose sliding scale, blood glucose control with monitor POC glucose.     DVT ppx - Heparin 5000 TID  Bowel regimen: Lactulose  Urine output: [place] in. Good urine output. CODE STATUS: Family still deciding for CODE STATUS changed to DNR [facility] palliative care following. Saumya Hebert MD, PGY-2  Internal Medicine Residency Program  [facility]  2/15/2021 3:43 PM

## 2021-02-15 NOTE — PROGRESS NOTES
NEPHROLOGY PROGRESS NOTE      SUBJECTIVE     Talia Lozoya has been dosed continued. His potassium has been stable and normal.  Renal function at baseline. Has decent diuresis. Tolerating tube feedings well. Continues on mechanical ventilation. OBJECTIVE     Vitals:    02/15/21 0500 02/15/21 0600 02/15/21 0700 02/15/21 0857   BP:       Pulse: 77 79 92 73   Resp: 18 16 11 (!) 34   Temp:       TempSrc:       SpO2: (!) 89% (!) 88% 90% (!) 87%   Weight:       Height:         24HR INTAKE/OUTPUT:      Intake/Output Summary (Last 24 hours) at 2/15/2021 1029  Last data filed at 2/15/2021 0800  Gross per 24 hour   Intake 2185 ml   Output 1120 ml   Net 1065 ml       General appearance:  On mechanical ventilation  Extremities: Present,Lower extremity edema  Neurological: Mechanical ventilation      MEDICATIONS     Scheduled Meds:    predniSONE  40 mg Oral Daily    Followed by   Celio Head ON 2/17/2021] predniSONE  20 mg Oral Daily    Followed by   Celio Head ON 2/20/2021] predniSONE  10 mg Oral Daily    Followed by   Celio Head ON 2/23/2021] predniSONE  5 mg Oral Daily    amLODIPine  10 mg Per NG tube Daily    insulin glargine  12 Units Subcutaneous Nightly    insulin lispro  0-18 Units Subcutaneous 4 times per day    lidocaine 1 % injection  5 mL Intradermal Once    sodium chloride flush  10 mL Intravenous 2 times per day    vancomycin (VANCOCIN) intermittent dosing (placeholder)   Other RX Placeholder    vancomycin  1,500 mg Intravenous Q12H    anidulafungin  100 mg Intravenous Q24H    famotidine (PEPCID) injection  20 mg Intravenous BID    lactulose  20 g Oral TID    sodium chloride flush  10 mL Intravenous 2 times per day    heparin (porcine)  5,000 Units Subcutaneous 3 times per day     Continuous Infusions:    dextrose      fentaNYL 125 mcg/hr (02/15/21 0253)    dextrose      midazolam 6 mg/hr (02/15/21 0800)    cisatracurium (NIMBEX) infusion Stopped (02/14/21 1040) CALCIUM 8.4* 8.4* 8.6       Last 3 CBC:  Recent Labs     02/13/21  0400 02/14/21  0246 02/15/21  0424   WBC 12.2* 10.8 9.6   RBC 3.57* 3.32* 3.21*   HGB 9.8* 9.0* 8.7*   HCT 31.7* 29.0* 28.5*   MCV 88.8 87.3 88.8   MCH 27.5 27.1 27.1   MCHC 30.9 31.0 30.5   RDW 14.6* 14.6* 14.8*   PLT See Reflexed IPF Result 132* 134*   MPV NOT REPORTED 9.6 9.7       ASSESSMENT     1. Hyperkalemia resolved  2. COVID-19 pneumonia  3. Ventilatory dependent respiratory failure  4. Anemia    PLAN     Discussed with ICU. We will sign off. Continue tube feedings.     Please do not hesitate to call with questions    This note is created with the assistance of a speech-recognition program. While intending to generate a document that actually reflects the content of the visit, no guarantees can be provided that every mistake has been identified and corrected by editing    Sachi Lentz MD, MRCP Kathe Hernandez, [address]   2/15/2021 10:29 AM  [facility]

## 2021-02-15 NOTE — PLAN OF CARE
BRONCHOSPASM/BRONCHOCONSTRICTION       Problem: Skin Integrity:  Goal: Will show no infection signs and symptoms  Description: Will show no infection signs and symptoms  Outcome: Ongoing  Goal: Absence of new skin breakdown  Description: Absence of new skin breakdown  Outcome: Ongoing     Problem: OXYGENATION/RESPIRATORY FUNCTION  Goal: Patient will maintain patent airway  2/15/2021 1029 by Steve Douglass RCP  Outcome: Ongoing  2/15/2021 0500 by Amadou Lisa RCP  Outcome: Ongoing  Goal: Patient will achieve/maintain normal respiratory rate/effort  Description: Respiratory rate and effort will be within normal limits for the patient  2/15/2021 1029 by Steve Douglass RCP  Outcome: Ongoing  2/15/2021 0500 by Amadou Lisa RCP  Outcome: Ongoing     Problem: MECHANICAL VENTILATION  Goal: Patient will maintain patent airway  2/15/2021 1029 by Steve Douglass RCP  Outcome: Ongoing  2/15/2021 0500 by Amadou Lisa RCP  Outcome: Ongoing  Goal: Oral health is maintained or improved  2/15/2021 1029 by Steve Douglass RCP  Outcome: Ongoing  2/15/2021 0500 by Amadou Lisa RCP  Outcome: Ongoing  Goal: ET tube will be managed safely  2/15/2021 1029 by Steve Douglass RCP  Outcome: Ongoing  2/15/2021 0500 by Amadou Lisa RCP  Outcome: Ongoing  Goal: Mobility/activity is maintained at optimum level for patient  2/15/2021 1029 by Steve Douglass RCP  Outcome: Ongoing  2/15/2021 0500 by Amadou Lisa RCP  Outcome: Ongoing     Problem: SKIN INTEGRITY  Goal: Skin integrity is maintained or improved  Outcome: Ongoing     Problem: Gas Exchange - Impaired  Goal: Absence of hypoxia  2/15/2021 1029 by Steve Douglass RCP  Outcome: Ongoing  2/15/2021 0500 by Amadou Lisa RCP  Outcome: Ongoing  Goal: Promote optimal lung function  2/15/2021 1029 by Steve Douglass RCP  Outcome: Ongoing  2/15/2021 0500 by Amadou Lisa RCP  Outcome: Ongoing            IMPROVE AERATION/BREATH SOUNDS DAMARIS DEVLIN contacted Macarena at [facility] for status update on transfer.  Macarena states she is still waiting for authorization from insurance provider.    DAMARIS DEVLIN in the process of contacting insurance provider   [x]   ADMINISTER BRONCHODILATOR THERAPY AS APPROPRIATE  [x]   ASSESS BREATH SOUNDS  [x]   IMPLEMENT AEROSOL/MDI PROTOCOL  [x]   PATIENT EDUCATION AS NEEDED   PROVIDE ADEQUATE OXYGENATION WITH ACCEPTABLE SP02/ABG'S    [x]  IDENTIFY APPROPRIATE OXYGEN THERAPY  [x]   MONITOR SP02/ABG'S AS NEEDED   [x]   PATIENT EDUCATION AS NEEDED   MECHANICAL VENTILATION     [x]  PROVIDE OPTIMAL VENTILATION  [x]   ASSESS FOR EXTUBATION READINESS  [x]   ASSESS FOR WEANING READINESS  [x]  EXTUBATE AS TOLERATED  [x]  IMPLEMENT ADULT MECHANICAL VENTILATION PROTOCOL  [x]  MAINTAIN ADEQUATE OXYGENATION  [x]  PERFORM SPONTANEOUS WEANING TRIAL AS TOLERATED

## 2021-02-15 NOTE — PROGRESS NOTES
Physical Therapy    Facility/Department: [address]  Initial Assessment    NAME: Lucian Arredondo  : 1945  MRN: [id number]    Date of Service: 2/15/2021    Discharge Recommendations:    CTA  PT Equipment Recommendations  Other: CTA    Assessment   Body structures, Functions, Activity limitations: Decreased functional mobility   Assessment: PROM x4, 10 reps. Pt would benefit from continued acute PT  Prognosis: Guarded  Decision Making: Low Complexity  PT Education: Plan of Care  Patient Education: no response  REQUIRES PT FOLLOW UP: Yes  Activity Tolerance  Activity Tolerance: Patient Tolerated treatment well       Patient Diagnosis(es): There were no encounter diagnoses. has a past medical history of Asthma, Hyperlipidemia, Hypertension, and Type 2 diabetes mellitus without complication ([facility] [address] has a past surgical history that includes hernia repair and pr colonoscopy flx dx w/collj spec when pfrmd (N/A, 10/5/2018). Restrictions  Restrictions/Precautions  Restrictions/Precautions: General Precautions  Required Braces or Orthoses?: No  Position Activity Restriction  Other position/activity restrictions: L radial art line, up with assist  Vision/Hearing        Subjective  General  Chart Reviewed: Yes  Patient assessed for rehabilitation services?: Yes  Response To Previous Treatment: Not applicable  Family / Caregiver Present: No  Follows Commands: Impaired(none, sedated)  Subjective  Subjective: RN agreeable to PROM.  Pt unable to respond, no apparent distress during session or change in vitals  Pain Screening  Patient Currently in Pain: Other (comment)(CPOT)     Pre Treatment Pain Screening  Intervention List: Patient able to continue with treatment    Orientation     Social/Functional History   unable to obtain  Cognition    int, sed    Objective          PROM RLE (degrees)  RLE PROM: WFL  PROM LLE (degrees)  LLE PROM: WFL  PROM RUE (degrees)  RUE PROM: WFL  PROM LUE (degrees)  LUE PROM: [facility] Exercises  Comments: PROM x4 10 reps. calf stretch 3x 30sec. none L wrist d/t art line. Plan   Plan  Times per week: 2-3x/wk  Current Treatment Recommendations: Strengthening, ROM  Safety Devices  Type of devices: (left as when arrived, ROM only performed,)  Restraints  Initially in place: (4 rails)    AM-PAC Score     AM-PAC Inpatient Mobility without Stair Climbing Raw Score : 5 (02/15/21 1316)  AM-PAC Inpatient without Stair Climbing T-Scale Score : 23.59 (02/15/21 1316)  Mobility Inpatient CMS 0-100% Score: 100 (02/15/21 1316)  Mobility Inpatient without Stair CMS G-Code Modifier : CN (02/15/21 1316)       Goals  Short term goals  Time Frame for Short term goals: 10 visits  Short term goal 1: Pt will not obtain contractures.   Short term goal 2: Pt will tolerate 30min PT  Short term goal 3: Pt will progress with mobility as able at appropriate       Therapy Time   Individual Concurrent Group Co-treatment   Time In 1256         Time Out 1311         Minutes 15         Timed Code Treatment Minutes: 8 Minutes       Cristiano Fraction, PT

## 2021-02-15 NOTE — PROGRESS NOTES
Comprehensive Nutrition Assessment    Type and Reason for Visit:  Reassess    Nutrition Recommendations/Plan:   1. Continue Current TF regimen Renal @ goal 30 mL/hr continuous + Proteinex BID to provide 1504 kcal/day, 110 g/day protein   - If proning, 20 mL/hr x 16 hrs while prone and 50 mL/hr x 8 hrs while supine. 2. Will continue to monitor TF adequacy/tolerance    Nutrition Assessment:  Pt remains on vent. TF Renal running @ goal 30 mL/hr. x 24 hrs + Proteinex BID. Pt tolerating TF with minimal residuals noted. Last BM 2/14 per RN. Meds: prednisone, lantus, humalog. Labs: Na 133 mmol/L, Glu 204 mg/dL. Noted K levels WNL and will reassess TF regimen if K continues to improve.     Malnutrition Assessment:  Malnutrition Status:  Insufficient data    Context:  Acute Illness       Estimated Daily Nutrient Needs:  Energy (kcal):  1551-1317 kcal/day; Weight Used for Energy Requirements:  Admission     Protein (g):  110 g pro/day; Weight Used for Protein Requirements:  Ideal(1.5)          Nutrition Related Findings:  +1 generalized edema      Current Nutrition Therapies:    Current Tube Feeding (TF) Orders:  · Feeding Route: Orogastric  · Formula: Renal  · Current TF & Flush Orders Provides: Renal @ goal 30 mL/hr continuous + Proteinex BID to provide 1504 kcal/day, 110 g/day protein  · Goal TF & Flush Orders Provides: Renal @ goal 30 mL/hr continuous + Proteinex BID to provide 1504 kcal/day, 110 g/day protein      Anthropometric Measures:  · Height: 5' 10\" (177.8 cm)  · Current Body Weight: 240 lb 11.9 oz (109.2 kg)   · Admission Body Weight: 237 lb (107.5 kg)    · Usual Body Weight:       · Ideal Body Weight: 166 lbs  · BMI: 34.5  · BMI Categories: Obese Class 2 (BMI 35.0 -39.9)       Nutrition Diagnosis:   · Inadequate oral intake related to impaired respiratory function as evidenced by NPO or clear liquid status due to medical condition, nutrition support - enteral nutrition      Nutrition Interventions: Food and/or Nutrient Delivery:  Continue NPO, Continue Current Tube Feeding  Nutrition Education/Counseling:  No recommendation at this time   Coordination of Nutrition Care:  Continue to monitor while inpatient    Goals:  meet % of estimated nutrient needs       Nutrition Monitoring and Evaluation:   Behavioral-Environmental Outcomes:  None Identified   Food/Nutrient Intake Outcomes:  Enteral Nutrition Intake/Tolerance  Physical Signs/Symptoms Outcomes:  Biochemical Data, Nutrition Focused Physical Findings, Skin, Weight     Discharge Planning:     Too soon to determine     Electronically signed by Yi Mena MS, RD, LD on 2/15/21 at 1:02 PM EST    Contact: [phone]

## 2021-02-16 PROBLEM — J12.82 PNEUMONIA DUE TO COVID-19 VIRUS: Status: ACTIVE | Noted: 2021-01-01

## 2021-02-16 NOTE — PROGRESS NOTES
[facility] -   Infectious diseases evaluation. Progress Note    admission date 2/4/2021    reason for consultation:   bandemia    Impression :   Current:  · Bandemia, likely from steroids  · COVID-19 virus pneumonia  · COVID-19 related sepsis with arts  · Septic shock  · Respiratory failure requiring mechanical ventilation  · Elevated inflammatory markers CRP, LDH      Discussion / summary of stay / plan of care   ·   Recommendations     Completed 5 days of remdesivir at the referring hospital  Post Levaquin course at the other hospital  E faecalis UTI treated with 3 days of zosyn  Out of the window for plasma  D/C isolation and transfer out of COVID unit  post vancomycin and Zosyn,   · Continues on steroids anticoagulation  · 2/12WBC 14 and low-grade fever: Improved  · Sp and  blood culture, all negative  · Continue anidulafungin   · 2/16 stop vancomycin    Infection Control Recommendations   · [place] Precautions  · Contact Isolation   · Airborne isolation removed  · Droplet Isolation removed    Antimicrobial Stewardship Recommendations   · Targeted therapy    Coordination ofOutpatient Care:   · Estimated Length of IV antimicrobials:  · Patient will need Midline / picc Catheter Insertion:   · Patient will need SNF:  · Patient will need outpatient wound care:     History of Present Illness:       INITIAL HISTORY:    Sierra Menon is a 70y.o.-year-old male who presented because of shortness of breath and cough that has been progressive. Started January 12 with a fever aches cough shortness of breath, did not lose the taste of food or the smell, no diarrhea no blood in the sputum. Fever was associated. Presented to the ER because of the worsening of shortness of breath. Chest x-ray showed pneumonia he tested positive for Covid. Lactic acid was 3 and the patient was desaturating so he was admitted on high flow to the ICU. To another hospital, patient was not improving despite steroids and high oxygen supplementation. Patient was intubated 2/3/2021 and transferred to [facility]. Paralyzed high oxygen requirement, 2 pressors on board. WBC 2019  Creatinine is normal  Liver enzymes are normal    CURRENT EVALUATION :  2/16/2021   Patient Vitals for the past 8 hrs:   Temp Temp src Pulse Resp SpO2   02/16/21 1129   84 16 (!) 79 %   02/16/21 1125   83 28 (!) 80 %   02/16/21 1000   80 (!) 35 94 %   02/16/21 0945   82 (!) 34 94 %   02/16/21 0930   81 (!) 35 94 %   02/16/21 0915   82 (!) 35 94 %   02/16/21 0900   89 30 94 %   02/16/21 0854   84 28 95 %   02/16/21 0845   76 23 92 %   02/16/21 0830   83 (!) 35 96 %   02/16/21 0815   79 (!) 31 91 %   02/16/21 0800 98.4 °F (36.9 °C) Axillary 84 (!) 35 93 %   02/16/21 0745   87 (!) 35 94 %   02/16/21 0730   86 (!) 35 93 %   02/16/21 0715   84 (!) 35 93 %   02/16/21 0700   84 (!) 35 93 %   02/16/21 0600   87 (!) 35 94 %     Nitrite oxide stopped 2/16   Desaturated afterwards in the 80s    100 FiO2 and 16 of PEEP 18  Creat stable  Sedated   No fever    Summary of relevant labs: 2/16/2021    Labs:  WBC 19-18-->14- 9 7 - 10 - 14-12.2 - 10 - 9.6    Cr 1.21-0.67    CRP 98  Ferritin 307  Fibrinogen 350      Micro:  Sputum culture 1-30-21 Normal aris    Blood:  · 2-4-21: No growth  · 1-19-21: No growth     Urine  2-4-21: Enterococcus faecalis     Covid +1/14    Imaging:  CXR 2/11  Otherwise, severe diffuse faint parenchymal and interstitial opacities   throughout both lungs unchanged.        Chest x-ray bilateral pulmonary changes with chronic changes  Ultrasound of the kidneys no hydronephrosis  IA COLONOSCOPY FLX DX W/COLLJ SPEC WHEN PFRMD N/A 10/5/2018    COLONOSCOPY performed by Leeanna Ayala DO at [facility] OR       Medications:      [START ON 2021] predniSONE  20 mg Oral Daily    Followed by   Sam Simud ON 2021] predniSONE  10 mg Oral Daily    Followed by   Sam Prorenetta ON 2021] predniSONE  5 mg Oral Daily    amLODIPine  10 mg Per NG tube Daily    insulin glargine  12 Units Subcutaneous Nightly    insulin lispro  0-18 Units Subcutaneous 4 times per day    lidocaine 1 % injection  5 mL Intradermal Once    sodium chloride flush  10 mL Intravenous 2 times per day    vancomycin (VANCOCIN) intermittent dosing (placeholder)   Other RX Placeholder    vancomycin  1,500 mg Intravenous Q12H    anidulafungin  100 mg Intravenous Q24H    famotidine (PEPCID) injection  20 mg Intravenous BID    lactulose  20 g Oral TID    sodium chloride flush  10 mL Intravenous 2 times per day    heparin (porcine)  5,000 Units Subcutaneous 3 times per day       Social History:     Social History     Socioeconomic History    Marital status:      Spouse name: Not on file    Number of children: Not on file    Years of education: Not on file    Highest education level: Not on file   Occupational History    Not on file   Social Needs    Financial resource strain: Not on file    Food insecurity     Worry: Not on file     Inability: Not on file    Transportation needs     Medical: Not on file     Non-medical: Not on file   Tobacco Use    Smoking status: Former Smoker     Quit date: 2009     Years since quittin.4    Smokeless tobacco: Never Used   Substance and Sexual Activity    Alcohol use: Yes     Comment: rare    Drug use: Not on file    Sexual activity: Not on file   Lifestyle    Physical activity     Days per week: Not on file     Minutes per session: Not on file    Stress: Not on file   Relationships    Social connections     Talks on phone: Not on file Gets together: Not on file     Attends Jewish service: Not on file     Active member of club or organization: Not on file     Attends meetings of clubs or organizations: Not on file     Relationship status: Not on file    Intimate partner violence     Fear of current or ex partner: Not on file     Emotionally abused: Not on file     Physically abused: Not on file     Forced sexual activity: Not on file   Other Topics Concern    Not on file   Social History Narrative    Not on file       Family History:     Family History   Problem Relation Age of Onset    Stroke Mother     Heart Disease Father       Medical Decision Making:   I have independently reviewed/ordered the following labs:    CBC with Differential:   Recent Labs     02/15/21  0424 02/16/21 0438   WBC 9.6 9.2   HGB 8.7* 9.1*   HCT 28.5* 29.0*   * 134*   LYMPHOPCT 3* 4*   MONOPCT 5 5     BMP:  Recent Labs     02/15/21  0424 02/16/21  0438   * 133*   K 4.2 3.9   CL 92* 92*   CO2 35* 36*   BUN 28* 24*   CREATININE 0.57* 0.52*     Hepatic Function Panel:   No results for input(s): PROT, LABALBU, BILIDIR, IBILI, BILITOT, ALKPHOS, ALT, AST in the last 72 hours. No results for input(s): RPR in the last 72 hours. No results for input(s): HIV in the last 72 hours. No results for input(s): BC in the last 72 hours. Lab Results   Component Value Date    CREATININE 0.52 02/16/2021    GLUCOSE 186 02/16/2021       Detailed results: Thank you for allowing us to participate in the care of this patient. Please call with questions. This note is created with the assistance of a speech recognition program.  While intending to generate adocument that actually reflects the content of the visit, the document can still have some errors including those of syntax and sound a like substitutions which may escape proof reading. It such instances, actual meaningcan be extrapolated by contextual diversion.     Min Bob MD

## 2021-02-16 NOTE — ACP (ADVANCE CARE PLANNING)
Advance Care Planning     Advance Care Planning (ACP) Physician/NP/PA Conversation    Date of Conversation: 2/3/2021  Conducted with:  Healthcare Decision Maker: Named in Advance Directive or Healthcare Power of  (name) Lisa Campbell- patients daughter    Healthcare Decision Maker:      Primary Decision Maker: Estiven Philip - Child - [phone]    Secondary Decision Maker: Steve Mahoney - Child - [phone]    Click here to complete Healthcare Decision Makers including selection of the Healthcare Decision Maker Relationship (ie \"Primary\")  Today we documented Decision Maker(s) consistent with ACP documents on file. Care Preferences:    Hospitalization: \"If your health worsens and it becomes clear that your chance of recovery is unlikely, what would be your preference regarding hospitalization? \"  The patient would prefer hospitalization. Ventilation: \"If you were unable to breath on your own and your chance of recovery was unlikely, what would be your preference about the use of a ventilator (breathing machine) if it was available to you? \"  The patient would desire the use of a ventilator. Resuscitation: \"In the event your heart stopped as a result of an underlying serious health condition, would you want attempts made to restart your heart, or would you prefer a natural death? \"  Yes, attempt to resuscitate.     benefit/burden of treatment options, ventilation preferences, hospitalization preferences and resuscitation preferences    Conversation Outcomes / Follow-Up Plan:  ACP complete - no further action today  Reviewed DNR/DNI and patient elects Full Code (Attempt Resuscitation)    Length of Voluntary ACP Conversation in minutes:  16 minutes    [place]

## 2021-02-16 NOTE — PLAN OF CARE
Problem: OXYGENATION/RESPIRATORY FUNCTION  Goal: Patient will maintain patent airway  2/15/2021 2106 by Suzette Castaneda RCMARLON  Outcome: Ongoing     Problem: OXYGENATION/RESPIRATORY FUNCTION  Goal: Patient will achieve/maintain normal respiratory rate/effort  Description: Respiratory rate and effort will be within normal limits for the patient  2/15/2021 2106 by Suzette Castaneda RCP  Outcome: Ongoing     Problem: MECHANICAL VENTILATION  Goal: Patient will maintain patent airway  2/15/2021 2106 by Suzette Castaneda RCP  Outcome: Ongoing     Problem: MECHANICAL VENTILATION  Goal: Oral health is maintained or improved  2/15/2021 2106 by Suzette Castaneda RCMARLON  Outcome: Ongoing     Problem: MECHANICAL VENTILATION  Goal: ET tube will be managed safely  2/15/2021 2106 by Suzette Castaneda RCP  Outcome: Ongoing     Problem: MECHANICAL VENTILATION  Goal: Mobility/activity is maintained at optimum level for patient  2/15/2021 2106 by Suzette Castaneda RCP  Outcome: Ongoing     Problem: SKIN INTEGRITY  Goal: Skin integrity is maintained or improved  2/15/2021 2106 by Suzette Castaneda RCP  Outcome: Ongoing

## 2021-02-16 NOTE — PLAN OF CARE
Problem: Skin Integrity:  Goal: Will show no infection signs and symptoms  Description: Will show no infection signs and symptoms  Outcome: Ongoing  Goal: Absence of new skin breakdown  Description: Absence of new skin breakdown  Outcome: Ongoing     Problem: Nutrition  Goal: Optimal nutrition therapy  Description: Nutrition Problem #1: Inadequate oral intake  Intervention: Food and/or Nutrient Delivery: (Start nutrition as able.  If TF, suggest Semi-elemental formula with goal rate of 55 mL/hr to provide 1584 kcal and 99 g pro/day.)  Nutritional Goals: meet % of estimated nutrient needs     Outcome: Ongoing     Problem: OXYGENATION/RESPIRATORY FUNCTION  Goal: Patient will maintain patent airway  2/16/2021 0450 by Erskin Dubin, RN  Outcome: Ongoing  2/15/2021 2106 by Yady Sparrow RCP  Outcome: Ongoing  Goal: Patient will achieve/maintain normal respiratory rate/effort  Description: Respiratory rate and effort will be within normal limits for the patient  2/16/2021 0450 by Erskin Dubin, RN  Outcome: Ongoing  2/15/2021 2106 by Yady Sparrow RCP  Outcome: Ongoing     Problem: MECHANICAL VENTILATION  Goal: Patient will maintain patent airway  2/16/2021 0450 by Erskin Dubin, RN  Outcome: Ongoing  2/15/2021 2106 by Yady Sparrow RCP  Outcome: Ongoing  Goal: Oral health is maintained or improved  2/16/2021 0450 by Erskin Dubin, RN  Outcome: Ongoing  2/15/2021 2106 by Yady Sparrow RCP  Outcome: Ongoing  Goal: ET tube will be managed safely  2/16/2021 0450 by Erskin Dubin, RN  Outcome: Ongoing  2/15/2021 2106 by Yady Sparrow RCP  Outcome: Ongoing  Goal: Mobility/activity is maintained at optimum level for patient  2/16/2021 0450 by Erskin Dubin, RN  Outcome: Ongoing  2/15/2021 2106 by Yady Sparrow RCP  Outcome: Ongoing     Problem: SKIN INTEGRITY  Goal: Skin integrity is maintained or improved  2/16/2021 0450 by Erskin Dubin, RN  Outcome: Ongoing 2/15/2021 2106 by Lashanda Harvey RCP  Outcome: Ongoing     Problem: SKIN INTEGRITY  Goal: Skin integrity is maintained or improved  2/16/2021 0450 by Maliha Lara RN  Outcome: Ongoing  2/15/2021 2106 by Lashanda Harvey RCP  Outcome: Ongoing     Problem: Metabolic:  Goal: Ability to maintain appropriate glucose levels will improve  Description: Ability to maintain appropriate glucose levels will improve  Outcome: Ongoing     Problem: Nutritional:  Goal: Maintenance of adequate nutrition will improve  Description: Maintenance of adequate nutrition will improve  Outcome: Ongoing  Goal: Progress toward achieving an optimal weight will improve  Description: Progress toward achieving an optimal weight will improve  Outcome: Ongoing     Problem: Mental Status - Impaired:  Goal: Mental status will improve  Description: Mental status will improve  Outcome: Ongoing     Problem: Gas Exchange - Impaired  Goal: Absence of hypoxia  Outcome: Ongoing  Goal: Promote optimal lung function  Outcome: Ongoing     Problem:  Body Temperature -  Risk of, Imbalanced  Goal: Ability to maintain a body temperature within defined limits  Outcome: Ongoing  Goal: Will regain or maintain usual level of consciousness  Outcome: Ongoing  Goal: Complications related to the disease process, condition or treatment will be avoided or minimized  Outcome: Ongoing

## 2021-02-16 NOTE — PLAN OF CARE
BRONCHOSPASM/BRONCHOCONSTRICTION     [x]         IMPROVE AERATION/BREATH SOUNDS  [x]   ADMINISTER BRONCHODILATOR THERAPY AS APPROPRIATE  [x]   ASSESS BREATH SOUNDS  [x]   IMPLEMENT AEROSOL/MDI PROTOCOL  [x]   PATIENT EDUCATION AS NEEDED   PROVIDE ADEQUATE OXYGENATION WITH ACCEPTABLE SP02/ABG'S    [x]  IDENTIFY APPROPRIATE OXYGEN THERAPY  [x]   MONITOR SP02/ABG'S AS NEEDED   [x]   PATIENT EDUCATION AS NEEDED   MECHANICAL VENTILATION     [x]  PROVIDE OPTIMAL VENTILATION  [x]   ASSESS FOR EXTUBATION READINESS  [x]   ASSESS FOR WEANING READINESS  [x]  EXTUBATE AS TOLERATED  [x]  IMPLEMENT ADULT MECHANICAL VENTILATION PROTOCOL  [x]  MAINTAIN ADEQUATE OXYGENATION    Problem: Restraint Use - Nonviolent/Non-Self-Destructive Behavior:  Goal: Absence of restraint indications  Description: Absence of restraint indications  2/16/2021 0859 by Dona Borrego RN  Outcome: Ongoing  Goal: Absence of restraint-related injury  Description: Absence of restraint-related injury  2/16/2021 0859 by Dona Borrego RN  Outcome: Ongoing     Problem: Skin Integrity:  Goal: Will show no infection signs and symptoms  Description: Will show no infection signs and symptoms  2/16/2021 0859 by Dona Borrego RN  Outcome: Ongoing  2/16/2021 0450 by Guevara Young RN  Outcome: Ongoing  Goal: Absence of new skin breakdown  Description: Absence of new skin breakdown  2/16/2021 0859 by Dona Borrego RN  Outcome: Ongoing  2/16/2021 0450 by Guevara Young RN  Outcome: Ongoing     Problem: Nutrition  Goal: Optimal nutrition therapy  Description: Nutrition Problem #1: Inadequate oral intake  Intervention: Food and/or Nutrient Delivery: (Start nutrition as able.  If TF, suggest Semi-elemental formula with goal rate of 55 mL/hr to provide 1584 kcal and 99 g pro/day.)  Nutritional Goals: meet % of estimated nutrient needs     2/16/2021 0859 by Dona Borrego RN  Outcome: Ongoing  2/16/2021 0450 by Guevara Young RN  Outcome: Ongoing Problem: OXYGENATION/RESPIRATORY FUNCTION  Goal: Patient will maintain patent airway  2/16/2021 1800 by Laila Villa RCP  Outcome: Ongoing  2/16/2021 0859 by Shashi Estrella RN  Outcome: Ongoing  2/16/2021 0450 by Chiquita Luna RN  Outcome: Ongoing  Goal: Patient will achieve/maintain normal respiratory rate/effort  Description: Respiratory rate and effort will be within normal limits for the patient  2/16/2021 1800 by Laila Villa RCP  Outcome: Ongoing  2/16/2021 0859 by Shashi Estrella RN  Outcome: Ongoing  2/16/2021 0450 by Chiquita Luna RN  Outcome: Ongoing     Problem: MECHANICAL VENTILATION  Goal: Patient will maintain patent airway  2/16/2021 1800 by Laila Villa RCP  Outcome: Ongoing  2/16/2021 0859 by Shashi Estrella RN  Outcome: Ongoing  2/16/2021 0450 by Chiquita Luna RN  Outcome: Ongoing  Goal: Oral health is maintained or improved  2/16/2021 1800 by Laila Villa RCP  Outcome: Ongoing  2/16/2021 0859 by Shashi Estrella RN  Outcome: Ongoing  2/16/2021 0450 by Chiquita Luna RN  Outcome: Ongoing  Goal: ET tube will be managed safely  2/16/2021 1800 by Laila Villa RCP  Outcome: Ongoing  2/16/2021 0859 by Shashi Estrella RN  Outcome: Ongoing  2/16/2021 0450 by Chiquita Luna RN  Outcome: Ongoing  Goal: Mobility/activity is maintained at optimum level for patient  2/16/2021 1800 by Laila Villa RCP  Outcome: Ongoing  2/16/2021 0859 by Shashi Estrella RN  Outcome: Ongoing  2/16/2021 0450 by Chiquita Luna RN  Outcome: Ongoing     Problem: SKIN INTEGRITY  Goal: Skin integrity is maintained or improved  2/16/2021 1800 by Laila Villa RCP  Outcome: Ongoing  2/16/2021 0859 by Shashi Estrella RN  Outcome: Ongoing  2/16/2021 0450 by Chiquita Luna RN  Outcome: Ongoing     Problem: Metabolic:  Goal: Ability to maintain appropriate glucose levels will improve  Description: Ability to maintain appropriate glucose levels will improve  2/16/2021 0859 by Shashi Estrella RN  Outcome: Ongoing 2/16/2021 0450 by Esther Posey RN  Outcome: Ongoing     Problem: Nutritional:  Goal: Maintenance of adequate nutrition will improve  Description: Maintenance of adequate nutrition will improve  2/16/2021 0859 by Lucia Concepcion RN  Outcome: Ongoing  2/16/2021 0450 by Esther Posey RN  Outcome: Ongoing  Goal: Progress toward achieving an optimal weight will improve  Description: Progress toward achieving an optimal weight will improve  2/16/2021 0859 by Lucia Concepcion RN  Outcome: Ongoing  2/16/2021 0450 by Esther Posey RN  Outcome: Ongoing     Problem: Mental Status - Impaired:  Goal: Mental status will improve  Description: Mental status will improve  2/16/2021 0859 by Lucia Concepcion RN  Outcome: Ongoing  2/16/2021 0450 by Esther Posey RN  Outcome: Ongoing     Problem: Gas Exchange - Impaired  Goal: Absence of hypoxia  2/16/2021 1800 by Val Mcdowell RCP  Outcome: Ongoing  2/16/2021 0859 by Lucia Concepcion RN  Outcome: Ongoing  2/16/2021 0450 by Esther Posey RN  Outcome: Ongoing  Goal: Promote optimal lung function  2/16/2021 1800 by Val Mcdowell RCP  Outcome: Ongoing  2/16/2021 0859 by Lucia Concepcion RN  Outcome: Ongoing  2/16/2021 0450 by Esther Posey RN  Outcome: Ongoing     Problem:  Body Temperature -  Risk of, Imbalanced  Goal: Ability to maintain a body temperature within defined limits  2/16/2021 0859 by Lucia Concepcion RN  Outcome: Ongoing  2/16/2021 0450 by Esther Posey RN  Outcome: Ongoing  Goal: Will regain or maintain usual level of consciousness  2/16/2021 0859 by Lucia Concepcion RN  Outcome: Ongoing  2/16/2021 0450 by Esther Posey RN  Outcome: Ongoing  Goal: Complications related to the disease process, condition or treatment will be avoided or minimized  2/16/2021 0859 by Lucia Concepcion RN  Outcome: Ongoing  2/16/2021 0450 by Esther Posey RN  Outcome: Ongoing     PERFORM SPONTANEOUS WEANING TRIAL AS TOLERATED

## 2021-02-16 NOTE — PROGRESS NOTES
Spoke to patient's son, updated him about patient's condition. Patient's son would like an attending to talk to him.     Tushar Mcgrath MD  Internal Medicine Resident  [address]  2/16/2021 6:50 PM

## 2021-02-16 NOTE — PROGRESS NOTES
Critical Care Team - Daily Progress Note      Date and time: 2/16/2021 2:44 PM  Patient's name:  Anita Andersen  Medical Record Number: [id number]  Patient's account/billing number: [de-identified]  Patient's YOB: 1945  Age: 70 y.o. Date of Admission: 2/4/2021 12:53 AM  Length of stay during current admission: 12  Primary Care Physician: Candy Connor MD  ICU Attending Physician: Lorena Gupta MD    Code Status: Full Code  Reason for ICU admission: Shortness of breath      Subjective:     OVERNIGHT EVENTS:         No acute overnight issues. Patient currently on nitric oxide. Plan to wean him off of that as it is not helping much. Patient not following commands off sedation. Will discuss with family about goals of care. BRIEF SUMMARY:  Patient was admitted for shortness of breath, cough, and dyspnea since January 4th. Was diagnosed with Covid19 Pneumonia. He continued to worsen and was admitted to the ICU for further management. He was started on high flow and BiPAP.  Subsequently treated with 5 days of intravenous remdesivir. Patient needed intubation on 2/3/2021.      At this time patient is on ARDS protocol, inhalational epoprostenol and is pronated and paralyzed.  Et tube reinserted on 2/11/2021    AWAKE & FOLLOWING COMMANDS:  [x] No   [] Yes    CURRENT VENTILATION STATUS:     [x] Ventilator  [] BIPAP  [] Nasal Cannula [] Room Air      SEDATION:  RAAS Score:  [] Propofol gtt  [x] Versed gtt  [] Ativan gtt   [] No Sedation    PARALYZED:   [x] No    [] Yes    VASOPRESSORS:   [] Yes     [x] No   If yes -   [] Levophed       [] Dopamine     [] Vasopressin       [] Dobutamine  [] Phenylephrine         [] Epinephrine    CENTRAL LINES:      [x] Yes (Date of Insertion:   )    [] No           If yes -  PICC line 2/12  [] Right Internal Jugular [] Left Internal Jugular [] Right Femoral   [] Left Femoral [] Right Subclavian  [] Left Subclavian     SMALLWOOD'S CATHETER: [] No   [x] Yes  (Date of Insertion:   )     URINE OUTPUT:   [x] Good  [] Low  [] Anuric    REVIEW OF SYSTEMS  Unable to obtain    OBJECTIVE:     VITAL SIGNS:  /65   Pulse 84   Temp 98.4 °F (36.9 °C) (Axillary)   Resp 16   Ht 5' 10\" (1.778 m)   Wt 241 lb (109.3 kg)   SpO2 (!) 79%   BMI 34.58 kg/m²   Tmax over 24 hours:  Temp (24hrs), Av.6 °F (37 °C), Min:98.4 °F (36.9 °C), Max:99 °F (37.2 °C)      Patient Vitals for the past 8 hrs:   Temp Temp src Pulse Resp SpO2   21 1129   84 16 (!) 79 %   21 1125   83 28 (!) 80 %   21 1000   80 (!) 35 94 %   21 0945   82 (!) 34 94 %   21 0930   81 (!) 35 94 %   21 0915   82 (!) 35 94 %   21 0900   89 30 94 %   21 0854   84 28 95 %   21 0845   76 23 92 %   21 0830   83 (!) 35 96 %   21 0815   79 (!) 31 91 %   21 0800 98.4 °F (36.9 °C) Axillary 84 (!) 35 93 %   21 0745   87 (!) 35 94 %   21 0730   86 (!) 35 93 %   21 0715   84 (!) 35 93 %   21 0700   84 (!) 35 93 %         Intake/Output Summary (Last 24 hours) at 2021 1444  Last data filed at 2021 1207  Gross per 24 hour   Intake 1958 ml   Output 1425 ml   Net 533 ml     Date 21 0000 - 21 2359   Shift 6916-4223 3506-3310 0656-3805 24 Hour Total   INTAKE   I.V.(mL/kg) 188(1.7) 487(4.5)  675(6.2)   NG/GT(mL/kg) 223(2) 238(2.2)  461(4.2)   Shift Total(mL/kg) 411(3.8) 725(6.6)  1136(10.4)   OUTPUT   Urine(mL/kg/hr) 725(0.8) 400  1125   Shift Total(mL/kg) 725(6.6) 400(3.7)  1125(10.3)   Weight (kg) 109.3 109.3 109.3 109.3     Wt Readings from Last 3 Encounters:   21 241 lb (109.3 kg)   21 237 lb 14 oz (107.9 kg)   10/05/18 218 lb (98.9 kg)     Body mass index is 34.58 kg/m². PHYSICAL EXAM:  Constitutional: Intubated and sedated. HEENT: PERRLA. Atraumatic. ET tube in place. Respiratory:  Diminished breath sounds. No rales. No wheezing. Cardiovascular: regular rate and rhythm, normal S1, S2  Abdomen: soft, no grimacing to palpation, nondistended, no masses or organomegaly  Neurological: Intubated, sedated. Not following commands off sedation. Extremities:  peripheral pulses normal, no pedal edema.     VENT SETTINGS (Comprehensive) (if applicable):  Vent Information  $Ventilation: $Subsequent Day  Skin Assessment: Clean, dry, & intact  Suction Catheter Diameter: 14  Equipment ID: [id number] #[id number]  Equipment Changed: Expiratory Filter  Vent Type: Servo i  Vent Mode: PRVC  Vt Ordered: 500 mL  Rate Set: 35 bmp  FiO2 : 100 %  SpO2: (!) 79 %  SpO2/FiO2 ratio: 79  Sensitivity: 5  PEEP/CPAP: 18  I Time/ I Time %: 0.6 s  Humidification Source: Heated wire  Humidification Temp: 37  Humidification Temp Measured: 36.8  Circuit Condensation: Drained  Nitric Oxide/Epoprostenol In Use?: Yes  NO Set: 5  NO Analyzed: 5.3 ppm  NO2 Analyzed: 0.2 ppm  Additional Respiratory  Assessments  Pulse: 84  Resp: 16  SpO2: (!) 79 %  End Tidal CO2: 30  Position: Semi-Hogan's  Humidification Source: Heated wire  Humidification Temp: 37  Circuit Condensation: Drained  Oral Care Completed?: Yes  Oral Care: Teeth brushed  Subglottic Suction Done?: Yes    ABGs:   Lab Results   Component Value Date    MXR9NQV 40 02/16/2021    FIO2 100.0 02/16/2021     Lactic Acid: No results found for: LACTA    DATA:  Complete Blood Count:   Recent Labs     02/14/21  0246 02/15/21  0424 02/16/21  0438   WBC 10.8 9.6 9.2   HGB 9.0* 8.7* 9.1*   MCV 87.3 88.8 88.1   * 134* 134*   RBC 3.32* 3.21* 3.29*   HCT 29.0* 28.5* 29.0*   MCH 27.1 27.1 27.7   MCHC 31.0 30.5 31.4   RDW 14.6* 14.8* 14.7*   MPV 9.6 9.7 9.1        PT/INR:    Lab Results   Component Value Date    PROTIME 14.0 02/03/2021    INR 1.1 02/03/2021     PTT:    Lab Results   Component Value Date    APTT 34.5 02/03/2021       Basal Metabolic Profile:   Recent Labs     02/14/21  0246 02/15/21  0424 02/16/21  0438   * 133* 133* K 4.3 4.2 3.9   BUN 36* 28* 24*   CREATININE 0.55* 0.57* 0.52*   CL 92* 92* 92*   CO2 36* 35* 36*      LFTS  No results for input(s): ALKPHOS, ALT, AST, BILITOT, BILIDIR, LABALBU in the last 72 hours.     MEDICATIONS:  Scheduled Meds:   [START ON 2/17/2021] predniSONE  20 mg Oral Daily    Followed by   Ronan Miser ON 2/20/2021] predniSONE  10 mg Oral Daily    Followed by   Ronan Miser ON 2/23/2021] predniSONE  5 mg Oral Daily    amLODIPine  10 mg Per NG tube Daily    insulin glargine  12 Units Subcutaneous Nightly    insulin lispro  0-18 Units Subcutaneous 4 times per day    lidocaine 1 % injection  5 mL Intradermal Once    sodium chloride flush  10 mL Intravenous 2 times per day    vancomycin (VANCOCIN) intermittent dosing (placeholder)   Other RX Placeholder    vancomycin  1,500 mg Intravenous Q12H    anidulafungin  100 mg Intravenous Q24H    famotidine (PEPCID) injection  20 mg Intravenous BID    lactulose  20 g Oral TID    sodium chloride flush  10 mL Intravenous 2 times per day    heparin (porcine)  5,000 Units Subcutaneous 3 times per day     Continuous Infusions:   dextrose      fentaNYL 200 mcg/hr (02/16/21 1434)    dextrose      midazolam 10 mg/hr (02/16/21 1150)    cisatracurium (NIMBEX) infusion Stopped (02/14/21 1040)    epoprostenol (VELETRI) nebulization solution Stopped (02/08/21 2100)     PRN Meds:       labetalol, 10 mg, Q6H PRN      sodium chloride flush, 10 mL, PRN      glucose, 15 g, PRN      dextrose, 12.5 g, PRN      glucagon (rDNA), 1 mg, PRN      dextrose, 100 mL/hr, PRN      polyvinyl alcohol, 1 drop, PRN      sodium chloride flush, 10 mL, PRN      promethazine, 12.5 mg, Q6H PRN    Or      ondansetron, 4 mg, Q6H PRN      polyethylene glycol, 17 g, Daily PRN      acetaminophen, 650 mg, Q6H PRN    Or      acetaminophen, 650 mg, Q6H PRN      glucose, 15 g, PRN      dextrose, 12.5 g, PRN      glucagon (rDNA), 1 mg, PRN      dextrose, 100 mL/hr, PRN ASSESSMENT:     Principal Problem:    Acute respiratory failure with hypoxia (HCC)  Active Problems:    COVID-19 virus infection    Type 2 diabetes mellitus ([facility]    Septic shock ([facility]    Palliative care encounter    Goals of care, counseling/discussion    DNR (do not resuscitate) discussion  Resolved Problems:    * No resolved hospital problems. *      PLAN:     Severe ARDS due to Covid 19 Pneumonia  -Proning discontinued  Nitric oxide discontinued.  -Completed remdesivir, Decadron 10 doses. -COVID-19 symptoms started 1/14/2021, hospitalized 1/20 with BiPAP for 14 days and then transferred to [facility] and intubated on 2/3/2021. Patient desaturated after discontinuation of nitric oxide inhaled. Tried vecuronium ordered a chest x-ray. patient also received a dose of Lasix. On my reevaluation, patient oxygen saturation improved.     YAA secondary to shock-  Resolved. Hyperkalemia- resolved. Nephro signed off. Shock- resolved, and off pressors. Type 2 DM-continue Lantus 12 units and high-dose sliding scale, blood glucose control with monitor POC glucose.     DVT ppx - Heparin 5000 TID  Bowel regimen: Lactulose  Urine output: Alvarenga discontinued. Decreased urine output. CODE STATUS: Family still deciding for CODE STATUS changed to DNR [facility] palliative care following.       Lyn Acevedo MD  Internal Medicine Resident  [facility]  2/16/2021 2:45 PM

## 2021-02-16 NOTE — PROGRESS NOTES
..    Palliative Care Progress Note    NAME:  Kyle Brewer RECORD NUMBER:  [id number]  AGE: 70 y.o. GENDER: male  : 1945  TODAY'S DATE:  2021    Reason for Consult:  goals of care and code status. History of Present Illness     The patient is a 70 y.o. Non-/non  male who presents with No chief complaint on file. Referred to Palliative Care by  [x] Physician   [] Nursing  [] Family Request   [] Other:       He was admitted to the primary service for Acute respiratory failure with hypoxia ([address] [J96.01]. His hospital course has been associated with Acute respiratory failure with hypoxia ([address] elevated inflammatory markers, Covid-19 pneumonia, Septic Shock, UTI, malnutrition, hyponatremia, and hyperkalemia. The patient has a complicated medical history and has been hospitalized since 2021 12:53 AM.  Day #14 on vent, requiring increased oxygenation. Continuous paralytic off, but patient did receive push of paralytic today due to hypoxia. Patient also received IV Lasix. Other care following for review of goals, CODE STATUS, and support. OVERNIGHT EVENTS: RN reports attempting to get off nitric oxide, and patient hypoxic requiring increased FiO2 to 100%, and PEEP of 18. Patient was given Lasix IV, and paralytic. RN reports that normal, and Versed next. Tube feeds continue. Alvarenga present.  pertinent labs include; PO2 60.9, PO2 80.3, HCO3 37.8, hemoglobin 9.1, platelets 603, BUN 24, sodium 133, and CO2 36. Chest x-ray today revealed overall no significant change in chest findings compared to prior study. /65   Pulse 84   Temp 98.4 °F (36.9 °C) (Axillary)   Resp 16   Ht 5' 10\" (1.778 m)   Wt 241 lb (109.3 kg)   SpO2 (!) 79%   BMI 34.58 kg/m²     Assessment        REVIEW OF SYSTEMS    [x]   UNABLE TO OBTAIN: Sedated and on vent.     Constitutional:  []   Chills   []  Fatigue   []  Fevers   []  Malaise   []  Weight loss   [] Other: ___30%  Bed Bound; Extensive disease; Total care; intake reduced; LOCfull/confusion  ___20%  Bed Bound; Extensive disease; Total care; intake minimal; Drowsy/coma  _x__10%  Bed Bound; Extensive disease; Total care; Mouth care only; Drowsy/coma  ___0       Death      Plan      Palliative Interaction: I visited patient, and he remained on vent and is sedated. RN at bedside, providing writer with update. Patient reports that nitric oxide was stopped, patient started to have hypoxia with lowest at 69%, and ranging around 75%. He reports patient receiving Lasix, and a push of paralytic. He also reports patient being maxed out on fentanyl and Versed sedation. Patient is presently 900% FiO2, and PEEP of 18 with SPO2 in the mid 90's. I informed him that Jessa, had reported family considering DNR [facility] CODE STATUS but wanted more time to make a decision. I informed him that we will be following up with family today. I reached out to Laura Cuellar patient's daughter/POA, introduced myself to her. I reminded her that was from palliative care. I informed her that I went to see her father, and provided her with update from RN, and my assessment. We discussed patients worsening condition, and asked Laura Cuellar if she had the chance to discuss code status with family. Laura Cuellar reports talking with her siblings, and everyone from [facility]. Laura Cuellar had reported talking with cousin who is a physician, and reports family wanting patient to remain a full code status. I discussed CPR in more detail, and she reports patient wanting this. Laura Cuellar reports being snowed in today. I offered spiritual care ZOOM meeting if family would like, and she will let staff know. I offered her much emotional support at this time, and informed her that we will continue to follow. I informed her that we can arrange a family meeting again to discuss patients condition with her and her support system at her convenience. Goals of care discussed with:    [] Patient independently    [] Patient and Family    [x] Family or Healthcare DPOA independently    [] Unable to discuss with patient, family/DPOA not present    3- Code Status  Full Code    4- Other recommendations  - We will continue to provide comfort and support to the patient and the family    Please call with any palliative questions or concerns. Palliative Care Team is available via perfect serve or via phone. Palliative Care will continue to follow Mr. Ba's care as needed. The note has been dictated by dragon, typing errors may be a possibility     Thank you for allowing Palliative Care to participate in the care of Mr. Roberto Prajapati . Electronically signed by   KATIA Gallardo CNP  Palliative Care Team  on 2/16/2021 at 2:26 PM    Palliative care can be reached via [employer].

## 2021-02-16 NOTE — PROGRESS NOTES
CXR was ordered and captured, per Dr. Rodrigo Poewrs advanced tube to 26 at the teeth. Breath sounds equal, vital signs stable, and tidal volumes adequate.

## 2021-02-16 NOTE — PLAN OF CARE
Problem: Restraint Use - Nonviolent/Non-Self-Destructive Behavior:  Goal: Absence of restraint indications  Description: Absence of restraint indications  Outcome: Ongoing  Goal: Absence of restraint-related injury  Description: Absence of restraint-related injury  Outcome: Ongoing     Problem: Skin Integrity:  Goal: Will show no infection signs and symptoms  Description: Will show no infection signs and symptoms  2/16/2021 0859 by Donte Trinidad RN  Outcome: Ongoing  2/16/2021 0450 by Derrick Headley RN  Outcome: Ongoing  Goal: Absence of new skin breakdown  Description: Absence of new skin breakdown  2/16/2021 0859 by Donte Trinidad RN  Outcome: Ongoing  2/16/2021 0450 by Derrick Headley RN  Outcome: Ongoing     Problem: Nutrition  Goal: Optimal nutrition therapy  Description: Nutrition Problem #1: Inadequate oral intake  Intervention: Food and/or Nutrient Delivery: (Start nutrition as able.  If TF, suggest Semi-elemental formula with goal rate of 55 mL/hr to provide 1584 kcal and 99 g pro/day.)  Nutritional Goals: meet % of estimated nutrient needs     2/16/2021 0859 by Donte Trinidad RN  Outcome: Ongoing  2/16/2021 0450 by Derrick Headley RN  Outcome: Ongoing     Problem: OXYGENATION/RESPIRATORY FUNCTION  Goal: Patient will maintain patent airway  2/16/2021 0859 by Donte Trinidad RN  Outcome: Ongoing  2/16/2021 0450 by Derrick Headley RN  Outcome: Ongoing  2/15/2021 2106 by Lucy Magdaleno RCP  Outcome: Ongoing  Goal: Patient will achieve/maintain normal respiratory rate/effort  Description: Respiratory rate and effort will be within normal limits for the patient  2/16/2021 0859 by Donte Trinidad RN  Outcome: Ongoing  2/16/2021 0450 by Derrick Headley RN  Outcome: Ongoing  2/15/2021 2106 by Lucy Magdaleno RCP  Outcome: Ongoing     Problem: MECHANICAL VENTILATION  Goal: Patient will maintain patent airway  2/16/2021 0859 by Donte Trinidad RN  Outcome: Ongoing  2/16/2021 0450 by Derrick Headley RN 2/16/2021 0450 by Pretty Moses RN  Outcome: Ongoing     Problem: Gas Exchange - Impaired  Goal: Absence of hypoxia  2/16/2021 0859 by Faith Corado RN  Outcome: Ongoing  2/16/2021 0450 by Pretty Moses RN  Outcome: Ongoing  Goal: Promote optimal lung function  2/16/2021 0859 by Faith Corado RN  Outcome: Ongoing  2/16/2021 0450 by Pretty Moses RN  Outcome: Ongoing     Problem:  Body Temperature -  Risk of, Imbalanced  Goal: Ability to maintain a body temperature within defined limits  2/16/2021 0859 by Faith Corado RN  Outcome: Ongoing  2/16/2021 0450 by Pretty Moses RN  Outcome: Ongoing  Goal: Will regain or maintain usual level of consciousness  2/16/2021 0859 by Faith Corado RN  Outcome: Ongoing  2/16/2021 0450 by Pretty Moses RN  Outcome: Ongoing  Goal: Complications related to the disease process, condition or treatment will be avoided or minimized  2/16/2021 0859 by Faith Corado RN  Outcome: Ongoing  2/16/2021 0450 by Pretty Moses RN  Outcome: Ongoing

## 2021-02-17 NOTE — PLAN OF CARE
Problem: Restraint Use - Nonviolent/Non-Self-Destructive Behavior:  Goal: Absence of restraint indications  Description: Absence of restraint indications  2/17/2021 0915 by Selene Coronado RN  Outcome: Ongoing  2/16/2021 2134 by Roly Cantor RN  Outcome: Ongoing  Goal: Absence of restraint-related injury  Description: Absence of restraint-related injury  2/17/2021 0915 by Selene Coronado RN  Outcome: Ongoing  2/16/2021 2134 by Roly Cantor RN  Outcome: Ongoing     Problem: Skin Integrity:  Goal: Will show no infection signs and symptoms  Description: Will show no infection signs and symptoms  2/17/2021 0915 by Selene Coronado RN  Outcome: Ongoing  2/16/2021 2134 by Roly Cantor RN  Outcome: Ongoing  Goal: Absence of new skin breakdown  Description: Absence of new skin breakdown  2/17/2021 0915 by Selene Coronado RN  Outcome: Ongoing  2/16/2021 2134 by Roly Cantor RN  Outcome: Ongoing     Problem: Nutrition  Goal: Optimal nutrition therapy  Description: Nutrition Problem #1: Inadequate oral intake  Intervention: Food and/or Nutrient Delivery: (Start nutrition as able.  If TF, suggest Semi-elemental formula with goal rate of 55 mL/hr to provide 1584 kcal and 99 g pro/day.)  Nutritional Goals: meet % of estimated nutrient needs     2/17/2021 0915 by Selene Coronado RN  Outcome: Ongoing  2/16/2021 2134 by Roly Cantor RN  Outcome: Ongoing     Problem: OXYGENATION/RESPIRATORY FUNCTION  Goal: Patient will maintain patent airway  2/17/2021 0915 by Selene Coronado RN  Outcome: Ongoing  2/16/2021 2134 by Roly Cantor RN  Outcome: Ongoing  2/16/2021 2005 by Lianet Don RCP  Outcome: Ongoing  Goal: Patient will achieve/maintain normal respiratory rate/effort  Description: Respiratory rate and effort will be within normal limits for the patient  2/17/2021 0915 by Selene Coronado RN  Outcome: Ongoing  2/16/2021 2134 by Roly Cantor RN  Outcome: Ongoing  2/16/2021 2005 by Lianet Don RCP Outcome: Ongoing     Problem: MECHANICAL VENTILATION  Goal: Patient will maintain patent airway  2/17/2021 0915 by Antonina Orellana RN  Outcome: Ongoing  2/17/2021 0831 by Gamaliel De Los Santos RCP  Outcome: Ongoing  2/16/2021 2134 by Jama Moon RN  Outcome: Ongoing  2/16/2021 2005 by Yady Sparrow RCP  Outcome: Ongoing  Goal: Oral health is maintained or improved  2/17/2021 0915 by Antonina Orellana RN  Outcome: Ongoing  2/17/2021 0831 by SAMEERA ColonP  Outcome: Ongoing  2/16/2021 2134 by Jama Moon RN  Outcome: Ongoing  2/16/2021 2005 by Yady Sparrow RCP  Outcome: Ongoing  Goal: ET tube will be managed safely  2/17/2021 0915 by Antonina Orellana RN  Outcome: Ongoing  2/17/2021 0831 by Gamaliel De Los Santos RCP  Outcome: Ongoing  2/16/2021 2134 by Jama Moon RN  Outcome: Ongoing  2/16/2021 2005 by Yady Sparrow RCP  Outcome: Ongoing  Goal: Mobility/activity is maintained at optimum level for patient  2/17/2021 0915 by Antonina Orellana RN  Outcome: Ongoing  2/17/2021 0831 by Gamaliel De Los Santos RCP  Outcome: Ongoing  2/16/2021 2134 by Jama Moon RN  Outcome: Ongoing  2/16/2021 2005 by Yady Sparrow RCP  Outcome: Ongoing     Problem: SKIN INTEGRITY  Goal: Skin integrity is maintained or improved  2/17/2021 0915 by Antonina Orellana RN  Outcome: Ongoing  2/17/2021 0831 by Gamaliel De Los Santos RCP  Outcome: Ongoing  2/16/2021 2134 by Jama Moon RN  Outcome: Ongoing  2/16/2021 2005 by Yady Sparrow RCP  Outcome: Ongoing     Problem: Metabolic:  Goal: Ability to maintain appropriate glucose levels will improve  Description: Ability to maintain appropriate glucose levels will improve  2/17/2021 0915 by Antonina Orellana RN  Outcome: Ongoing  2/16/2021 2134 by Jama Moon RN  Outcome: Ongoing     Problem: Nutritional:  Goal: Maintenance of adequate nutrition will improve  Description: Maintenance of adequate nutrition will improve  2/17/2021 0915 by Antonina Orellana RN  Outcome: Ongoing 2/16/2021 2134 by Jama Moon RN  Outcome: Ongoing  Goal: Progress toward achieving an optimal weight will improve  Description: Progress toward achieving an optimal weight will improve  2/17/2021 0915 by Antonina Orellana RN  Outcome: Ongoing  2/16/2021 2134 by Jama Moon RN  Outcome: Ongoing     Problem: Mental Status - Impaired:  Goal: Mental status will improve  Description: Mental status will improve  2/17/2021 0915 by Antonina Orellana RN  Outcome: Ongoing  2/16/2021 2134 by Jama Moon RN  Outcome: Ongoing     Problem: Gas Exchange - Impaired  Goal: Absence of hypoxia  2/17/2021 0915 by Antonina Orellana RN  Outcome: Ongoing  2/17/2021 0831 by Gamaliel De Los Santos RCP  Outcome: Ongoing  2/16/2021 2134 by Jama Moon RN  Outcome: Ongoing  Goal: Promote optimal lung function  2/17/2021 0915 by Antonina Orellana RN  Outcome: Ongoing  2/17/2021 0831 by Gamaliel De Los Santos RCP  Outcome: Ongoing  2/16/2021 2134 by Jama Moon RN  Outcome: Ongoing     Problem:  Body Temperature -  Risk of, Imbalanced  Goal: Ability to maintain a body temperature within defined limits  2/17/2021 0915 by Antonina Orellana RN  Outcome: Ongoing  2/16/2021 2134 by Jama Moon RN  Outcome: Ongoing  Goal: Will regain or maintain usual level of consciousness  2/17/2021 0915 by Antonina Orellana RN  Outcome: Ongoing  2/16/2021 2134 by Jama Moon RN  Outcome: Ongoing  Goal: Complications related to the disease process, condition or treatment will be avoided or minimized  2/17/2021 0915 by Antonina Orellana RN  Outcome: Ongoing  2/16/2021 2134 by Jama Moon RN  Outcome: Ongoing

## 2021-02-17 NOTE — PLAN OF CARE
Problem: OXYGENATION/RESPIRATORY FUNCTION  Goal: Patient will maintain patent airway  2/16/2021 2005 by Frederic Adams RCP  Outcome: Ongoing     Problem: OXYGENATION/RESPIRATORY FUNCTION  Goal: Patient will achieve/maintain normal respiratory rate/effort  Description: Respiratory rate and effort will be within normal limits for the patient  2/16/2021 2005 by Frederic Adams RCP  Outcome: Ongoing     Problem: MECHANICAL VENTILATION  Goal: Patient will maintain patent airway  2/16/2021 2005 by Frederic Adams RCP  Outcome: Ongoing     Problem: MECHANICAL VENTILATION  Goal: Oral health is maintained or improved  2/16/2021 2005 by Frederic Adams RCP  Outcome: Ongoing     Problem: MECHANICAL VENTILATION  Goal: ET tube will be managed safely  2/16/2021 2005 by Frederic Adams RCP  Outcome: Ongoing     Problem: MECHANICAL VENTILATION  Goal: Mobility/activity is maintained at optimum level for patient  2/16/2021 2005 by Frederic Adams RCP  Outcome: Ongoing     Problem: SKIN INTEGRITY  Goal: Skin integrity is maintained or improved  2/16/2021 2005 by Frederic Adams RCP  Outcome: Ongoing

## 2021-02-17 NOTE — PROGRESS NOTES
Phone discussion(in hosp) with 3 out of 5 children. Patient has a living will and [address]. Son Flores Robert (on phone) is [place]. Outlined course to date, current condition, and poor prognosis for functional recovery. Patient is 33 days since the onset of his illness. Refractory hypoxemia despite aggressive measures. Age precludes ECMO and/or transplant evaluation. Mortality in this setting greater than 50%. Family acknowledges same and will discuss CODE STATUS. Critical care time 30 minutes.

## 2021-02-17 NOTE — PROGRESS NOTES
Physical Therapy    DATE: 2021    NAME: Miguel Garcia  MRN: [id number]   : 1945      Patient not seen this date for Physical Therapy due to:  Not appropriate today per nurse          Electronically signed by Dioni Granados PT on 2021 at 3:36 PM

## 2021-02-17 NOTE — PLAN OF CARE
Problem: Skin Integrity:  Goal: Will show no infection signs and symptoms  Description: Will show no infection signs and symptoms  2/16/2021 2134 by Corrine Zimmer RN  Outcome: Ongoing  Goal: Absence of new skin breakdown  Description: Absence of new skin breakdown  2/16/2021 2134 by Corrine Zimmer RN  Outcome: Ongoing     Problem: OXYGENATION/RESPIRATORY FUNCTION  Goal: Patient will maintain patent airway  2/16/2021 2134 by Corrine Zimmer RN  Outcome: Ongoing  2/16/2021 2005 by Frederic Adams RCP  Outcome: Ongoing  Goal: Patient will achieve/maintain normal respiratory rate/effort  Description: Respiratory rate and effort will be within normal limits for the patient  2/16/2021 2134 by Corrine Zimmer RN  Outcome: Ongoing  2/16/2021 2005 by Frederic Adams RCP  Outcome: Ongoing     Problem: MECHANICAL VENTILATION  Goal: Patient will maintain patent airway  2/17/2021 0831 by Brian Nix RCP  Outcome: Ongoing  2/16/2021 2134 by Corrine Zimmer RN  Outcome: Ongoing  2/16/2021 2005 by Frederic Adams RCP  Outcome: Ongoing  Goal: Oral health is maintained or improved  2/17/2021 0831 by Brian Nix RCP  Outcome: Ongoing  2/16/2021 2134 by Corrine Zimmer RN  Outcome: Ongoing  2/16/2021 2005 by Frederic Adams RCP  Outcome: Ongoing  Goal: ET tube will be managed safely  2/17/2021 0831 by Brian Nix RCP  Outcome: Ongoing  2/16/2021 2134 by Corrine Zimmer RN  Outcome: Ongoing  2/16/2021 2005 by Frederic Adams RCP  Outcome: Ongoing  Goal: Mobility/activity is maintained at optimum level for patient  2/17/2021 0831 by Brian Nix RCP  Outcome: Ongoing  2/16/2021 2134 by Corrine Zimmer RN  Outcome: Ongoing  2/16/2021 2005 by Frederic Adams RCP  Outcome: Ongoing

## 2021-02-17 NOTE — FLOWSHEET NOTE
Assessment: Patient was sleeping when when  visited. Family was present and open to spiritual care. When asked how patient was doing, family responded; \"not good. \" Family requested that patient be anointed. Because today is Lisandro Wednesday, family also wanted patient to receive ashes. Per family, patient was raised Muslim and a member of [address] [place] [address]. Family looked a bit  anxious and worried. Intervention:  maintained listening presence, offered support and prayed with family. Patient received ashes and sacrament of anointing of the sick at family request.   Outcome: Family was very appreciative of the spiritual support they received. Chaplains would continue to visit for ongoing assessment of patient's condition and for more prayers and support to family. 02/17/21 1347   Encounter Summary   Services provided to: Patient and family together   Referral/Consult From: Family   Support System Family members   Place of [address]   Continue Visiting   (02/17/2021)   Complexity of Encounter High   Length of Encounter 45 minutes   Spiritual Assessment Completed Yes   Routine   Type Initial   Spiritual/Taoism   Type Spiritual support   Assessment Approachable; Anxious; Fearful   Intervention Active listening;Prayer; Anointing   Outcome Expressed gratitude   Sacraments   Sacrament of Sick-Anointing Anointed

## 2021-02-17 NOTE — PROGRESS NOTES
Critical Care Team - Daily Progress Note      Date and time: 2/17/2021 5:39 PM  Patient's name:  Penelope Mccormick  Medical Record Number: [id number]  Patient's account/billing number: [de-identified]  Patient's YOB: 1945  Age: 70 y.o. Date of Admission: 2/4/2021 12:53 AM  Length of stay during current admission: 13  Primary Care Physician: Mortimer Kirsch, MD  ICU Attending Physician: Braxton Roman MD    Code Status: DNR-CCA  Reason for ICU admission: Shortness of breath      Subjective:     OVERNIGHT EVENTS:         Pt was seen and examined at bedside. Intubated and sedated  Not tolerating sedation holiday  Vitals stable. Labs reviewed. No acute events overnight. Family meeting today to discuss goals of care    BRIEF SUMMARY:  Patient was admitted for shortness of breath, cough, and dyspnea since January 4th. Was diagnosed with Covid19 Pneumonia. He continued to worsen and was admitted to the ICU for further management. He was started on high flow and BiPAP.  Subsequently treated with 5 days of intravenous remdesivir. Patient needed intubation on 2/3/2021.      Patient is on ARDS protocol, inhalational epoprostenol and is pronated and paralyzed. Et tube reinserted on 2/11/2021. Respiratory supportive care was continued.     AWAKE & FOLLOWING COMMANDS:  [x] No   [] Yes    CURRENT VENTILATION STATUS:     [x] Ventilator  [] BIPAP  [] Nasal Cannula [] Room Air      SEDATION:  RAAS Score:  [] Propofol gtt  [x] Versed gtt  [] Ativan gtt   [] No Sedation    PARALYZED:   [x] No    [] Yes    VASOPRESSORS:   [] Yes     [x] No   If yes -   [] Levophed       [] Dopamine     [] Vasopressin       [] Dobutamine  [] Phenylephrine         [] Epinephrine    CENTRAL LINES:      [x] Yes (Date of Insertion:   )    [] No           If yes -  PICC line 2/12  [] Right Internal Jugular [] Left Internal Jugular [] Right Femoral   [] Left Femoral [] Right Subclavian  [] Left Subclavian     SMALLWOOD'S CATHETER: [] No   [x] Yes  (Date of Insertion:   )     URINE OUTPUT:   [x] Good  [] Low  [] Anuric    REVIEW OF SYSTEMS  Unable to obtain    OBJECTIVE:     VITAL SIGNS:  /65   Pulse 98   Temp 98.6 °F (37 °C) (Core)   Resp (!) 35   Ht 5' 10\" (1.778 m)   Wt 247 lb 12.8 oz (112.4 kg)   SpO2 96%   BMI 35.56 kg/m²   Tmax over 24 hours:  Temp (24hrs), Av.6 °F (37 °C), Min:98.6 °F (37 °C), Max:98.6 °F (37 °C)      Patient Vitals for the past 8 hrs:   Pulse Resp SpO2   21 1615 98 (!) 35 96 %   21 1600 100 30 (!) 89 %   21 1545 102 (!) 33 91 %   21 1530 101 (!) 32 91 %   21 1515 100 (!) 33 91 %   21 1500 101 (!) 34 90 %   21 1458 101 (!) 31 90 %   21 1445 100 (!) 35 90 %   21 1430 101 30 (!) 89 %   21 1415 99 (!) 33 (!) 89 %   21 1400 98 (!) 32 (!) 88 %   21 1345 97 (!) 33 (!) 89 %   21 1330 97 (!) 35 (!) 89 %   21 1315 96 (!) 34 (!) 89 %   21 1300 98 (!) 33 (!) 87 %   21 1245 97 (!) 31 (!) 87 %   21 1230 99 (!) 31 (!) 87 %   21 1215 97 (!) 33 (!) 89 %   21 1200 96 (!) 31 90 %   21 1145 95 (!) 32 92 %   21 1130 97 (!) 34 94 %   21 1115 96 (!) 35 97 %   21 1100 103 (!) 34 93 %   21 1045 102 29 91 %   21 1030 101 (!) 31 91 %   21 1015 99 (!) 31 93 %   21 1000 99 30 93 %   21 0945 97 (!) 33 94 %         Intake/Output Summary (Last 24 hours) at 2021 1739  Last data filed at 2021 1600  Gross per 24 hour   Intake 1597.97 ml   Output 1100 ml   Net 497.97 ml     Date 21 0000 - 21 2359   Shift 8939-2051 2932-0615 9132-8466 24 Hour Total   INTAKE   I.V.(mL/kg) 246(2.2) 135(1.2) 226(2) 607(5.4)   NG/GT(mL/kg) 239(2.1)  206(1.8) 445(4)   Shift Total(mL/kg) 485(4.3) 135(1.2) 432(3.8) 1052(9.4)   OUTPUT   Urine(mL/kg/hr) 300(0.3) 165(0.2) 325 790   Shift Total(mL/kg) 300(2.7) 165(1.5) 325(2.9) 790(7)   Weight (kg) 112.4 112.4 112.4 112.4 Wt Readings from Last 3 Encounters:   02/17/21 247 lb 12.8 oz (112.4 kg)   02/03/21 237 lb 14 oz (107.9 kg)   10/05/18 218 lb (98.9 kg)     Body mass index is 35.56 kg/m². PHYSICAL EXAM:  Constitutional: Intubated and sedated. HEENT: PERRLA. Atraumatic. ET tube in place. Respiratory:  Ventilatory breath sounds. No rales. No wheezing. Cardiovascular: regular rate and rhythm, normal S1, S2  Abdomen: soft, no grimacing to palpation, nondistended, no masses or organomegaly  Neurological: Intubated, sedated. Unable to tolerate sedation holiday. Extremities:  peripheral pulses normal, no pedal edema.     VENT SETTINGS (Comprehensive) (if applicable):  Vent Information  $Ventilation: $Subsequent Day  Skin Assessment: Clean, dry, & intact  Suction Catheter Diameter: 14  Equipment ID: [id number] #[id number]  Equipment Changed: Expiratory Filter  Vent Type: Trilogy  Vent Mode: PRVC  Vt Ordered: 500 mL  Rate Set: 35 bmp  FiO2 : 100 %  SpO2: 96 %  SpO2/FiO2 ratio: 90  Sensitivity: 5  PEEP/CPAP: 18  I Time/ I Time %: 0.6 s  Humidification Source: Heated wire  Humidification Temp: 36.9  Humidification Temp Measured: 37  Circuit Condensation: Drained  Nitric Oxide/Epoprostenol In Use?: No  NO Set: 5  NO Analyzed: 5.3 ppm  NO2 Analyzed: 0.2 ppm  Additional Respiratory  Assessments  Pulse: 98  Resp: (!) 35  SpO2: 96 %  End Tidal CO2: 36  Position: Semi-Hogan's  Humidification Source: Heated wire  Humidification Temp: 36.9  Circuit Condensation: Drained  Oral Care Completed?: Yes  Oral Care: Mouth suctioned  Subglottic Suction Done?: Yes    ABGs:   Lab Results   Component Value Date    WGQ6ROO 47 02/17/2021    FIO2 95.0 02/17/2021     Lactic Acid: No results found for: LACTA    DATA:  Complete Blood Count:   Recent Labs     02/15/21  0424 02/16/21  0438 02/17/21  0440   WBC 9.6 9.2 12.3*   HGB 8.7* 9.1* 9.1*   MCV 88.8 88.1 89.4   * 134* 152   RBC 3.21* 3.29* 3.31*   HCT 28.5* 29.0* 29.6*   MCH 27.1 27.7 27.5 MCHC 30.5 31.4 30.7   RDW 14.8* 14.7* 15.0*   MPV 9.7 9.1 9.4        PT/INR:    Lab Results   Component Value Date    PROTIME 14.0 02/03/2021    INR 1.1 02/03/2021     PTT:    Lab Results   Component Value Date    APTT 34.5 02/03/2021       Basal Metabolic Profile:   Recent Labs     02/15/21  0424 02/16/21  0438 02/17/21  0440   * 133* 136   K 4.2 3.9 4.4   BUN 28* 24* 26*   CREATININE 0.57* 0.52* 0.58*   CL 92* 92* 92*   CO2 35* 36* 39*      LFTS  No results for input(s): ALKPHOS, ALT, AST, BILITOT, BILIDIR, LABALBU in the last 72 hours.     MEDICATIONS:  Scheduled Meds:   predniSONE  20 mg Oral Daily    Followed by   Luanne Mccrary ON 2/20/2021] predniSONE  10 mg Oral Daily    Followed by   Luanne Mccrary ON 2/23/2021] predniSONE  5 mg Oral Daily    amLODIPine  10 mg Per NG tube Daily    insulin glargine  12 Units Subcutaneous Nightly    insulin lispro  0-18 Units Subcutaneous 4 times per day    sodium chloride flush  10 mL Intravenous 2 times per day    anidulafungin  100 mg Intravenous Q24H    famotidine (PEPCID) injection  20 mg Intravenous BID    lactulose  20 g Oral TID    sodium chloride flush  10 mL Intravenous 2 times per day    heparin (porcine)  5,000 Units Subcutaneous 3 times per day     Continuous Infusions:   sodium chloride 10 mL/hr at 02/17/21 0313    dextrose      fentaNYL 200 mcg/hr (02/17/21 1558)    midazolam 10 mg/hr (02/17/21 1001)     PRN Meds:       labetalol, 10 mg, Q6H PRN      glucose, 15 g, PRN      dextrose, 12.5 g, PRN      glucagon (rDNA), 1 mg, PRN      dextrose, 100 mL/hr, PRN      polyvinyl alcohol, 1 drop, PRN      sodium chloride flush, 10 mL, PRN      promethazine, 12.5 mg, Q6H PRN    Or      ondansetron, 4 mg, Q6H PRN      polyethylene glycol, 17 g, Daily PRN      acetaminophen, 650 mg, Q6H PRN    Or      acetaminophen, 650 mg, Q6H PRN          ASSESSMENT:     Principal Problem:    Pneumonia due to COVID-19 virus  Active Problems: Acute respiratory failure with hypoxia (HCC)    Type 2 diabetes mellitus ([facility]    Septic shock ([facility]    Palliative care encounter    Goals of care, counseling/discussion    DNR (do not resuscitate) discussion    Class 1 obesity due to excess calories with serious comorbidity and body mass index (BMI) of 34.0 to 34.9 in adult  Resolved Problems:    * No resolved hospital problems. *      PLAN:     Severe ARDS due to Covid 19 Pneumonia  -Completed remdesivir, Decadron 10 doses. -COVID-19 symptoms started 1/14/2021, hospitalized 1/20 with BiPAP for 14 days and then transferred to [facility]. V's and intubated on 2/3/2021. Continue supportive care. - Family explained about status. Goals of care discussion ongoing.     YAA secondary to shock-  Resolved. Hyperkalemia- resolved. Nephro signed off. Shock- resolved, and off pressors. Type 2 DM-continue Lantus 12 units and high-dose sliding scale, blood glucose control with monitor POC glucose.     DVT ppx - Heparin 5000 TID  Bowel regimen: Lactulose    CODE STATUS: CODE STATUS changed to DNR CCA. Palliative care following.     Ygnacio Mcburney, MD  PGY-2, Internal Medicine Resident  [facility], [facility]         2/17/2021, 5:44 PM

## 2021-02-17 NOTE — PLAN OF CARE
Problem: Skin Integrity:  Goal: Will show no infection signs and symptoms  Description: Will show no infection signs and symptoms  2/16/2021 2134 by Roly Cantor RN  Outcome: Ongoing  2/16/2021 0859 by Selene Coronado RN  Outcome: Ongoing  Goal: Absence of new skin breakdown  Description: Absence of new skin breakdown  2/16/2021 2134 by Roly Cantor RN  Outcome: Ongoing  2/16/2021 0859 by Selene Coronado RN  Outcome: Ongoing     Problem: Nutrition  Goal: Optimal nutrition therapy  Description: Nutrition Problem #1: Inadequate oral intake  Intervention: Food and/or Nutrient Delivery: (Start nutrition as able.  If TF, suggest Semi-elemental formula with goal rate of 55 mL/hr to provide 1584 kcal and 99 g pro/day.)  Nutritional Goals: meet % of estimated nutrient needs     2/16/2021 2134 by Roly Cantor RN  Outcome: Ongoing  2/16/2021 0859 by Selene Coronado RN  Outcome: Ongoing     Problem: OXYGENATION/RESPIRATORY FUNCTION  Goal: Patient will maintain patent airway  2/16/2021 2134 by Roly Cantor RN  Outcome: Ongoing  2/16/2021 2005 by Lianet Don RCP  Outcome: Ongoing  2/16/2021 1800 by Afia Lau RCP  Outcome: Ongoing  2/16/2021 0859 by Selene Coronado RN  Outcome: Ongoing  Goal: Patient will achieve/maintain normal respiratory rate/effort  Description: Respiratory rate and effort will be within normal limits for the patient  2/16/2021 2134 by Roly Cantor RN  Outcome: Ongoing  2/16/2021 2005 by Lianet Don RCP  Outcome: Ongoing  2/16/2021 1800 by Afia Lau RCP  Outcome: Ongoing  2/16/2021 0859 by Selene Coronado RN  Outcome: Ongoing     Problem: MECHANICAL VENTILATION  Goal: Patient will maintain patent airway  2/16/2021 2134 by Roly Cantor RN  Outcome: Ongoing  2/16/2021 2005 by Lianet Don RCP  Outcome: Ongoing  2/16/2021 1800 by Afia Lau RCP  Outcome: Ongoing  2/16/2021 0859 by Selene Coronado RN  Outcome: Ongoing Goal: Oral health is maintained or improved  2/16/2021 2134 by Dangelo Stokes RN  Outcome: Ongoing  2/16/2021 2005 by Floridalma Alexander RCP  Outcome: Ongoing  2/16/2021 1800 by Val Mcdowell RCP  Outcome: Ongoing  2/16/2021 0859 by Lucia Concepcion RN  Outcome: Ongoing  Goal: ET tube will be managed safely  2/16/2021 2134 by Dangelo Stokes RN  Outcome: Ongoing  2/16/2021 2005 by Floridalma Alexander RCP  Outcome: Ongoing  2/16/2021 1800 by Val Mcdowell RCP  Outcome: Ongoing  2/16/2021 0859 by Lucia Concepcion RN  Outcome: Ongoing  Goal: Mobility/activity is maintained at optimum level for patient  2/16/2021 2134 by Dangelo Stokes RN  Outcome: Ongoing  2/16/2021 2005 by Floridalma Alexander RCP  Outcome: Ongoing  2/16/2021 1800 by Val Mcdowell RCP  Outcome: Ongoing  2/16/2021 0859 by Lucia Concepicon RN  Outcome: Ongoing     Problem: SKIN INTEGRITY  Goal: Skin integrity is maintained or improved  2/16/2021 2134 by Dangelo Stokes RN  Outcome: Ongoing  2/16/2021 2005 by Floridalma Alexander RCP  Outcome: Ongoing  2/16/2021 1800 by Val Mcdowell RCP  Outcome: Ongoing  2/16/2021 0859 by Lucia Concepcion RN  Outcome: Ongoing     Problem: Metabolic:  Goal: Ability to maintain appropriate glucose levels will improve  Description: Ability to maintain appropriate glucose levels will improve  2/16/2021 2134 by Dangelo Stokes RN  Outcome: Ongoing  2/16/2021 0859 by Lucia Concepcion RN  Outcome: Ongoing     Problem: Nutritional:  Goal: Maintenance of adequate nutrition will improve  Description: Maintenance of adequate nutrition will improve  2/16/2021 2134 by Dangelo Stokes RN  Outcome: Ongoing  2/16/2021 0859 by Lucia Concepcion RN  Outcome: Ongoing  Goal: Progress toward achieving an optimal weight will improve  Description: Progress toward achieving an optimal weight will improve  2/16/2021 2134 by Dangelo Stokes RN  Outcome: Ongoing  2/16/2021 0859 by Lucia Jamey, RN  Outcome: Ongoing     Problem: Mental Status - Impaired: Goal: Mental status will improve  Description: Mental status will improve  2/16/2021 2134 by Corrine Zimmer RN  Outcome: Ongoing  2/16/2021 0859 by Yoan Carter RN  Outcome: Ongoing     Problem: Gas Exchange - Impaired  Goal: Absence of hypoxia  2/16/2021 2134 by Corrine Zimmer RN  Outcome: Ongoing  2/16/2021 1800 by Mary Garcia RCP  Outcome: Ongoing  2/16/2021 0859 by Yoan Carter RN  Outcome: Ongoing  Goal: Promote optimal lung function  2/16/2021 2134 by Corrine Zimmer RN  Outcome: Ongoing  2/16/2021 1800 by Mary Garcia RCP  Outcome: Ongoing  2/16/2021 0859 by Yoan Carter RN  Outcome: Ongoing     Problem:  Body Temperature -  Risk of, Imbalanced  Goal: Ability to maintain a body temperature within defined limits  2/16/2021 2134 by Corrine Zimmer RN  Outcome: Ongoing  2/16/2021 0859 by Yoan Carter RN  Outcome: Ongoing  Goal: Will regain or maintain usual level of consciousness  2/16/2021 2134 by Corrine Zimmer RN  Outcome: Ongoing  2/16/2021 0859 by Yoan Carter RN  Outcome: Ongoing  Goal: Complications related to the disease process, condition or treatment will be avoided or minimized  2/16/2021 2134 by Corrine Zimmer RN  Outcome: Ongoing  2/16/2021 0859 by Yoan Carter RN  Outcome: Ongoing

## 2021-02-17 NOTE — PROGRESS NOTES
.. PALLIATIVE CARE TEAM    Patient: Tami Gannon  Room: 0108/0108-01    Reason For Consult   Goals of care evaluation  Distress management  Symptom Management  Guidance and support  Facilitate communications  Assistance in coordinating care  Recommendations for the above    Impression: Tami Gannon is a 70y.o. year old male with  has a past medical history of Asthma, Hyperlipidemia, Hypertension, and Type 2 diabetes mellitus without complication ([address] .  Currently hospitalized for the management of pneumonia/Covid 19  The Palliative Care Team is following to assist with goals of care and family support. Code Status  DNR-CCA    Vital Signs:  /65   Pulse 101   Temp 98.6 °F (37 °C) (Core)   Resp (!) 31   Ht 5' 10\" (1.778 m)   Wt 247 lb 12.8 oz (112.4 kg)   SpO2 90%   BMI 35.56 kg/m²     Patient Active Problem List   Diagnosis    Grade II internal hemorrhoids    Pneumonia due to COVID-19 virus    Acute respiratory failure with hypoxia (HCC)    [facility] 2 [facility] mellitus ([facility]    Hypertension    Mild malnutrition ([facility]    Septic shock ([address]    Palliative care encounter    Goals of care, counseling/discussion    DNR (do not resuscitate) discussion    Class 1 obesity due to excess calories with serious comorbidity and body mass index (BMI) of 34.0 to 34.9 in adult       Palliative Interaction:Patient is intubated and sedated. His vent settings remain FIO2 of 100% and peep of 18. The beside nurse Charla Acosta was at the bedside answering questions that the family had. The patient's 5 children are present, including daughter Adis Cullen who is the appointed HCPOA. Other in laws as well are present. I introduce my Palliative care support role to them. We go to another room for a private conversation with the family and Sally Agustin is also present. We talk about the patient's medical journey, and they tell us that their Dad was sick for several days before he was admitted to [facility]. Kar Quintanilla stated\" he would not let us see him or come over while he was sick. \" The son stated\" when we took him to [facility] his oxygen was in the 80's. We discussed that time line for intubated patients and the risks of leaving an ET tube in long term. Zeke Romeo RMATEUS discussed the criteria to place a Yuliana Meghan and how patients need to steadily improve, and that they can maintain their oxygenation to proceed with that procedure. Deanathaniel Board R.N states that at this time, the patient has not shown improvement. We discuss again the [facility] code status, and that all the medical treatments are continued. Resuscitation will not take place if a cardiac arrest occurs. The family is in full understanding as are agreeable. DNRCC-A order placed. Entire family expresses that they want to continue current medical treatment, and that they are praying and hoping for a miracle. Will follow for goals of care and family support. Goals/Plan of care  Education/support to family  Providing support for coping/adaptation/distress of family  Discussing meaning/purpose   Continue with current plan of care  Code status clarified: [facility]  Validating patient/family distress  Continued communication updates  Recognizing, reflecting, and empathizing with family members' anticipatory grief  Patient remains intubated and sedated. He is on 100% FIO2 per vent, with a peep of 18. Family agrees to a [facility] and they want to otherwise continue all current treatments. Will follow for goals of care and family support.      Electronically signed by   Cheryl Antonio RN  Palliative Care Team  on 2/17/2021 at 3:46 PM

## 2021-02-17 NOTE — FLOWSHEET NOTE
Discussed with family regarding plan of care and code status with Dr. Dago Mg and Palliative care Nurse Corrina. Discussed patient condition and doctor discussed prognosis. After all questions and concerns were answered, the family agreed to change the patient's code status to [address]. Emotional support was provided to family.

## 2021-02-18 NOTE — PROGRESS NOTES
Critical Care Team - Daily Progress Note      Date and time: 2/18/2021 6:44 PM  Patient's name:  Angela Sutton  Medical Record Number: [id number]  Patient's account/billing number: [de-identified]  Patient's YOB: 1945  Age: 70 y.o. Date of Admission: 2/4/2021 12:53 AM  Length of stay during current admission: 14  Primary Care Physician: Rodrigue Davila MD  ICU Attending Physician: Rhea Waters MD    Code Status: DNR-CCA  Reason for ICU admission: Shortness of breath      Subjective:     OVERNIGHT EVENTS:         Pt was seen and examined at bedside. Intubated and sedated  Vitals stable. Labs reviewed. No acute events overnight. Code status changed to [facility]  On maximum respiratory support    BRIEF SUMMARY:  Patient was admitted for shortness of breath, cough, and dyspnea since January 4th. Was diagnosed with Covid19 Pneumonia. He continued to worsen and was admitted to the ICU for further management. He was started on high flow and BiPAP.  Subsequently treated with 5 days of intravenous remdesivir. Patient needed intubation on 2/3/2021.      Patient is on ARDS protocol, inhalational epoprostenol and is pronated and paralyzed. Et tube reinserted on 2/11/2021. Respiratory supportive care was continued.     AWAKE & FOLLOWING COMMANDS:  [x] No   [] Yes    CURRENT VENTILATION STATUS:     [x] Ventilator  [] BIPAP  [] Nasal Cannula [] Room Air      SEDATION:  RAAS Score:  [] Propofol gtt  [x] Versed gtt  [] Ativan gtt   [] No Sedation    PARALYZED:   [x] No    [] Yes    VASOPRESSORS:   [] Yes     [x] No   If yes -   [] Levophed       [] Dopamine     [] Vasopressin       [] Dobutamine  [] Phenylephrine         [] Epinephrine    CENTRAL LINES:      [x] Yes (Date of Insertion:   )    [] No           If yes -  PICC line 2/12  [] Right Internal Jugular [] Left Internal Jugular [] Right Femoral   [] Left Femoral [] Right Subclavian  [] Left Subclavian     SMALLWOOD'S CATHETER: [] No   [x] Yes  (Date of Insertion:   )     URINE OUTPUT:   [x] Good  [] Low  [] Anuric    REVIEW OF SYSTEMS  Unable to obtain    OBJECTIVE:     VITAL SIGNS:  /65   Pulse 81   Temp 99.7 °F (37.6 °C) (Bladder)   Resp (!) 33   Ht 5' 10\" (1.778 m)   Wt 248 lb 7.3 oz (112.7 kg)   SpO2 93%   BMI 35.65 kg/m²   Tmax over 24 hours:  Temp (24hrs), Av.2 °F (37.3 °C), Min:98.8 °F (37.1 °C), Max:99.7 °F (37.6 °C)      Patient Vitals for the past 8 hrs:   BP Temp Temp src Pulse Resp SpO2   21 1730    81 (!) 33 93 %   21 1700 124/65   82 29 97 %   21 1630    81 (!) 31 97 %   21 1600  99.7 °F (37.6 °C) Bladder 80 28 98 %   21 1530    83 27 97 %   21 1522    83 (!) 0 97 %   21 1500 121/61   83 (!) 0 97 %   21 1430    81 (!) 0 96 %   21 1400    80 (!) 6 96 %   21 1330    80 15 95 %   21 1300 117/61   78 30 96 %   21 1255    77 30 96 %   21 1254     (!) 33 96 %   21 1230    79 30 98 %   21 1200 121/65 99.1 °F (37.3 °C) Bladder 81 (!) 35 95 %   21 1130    81 (!) 35 95 %   21 1100    82 (!) 35 96 %         Intake/Output Summary (Last 24 hours) at 2021 1844  Last data filed at 2021 1700  Gross per 24 hour   Intake 1012 ml   Output 1205 ml   Net -193 ml     Date 21 0000 - 21 2359   Shift 1112-1140 3391-8016 1875-4417 24 Hour Total   INTAKE   I.V.(mL/kg) 204(1.8) 187(1.7)  391(3.5)   NG/GT(mL/kg) 94(0.8) 94(0.8)  188(1.7)   Shift Total(mL/kg) 298(2.6) 281(2.5)  579(5.1)   OUTPUT   Urine(mL/kg/hr) 445(0.5) 390(0.4) 115 950   Shift Total(mL/kg) 445(3.9) 390(3.5) 115(1) 950(8.4)   Weight (kg) 112.7 112.7 112.7 112.7     Wt Readings from Last 3 Encounters:   21 248 lb 7.3 oz (112.7 kg)   21 237 lb 14 oz (107.9 kg)   10/05/18 218 lb (98.9 kg)     Body mass index is 35.65 kg/m². PHYSICAL EXAM:  Constitutional: Intubated and sedated. HEENT: PERRLA. Atraumatic. ET tube in place. Respiratory:  Ventilatory breath sounds. Diffuse rhonchi bilaterally  Cardiovascular: regular rate and rhythm, normal S1, S2  Abdomen: soft, no grimacing to palpation, nondistended, no masses or organomegaly  Neurological: Intubated, sedated. Unable to tolerate sedation holiday. Extremities:  peripheral pulses normal, no pedal edema.     VENT SETTINGS (Comprehensive) (if applicable):  Vent Information  $Ventilation: $Subsequent Day  Skin Assessment: Clean, dry, & intact  Suction Catheter Diameter: 14  Equipment ID: [id number] #[id number]  Equipment Changed: Expiratory Filter  Vent Type: Servo i  Vent Mode: PRVC  Vt Ordered: 500 mL  Rate Set: 35 bmp  FiO2 : 100 %  SpO2: 93 %  SpO2/FiO2 ratio: 97  Sensitivity: 2  PEEP/CPAP: 18  I Time/ I Time %: 0.7 s  Humidification Source: Heated wire  Humidification Temp: 37  Humidification Temp Measured: 37  Circuit Condensation: Drained  Nitric Oxide/Epoprostenol In Use?: No  NO Set: 5  NO Analyzed: 5.3 ppm  NO2 Analyzed: 0.2 ppm  Additional Respiratory  Assessments  Pulse: 81  Resp: (!) 33  SpO2: 93 %  End Tidal CO2: 32  Position: Semi-Hogan's  Humidification Source: Heated wire  Humidification Temp: 37  Circuit Condensation: Drained  Oral Care Completed?: Yes  Oral Care: Suction toothette, Mouth suctioned, Mouth swabbed, Mouthwash  Subglottic Suction Done?: No    ABGs:   Lab Results   Component Value Date    VHF7HZO 43 02/18/2021    FIO2 100.0 02/18/2021     Lactic Acid: No results found for: LACTA    DATA:  Complete Blood Count:   Recent Labs     02/16/21  0438 02/17/21  0440 02/18/21  0415   WBC 9.2 12.3* 11.1   HGB 9.1* 9.1* 8.8*   MCV 88.1 89.4 91.2   * 152 149   RBC 3.29* 3.31* 3.17*   HCT 29.0* 29.6* 28.9*   MCH 27.7 27.5 27.8   MCHC 31.4 30.7 30.4   RDW 14.7* 15.0* 15.1*   MPV 9.1 9.4 9.4        PT/INR:    Lab Results   Component Value Date    PROTIME 14.0 02/03/2021    INR 1.1 02/03/2021     PTT:    Lab Results Component Value Date    APTT 34.5 02/03/2021       Basal Metabolic Profile:   Recent Labs     02/16/21  0438 02/17/21  0440 02/18/21  0415   * 136 136   K 3.9 4.4 4.7   BUN 24* 26* 32*   CREATININE 0.52* 0.58* 0.62*   CL 92* 92* 93*   CO2 36* 39* 37*      MEDICATIONS:  Scheduled Meds:   predniSONE  20 mg Oral Daily    Followed by   Terra Fernandez ON 2/20/2021] predniSONE  10 mg Oral Daily    Followed by   Terra Fernandez ON 2/23/2021] predniSONE  5 mg Oral Daily    amLODIPine  10 mg Per NG tube Daily    insulin glargine  12 Units Subcutaneous Nightly    insulin lispro  0-18 Units Subcutaneous 4 times per day    sodium chloride flush  10 mL Intravenous 2 times per day    anidulafungin  100 mg Intravenous Q24H    famotidine (PEPCID) injection  20 mg Intravenous BID    sodium chloride flush  10 mL Intravenous 2 times per day    heparin (porcine)  5,000 Units Subcutaneous 3 times per day     Continuous Infusions:   sodium chloride 10 mL/hr at 02/18/21 0400    dextrose      fentaNYL 150 mcg/hr (02/18/21 1616)    midazolam 4 mg/hr (02/18/21 1230)     PRN Meds:       labetalol, 10 mg, Q6H PRN      glucose, 15 g, PRN      dextrose, 12.5 g, PRN      glucagon (rDNA), 1 mg, PRN      dextrose, 100 mL/hr, PRN      polyvinyl alcohol, 1 drop, PRN      sodium chloride flush, 10 mL, PRN      promethazine, 12.5 mg, Q6H PRN    Or      ondansetron, 4 mg, Q6H PRN      polyethylene glycol, 17 g, Daily PRN      acetaminophen, 650 mg, Q6H PRN    Or      acetaminophen, 650 mg, Q6H PRN          ASSESSMENT:     Principal Problem:    Pneumonia due to COVID-19 virus  Active Problems:    Acute respiratory failure with hypoxia (HCC)    Type 2 diabetes mellitus (HCC)    Septic shock (HCC)    Palliative care encounter    Goals of care, counseling/discussion    DNR (do not resuscitate) discussion    Class 1 obesity due to excess calories with serious comorbidity and body mass index (BMI) of 34.0 to 34.9 in adult Resolved Problems:    * No resolved hospital problems. *      PLAN:     Severe ARDS due to Covid 19 Pneumonia  - Completed Remdesivir, Decadron 10 doses. - COVID-19 symptoms started 1/14/2021, hospitalized 1/20 with BiPAP for 14 days and then transferred to [facility] [place]. V's and intubated on 2/3/2021.  - Continue supportive care. - Family explained about status.  Patient changed to DNR CCA  - Clinically not improving     Type 2 DM-continue Lantus 12 units and high-dose sliding scale, blood glucose control with monitor POC glucose.     DVT ppx - Heparin 5000 TID  Bowel regimen: Lactulose    Garth Prado MD  PGY-2, Internal Medicine Resident  [facility], [place]         2/18/2021, 6:44 PM

## 2021-02-18 NOTE — PROGRESS NOTES
Comprehensive Nutrition Assessment    Type and Reason for Visit:  Reassess    Nutrition Recommendations/Plan: Continue TF as tolerated; suggest increase TF rate to 40 mL/hr and provide 1 protein modular/day. Will continue to monitor TF tolerance/adequacy. Nutrition Assessment:  Chart reviewed. Pt remains on vent. Renal TF currently infusing at 30 mL/hr. No tolerance problems noted. Last BM noted: today. Meds/labs reviewed. Estimated Daily Nutrient Needs:  Energy (kcal):  1515-0899 kcal/day; Weight Used for Energy Requirements:  Admission     Protein (g):  110 g pro/day; Weight Used for Protein Requirements:  Ideal(1.5)          Current Nutrition Therapies:    DIET TUBE FEED CONTINUOUS/CYCLIC NPO; Renal Formula; Orogastric; 10; 30; 24  Current Tube Feeding (TF) Orders:  · Formula: Renal  · Schedule: Continuous at 30 mL/hr   · Additives/Modulars: Protein(BID)-?if being provided  · Current TF & Flush Orders Provides: Renal @ goal 30 mL/hr continuous + Proteinex BID to provide 1504 kcal/day, 110 g/day protein     Anthropometric Measures:  · Height: 5' 10\" (177.8 cm)  · Current Body Weight: 248 lb 7.3 oz (112.7 kg)   · Admission Body Weight: 237 lb (107.5 kg)    · Ideal Body Weight: 166 lbs; % Ideal Body Weight  149%   · BMI: 35.6  · BMI Categories: Obese Class 2 (BMI 35.0 -39.9)       Nutrition Diagnosis:   · Inadequate oral intake related to impaired respiratory function as evidenced by NPO or clear liquid status due to medical condition, nutrition support - enteral nutrition    Nutrition Interventions:   Food and/or Nutrient Delivery:Continue TF as tolerated; suggest increase TF rate to 40 mL/hr and provide 1 protein modular/day. Will continue to monitor TF tolerance/adequacy.   Nutrition Education/Counseling:  No recommendation at this time   Coordination of Nutrition Care:  Continue to monitor while inpatient    Goals:  meet % of estimated nutrient needs -achieved Nutrition Monitoring and Evaluation:   Behavioral-Environmental Outcomes:  None Identified   Food/Nutrient Intake Outcomes:  Enteral Nutrition Intake/Tolerance  Physical Signs/Symptoms Outcomes:  Biochemical Data, Nausea or Vomiting, Nutrition Focused Physical Findings, Skin, Weight     Discharge Planning:     Too soon to determine     Electronically signed by Yary Alexander RD, LD on 2/18/21 at 2:39 PM EST    Contact: [phone]

## 2021-02-18 NOTE — PLAN OF CARE
Problem: Skin Integrity:  Goal: Will show no infection signs and symptoms  Description: Will show no infection signs and symptoms  Outcome: Ongoing  Goal: Absence of new skin breakdown  Description: Absence of new skin breakdown  Outcome: Ongoing     Problem: Nutrition  Goal: Optimal nutrition therapy  Description: Nutrition Problem #1: Inadequate oral intake  Intervention: Food and/or Nutrient Delivery: (Start nutrition as able.  If TF, suggest Semi-elemental formula with goal rate of 55 mL/hr to provide 1584 kcal and 99 g pro/day.)  Nutritional Goals: meet % of estimated nutrient needs     Outcome: Ongoing     Problem: OXYGENATION/RESPIRATORY FUNCTION  Goal: Patient will maintain patent airway  2/18/2021 1529 by Elvis Sparrow RN  Outcome: Ongoing  2/18/2021 0845 by Lilliam Molina RCP  Outcome: Ongoing  Goal: Patient will achieve/maintain normal respiratory rate/effort  Description: Respiratory rate and effort will be within normal limits for the patient  2/18/2021 1529 by Elvis Sparrow RN  Outcome: Ongoing  2/18/2021 0845 by Lilliam Molina RCP  Outcome: Ongoing  2/18/2021 0845 by Lilliam Molina RCP  Outcome: Ongoing     Problem: MECHANICAL VENTILATION  Goal: Patient will maintain patent airway  2/18/2021 1529 by Elvis Sparrow RN  Outcome: Ongoing  2/18/2021 0845 by Lilliam Molina RCP  Outcome: Ongoing  Goal: Oral health is maintained or improved  2/18/2021 1529 by Elvis Sparrow RN  Outcome: Ongoing  2/18/2021 0845 by Lilliam Molina RCP  Outcome: Ongoing  Goal: ET tube will be managed safely  2/18/2021 1529 by Elvis Sparrow RN  Outcome: Ongoing  2/18/2021 0845 by Lilliam Molina RCP  Outcome: Ongoing  Goal: Mobility/activity is maintained at optimum level for patient  2/18/2021 1529 by Elvis Sparrow RN  Outcome: Ongoing  2/18/2021 0845 by Lilliam Molina RCP  Outcome: Ongoing  Goal: Ability to express needs and understand communication 2/18/2021 1529 by Veena Balderrama RN  Outcome: Ongoing  2/18/2021 0845 by Madie Banks RCP  Outcome: Ongoing     Problem: SKIN INTEGRITY  Goal: Skin integrity is maintained or improved  2/18/2021 1529 by Veena Balderrama RN  Outcome: Ongoing  2/18/2021 0845 by Madie Banks RCP  Outcome: Ongoing     Problem: Metabolic:  Goal: Ability to maintain appropriate glucose levels will improve  Description: Ability to maintain appropriate glucose levels will improve  Outcome: Ongoing     Problem: Nutritional:  Goal: Maintenance of adequate nutrition will improve  Description: Maintenance of adequate nutrition will improve  Outcome: Ongoing  Goal: Progress toward achieving an optimal weight will improve  Description: Progress toward achieving an optimal weight will improve  Outcome: Ongoing     Problem: Mental Status - Impaired:  Goal: Mental status will improve  Description: Mental status will improve  Outcome: Ongoing     Problem: Gas Exchange - Impaired  Goal: Absence of hypoxia  Outcome: Ongoing  Goal: Promote optimal lung function  Outcome: Ongoing     Problem:  Body Temperature -  Risk of, Imbalanced  Goal: Ability to maintain a body temperature within defined limits  Outcome: Ongoing  Goal: Will regain or maintain usual level of consciousness  Outcome: Ongoing  Goal: Complications related to the disease process, condition or treatment will be avoided or minimized  Outcome: Ongoing

## 2021-02-18 NOTE — PLAN OF CARE
Problem: Skin Integrity:  Goal: Will show no infection signs and symptoms  Description: Will show no infection signs and symptoms  Outcome: Ongoing     Problem: Skin Integrity:  Goal: Absence of new skin breakdown  Description: Absence of new skin breakdown  Outcome: Ongoing     Problem: Metabolic:  Goal: Ability to maintain appropriate glucose levels will improve  Description: Ability to maintain appropriate glucose levels will improve  Outcome: Ongoing     Problem: Nutritional:  Goal: Maintenance of adequate nutrition will improve  Description: Maintenance of adequate nutrition will improve  Outcome: Ongoing     Problem: Nutritional:  Goal: Progress toward achieving an optimal weight will improve  Description: Progress toward achieving an optimal weight will improve  Outcome: Ongoing     Problem: Mental Status - Impaired:  Goal: Mental status will improve  Description: Mental status will improve  Outcome: Ongoing     Problem: Gas Exchange - Impaired  Goal: Absence of hypoxia  Outcome: Ongoing     Problem: Gas Exchange - Impaired  Goal: Promote optimal lung function  Outcome: Ongoing     Problem: Body Temperature -  Risk of, Imbalanced  Goal: Ability to maintain a body temperature within defined limits  Outcome: Ongoing     Problem: Body Temperature -  Risk of, Imbalanced  Goal: Will regain or maintain usual level of consciousness  Outcome: Ongoing     Problem:  Body Temperature -  Risk of, Imbalanced  Goal: Complications related to the disease process, condition or treatment will be avoided or minimized  Outcome: Ongoing     Problem: OXYGENATION/RESPIRATORY FUNCTION  Goal: Patient will maintain patent airway  2/18/2021 0039 by Gely Wells RN  Outcome: Ongoing    Goal: Patient will achieve/maintain normal respiratory rate/effort  Description: Respiratory rate and effort will be within normal limits for the patient  2/18/2021 0039 by Gely Wells RN  Outcome: Ongoing     Problem: MECHANICAL VENTILATION Goal: Patient will maintain patent airway  2/18/2021 0039 by Sherman Lozano RN  Outcome: Ongoing  Goal: Oral health is maintained or improved  2/18/2021 0039 by Sherman Lozano RN  Outcome: Ongoing

## 2021-02-18 NOTE — PROGRESS NOTES
.. PALLIATIVE CARE TEAM    Patient: Bernardo Melara  Room: 0108/0108-01    Reason For Consult   Goals of care evaluation  Distress management  Symptom Management  Guidance and support  Facilitate communications  Assistance in coordinating care  Recommendations for the above    Impression: Bernardo Melara is a 70y.o. year old male with  has a past medical history of Asthma, Hyperlipidemia, Hypertension, and Type 2 diabetes mellitus without complication ([facility] [address] .  Currently hospitalized for the management of respiratory failure/Covid 19. The Palliative Care Team is following to assist with goals of care and family support. Code Status  DNR-CCA    Vital Signs:  /65   Pulse 79   Temp 99.1 °F (37.3 °C) (Bladder)   Resp (!) 33   Ht 5' 10\" (1.778 m)   Wt 248 lb 7.3 oz (112.7 kg)   SpO2 96%   BMI 35.65 kg/m²     Patient Active Problem List   Diagnosis    Grade II internal hemorrhoids    Pneumonia due to COVID-19 virus    Acute respiratory failure with hypoxia (HCC)    Type 2 diabetes mellitus ([facility] [address]    Hypertension    Mild malnutrition ([facility] [address]    Septic shock ([facility] [address]    Palliative care encounter    Goals of care, counseling/discussion    DNR (do not resuscitate) discussion    Class 1 obesity due to excess calories with serious comorbidity and body mass index (BMI) of 34.0 to 34.9 in adult       Palliative Interaction:The patient remains intubated and sedated and and his vent settings are FIO2 of 100% and peep is 16. The bedside nurse states that the patient continues with no positive progress and that they are not able to wean off any sedation because the patient responds so poorly. I reach out to the patient's daughter Bony Mitchell, and she is with her family. I ask if they have been updated, and Susie Núñez states\" yes everyday and twice daily normally. \" Luis Caraballo states that the nurse's have been excellent and even when they call with any questions outside the update times, that they are patient with them and answer any questions that they have. The entire family  are all grateful for the care. I offer much emotional support, and they are very appreciative for the conversation and the support. Goals/Plan of care  Education/support to family  Providing support for coping/adaptation/distress of patient  Continue with current plan of care  Code status clarified: [facility]  Validating patient/family distress  Continued communication updates  Patient remains intubated and sedated with high vent settings. I reach out to the family for support.      Electronically signed by   Jay Lin RN  Palliative Care Team  on 2/18/2021 at 12:57 PM

## 2021-02-18 NOTE — PROGRESS NOTES
[facility] -   Infectious diseases evaluation. Progress Note    admission date 2/4/2021    reason for consultation:   bandemia    Impression :   Current:  · Bandemia, likely from steroids  · COVID-19 virus pneumonia  · COVID-19 related sepsis with arts  · Septic shock  · Respiratory failure requiring mechanical ventilation  · Elevated inflammatory markers CRP, LDH      Discussion / summary of stay / plan of care   ·   Recommendations     Completed 5 days of remdesivir at the referring hospital  Post Levaquin course at the other hospital  E faecalis UTI treated with 3 days of zosyn  Out of the window for plasma  D/C isolation and transfer out of COVID unit  post vancomycin and Zosyn,   · Continues on steroids anticoagulation  · 2/12WBC 14 and low-grade fever: Improved  · Sp and  blood culture, all negative  · 2/12 anidulafungin   · 2/12- 2/16 vancomycin-stopped    If blood cultures remain negative tomorrow, stop anidulafungin  Infection Control Recommendations   · [place] Precautions  · Contact Isolation   · Airborne isolation removed  · Droplet Isolation removed    Antimicrobial Stewardship Recommendations   · Targeted therapy    Coordination ofOutpatient Care:   · Estimated Length of IV antimicrobials:  · Patient will need Midline / picc Catheter Insertion:   · Patient will need SNF:  · Patient will need outpatient wound care:     History of Present Illness:       INITIAL HISTORY:    Abby Estrada is a 70y.o.-year-old male who presented because of shortness of breath and cough that has been progressive. Started January 12 with a fever aches cough shortness of breath, did not lose the taste of food or the smell, no diarrhea no blood in the sputum. Fever was associated. Presented to the ER because of the worsening of shortness of breath. Chest x-ray showed pneumonia he tested positive for Covid. Lactic acid was 3 and the patient was desaturating so he was admitted on high flow to the ICU. To another hospital, patient was not improving despite steroids and high oxygen supplementation. Patient was intubated 2/3/2021 and transferred to [facility]. Paralyzed high oxygen requirement, 2 pressors on board.     WBC 2019  Creatinine is normal  Liver enzymes are normal    CURRENT EVALUATION :  2/17/2021   Patient Vitals for the past 8 hrs:   Pulse Resp SpO2   02/17/21 1930 93 (!) 35 94 %   02/17/21 1915 93 (!) 35 94 %   02/17/21 1900 95 (!) 35 93 %   02/17/21 1845 95 (!) 35 93 %   02/17/21 1830 97 (!) 33 92 %   02/17/21 1815 97 (!) 35 92 %   02/17/21 1800 95 (!) 35 91 %   02/17/21 1745 96 (!) 34 91 %   02/17/21 1730 95 (!) 34 90 %   02/17/21 1715 96 (!) 35 90 %   02/17/21 1700 97 (!) 35 90 %   02/17/21 1645 96 (!) 34 91 %   02/17/21 1630 96 (!) 34 92 %   02/17/21 1615 98 (!) 35 96 %   02/17/21 1600 100 30 (!) 89 %   02/17/21 1545 102 (!) 33 91 %   02/17/21 1530 101 (!) 32 91 %   02/17/21 1515 100 (!) 33 91 %   02/17/21 1500 101 (!) 34 90 %   02/17/21 1458 101 (!) 31 90 %   02/17/21 1445 100 (!) 35 90 %   02/17/21 1430 101 30 (!) 89 %   02/17/21 1415 99 (!) 33 (!) 89 %   02/17/21 1400 98 (!) 32 (!) 88 %   02/17/21 1345 97 (!) 33 (!) 89 %   02/17/21 1330 97 (!) 35 (!) 89 %     Nitrite oxide stopped 2/16   38 3 temperature, 100% FiO2, he is not proning, saturation dropped  Sputum is brownish  He has no femoral line  WBC 12    Summary of relevant labs: 2/17/2021    Labs:  WBC 19-18-->14- 9 7 - 10 - 14-12.2 - 10 - 9.6    Cr 1.21-0.67    CRP 98  Ferritin 307  Fibrinogen 350      Micro:  Sputum culture 1-30-21 Normal aris    Blood:  · 2-4-21: No growth  · 1-19-21: No growth     Urine  2-4-21: Enterococcus faecalis     Covid +1/14    Imaging:  CXR 2/11  Otherwise, severe diffuse faint parenchymal and interstitial opacities throughout both lungs unchanged. Chest x-ray bilateral pulmonary changes with chronic changes  Ultrasound of the kidneys no hydronephrosis      I have personally reviewed the past medical history, past surgical history, medications, social history, and family history, and I haveupdated the database accordingly. Allergies:   Patient has no known allergies. Review of Systems:     Review of Systems   Unable to perform ROS: Intubated       Physical Examination :       Physical Exam  Constitutional:       General: He is not in acute distress. Appearance: He is obese. He is not ill-appearing. Interventions: He is sedated and intubated. HENT:      Head: Normocephalic and atraumatic. Nose: Nose normal.      Mouth/Throat:      Mouth: Mucous membranes are moist.   Eyes:      General: No scleral icterus. Conjunctiva/sclera: Conjunctivae normal.   Neck:      Musculoskeletal: Neck supple. No neck rigidity. Cardiovascular:      Rate and Rhythm: Normal rate and regular rhythm. Heart sounds: No murmur. No friction rub. No gallop. Pulmonary:      Effort: No respiratory distress. He is intubated. Breath sounds: No stridor. Rales present. No wheezing or rhonchi. Abdominal:      General: There is no distension. Palpations: Abdomen is soft. There is no mass. Hernia: No hernia is present. Genitourinary:     Comments: Urine is yusra  Musculoskeletal:         General: No swelling, tenderness or signs of injury. Right lower leg: No edema. Left lower leg: No edema. Skin:     Coloration: Skin is not jaundiced or pale. Findings: No bruising, erythema, lesion or rash.    Neurological:      Comments: Intubated, very weak in general   Psychiatric:      Comments: Intubated, folowed commands, appropriate         Past Medical History:     Past Medical History:   Diagnosis Date    Asthma     Hyperlipidemia     Hypertension  Type 2 diabetes mellitus without complication ([facility] [address]        Past Surgical  History:     Past Surgical History:   Procedure Laterality Date    HERNIA REPAIR      right inguinal at age 3 years.     IN COLONOSCOPY FLX DX W/COLLJ SPEC WHEN PFRMD N/A 10/5/2018    COLONOSCOPY performed by Arnoldo Angel, DO at [facility]       Medications:      predniSONE  20 mg Oral Daily    Followed by   Cony Ivory ON 2021] predniSONE  10 mg Oral Daily    Followed by   Cony Ivory ON 2021] predniSONE  5 mg Oral Daily    amLODIPine  10 mg Per NG tube Daily    insulin glargine  12 Units Subcutaneous Nightly    insulin lispro  0-18 Units Subcutaneous 4 times per day    sodium chloride flush  10 mL Intravenous 2 times per day    anidulafungin  100 mg Intravenous Q24H    famotidine (PEPCID) injection  20 mg Intravenous BID    lactulose  20 g Oral TID    sodium chloride flush  10 mL Intravenous 2 times per day    heparin (porcine)  5,000 Units Subcutaneous 3 times per day       Social History:     Social History     Socioeconomic History    Marital status:      Spouse name: Not on file    Number of children: Not on file    Years of education: Not on file    Highest education level: Not on file   Occupational History    Not on file   Social Needs    Financial resource strain: Not on file    Food insecurity     Worry: Not on file     Inability: Not on file    Transportation needs     Medical: Not on file     Non-medical: Not on file   Tobacco Use    Smoking status: Former Smoker     Quit date: 2009     Years since quittin.4    Smokeless tobacco: Never Used   Substance and Sexual Activity    Alcohol use: Yes     Comment: rare    Drug use: Not on file    Sexual activity: Not on file   Lifestyle    Physical activity     Days per week: Not on file     Minutes per session: Not on file    Stress: Not on file   Relationships    Social connections     Talks on phone: Not on file Gets together: Not on file     Attends Mu-ism service: Not on file     Active member of club or organization: Not on file     Attends meetings of clubs or organizations: Not on file     Relationship status: Not on file    Intimate partner violence     Fear of current or ex partner: Not on file     Emotionally abused: Not on file     Physically abused: Not on file     Forced sexual activity: Not on file   Other Topics Concern    Not on file   Social History Narrative    Not on file       Family History:     Family History   Problem Relation Age of Onset    Stroke Mother     Heart Disease Father       Medical Decision Making:   I have independently reviewed/ordered the following labs:    CBC with Differential:   Recent Labs     02/16/21  0438 02/17/21  0440   WBC 9.2 12.3*   HGB 9.1* 9.1*   HCT 29.0* 29.6*   * 152   LYMPHOPCT 4* 4*   MONOPCT 5 5     BMP:  Recent Labs     02/16/21  0438 02/17/21  0440   * 136   K 3.9 4.4   CL 92* 92*   CO2 36* 39*   BUN 24* 26*   CREATININE 0.52* 0.58*     Hepatic Function Panel:   No results for input(s): PROT, LABALBU, BILIDIR, IBILI, BILITOT, ALKPHOS, ALT, AST in the last 72 hours. No results for input(s): RPR in the last 72 hours. No results for input(s): HIV in the last 72 hours. No results for input(s): BC in the last 72 hours. Lab Results   Component Value Date    CREATININE 0.58 02/17/2021    GLUCOSE 129 02/17/2021       Detailed results: Thank you for allowing us to participate in the care of this patient. Please call with questions. This note is created with the assistance of a speech recognition program.  While intending to generate adocument that actually reflects the content of the visit, the document can still have some errors including those of syntax and sound a like substitutions which may escape proof reading. It such instances, actual meaningcan be extrapolated by contextual diversion.     Alfredo Colvin MD Office: [phone]  Perfect serve / office [phone]        I have discussed the care of the patient, including pertinent history and exam findings,  with the resident. I have seen and examined the patient and the key elements of all parts of the encounter have been performed by me. I agree with the assessment, plan and orders as documented by the resident.     Yecenia Velasco, Infectious Diseases

## 2021-02-18 NOTE — PLAN OF CARE
Problem: OXYGENATION/RESPIRATORY FUNCTION  Goal: Patient will maintain patent airway  2/17/2021 2234 by Miah Sellers RCP  Outcome: Ongoing     Problem: OXYGENATION/RESPIRATORY FUNCTION  Goal: Patient will achieve/maintain normal respiratory rate/effort  Description: Respiratory rate and effort will be within normal limits for the patient  2/17/2021 2234 by Miah Sellers RCP  Outcome: Ongoing     Problem: MECHANICAL VENTILATION  Goal: Patient will maintain patent airway  2/17/2021 2234 by Miah Sellers RCP  Outcome: Ongoing     Problem: MECHANICAL VENTILATION  Goal: Oral health is maintained or improved  2/17/2021 2234 by Miah Sellers RCP  Outcome: Ongoing     Problem: MECHANICAL VENTILATION  Goal: ET tube will be managed safely  2/17/2021 2234 by Miah Sellers RCP  Outcome: Ongoing     Problem: MECHANICAL VENTILATION  Goal: Mobility/activity is maintained at optimum level for patient  2/17/2021 2234 by Miah Sellers RCP  Outcome: Ongoing     Problem: MECHANICAL VENTILATION  Goal: Ability to express needs and understand communication  Outcome: Ongoing     Problem: SKIN INTEGRITY  Goal: Skin integrity is maintained or improved  2/17/2021 2234 by Miah Sellers RCP  Outcome: Ongoing

## 2021-02-18 NOTE — PLAN OF CARE
Problem: OXYGENATION/RESPIRATORY FUNCTION  Goal: Patient will maintain patent airway  2/18/2021 0845 by Marley Moses RCP  Outcome: Ongoing  2/18/2021 0039 by Juwan Hernandez RN  Outcome: Ongoing  2/17/2021 2234 by Yuli Gabriel RCP  Outcome: Ongoing  Goal: Patient will achieve/maintain normal respiratory rate/effort  Description: Respiratory rate and effort will be within normal limits for the patient  2/18/2021 0845 by Marley Moses, RCP  Outcome: Ongoing  2/18/2021 0845 by Marley Moses RCP  Outcome: Ongoing  2/18/2021 0039 by Juwan Hernandez RN  Outcome: Ongoing  2/17/2021 2234 by Yuli Gabriel RCP  Outcome: Ongoing     Problem: MECHANICAL VENTILATION  Goal: Patient will maintain patent airway  2/18/2021 0845 by SAMEERA BoyerP  Outcome: Ongoing  2/18/2021 0039 by Juwan Hernandez RN  Outcome: Ongoing  2/17/2021 2234 by Yuli Gabriel RCP  Outcome: Ongoing  Goal: Oral health is maintained or improved  2/18/2021 0845 by SAMEERA BoyerP  Outcome: Ongoing  2/18/2021 0039 by Juwan Hernandez RN  Outcome: Ongoing  2/17/2021 2234 by Yuli Gabriel RCP  Outcome: Ongoing  Goal: ET tube will be managed safely  2/18/2021 0845 by SAMEERA BoyerP  Outcome: Ongoing  2/17/2021 2234 by Yuli Gabriel RCP  Outcome: Ongoing  Goal: Mobility/activity is maintained at optimum level for patient  2/18/2021 0845 by Marley Moses RCP  Outcome: Ongoing  2/17/2021 2234 by SAMEERA ChoiP  Outcome: Ongoing  Goal: Ability to express needs and understand communication  2/18/2021 0845 by Marley Moses RCP  Outcome: Ongoing  2/17/2021 2234 by Yuli Gabriel RCP  Outcome: Ongoing     Problem: SKIN INTEGRITY  Goal: Skin integrity is maintained or improved  2/18/2021 0845 by SAMEERA BoyerP  Outcome: Ongoing  2/17/2021 2234 by Yuli Gabriel RCP  Outcome: Ongoing

## 2021-02-19 NOTE — PLAN OF CARE
Problem: Skin Integrity:  Goal: Will show no infection signs and symptoms  Description: Will show no infection signs and symptoms  2/19/2021 0157 by Saintclair Ard, RN  Outcome: Ongoing     Problem: Skin Integrity:  Goal: Absence of new skin breakdown  Description: Absence of new skin breakdown  2/19/2021 0157 by Saintclair Ard, RN  Outcome: Ongoing     Problem: Nutrition  Goal: Optimal nutrition therapy  Description: Nutrition Problem #1: Inadequate oral intake  Intervention: Food and/or Nutrient Delivery: (Start nutrition as able.  If TF, suggest Semi-elemental formula with goal rate of 55 mL/hr to provide 1584 kcal and 99 g pro/day.)  Nutritional Goals: meet % of estimated nutrient needs     2/19/2021 0157 by Saintclair Ard, RN  Outcome: Ongoing     Problem: Gas Exchange - Impaired  Goal: Absence of hypoxia  2/19/2021 0157 by Saintclair Ard, RN  Outcome: Ongoing     Problem: Gas Exchange - Impaired  Goal: Promote optimal lung function  2/19/2021 0157 by Saintclair Ard, RN  Outcome: Ongoing

## 2021-02-19 NOTE — PROGRESS NOTES
[facility] -   Infectious diseases evaluation. Progress Note    admission date 2/4/2021    reason for consultation:   bandemia    Impression :   Current:  · Bandemia, likely from steroids  · COVID-19 virus pneumonia  · COVID-19 related sepsis with arts  · Septic shock  · Respiratory failure requiring mechanical ventilation  · Elevated inflammatory markers CRP, LDH      Discussion / summary of stay / plan of care   ·   Recommendations     Completed 5 days of remdesivir at the referring hospital  Post Levaquin course at the other hospital  E faecalis UTI treated with 3 days of zosyn  Out of the window for plasma  D/C isolation and transfer out of COVID unit  post vancomycin and Zosyn,   · Continues on steroids anticoagulation  · 2/12WBC 14 and low-grade fever: Improved  · Sp and  blood culture, all negative  · 2/12 anidulafungin  -2/18 - stopped  · 2/12- 2/16 vancomycin-stopped    Continue off antibiotics, very poor outcome    Infection Control Recommendations   · [place] Precautions  · Contact Isolation   · Airborne isolation removed  · Droplet Isolation removed    Antimicrobial Stewardship Recommendations   · Targeted therapy    Coordination ofOutpatient Care:   · Estimated Length of IV antimicrobials:  · Patient will need Midline / picc Catheter Insertion:   · Patient will need SNF:  · Patient will need outpatient wound care:     History of Present Illness:       INITIAL HISTORY:    Brad Baez is a 70y.o.-year-old male who presented because of shortness of breath and cough that has been progressive. Started January 12 with a fever aches cough shortness of breath, did not lose the taste of food or the smell, no diarrhea no blood in the sputum. Fever was associated. Presented to the ER because of the worsening of shortness of breath.  SC COLONOSCOPY FLX DX W/COLLJ SPEC WHEN PFRMD N/A 10/5/2018    COLONOSCOPY performed by Shala Bruno DO at [address]       Medications:      predniSONE  20 mg Oral Daily    Followed by   Nina Clark ON 2021] predniSONE  10 mg Oral Daily    Followed by   Nina Clark ON 2021] predniSONE  5 mg Oral Daily    amLODIPine  10 mg Per NG tube Daily    insulin glargine  12 Units Subcutaneous Nightly    insulin lispro  0-18 Units Subcutaneous 4 times per day    sodium chloride flush  10 mL Intravenous 2 times per day    anidulafungin  100 mg Intravenous Q24H    famotidine (PEPCID) injection  20 mg Intravenous BID    sodium chloride flush  10 mL Intravenous 2 times per day    heparin (porcine)  5,000 Units Subcutaneous 3 times per day       Social History:     Social History     Socioeconomic History    Marital status:      Spouse name: Not on file    Number of children: Not on file    Years of education: Not on file    Highest education level: Not on file   Occupational History    Not on file   Social Needs    Financial resource strain: Not on file    Food insecurity     Worry: Not on file     Inability: Not on file    Transportation needs     Medical: Not on file     Non-medical: Not on file   Tobacco Use    Smoking status: Former Smoker     Quit date: 2009     Years since quittin.4    Smokeless tobacco: Never Used   Substance and Sexual Activity    Alcohol use: Yes     Comment: rare    Drug use: Not on file    Sexual activity: Not on file   Lifestyle    Physical activity     Days per week: Not on file     Minutes per session: Not on file    Stress: Not on file   Relationships    Social connections     Talks on phone: Not on file     Gets together: Not on file     Attends Congregational service: Not on file     Active member of club or organization: Not on file     Attends meetings of clubs or organizations: Not on file     Relationship status: Not on file  Intimate partner violence     Fear of current or ex partner: Not on file     Emotionally abused: Not on file     Physically abused: Not on file     Forced sexual activity: Not on file   Other Topics Concern    Not on file   Social History Narrative    Not on file       Family History:     Family History   Problem Relation Age of Onset    Stroke Mother     Heart Disease Father       Medical Decision Making:   I have independently reviewed/ordered the following labs:    CBC with Differential:   Recent Labs     02/17/21  0440 02/18/21  0415   WBC 12.3* 11.1   HGB 9.1* 8.8*   HCT 29.6* 28.9*    149   LYMPHOPCT 4* 5*   MONOPCT 5 5     BMP:  Recent Labs     02/17/21  0440 02/18/21  0415    136   K 4.4 4.7   CL 92* 93*   CO2 39* 37*   BUN 26* 32*   CREATININE 0.58* 0.62*     Hepatic Function Panel:   No results for input(s): PROT, LABALBU, BILIDIR, IBILI, BILITOT, ALKPHOS, ALT, AST in the last 72 hours. No results for input(s): RPR in the last 72 hours. No results for input(s): HIV in the last 72 hours. No results for input(s): BC in the last 72 hours. Lab Results   Component Value Date    CREATININE 0.62 02/18/2021    GLUCOSE 167 02/18/2021       Detailed results: Thank you for allowing us to participate in the care of this patient. Please call with questions. This note is created with the assistance of a speech recognition program.  While intending to generate adocument that actually reflects the content of the visit, the document can still have some errors including those of syntax and sound a like substitutions which may escape proof reading. It such instances, actual meaningcan be extrapolated by contextual diversion.     Ranjan Nguyen MD  Office: [phone]  Perfect serve / office [phone] I have discussed the care of the patient, including pertinent history and exam findings,  with the resident. I have seen and examined the patient and the key elements of all parts of the encounter have been performed by me. I agree with the assessment, plan and orders as documented by the resident.     Yecenia Velasco, Infectious Diseases

## 2021-02-19 NOTE — PROGRESS NOTES
Physical Therapy    DATE: 2021    NAME: Rosy Cedeno  MRN: [id number]   : 1945      Patient not seen this date for Physical Therapy due to:     Other: not appropriate at this time, on full vent support, satting in 80\"s      Electronically signed by Constantine Martin PTA on 2021 at 2:34 PM

## 2021-02-19 NOTE — PLAN OF CARE
Problem: Skin Integrity:  Goal: Will show no infection signs and symptoms  Description: Will show no infection signs and symptoms  2/19/2021 0822 by Chavez Elise RCP  Outcome: Ongoing  2/19/2021 0809 by Chavez Elise RCP  Outcome: Ongoing  2/19/2021 0157 by Ron Mcfarland RN  Outcome: Ongoing  Goal: Absence of new skin breakdown  Description: Absence of new skin breakdown  2/19/2021 0822 by Chavez Elise RCP  Outcome: Ongoing  2/19/2021 0809 by Chavez Elise RCP  Outcome: Ongoing  2/19/2021 0157 by Ron Mcfarland RN  Outcome: Ongoing     Problem: OXYGENATION/RESPIRATORY FUNCTION  Goal: Patient will maintain patent airway  2/19/2021 0822 by Chavez Elise RCP  Outcome: Ongoing  2/19/2021 0809 by Chavez Elise RCP  Outcome: Ongoing  2/18/2021 2206 by Jonas Beltran RCP  Outcome: Ongoing  Goal: Patient will achieve/maintain normal respiratory rate/effort  Description: Respiratory rate and effort will be within normal limits for the patient  2/19/2021 6201 by Chavez Elise RCP  Outcome: Ongoing  2/19/2021 0809 by Chavez Elise RCP  Outcome: Ongoing  2/18/2021 2206 by Jonas Beltran RCP  Outcome: Ongoing     Problem: MECHANICAL VENTILATION  Goal: Patient will maintain patent airway  2/19/2021 0822 by Chavez Elise RCP  Outcome: Ongoing  2/19/2021 0809 by Chavez Elise RCP  Outcome: Ongoing  2/18/2021 2206 by Jonas Beltran RCP  Outcome: Ongoing  Goal: Oral health is maintained or improved  2/19/2021 0822 by Chavez Elise RCP  Outcome: Ongoing  2/19/2021 0809 by Chavez Elise RCP  Outcome: Ongoing  2/18/2021 2206 by Jonas Beltran RCP  Outcome: Ongoing  Goal: ET tube will be managed safely  2/19/2021 0822 by Chavez Elise RCP  Outcome: Ongoing  2/19/2021 0809 by Chavez Elise RCP  Outcome: Ongoing  2/18/2021 2206 by Jonas Beltran RCP  Outcome: Ongoing  Goal: Mobility/activity is maintained at optimum level for patient  2/19/2021 5437 by Chavez Elise RCP

## 2021-02-19 NOTE — PROGRESS NOTES
.. PALLIATIVE CARE TEAM    Patient: Abby Estrada  Room: 0108/0108-01    Reason For Consult   Goals of care evaluation  Distress management  Symptom Management  Guidance and support  Facilitate communications  Assistance in coordinating care  Recommendations for the above    Impression: Abby Estrada is a 70y.o. year old male with  has a past medical history of Asthma, Hyperlipidemia, Hypertension, and Type 2 diabetes mellitus without complication ([address] .  Currently hospitalized for the management of Covid 19 pneumonia. The Palliative Care Team is following to assist with goals of care and family support. Code Status  DNR-CCA    Vital Signs:  /66   Pulse 114   Temp 101.3 °F (38.5 °C) (Bladder)   Resp 19   Ht 5' 10\" (1.778 m)   Wt 247 lb 2.2 oz (112.1 kg)   SpO2 (!) 79%   BMI 35.46 kg/m²     Patient Active Problem List   Diagnosis    Grade II internal hemorrhoids    Pneumonia due to COVID-19 virus    Acute respiratory failure with hypoxia (HCC)    [facility] mellitus [facility]    Hypertension    Mild malnutrition ([facility]    Septic shock ([address]    Palliative care encounter    Goals of care, counseling/discussion    DNR (do not resuscitate) discussion    Class 1 obesity due to excess calories with serious comorbidity and body mass index (BMI) of 34.0 to 34.9 in adult       Palliative Interaction:Patient is intubated and sedated. I get an update from Lashawn Young. He states that the FIO2 remains at 100% and the Peep is back up to 18. Vincent Bond states that they have done everything to adjust the vent and the pulse ox remains at 80%. Vincent Bond tells me that he wants to get Dr. Memo Joyce to call the family to update them. I reach out to Chito Floyd the patient's daughter at [phone], and I leave a VM. I also reach out to Geraldo Whitt patient's son at [phone], and also leave a voicemail for them to call palliative care or the ICU. Will follow for goals of care and family support. Goals/Plan of care  Continue with current plan of care  Code status clarified: [facility]  Continued communication updates  Patient remains intubated and sedated. I get an update from David Castillo R.N that the patient is struggling , and that they have adjusted vent settings and that the patient reamins on 100% FIO2 and a low sat. David Castillo has reached out to have the medical team call the family.      Electronically signed by   Delaney Tyler RN  Palliative Care Team  on 2/19/2021 at 12:17 PM

## 2021-02-19 NOTE — PLAN OF CARE
Problem: OXYGENATION/RESPIRATORY FUNCTION  Goal: Patient will maintain patent airway  2/18/2021 2206 by Orval Socks, RCP  Outcome: Ongoing     Problem: OXYGENATION/RESPIRATORY FUNCTION  Goal: Patient will achieve/maintain normal respiratory rate/effort  Description: Respiratory rate and effort will be within normal limits for the patient  2/18/2021 2206 by Orval Socks, RCP  Outcome: Ongoing     Problem: MECHANICAL VENTILATION  Goal: Patient will maintain patent airway  2/18/2021 2206 by Orval Socks, RCP  Outcome: Ongoing     Problem: MECHANICAL VENTILATION  Goal: Oral health is maintained or improved  2/18/2021 2206 by Orval Socks, RCP  Outcome: Ongoing     Problem: MECHANICAL VENTILATION  Goal: ET tube will be managed safely  2/18/2021 2206 by Orval Socks, RCP  Outcome: Ongoing     Problem: MECHANICAL VENTILATION  Goal: Mobility/activity is maintained at optimum level for patient  2/18/2021 2206 by Orval Socks, RCP  Outcome: Ongoing     Problem: MECHANICAL VENTILATION  Goal: Ability to express needs and understand communication  2/18/2021 2206 by Orval Socks, RCP  Outcome: Ongoing     Problem: SKIN INTEGRITY  Goal: Skin integrity is maintained or improved  2/18/2021 2206 by Orval Socks, RCP  Outcome: Ongoing

## 2021-02-19 NOTE — PROGRESS NOTES
[facility] -   Infectious diseases evaluation. Progress Note    admission date 2/4/2021    reason for consultation:   bandemia    Impression :   Current:  · Bandemia, likely from steroids  · COVID-19 virus pneumonia  · COVID-19 related sepsis with arts  · Septic shock  · Respiratory failure requiring mechanical ventilation  · Elevated inflammatory markers CRP, LDH      Discussion / summary of stay / plan of care   ·   Recommendations     Completed 5 days of remdesivir at the referring hospital  Post Levaquin course at the other hospital  E faecalis UTI treated with 3 days of zosyn  Out of the window for plasma  D/C isolation and transfer out of COVID unit  post vancomycin and Zosyn,   · Continues on steroids anticoagulation  · 2/12WBC 14 and low-grade fever: Improved  · Sp and  blood culture, all negative  · 2/12 anidulafungin  -2/18 - stopped  · 2/12- 2/16 vancomycin-stopped  · 2/19-fever again, start Flonase, ceftaroline, blood and sputum culture    Infection Control Recommendations   · [place] Precautions  · Contact Isolation   · Airborne isolation removed  · Droplet Isolation removed    Antimicrobial Stewardship Recommendations   · Targeted therapy    Coordination ofOutpatient Care:   · Estimated Length of IV antimicrobials:  · Patient will need Midline / picc Catheter Insertion:   · Patient will need SNF:  · Patient will need outpatient wound care:     History of Present Illness:       INITIAL HISTORY:    Amanda Carolina is a 70y.o.-year-old male who presented because of shortness of breath and cough that has been progressive. Started January 12 with a fever aches cough shortness of breath, did not lose the taste of food or the smell, no diarrhea no blood in the sputum. Fever was associated. Presented to the ER because of the worsening of shortness of breath. Chest x-ray showed pneumonia he tested positive for Covid. Lactic acid was 3 and the patient was desaturating so he was admitted on high flow to the ICU. To another hospital, patient was not improving despite steroids and high oxygen supplementation. Patient was intubated 2/3/2021 and transferred to [place] [address]. Paralyzed high oxygen requirement, 2 pressors on board. WBC 2019  Creatinine is normal  Liver enzymes are normal    CURRENT EVALUATION :  2/19/2021   Patient Vitals for the past 8 hrs:   BP Temp Temp src Pulse Resp SpO2   02/19/21 1400 129/74   117 22 (!) 81 %   02/19/21 1300 130/71   117 22 (!) 80 %   02/19/21 1200 138/67 102 °F (38.9 °C) Bladder 113 22 (!) 79 %   02/19/21 1158    114 19 (!) 79 %   02/19/21 1100 130/66   112 10 (!) 79 %   02/19/21 1000 132/63   106 (!) 6 (!) 80 %   02/19/21 0930    106 20    02/19/21 0915    105 22    02/19/21 0900 133/70   102 22 (!) 82 %   02/19/21 0808     19 (!) 81 %   02/19/21 0800  101.3 °F (38.5 °C) Bladder        Nitrite oxide stopped 2/16     FiO2 remains at 100, PEEP of 16  Not responsive at this point,  He has a fever again 38.9  Sputum is little and white count is normal    Family still to decide CODE STATUS    Summary of relevant labs: 2/19/2021    Labs:  WBC 19-18-->14- 9 7 - 10 - 14 - 12.2 - 10 - 9.6 - 12.8    Cr 1.21-0.67    CRP 98  Ferritin 307  Fibrinogen 350      Micro:  Sputum culture 1-30-21 Normal aris    Blood:  · 2-4-21: No growth  · 1-19-21: No growth     Urine  2-4-21: Enterococcus faecalis     Covid +1/14    Imaging:  CXR 2/11  Otherwise, severe diffuse faint parenchymal and interstitial opacities   throughout both lungs unchanged.        Chest x-ray bilateral pulmonary changes with chronic changes  Ultrasound of the kidneys no hydronephrosis I have personally reviewed the past medical history, past surgical history, medications, social history, and family history, and I haveupdated the database accordingly. Allergies:   Patient has no known allergies. Review of Systems:     Review of Systems   Unable to perform ROS: Intubated       Physical Examination :       Physical Exam  Constitutional:       General: He is not in acute distress. Appearance: He is obese. He is ill-appearing. He is not diaphoretic. Interventions: He is sedated and intubated. HENT:      Head: Normocephalic and atraumatic. Nose: Nose normal.      Mouth/Throat:      Mouth: Mucous membranes are moist.   Eyes:      General: No scleral icterus. Conjunctiva/sclera: Conjunctivae normal.   Neck:      Musculoskeletal: Neck supple. No neck rigidity. Cardiovascular:      Rate and Rhythm: Normal rate and regular rhythm. Heart sounds: No murmur. No gallop. Pulmonary:      Effort: No respiratory distress. He is intubated. Breath sounds: No stridor. Rales present. No wheezing or rhonchi. Abdominal:      General: There is no distension. Palpations: Abdomen is soft. There is no mass. Hernia: No hernia is present. Genitourinary:     Comments: Urine is yusra  Musculoskeletal:         General: No swelling, tenderness or signs of injury. Right lower leg: No edema. Left lower leg: No edema. Skin:     Coloration: Skin is not jaundiced or pale. Findings: No bruising, erythema, lesion or rash.    Neurological:      Comments: Intubated, very weak in general   Psychiatric:      Comments: Intubated, folowed commands, appropriate         Past Medical History:     Past Medical History:   Diagnosis Date    Asthma     Hyperlipidemia     Hypertension     Type 2 diabetes mellitus without complication ([facility]        Past Surgical  History:     Past Surgical History:   Procedure Laterality Date    HERNIA REPAIR right inguinal at age 3 years.     ND COLONOSCOPY FLX DX W/COLLJ SPEC WHEN PFRMD N/A 10/5/2018    [facility]       Medications:      [START ON 2021] predniSONE  10 mg Oral Daily    Followed by   Nichol Zapien ON 2021] predniSONE  5 mg Oral Daily    amLODIPine  10 mg Per NG tube Daily    insulin glargine  12 Units Subcutaneous Nightly    insulin lispro  0-18 Units Subcutaneous 4 times per day    sodium chloride flush  10 mL Intravenous 2 times per day    famotidine (PEPCID) injection  20 mg Intravenous BID    sodium chloride flush  10 mL Intravenous 2 times per day    heparin (porcine)  5,000 Units Subcutaneous 3 times per day       Social History:     Social History     Socioeconomic History    Marital status:      Spouse name: Not on file    Number of children: Not on file    Years of education: Not on file    Highest education level: Not on file   Occupational History    Not on file   Social Needs    Financial resource strain: Not on file    Food insecurity     Worry: Not on file     Inability: Not on file    Transportation needs     Medical: Not on file     Non-medical: Not on file   Tobacco Use    Smoking status: Former Smoker     Quit date: 2009     Years since quittin.4    Smokeless tobacco: Never Used   Substance and Sexual Activity    Alcohol use: Yes     Comment: rare    Drug use: Not on file    Sexual activity: Not on file   Lifestyle    Physical activity     Days per week: Not on file     Minutes per session: Not on file    Stress: Not on file   Relationships    Social connections     Talks on phone: Not on file     Gets together: Not on file     Attends [employer] service: Not on file     Active member of club or organization: Not on file     Attends meetings of clubs or organizations: Not on file     Relationship status: Not on file    Intimate partner violence     Fear of current or ex partner: Not on file Emotionally abused: Not on file     Physically abused: Not on file     Forced sexual activity: Not on file   Other Topics Concern    Not on file   Social History Narrative    Not on file       Family History:     Family History   Problem Relation Age of Onset    Stroke Mother     Heart Disease Father       Medical Decision Making:   I have independently reviewed/ordered the following labs:    CBC with Differential:   Recent Labs     02/18/21 0415 02/19/21 0416   WBC 11.1 12.8*   HGB 8.8* 8.8*   HCT 28.9* 28.9*    149   LYMPHOPCT 5* 6*   MONOPCT 5 4     BMP:  Recent Labs     02/18/21 0415 02/19/21 0416    135   K 4.7 4.8   CL 93* 93*   CO2 37* 39*   BUN 32* 29*   CREATININE 0.62* 0.52*     Hepatic Function Panel:   No results for input(s): PROT, LABALBU, BILIDIR, IBILI, BILITOT, ALKPHOS, ALT, AST in the last 72 hours. No results for input(s): RPR in the last 72 hours. No results for input(s): HIV in the last 72 hours. No results for input(s): BC in the last 72 hours. Lab Results   Component Value Date    CREATININE 0.52 02/19/2021    GLUCOSE 134 02/19/2021       Detailed results: Thank you for allowing us to participate in the care of this patient. Please call with questions. This note is created with the assistance of a speech recognition program.  While intending to generate adocument that actually reflects the content of the visit, the document can still have some errors including those of syntax and sound a like substitutions which may escape proof reading. It such instances, actual meaningcan be extrapolated by contextual diversion.     Cnadi Lezama MD  Office: [phone]  Perfect serve / office [phone]

## 2021-02-19 NOTE — PLAN OF CARE
Problem: Skin Integrity:  Goal: Will show no infection signs and symptoms  Description: Will show no infection signs and symptoms  2/19/2021 0809 by Shravan Smalls RCP  Outcome: Ongoing  2/19/2021 0157 by Alex Lira RN  Outcome: Ongoing  Goal: Absence of new skin breakdown  Description: Absence of new skin breakdown  2/19/2021 0809 by Shravan Smalls RCP  Outcome: Ongoing  2/19/2021 0157 by Alex Lira RN  Outcome: Ongoing     Problem: OXYGENATION/RESPIRATORY FUNCTION  Goal: Patient will maintain patent airway  2/19/2021 0809 by SAMEERA CardenasP  Outcome: Ongoing  2/18/2021 2206 by Niraj Bhatti RCP  Outcome: Ongoing  Goal: Patient will achieve/maintain normal respiratory rate/effort  Description: Respiratory rate and effort will be within normal limits for the patient  2/19/2021 0809 by Shravan Smalls RCP  Outcome: Ongoing  2/18/2021 2206 by Niraj Bhatti RCP  Outcome: Ongoing     Problem: MECHANICAL VENTILATION  Goal: Patient will maintain patent airway  2/19/2021 0809 by Shravan Smalls RCP  Outcome: Ongoing  2/18/2021 2206 by Niraj Bhatti RCP  Outcome: Ongoing  Goal: Oral health is maintained or improved  2/19/2021 0809 by Shravan Smalls RCP  Outcome: Ongoing  2/18/2021 2206 by Niraj Bhatti RCP  Outcome: Ongoing  Goal: ET tube will be managed safely  2/19/2021 0809 by SAMEERA CardenasP  Outcome: Ongoing  2/18/2021 2206 by Niraj Bhatti RCP  Outcome: Ongoing  Goal: Mobility/activity is maintained at optimum level for patient  2/19/2021 0809 by Shravan Smalls RCP  Outcome: Ongoing  2/18/2021 2206 by Niraj Bhatti RCP  Outcome: Ongoing  Goal: Ability to express needs and understand communication  2/19/2021 0809 by Shravan Smalls RCP  Outcome: Ongoing  2/18/2021 2206 by Niraj Bhatti RCP  Outcome: Ongoing     Problem: Gas Exchange - Impaired  Goal: Absence of hypoxia  2/19/2021 0157 by Alex Lira RN  Outcome: Ongoing  Goal: Promote optimal lung function 2/19/2021 0157 by Karol Yip RN  Outcome: Ongoing

## 2021-02-19 NOTE — PLAN OF CARE
Problem: Skin Integrity:  Goal: Will show no infection signs and symptoms  Description: Will show no infection signs and symptoms  2/19/2021 1056 by Melchor George RN  Outcome: Ongoing  2/19/2021 0822 by Nirali Jose RCP  Outcome: Ongoing  2/19/2021 0809 by Nirali Jose RCP  Outcome: Ongoing  2/19/2021 0157 by Naomi Javier RN  Outcome: Ongoing  Goal: Absence of new skin breakdown  Description: Absence of new skin breakdown  2/19/2021 1056 by Melchor George RN  Outcome: Ongoing  2/19/2021 0822 by Nirali Jose RCP  Outcome: Ongoing  2/19/2021 0809 by Nirali Jose RCP  Outcome: Ongoing  2/19/2021 0157 by Naomi Javier RN  Outcome: Ongoing     Problem: Nutrition  Goal: Optimal nutrition therapy  Description: Nutrition Problem #1: Inadequate oral intake  Intervention: Food and/or Nutrient Delivery: (Start nutrition as able.  If TF, suggest Semi-elemental formula with goal rate of 55 mL/hr to provide 1584 kcal and 99 g pro/day.)  Nutritional Goals: meet % of estimated nutrient needs     2/19/2021 1056 by Melchor George RN  Outcome: Ongoing  2/19/2021 0822 by Nirali Jose RCP  Outcome: Ongoing  2/19/2021 0157 by Naomi Javier RN  Outcome: Ongoing     Problem: OXYGENATION/RESPIRATORY FUNCTION  Goal: Patient will maintain patent airway  2/19/2021 1056 by Melchor George RN  Outcome: Ongoing  2/19/2021 0822 by Nirali Jose RCP  Outcome: Ongoing  2/19/2021 0809 by Nirali Jose RCP  Outcome: Ongoing  2/18/2021 2206 by Brooke Fisher RCP  Outcome: Ongoing  Goal: Patient will achieve/maintain normal respiratory rate/effort  Description: Respiratory rate and effort will be within normal limits for the patient  2/19/2021 1056 by Melchor Geogre RN  Outcome: Ongoing  2/19/2021 0822 by Nirali Jose RCP  Outcome: Ongoing  2/19/2021 0809 by Nirali Jose RCP  Outcome: Ongoing  2/18/2021 2206 by Brooke Fisher RCP  Outcome: Ongoing     Problem: MECHANICAL VENTILATION Goal: Patient will maintain patent airway  2/19/2021 1056 by Cele Johansen RN  Outcome: Ongoing  2/19/2021 0822 by Alinda [facility], RCP  Outcome: Ongoing  2/19/2021 0809 by Irona [facility], RCP  Outcome: Ongoing  2/18/2021 2206 by Damon Dickinson, RCP  Outcome: Ongoing  Goal: Oral health is maintained or improved  2/19/2021 1056 by Cele Johansen RN  Outcome: Ongoing  2/19/2021 0822 by Alinda [facility], RCP  Outcome: Ongoing  2/19/2021 0809 by Irona [facility], RCP  Outcome: Ongoing  2/18/2021 2206 by Damon Dickinson, RCP  Outcome: Ongoing  Goal: ET tube will be managed safely  2/19/2021 1056 by Cele Johansen RN  Outcome: Ongoing  2/19/2021 0822 by rIona [facility], RCP  Outcome: Ongoing  2/19/2021 0809 by Matty [facility], RCP  Outcome: Ongoing  2/18/2021 2206 by Damon Dickinson, RCP  Outcome: Ongoing  Goal: Mobility/activity is maintained at optimum level for patient  2/19/2021 1056 by Cele Johansen RN  Outcome: Ongoing  2/19/2021 0822 by Matty [facility], RCP  Outcome: Ongoing  2/19/2021 0809 by Alinda [facility], RCP  Outcome: Ongoing  2/18/2021 2206 by Damon Dickinson, RCP  Outcome: Ongoing  Goal: Ability to express needs and understand communication  2/19/2021 1056 by Cele Johansen RN  Outcome: Ongoing  2/19/2021 0822 by Matty [facility], RCP  Outcome: Ongoing  2/19/2021 0809 by Alinda [facility], RCP  Outcome: Ongoing  2/18/2021 2206 by Damon Dickinson, RCP  Outcome: Ongoing     Problem: SKIN INTEGRITY  Goal: Skin integrity is maintained or improved  2/19/2021 1056 by Cele Johansen RN  Outcome: Ongoing  2/19/2021 0822 by Matty [facility], RCP  Outcome: Ongoing  2/19/2021 0809 by Irona [facility], RCP  Outcome: Ongoing  2/18/2021 2206 by Damon Dickinson RCP  Outcome: Ongoing     Problem: Metabolic:  Goal: Ability to maintain appropriate glucose levels will improve  Description: Ability to maintain appropriate glucose levels will improve  2/19/2021 1056 by Cele Johansen RN  Outcome: Ongoing  2/19/2021 0822 by Matty Parra RCP Outcome: Ongoing     Problem: Nutritional:  Goal: Maintenance of adequate nutrition will improve  Description: Maintenance of adequate nutrition will improve  2/19/2021 1056 by Corinne Jean RN  Outcome: Ongoing  2/19/2021 0822 by Eve Sierra RCP  Outcome: Ongoing  Goal: Progress toward achieving an optimal weight will improve  Description: Progress toward achieving an optimal weight will improve  2/19/2021 1056 by Corinne Jean RN  Outcome: Ongoing  2/19/2021 0822 by Eve Sierra RCP  Outcome: Ongoing     Problem: Mental Status - Impaired:  Goal: Mental status will improve  Description: Mental status will improve  2/19/2021 1056 by Corinne Jean RN  Outcome: Ongoing  2/19/2021 0822 by Eve Sierra RCP  Outcome: Ongoing     Problem: Gas Exchange - Impaired  Goal: Absence of hypoxia  2/19/2021 1056 by Corinne Jean RN  Outcome: Ongoing  2/19/2021 0822 by Eve Sierra RCP  Outcome: Ongoing  2/19/2021 0157 by Micki Michele RN  Outcome: Ongoing  Goal: Promote optimal lung function  2/19/2021 1056 by Corinne Jean RN  Outcome: Ongoing  2/19/2021 0822 by Eve Sierra RCP  Outcome: Ongoing  2/19/2021 0157 by Micki Michele RN  Outcome: Ongoing     Problem:  Body Temperature -  Risk of, Imbalanced  Goal: Ability to maintain a body temperature within defined limits  2/19/2021 1056 by Corinne Jean RN  Outcome: Ongoing  2/19/2021 0822 by Eve Sierra RCP  Outcome: Ongoing  Goal: Will regain or maintain usual level of consciousness  2/19/2021 1056 by Corinne Jean RN  Outcome: Ongoing  2/19/2021 0822 by Eve Sierra RCP  Outcome: Ongoing  Goal: Complications related to the disease process, condition or treatment will be avoided or minimized  2/19/2021 1056 by Corinne Jean RN  Outcome: Ongoing  2/19/2021 0822 by Eve Sierra RCP  Outcome: Ongoing

## 2021-02-19 NOTE — PROGRESS NOTES
Critical Care Team - Daily Progress Note      Date and time: 2/19/2021 10:50 AM  Patient's name:  [address] Record Number: [id number]  Patient's account/billing number: [de-identified]  Patient's YOB: 1945  Age: 70 y.o. Date of Admission: 2/4/2021 12:53 AM  Length of stay during current admission: 15  Primary Care Physician: Naun Long MD  ICU Attending Physician: Kelly Blake MD    Code Status: DNR-CCA  Reason for ICU admission: Shortness of breath      Subjective:     OVERNIGHT EVENTS:         Pt seen and examined. Currently on fentanyl and versed. Weaning off causing asynchrony and desaturations. Currently on Bivent, not proning. 35/500/18/100% with PIP 35  Not following commands, occasional gag/cough reflexes. BRIEF SUMMARY:  Patient was admitted for shortness of breath, cough, and dyspnea since January 4th. Was diagnosed with Covid19 Pneumonia. He continued to worsen and was admitted to the ICU for further management. He was started on high flow and BiPAP.  Subsequently treated with 5 days of intravenous remdesivir. Patient needed intubation on 2/3/2021.      Patient is on ARDS protocol, inhalational epoprostenol and is pronated and paralyzed. Et tube reinserted on 2/11/2021. Respiratory supportive care was continued.     AWAKE & FOLLOWING COMMANDS:  [x] No   [] Yes    CURRENT VENTILATION STATUS:     [x] Ventilator  [] BIPAP  [] Nasal Cannula [] Room Air      SEDATION:  RAAS Score:  [] Propofol gtt  [x] Versed gtt  [] Ativan gtt   [] No Sedation    PARALYZED:   [x] No    [] Yes    VASOPRESSORS:   [] Yes     [x] No   If yes -   [] Levophed       [] Dopamine     [] Vasopressin       [] Dobutamine  [] Phenylephrine         [] Epinephrine    CENTRAL LINES:      [x] Yes (Date of Insertion:   )    [] No           If yes -  PICC line 2/12  [] Right Internal Jugular [] Left Internal Jugular [] Right Femoral   [] Left Femoral [] Right Subclavian  [] Left Subclavian SMALLWOOD'S CATHETER:    [] No   [x] Yes  (Date of Insertion:   )     URINE OUTPUT:   [x] Good  [] Low  [] Anuric    REVIEW OF SYSTEMS  Unable to obtain    OBJECTIVE:     VITAL SIGNS:  /63   Pulse 106   Temp 101.3 °F (38.5 °C) (Bladder)   Resp (!) 6   Ht 5' 10\" (1.778 m)   Wt 247 lb 2.2 oz (112.1 kg)   SpO2 (!) 80%   BMI 35.46 kg/m²   Tmax over 24 hours:  Temp (24hrs), Av °F (37.8 °C), Min:99.1 °F (37.3 °C), Max:101.3 °F (38.5 °C)      Patient Vitals for the past 8 hrs:   BP Temp Temp src Pulse Resp SpO2   21 1000 132/63   106 (!) 6 (!) 80 %   21 0930    106 20    21 0915    105 22    21 0900 133/70   102 22 (!) 82 %   21 0808     19 (!) 81 %   21 0800  101.3 °F (38.5 °C) Bladder      21 0600 138/69   100 28 (!) 87 %   21 0500 (!) 142/72   101 29 90 %   21 0443    98 (!) 35 90 %   21 0400 134/68   98 (!) 32 (!) 86 %   21 0300 129/68   94 (!) 33 (!) 87 %         Intake/Output Summary (Last 24 hours) at 2021 1050  Last data filed at 2021 0800  Gross per 24 hour   Intake 1331 ml   Output 1360 ml   Net -29 ml     Date 21 0000 - 21 2359   Shift 3013-5602 8846-2702 5930-6802 24 Hour Total   INTAKE   I.V.(mL/kg) 373(3.3)   373(3.3)   NG/GT(mL/kg) 484(4.3)   484(4.3)   Shift Total(mL/kg) 857(7.6)   857(7.6)   OUTPUT   Urine(mL/kg/hr) 525(0.6) 125  650   Stool(mL/kg) 50(0.4)   50(0.4)   Shift Total(mL/kg) 575(5.1) 125(1.1)  700(6.2)   Weight (kg) 112.1 112.1 112.1 112.1     Wt Readings from Last 3 Encounters:   21 247 lb 2.2 oz (112.1 kg)   21 237 lb 14 oz (107.9 kg)   10/05/18 218 lb (98.9 kg)     Body mass index is 35.46 kg/m². PHYSICAL EXAM:  Constitutional: Intubated and sedated. HEENT: PERRLA. Atraumatic. ET tube in place. Respiratory:  Ventilatory breath sounds.  Diffuse rhonchi bilaterally  Cardiovascular: regular rate and rhythm, normal S1, S2 Abdomen: soft, no grimacing to palpation, nondistended, bs hypoactive. Neurological: Intubated, sedated. Unable to tolerate sedation holiday. Extremities:  peripheral pulses normal, no pedal edema.     VENT SETTINGS (Comprehensive) (if applicable):  Vent Information  $Ventilation: $Subsequent Day  Skin Assessment: Clean, dry, & intact  Suction Catheter Diameter: 14  Equipment ID: [id number]  Equipment Changed: Expiratory Filter  Vent Type: Servo i  Vent Mode: (S) Bi-Level  Time high: 2 cmH2O  Time low: 0.4 cmH2O  Pressure High: 28 cmH2O  Vt Ordered: 500 mL  Rate Set: (back up 25)  Pressure Support: 6 cmH20  FiO2 : 100 %  SpO2: (!) 80 %  SpO2/FiO2 ratio: 81  Sensitivity: 2  PEEP/CPAP: 20  I Time/ I Time %: 0.7 s  Humidification Source: Heated wire  Humidification Temp: 37  Humidification Temp Measured: 37  Circuit Condensation: Drained  Nitric Oxide/Epoprostenol In Use?: No  NO Set: 5  NO Analyzed: 5.3 ppm  NO2 Analyzed: 0.2 ppm  Additional Respiratory  Assessments  Pulse: 106  Resp: (!) 6  SpO2: (!) 80 %  End Tidal CO2: 30  Position: Semi-Hogan's  Humidification Source: Heated wire  Humidification Temp: 37  Circuit Condensation: Drained  Oral Care Completed?: Yes  Oral Care: Mouthwash, Lip moisturizer applied, Mouth swabbed, Mouth moisturizer, Mouth suctioned  Subglottic Suction Done?: No    ABGs:   Lab Results   Component Value Date    JFQ4QIG 29 02/19/2021    FIO2 100.0 02/19/2021     Lactic Acid: No results found for: LACTA    DATA:  Complete Blood Count:   Recent Labs     02/17/21  0440 02/18/21  0415 02/19/21  0416   WBC 12.3* 11.1 12.8*   HGB 9.1* 8.8* 8.8*   MCV 89.4 91.2 91.5    149 149   RBC 3.31* 3.17* 3.16*   HCT 29.6* 28.9* 28.9*   MCH 27.5 27.8 27.8   MCHC 30.7 30.4 30.4   RDW 15.0* 15.1* 15.4*   MPV 9.4 9.4 9.5        PT/INR:    Lab Results   Component Value Date    PROTIME 14.0 02/03/2021    INR 1.1 02/03/2021     PTT:    Lab Results   Component Value Date    APTT 34.5 02/03/2021 Basal Metabolic Profile:   Recent Labs     02/17/21  0440 02/18/21  0415 02/19/21  0416    136 135   K 4.4 4.7 4.8   BUN 26* 32* 29*   CREATININE 0.58* 0.62* 0.52*   CL 92* 93* 93*   CO2 39* 37* 39*      MEDICATIONS:  Scheduled Meds:   [START ON 2/20/2021] predniSONE  10 mg Oral Daily    Followed by   Aida Cole ON 2/23/2021] predniSONE  5 mg Oral Daily    amLODIPine  10 mg Per NG tube Daily    insulin glargine  12 Units Subcutaneous Nightly    insulin lispro  0-18 Units Subcutaneous 4 times per day    sodium chloride flush  10 mL Intravenous 2 times per day    famotidine (PEPCID) injection  20 mg Intravenous BID    sodium chloride flush  10 mL Intravenous 2 times per day    heparin (porcine)  5,000 Units Subcutaneous 3 times per day     Continuous Infusions:   sodium chloride 10 mL/hr at 02/18/21 0400    dextrose      fentaNYL 200 mcg/hr (02/19/21 0830)    midazolam 8 mg/hr (02/19/21 0815)     PRN Meds:       labetalol, 10 mg, Q6H PRN      glucose, 15 g, PRN      dextrose, 12.5 g, PRN      glucagon (rDNA), 1 mg, PRN      dextrose, 100 mL/hr, PRN      polyvinyl alcohol, 1 drop, PRN      sodium chloride flush, 10 mL, PRN      promethazine, 12.5 mg, Q6H PRN    Or      ondansetron, 4 mg, Q6H PRN      polyethylene glycol, 17 g, Daily PRN      acetaminophen, 650 mg, Q6H PRN    Or      acetaminophen, 650 mg, Q6H PRN          ASSESSMENT:     Principal Problem:    Pneumonia due to COVID-19 virus  Active Problems:    Acute respiratory failure with hypoxia (HCC)    Type 2 diabetes mellitus (HCC)    Septic shock (HCC)    Palliative care encounter    Goals of care, counseling/discussion    DNR (do not resuscitate) discussion    Class 1 obesity due to excess calories with serious comorbidity and body mass index (BMI) of 34.0 to 34.9 in adult  Resolved Problems:    * No resolved hospital problems. *      PLAN:     Severe ARDS due to Covid 19 Pneumonia  - Completed Remdesivir, Decadron 10 doses. - COVID-19 symptoms started 1/14/2021, hospitalized 1/20 with BiPAP for 14 days and then transferred to [facility]. V's and intubated on 2/3/2021.  - Continue supportive care. - Family explained about clinical status. - Clinically not improving, poor prognosis.     Type 2 DM-continue Lantus 12 units and high-dose sliding scale, blood glucose control with monitor POC glucose. Neuro:  Intermittently follows commands but unable to tolerate sedation holidays. GI: On TFs with good bowel movements.     DVT ppx - Heparin 5000 TID  Bowel regimen: Lactulose    DNR CCA  Needs family discussion again regarding prognosis.       Zacarias Kirkpatrick MD, PGY-2  Internal Medicine Residency Program  [facility]  2/19/2021

## 2021-02-20 NOTE — PROGRESS NOTES
Critical Care Team - Daily Progress Note      Date and time: 2/20/2021 12:48 PM  Patient's name:  Destinee Tariq  Medical Record Number: [id number]  Patient's account/billing number: [de-identified]  Patient's YOB: 1945  Age: 70 y.o. Date of Admission: 2/4/2021 12:53 AM  Length of stay during current admission: 16  Primary Care Physician: Yvette Browning MD  ICU Attending Physician: Brenden Zamora MD    Code Status: DNR-CCA  Reason for ICU admission: Shortness of breath      Subjective:     OVERNIGHT EVENTS:         No acute overnight issues. Still on Bivent with desats at 66-80s  Spoke with family regarding poor prognosis. Family will come today for withdrawing care. BRIEF SUMMARY:  Patient was admitted for shortness of breath, cough, and dyspnea since January 4th. Was diagnosed with Covid19 Pneumonia. He continued to worsen and was admitted to the ICU for further management. He was started on high flow and BiPAP.  Subsequently treated with 5 days of intravenous remdesivir. Patient needed intubation on 2/3/2021.      Patient is on ARDS protocol, inhalational epoprostenol and is pronated and paralyzed. Et tube reinserted on 2/11/2021. Respiratory supportive care was continued.     AWAKE & FOLLOWING COMMANDS:  [x] No   [] Yes    CURRENT VENTILATION STATUS:     [x] Ventilator  [] BIPAP  [] Nasal Cannula [] Room Air      SEDATION:  RAAS Score:  [] Propofol gtt  [x] Versed gtt  [] Ativan gtt   [] No Sedation    PARALYZED:   [x] No    [] Yes    VASOPRESSORS:   [] Yes     [x] No   If yes -   [] Levophed       [] Dopamine     [] Vasopressin       [] Dobutamine  [] Phenylephrine         [] Epinephrine    CENTRAL LINES:      [x] Yes (Date of Insertion:   )    [] No           If yes -  PICC line 2/12  [] Right Internal Jugular [] Left Internal Jugular [] Right Femoral   [] Left Femoral [] Right Subclavian  [] Left Subclavian     SMALLWOOD'S CATHETER:    [] No   [x] Yes  (Date of Insertion:   )     URINE OUTPUT: [x] Good  [] Low  [] Anuric    REVIEW OF SYSTEMS  Unable to obtain    OBJECTIVE:     VITAL SIGNS:  BP (!) 101/59   Pulse 103   Temp 101.3 °F (38.5 °C) (Bladder)   Resp 21   Ht 5' 10\" (1.778 m)   Wt 256 lb 13.4 oz (116.5 kg)   SpO2 (!) 80%   BMI 36.85 kg/m²   Tmax over 24 hours:  Temp (24hrs), Av.7 °F (38.7 °C), Min:101.3 °F (38.5 °C), Max:102 °F (38.9 °C)      Patient Vitals for the past 8 hrs:   BP Temp Temp src Pulse Resp SpO2 Weight   21 1139    103 21 (!) 80 %    21 1000 (!) 101/59   102 21 (!) 81 %    21 0900 (!) 107/59   103 21 (!) 80 %    21 0831    104 21 (!) 80 %    21 0800 112/61 101.3 °F (38.5 °C) Bladder 105 21 (!) 80 %    21 0700 116/62   107 21 (!) 80 %    21 0600 117/62   105 21 (!) 79 % 256 lb 13.4 oz (116.5 kg)   21 0500 115/61   104 21 (!) 79 %          Intake/Output Summary (Last 24 hours) at 2021 1248  Last data filed at 2021 1200  Gross per 24 hour   Intake 1381 ml   Output 1177 ml   Net 204 ml     Date 21 0000 - 21 2359   Shift 7719-4315 4109-2242 1621-9278 24 Hour Total   INTAKE   I.V.(mL/kg) 489(4.2) 170(1.5)  659(5.7)   NG/GT(mL/kg) 195(1.7) 115(1)  310(2.7)   Shift Total(mL/kg) 684(5.9) 285(2.4)  969(8.3)   OUTPUT   Urine(mL/kg/hr) 327(0.4) 125  452   Shift Total(mL/kg) 327(2.8) 125(1.1)  452(3.9)   Weight (kg) 116.5 116.5 116.5 116.5     Wt Readings from Last 3 Encounters:   21 256 lb 13.4 oz (116.5 kg)   21 237 lb 14 oz (107.9 kg)   10/05/18 218 lb (98.9 kg)     Body mass index is 36.85 kg/m². PHYSICAL EXAM:  Constitutional: Intubated and sedated. HEENT: PERRLA. Atraumatic. ET tube in place. Respiratory:  Ventilatory breath sounds. Diffuse rhonchi bilaterally  Cardiovascular: regular rate and rhythm, normal S1, S2  Abdomen: soft, no grimacing to palpation, nondistended, bs hypoactive. Neurological: Intubated, sedated. Unable to tolerate sedation holiday. Extremities:  peripheral pulses normal, no pedal edema.     VENT SETTINGS (Comprehensive) (if applicable):  Vent Information  $Ventilation: $Subsequent Day  Skin Assessment: Clean, dry, & intact  Suction Catheter Diameter: 14  Equipment ID: [id number]  Equipment Changed: Expiratory Filter  Vent Type: Servo i  Vent Mode: Bi-Level  Time high: 2.4 cmH2O  Time low: 0.4 cmH2O  Pressure High: 28 cmH2O  Vt Ordered: 500 mL  Rate Set: (backup 21)  Pressure Support: 6 cmH20  FiO2 : 100 %  SpO2: (!) 80 %  SpO2/FiO2 ratio: 80  Sensitivity: 2  PEEP/CPAP: 2  I Time/ I Time %: 0.7 s  Humidification Source: Heated wire  Humidification Temp: 37  Humidification Temp Measured: 37.1  Circuit Condensation: Drained  Nitric Oxide/Epoprostenol In Use?: No  NO Set: 5  NO Analyzed: 5.3 ppm  NO2 Analyzed: 0.2 ppm  Additional Respiratory  Assessments  Pulse: 103  Resp: 21  SpO2: (!) 80 %  End Tidal CO2: 33  Position: Semi-Hogan's  Humidification Source: Heated wire  Humidification Temp: 37  Circuit Condensation: Drained  Oral Care Completed?: Yes  Oral Care: Teeth brushed, Mouthwash, Mouth swabbed, Mouth suctioned  Subglottic Suction Done?: Yes    ABGs:   Lab Results   Component Value Date    NNG4ENQ 48 02/20/2021    FIO2 100.0 02/20/2021     Lactic Acid: No results found for: LACTA    DATA:  Complete Blood Count:   Recent Labs     02/18/21  0415 02/19/21  0416 02/20/21  0754   WBC 11.1 12.8* 10.0   HGB 8.8* 8.8* 8.5*   MCV 91.2 91.5 93.3    149 145   RBC 3.17* 3.16* 3.12*   HCT 28.9* 28.9* 29.1*   MCH 27.8 27.8 27.2   MCHC 30.4 30.4 29.2   RDW 15.1* 15.4* 16.2*   MPV 9.4 9.5 9.6        PT/INR:    Lab Results   Component Value Date    PROTIME 14.0 02/03/2021    INR 1.1 02/03/2021     PTT:    Lab Results   Component Value Date    APTT 34.5 02/03/2021       Basal Metabolic Profile:   Recent Labs     02/18/21  0415 02/19/21  0416 02/20/21  0754    135 136   K 4.7 4.8 5.2   BUN 32* 29* 40*   CREATININE 0.62* 0.52* 0.66* CL 93* 93* 90*   CO2 37* 39* 38*      MEDICATIONS:  Scheduled Meds:   fluticasone  1 spray Each Nostril Daily    ceftaroline fosamil (TEFLARO) IVPB  600 mg Intravenous Q12H    predniSONE  10 mg Oral Daily    Followed by   Aida Cole ON 2/23/2021] predniSONE  5 mg Oral Daily    amLODIPine  10 mg Per NG tube Daily    insulin glargine  12 Units Subcutaneous Nightly    insulin lispro  0-18 Units Subcutaneous 4 times per day    sodium chloride flush  10 mL Intravenous 2 times per day    famotidine (PEPCID) injection  20 mg Intravenous BID    sodium chloride flush  10 mL Intravenous 2 times per day    heparin (porcine)  5,000 Units Subcutaneous 3 times per day     Continuous Infusions:   sodium chloride 10 mL/hr at 02/18/21 0400    dextrose      fentaNYL 150 mcg/hr (02/20/21 1224)    midazolam 8 mg/hr (02/20/21 1236)     PRN Meds:       labetalol, 10 mg, Q6H PRN      glucose, 15 g, PRN      dextrose, 12.5 g, PRN      glucagon (rDNA), 1 mg, PRN      dextrose, 100 mL/hr, PRN      polyvinyl alcohol, 1 drop, PRN      sodium chloride flush, 10 mL, PRN      promethazine, 12.5 mg, Q6H PRN    Or      ondansetron, 4 mg, Q6H PRN      polyethylene glycol, 17 g, Daily PRN      acetaminophen, 650 mg, Q6H PRN    Or      acetaminophen, 650 mg, Q6H PRN          ASSESSMENT:     Principal Problem:    Pneumonia due to COVID-19 virus  Active Problems:    Acute respiratory failure with hypoxia (HCC)    Type 2 diabetes mellitus (HCC)    Septic shock (HCC)    Palliative care encounter    Goals of care, counseling/discussion    DNR (do not resuscitate) discussion    Class 1 obesity due to excess calories with serious comorbidity and body mass index (BMI) of 34.0 to 34.9 in adult  Resolved Problems:    * No resolved hospital problems. *      PLAN:     Severe ARDS due to Covid 19 Pneumonia  - Completed Remdesivir, Decadron 10 doses. - COVID-19 symptoms started 1/14/2021, hospitalized 1/20 with BiPAP for 14 days and then transferred to [facility] and intubated on 2/3/2021.  - Continue supportive care. - Family explained about clinical status. - Clinically not improving, poor prognosis.     Type 2 DM-continue Lantus 12 units and high-dose sliding scale, blood glucose control with monitor POC glucose. Neuro:  Intermittently follows commands but unable to tolerate sedation holidays. GI: On TFs with good bowel movements.     DVT ppx - Heparin 5000 TID  Bowel regimen: Lactulose    DNR CCA  Family decided to withdraw care today.       Calderon Alfaro MD, PGY-2  Internal Medicine Residency Program  [facility]  2/20/2021

## 2021-02-20 NOTE — PROGRESS NOTES
Patient extubated per physician order. Patient extubated in usual fashion. Patient placed on room air.     Trina Wright   5:22 PM

## 2021-02-20 NOTE — PROGRESS NOTES
Palliative Care Progress Note    NAME:  Kyle Brewer RECORD NUMBER:  [id number]  AGE: 70 y.o. GENDER: male  : 1945  TODAY'S DATE:  2021    Reason for Consult:  goals of care  History of Present Illness     The patient is a 70 y.o. Non-/non  male who presents with Covid Pneumonia     Referred to Palliative Care by  [x] Physician   [] Nursing  [] Family Request   [] Other:       He was admitted to the ICU service for Acute respiratory failure with hypoxia ([facility] [address] [J96.01]. His hospital course has been associated with Covid, pneumonia, respiratory failure . The patient has a complicated medical history and has been hospitalized since 2021 12:53 AM. Patient remains on ventilator. He was started on Flonase, Ceftaroline due to running a fever on . Patient had blood cultures and sputum cultures sent await results. Pateint labs glucose 136, BUN 40, creatinine 0.66, calcium 9.3, sodium 136, potassium 5.2, chloride 90, WBC 10, RBC 3.12, hemoglobin 8.5, hematocrit 29.1, platelets 127. Family to arrive around 1:00 for family meeting with palliative care. Palliative care will continue to follow patient and family and provide emotional support and updates as needed.     OVERNIGHT EVENTS:  Patient is a [facility] [address] care medications ordered   Emotional support provided to family     BP (!) 101/59   Pulse 103   Temp 101.3 °F (38.5 °C) (Bladder)   Resp 21   Ht 5' 10\" (1.778 m)   Wt 256 lb 13.4 oz (116.5 kg)   SpO2 (!) 80%   BMI 36.85 kg/m²     Assessment        REVIEW OF SYSTEMS    Patient is intubated and sedated and unable to assess ROS     PHYSICAL ASSESSMENT:     General: []  Oriented x3      [] well appearing      [] Intubated      [x] ill appearing      [x] Other:Patient on ventilator and sedated   Mental Status: [] normal mental status exam      [] drowsy      [] Confused      [x] Other: Intubated and sedated Patient family at bedside and palliative care called to discuss [facility] comfort care and comfort measures with terminal extubation. I discussed the comfort care medications Morphine, Ativan, and Robinul to them and that patient would be medicated prior to extubation to ensure comfort. I also offered spiritual care to come and say a prayer with family and they agreed awaiting for last sibling to arrive. I ordered them a hospitality tray and placed prayer blanket on patient as means of support and comfort. Patient daughter Darleen Madden agreed to [place] care and orders placed in computer. Comfort care medications ordered. Encouraged family to spend time with patient and when they are done to let us know when they were ready to proceed. Emotional support provided to family.    Education/support to family  Discharge planning/helping to coordinate care  Communications with primary service  Pharmacologic pain management  Providing support for coping/adaptation/distress of patient  Discussing meaning/purpose   Caregiver support/education  Continue with current plan of care  Code status clarified: [facility]  Principle Problem/Diagnosis:  Pneumonia due to COVID-19 virus    Additional Assessments:  Principal Problem:    Pneumonia due to COVID-19 virus  Active Problems:    Acute respiratory failure with hypoxia ([address]    Type 2 diabetes mellitus ([facility] [address]    Septic shock ([facility] [address]    Palliative care encounter    Goals of care, counseling/discussion    DNR (do not resuscitate) discussion    Class 1 obesity due to excess calories with serious comorbidity and body mass index (BMI) of 34.0 to 34.9 in adult    1- Symptom management/ pain control     Pain Assessment:  Comfort measures                Anxiety:  comfort measures                          Dyspnea:  comfort measures                           Fatigue:  Comfort measures     Other:      2- Goals of care evaluation

## 2021-02-20 NOTE — FLOWSHEET NOTE
Assessment.  was paged by the nurse to come and say a prayer before the extubation. Family was gathered around the bed. They all welcomed the . They were approachable and calm. They were teary eyed and grieving. Intervention.  offered [place] 5 ritual regarding extubation. Outcome. Connection to ritual. Connection to the holy. Plan. Chaplains will remain available to offer spiritual and emotional support as needed. 02/20/21 1701   Encounter Summary   Services provided to: Patient and family together   Referral/Consult From: Nurse   Support System Family members   Continue Visiting   (2/20/2021)   Complexity of Encounter High   Length of Encounter 30 minutes   Spiritual Assessment Completed Yes   Routine   Type Follow up   Assessment Calm; Approachable;Grieving   Intervention Active listening;Prayer;[place];Scripture;Ritual   Outcome Comfort;Expressed gratitude

## 2021-02-20 NOTE — FLOWSHEET NOTE
Assessment.  was rounding on the floor and initiated a visit. Nurse told the  that they are planning to terminally extubate today. She shared that family was in the waiting room. Family welcomed the . They were grieving. Family shared that Jeff Gonzalez did a good deed for someone every day. Intervention.  facilitated a life review and prayed for Jeff Gonzalez and his family. Family joined  as he prayed the [place]. Outcome. Connection to the holy and ritual. Grief facilitation. Care. Comfort. Family thanked the . Plan. Chaplains will remain available to offer spiritual and emotional support as needed. 02/20/21 1529   Encounter Summary   Services provided to: Family   Referral/Consult From: [address] Family members   Place of Religion   ([address])   Contact Jew No   Continue Visiting   (2/20/2021)   Complexity of Encounter High   Length of Encounter 45 minutes   Spiritual Assessment Completed Yes   Routine   Type Initial   Assessment Calm; Approachable;Grieving   Intervention Active listening;Explored feelings, thoughts, concerns;Prayer;[place]   Outcome Comfort;Engaged in conversation; Shared life review

## 2021-02-20 NOTE — FLOWSHEET NOTE
[address] [facility] [address]   Patient Death Note  DEATH   Shift date: 2021   Shift day: Saturday  Shift #: 2                 Room # 0108/0108-01   Name: Miguel Garcia            Age: 70 y.o. Gender: male          [employer]: [address] of [employer]:   Admit Date & Time: 2021 12:53 AM     Referral: Nurse   Actual date of death: 2021   TOD: 17:30        SITUATION AT DEATH:  Patient was terminally extubated. He was surrounded by family. IS THIS A 'S CASE? No    SPIRITUAL/EMOTIONAL INTERVENTION:  Rubens Randolph was called to offer the prayer of commendation.  prayed the [address] of commendation as well as others from the book.  also used presence, silence, and song. 21   Encounter Summary   Services provided to: Patient and family together   Referral/Consult From: Nurse   Support System Family members   Continue Visiting   (2021)   Complexity of Encounter High   Length of Encounter 45 minutes   Spiritual Assessment Completed Yes   Grief and Life Adjustment   Type Death   Assessment Grieving   Intervention Prayer;Ritual;Scripture   Outcome Comfort; Connection/belonging;Grieving;Tearful         Family Received Grief Packet? Yes       NAME AND PHONE NUMBER OF DOCTOR SIGNING DEATH CERTIFICATE:  (full name) Casi Flores     (phone)  [phone]       spoke with Molina   , who will contact the  home   home has already been contacted by nurse. Copy of COMPLETED Release of Body Form Received? Yes    Patient's belongings: N/a     HOME:  Name: [facility]: [place], [place]  Phone Number: [phone]    NEXT OF KIN:  Name: Radha Carbone  Relationship: Daughter  Street Address: [address] [place] [address] Avenue: [place]  State: [place]  Zip code: [address]   Phone Number: [phone]    ANY FOLLOW-UP NEEDED? No    IF SO, WHAT? Electronically signed by Oswaldo Steel, on 2/20/2021 at 6:55 PM.  [facility]  [phone]

## 2021-02-20 NOTE — PROGRESS NOTES
[facility] -   Infectious diseases evaluation. Progress Note    admission date 2/4/2021    reason for consultation:   bandemia    Impression :   Current:  · Bandemia, likely from steroids  · COVID-19 virus pneumonia  · COVID-19 related sepsis with ARDS  · Septic shock  · Respiratory failure requiring mechanical ventilation  · Elevated inflammatory markers CRP, LDH      Discussion / summary of stay / plan of care   Completed 5 days of remdesivir at the referring hospital  Post Levaquin course at the other hospital  E faecalis UTI treated with 3 days of zosyn  Out of the window for plasma  D/C isolation and transfer out of COVID unit  post vancomycin and Zosyn,   · Continues on steroids anticoagulation  · 2/12WBC 14 and low-grade fever: Improved  · Sp and  blood culture, all negative  · 2/12 anidulafungin  -2/18 - stopped  · 2/12- 2/16 vancomycin-stopped  · 2/19-fever again, start Flonase, ceftaroline, blood and sputum culture  Recommendations     · Ceftaroline 600 mg IV q 12 hr, pending culture data. Start date 2-19-21  · Will respect family's decision to transition to comfort care and terminal extubation. Infection Control Recommendations   · [place] Precautions  · Contact Isolation     Antimicrobial Stewardship Recommendations   · Targeted therapy    Coordination ofOutpatient Care:   · Estimated Length of IV antimicrobials:  · Patient will need Midline / picc Catheter Insertion:   · Patient will need SNF:  · Patient will need outpatient wound care:     History of Present Illness:       INITIAL HISTORY:    Kaitlynn Minor is a 70y.o.-year-old male who presented because of shortness of breath and cough that has been progressive. Started January 12 with a fever aches cough shortness of breath, did not lose the taste of food or the smell, no diarrhea no blood in the sputum. Fever was associated. Presented to the ER because of the worsening of shortness of breath. Chest x-ray showed pneumonia he tested positive for Covid. Lactic acid was 3 and the patient was desaturating so he was admitted on high flow to the ICU. To another hospital, patient was not improving despite steroids and high oxygen supplementation. Patient was intubated 2/3/2021 and transferred to [facility]. Paralyzed high oxygen requirement, 2 pressors on board. WBC 2019  Creatinine is normal  Liver enzymes are normal    CURRENT EVALUATION :  2/20/2021   Patient Vitals for the past 8 hrs:   BP Temp Temp src Pulse Resp SpO2 Weight   02/20/21 1139    103 21 (!) 80 %    02/20/21 1000 (!) 101/59   102 21 (!) 81 %    02/20/21 0900 (!) 107/59   103 21 (!) 80 %    02/20/21 0831    104 21 (!) 80 %    02/20/21 0800 112/61 101.3 °F (38.5 °C) Bladder 105 21 (!) 80 %    02/20/21 0700 116/62   107 21 (!) 80 %    02/20/21 0600 117/62   105 21 (!) 79 % 256 lb 13.4 oz (116.5 kg)   02/20/21 0500 115/61   104 21 (!) 79 %    02/20/21 0400 (!) 111/57   105 21 (!) 79 %        Patient evaluated and examined in the ICU. Remains febrile  BP stable     FiO2 remains at 100, PEEP of 16  Not responsive at this point,      Family has decide to transition to comfort care and plan terminal extubation later today. Will respect their wishes. Summary of relevant labs: 2/20/2021    Labs:  WBC 19-18-->14- 9 7 - 10 - 14 - 12.2 - 10 - 9.6 - 12.8    Cr 1.21-0.67    CRP 98  Ferritin 307  Fibrinogen 350      Micro:  Sputum culture 1-30-21 Normal aris    Blood:  · 2-4-21: No growth  · 1-19-21: No growth     Urine  2-4-21: Enterococcus faecalis     Covid +1/14    Imaging:  CXR 2/11  Otherwise, severe diffuse faint parenchymal and interstitial opacities   throughout both lungs unchanged.        Chest x-ray bilateral pulmonary changes with chronic changes  Ultrasound of the kidneys no hydronephrosis I have personally reviewed the past medical history, past surgical history, medications, social history, and family history, and I haveupdated the database accordingly. Allergies:   Patient has no known allergies. Review of Systems:     Review of Systems   Unable to perform ROS: Intubated       Physical Examination :       Physical Exam  Constitutional:       General: He is not in acute distress. Appearance: He is obese. He is ill-appearing. He is not diaphoretic. Interventions: He is sedated and intubated. HENT:      Head: Normocephalic and atraumatic. Nose: Nose normal.      Mouth/Throat:      Mouth: Mucous membranes are moist.   Eyes:      General: No scleral icterus. Conjunctiva/sclera: Conjunctivae normal.   Neck:      Musculoskeletal: Neck supple. No neck rigidity. Cardiovascular:      Rate and Rhythm: Normal rate and regular rhythm. Heart sounds: No murmur. No gallop. Pulmonary:      Effort: No respiratory distress. He is intubated. Breath sounds: No stridor. Rales present. No wheezing or rhonchi. Abdominal:      General: There is no distension. Palpations: Abdomen is soft. There is no mass. Hernia: No hernia is present. Genitourinary:     Comments: Urine is yusra  Musculoskeletal:         General: No swelling, tenderness or signs of injury. Right lower leg: No edema. Left lower leg: No edema. Skin:     Coloration: Skin is not jaundiced or pale. Findings: No bruising, erythema, lesion or rash.    Neurological:      Comments: Intubated, very weak in general   Psychiatric:      Comments: Intubated, folowed commands, appropriate         Past Medical History:     Past Medical History:   Diagnosis Date    Asthma     Hyperlipidemia     Hypertension     Type 2 diabetes mellitus without complication ([facility] [address]        Past Surgical  History:     Past Surgical History:   Procedure Laterality Date    HERNIA REPAIR Relationship status: Not on file    Intimate partner violence     Fear of current or ex partner: Not on file     Emotionally abused: Not on file     Physically abused: Not on file     Forced sexual activity: Not on file   Other Topics Concern    Not on file   Social History Narrative    Not on file       Family History:     Family History   Problem Relation Age of Onset    Stroke Mother     Heart Disease Father       Medical Decision Making:   I have independently reviewed/ordered the following labs:    CBC with Differential:   Recent Labs     02/19/21  0416 02/20/21  0754   WBC 12.8* 10.0   HGB 8.8* 8.5*   HCT 28.9* 29.1*    145   LYMPHOPCT 6* 8*   MONOPCT 4 6     BMP:  Recent Labs     02/19/21  0416 02/20/21  0754    136   K 4.8 5.2   CL 93* 90*   CO2 39* 38*   BUN 29* 40*   CREATININE 0.52* 0.66*     Hepatic Function Panel:   No results for input(s): PROT, LABALBU, BILIDIR, IBILI, BILITOT, ALKPHOS, ALT, AST in the last 72 hours. No results for input(s): RPR in the last 72 hours. No results for input(s): HIV in the last 72 hours. No results for input(s): BC in the last 72 hours. Lab Results   Component Value Date    CREATININE 0.66 02/20/2021    GLUCOSE 136 02/20/2021       Detailed results: Thank you for allowing us to participate in the care of this patient. Please call with questions. This note is created with the assistance of a speech recognition program.  While intending to generate adocument that actually reflects the content of the visit, the document can still have some errors including those of syntax and sound a like substitutions which may escape proof reading. It such instances, actual meaningcan be extrapolated by contextual diversion.     Reyes Shingles, MD  Office: [phone]  Perfect serve / office [phone]

## 2021-02-21 LAB
CULTURE: ABNORMAL
DIRECT EXAM: ABNORMAL
Lab: ABNORMAL
SPECIMEN DESCRIPTION: ABNORMAL

## 2021-02-21 NOTE — FLOWSHEET NOTE
Daughter and POA changed code status to [facility]. RT terminally extubated pt at 18 with family and RN at bedside. Pt declared  at [phone] by Dr. Karla Lucio. Life connections and  home notified. Body taken to [facility] at 1900.

## 2021-02-21 NOTE — DEATH NOTES
Death Pronouncement Note  Patient's Name: Lorenzo Jones   Patient's YOB: 1945  MRN Number: [id number]    Admitting Provider: Yamileth Luevano MD  Attending Provider: Homero Galvan MD    Patient was examined and the following were absent: Pulses, Blood Pressure and Respiratory effort    I declared the patient dead on  02/20/21   at .5864     Preliminary Cause of Death: resp failure d/t COVID-PNA    Electronically signed by Otto Lin DO on 2/20/21 at 7:04 PM EST

## 2021-02-23 ENCOUNTER — TELEPHONE (OUTPATIENT)
Dept: PULMONOLOGY | Age: 76
End: 2021-02-23

## 2021-02-24 NOTE — DISCHARGE SUMMARY
[address]     Department of Internal Medicine - Staff Internal Medicine Teaching Service    INPATIENT DISCHARGE SUMMARY      Patient Identification:  Shiela Stein is a 70 y.o. male. :  1945  MRN: [id number]     Acct: [de-identified]   PCP: Kush Gomez MD  Admit Date:  2021  Discharge date and time: 2021  7:00 PM   Attending Provider: No att. providers found                                     [address] Problem Lists:  Principal Problem:    Pneumonia due to COVID-19 virus  Active Problems:    Acute respiratory failure with hypoxia ([facility] [address]    Type 2 diabetes mellitus ([facility] [address]    Septic shock ([facility] [address]    Palliative care encounter    Goals of care, counseling/discussion    DNR (do not resuscitate) discussion    Class 1 obesity due to excess calories with serious comorbidity and body mass index (BMI) of 34.0 to 34.9 in adult  Resolved Problems:    * No resolved hospital problems. *      HOSPITAL STAY     Brief Inpatient course:   70 y. o. male admitted for  with complaints of worsening dyspnea and cough over the last 2 days. Sherrie Post explained that his symptoms began  with fever, malaise, myalgias, and cough.  He explained on  that he began with respiratory difficulty and he was tested and was positive. Due to his desaturations he was admitted and placed in the ICU on high flow and BiPAP.  Subsequently treated with 5 days of intravenous remdesivir  and completed methylprednisolone.  Rectory hypoxemia persists. Patient got intubated on 2/3/2021. Severe ARDS due to Covid 19 Pneumonia  Completed Remdesivir, Decadron 10 doses. COVID-19 symptoms started 2021, hospitalized  with BiPAP for 14 days and then transferred to [facility]. V's and intubated on 2/3/2021. Clinically no improvement even with proning inhaled nitro. Poor prognosis explained to the family, and palliative care followed. Nephrology initially followed for hypernatremia, later resolved. Neurologically occasionally followed commands with off sedation. However required paralytics and continued sedation most times due to ventilatory asynchrony. Patient was initially DNR CCA and after a week with poor prognosis, family meetings followed and they decided to withdraw support. Code status changed to DNR CC and extubated. Unfortunately patient diseased.     Procedures/ Significant Interventions:    Intubation with mechanical ventilation  Central line, later PICC line  NG tube  Art line  Alvarenga catheter    Consults:     Consults:     Final Specialist Recommendations/Findings:   IP CONSULT TO INFECTIOUS DISEASES  IP CONSULT TO NEPHROLOGY  IP CONSULT TO PALLIATIVE CARE      Any Hospital Acquired Infections: none    DISCHARGE PLAN     Disposition: Diseased      Note that over 30 minutes was spent in preparing discharge papers, discussing discharge with patient, medication review, etc.      Kt He MD, MD  Internal Medicine Resident, PGY-2  [facility]

## 2021-02-25 LAB
CULTURE: NORMAL
CULTURE: NORMAL
Lab: NORMAL
Lab: NORMAL
SPECIMEN DESCRIPTION: NORMAL
SPECIMEN DESCRIPTION: NORMAL

## 2022-03-04 NOTE — PLAN OF CARE
Problem: Skin Integrity:  Goal: Will show no infection signs and symptoms  Description: Will show no infection signs and symptoms  Outcome: Ongoing  Goal: Absence of new skin breakdown  Description: Absence of new skin breakdown  Outcome: Ongoing     Problem: OXYGENATION/RESPIRATORY FUNCTION  Goal: Patient will maintain patent airway  Outcome: Ongoing  Goal: Patient will achieve/maintain normal respiratory rate/effort  Description: Respiratory rate and effort will be within normal limits for the patient  Outcome: Ongoing     Problem: MECHANICAL VENTILATION  Goal: Patient will maintain patent airway  Outcome: Ongoing  Goal: Oral health is maintained or improved  Outcome: Ongoing  Goal: ET tube will be managed safely  Outcome: Ongoing  Goal: Mobility/activity is maintained at optimum level for patient  Outcome: Ongoing     Problem: SKIN INTEGRITY  Goal: Skin integrity is maintained or improved  Outcome: Ongoing LDR 7

## (undated) DEVICE — CANNULA ORAL NSL AD CO2 N INTUB O2 DEL DISP TRU LNK

## (undated) DEVICE — SOLUTION IV IRRIG POUR BRL 0.9% SODIUM CHL 2F7124

## (undated) DEVICE — MEDI-VAC NON-CONDUCTIVE TUBING7MM X 30.5 (100FT): Brand: CARDINAL HEALTH